# Patient Record
Sex: FEMALE | Race: WHITE | HISPANIC OR LATINO | Employment: FULL TIME | URBAN - METROPOLITAN AREA
[De-identification: names, ages, dates, MRNs, and addresses within clinical notes are randomized per-mention and may not be internally consistent; named-entity substitution may affect disease eponyms.]

---
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
## 2018-01-08 ENCOUNTER — OFFICE VISIT (OUTPATIENT)
Dept: NEUROLOGY | Facility: CLINIC | Age: 18
End: 2018-01-08
Payer: MEDICAID

## 2018-01-08 ENCOUNTER — TELEPHONE (OUTPATIENT)
Dept: NEUROLOGY | Facility: CLINIC | Age: 18
End: 2018-01-08

## 2018-01-08 VITALS
WEIGHT: 129.88 LBS | SYSTOLIC BLOOD PRESSURE: 103 MMHG | DIASTOLIC BLOOD PRESSURE: 70 MMHG | HEART RATE: 68 BPM | BODY MASS INDEX: 21.64 KG/M2 | HEIGHT: 65 IN

## 2018-01-08 DIAGNOSIS — G43.009 MIGRAINE WITHOUT AURA AND WITHOUT STATUS MIGRAINOSUS, NOT INTRACTABLE: ICD-10-CM

## 2018-01-08 DIAGNOSIS — R56.9 SEIZURE-LIKE ACTIVITY: ICD-10-CM

## 2018-01-08 DIAGNOSIS — R56.9 SEIZURES: Primary | ICD-10-CM

## 2018-01-08 PROCEDURE — 99214 OFFICE O/P EST MOD 30 MIN: CPT | Mod: PBBFAC,PO | Performed by: PSYCHIATRY & NEUROLOGY

## 2018-01-08 PROCEDURE — 99204 OFFICE O/P NEW MOD 45 MIN: CPT | Mod: S$PBB,,, | Performed by: PSYCHIATRY & NEUROLOGY

## 2018-01-08 PROCEDURE — 99999 PR PBB SHADOW E&M-EST. PATIENT-LVL IV: CPT | Mod: PBBFAC,,, | Performed by: PSYCHIATRY & NEUROLOGY

## 2018-01-08 NOTE — PROGRESS NOTES
[facility] NEUROLOGY  [facility] [place]    Date: 18  Patient Name: Vanessa Sinclair   MRN: [id number]   PCP: Tamra Arora  Referring Provider: No ref. provider found    Assessment:      This is Vanessa Sinclair, 17 y.o. female who presents ***       Plan:      Problem List Items Addressed This Visit     None             I completed education on seizure first aid and safety. I recommended seizure precautions with regards to avoiding unsupervised water recreational activity or bathing in tubs, climbing or working at heights, operation of heavy or dangerous machinery, caution around fire and sources of high heat, as well as any other activity which could put a patient at danger in case of a seizure.  I also reviewed the [facility] law and recommended ***.    As the patient is a female of childbearing age, I have counseled the patient on issues pertaining to pregnancy and epilepsy and recommended ***.    Odilon Brown MD  [facility]   Department of Neurology    Patient note was created using Dragon Dictation.  Any errors in syntax or even information may not have been identified and edited on initial review prior to signing this note.  Subjective:   Patient seen in consultation at the request of Dr. Shankar** for the evaluation of ***.  A copy of this note will be sent to the referring physician.        HPI:   Ms. Vanessa Sinclair is a 17 y.o. female who presents with a chief complaint of ***    Seizure Type: ***  Seizure Etiology:   Current AEDs:     The patient {is/is not:} accompanied by family who contribute to the history. This patient has *** types of seizure as described below. The patient reports having seizures for {gen duration 3:624997}. The patient reports to have {new/improving/stable/worsenin} seizure control. The seizure frequency is {frequency:3847914382}. The last seizure was *** . The patient reports {No/  **:07586} side effects from seizure  "medication.     Seizure Type 1: ***  Seizure Description:   Aura:   Associated Symptoms:  Seizure Frequency:  Last seizure:     Seizure Type 2: ***  Seizure Description:   Aura:   Associated Symptoms:  Seizure Frequency:  Last seizure:     Seizure Type 3: ***  Seizure Description:   Aura:   Associated Symptoms:  Seizure Frequency:  Last seizure:     Handedness: {handedness:581933}  Seizure Triggers/ Provoking Features: {Blank multiple:79441::"sleep deprivation","stress","photic stimulation","hyperventilation","missed medications","illness/medical problems","menses"}   Seizure Onset Age: ***  Seizure/ Epilepsy Risk Factors: {HW Epilepsy Risk Factors:64499}  Birth/Developmental History: {OMFS Birth History:7523218648} and {gen normal/delayed:196666} developmental history  Previous Seizure Medications: {prior AEDs:61190}  Other Treatments:  Episodes of Status:   Recent Med Changes:  Psychiatric/Behavioral Comorbitidies:  Surgical Candidacy:     Prior Studies:  EEG : ***  vEEG/ EMU evaluation:   MRI of brain:  Other studies:  AED levels:   CT/CTA Scan:   PET Scan:  Neuropsychological Evaluation:  DEXA Scan:    sz per month 2012 2013 2014 2015 2016 2017   Alvarez         Feb         Mar         Apr         May         Meet              Jul         Aug         Sep         Oct         Nov         Dec         Tot           PAST MEDICAL HISTORY:  Past Medical History:   Diagnosis Date    Asthma     Headache(784.0)     Seizures        PAST SURGICAL HISTORY:  Past Surgical History:   Procedure Laterality Date    WISDOM TOOTH EXTRACTION         CURRENT MEDS:  Current Outpatient Prescriptions   Medication Sig Dispense Refill    BLISOVI 24 FE 1 mg-20 mcg (24)/75 mg (4) per tablet Take 1 tablet by mouth once daily.      ergocalciferol (ERGOCALCIFEROL) 50,000 unit Cap Take 1 capsule (50,000 Units total) by mouth every 7 days. 12 capsule 3    ethosuximide (ZARONTIN) 250 mg Cap 2 capsules once daily 60 capsule 5    naproxen " (NAPROSYN) 500 MG tablet Take 1 tablet (500 mg total) by mouth 2 (two) times daily as needed (TAKE 1 PO BID PRN PAIN). 20 tablet 0    tizanidine (ZANAFLEX) 4 MG tablet Take 1 tablet (4 mg total) by mouth every 8 (eight) hours. Take 1 po q 8 hrs prn muscle spasm 15 tablet 0    venlafaxine (EFFEXOR) 25 MG Tab Take 1 tab PO q day x 3 ds, then take 1 tab bid. 60 tablet 3    zonisamide (ZONEGRAN) 100 MG Cap Take 4 capsules (400 mg total) by mouth once daily. 120 capsule 5     No current facility-administered medications for this visit.        ALLERGIES:  Review of patient's allergies indicates:   Allergen Reactions    Lamictal  [lamotrigine]      Other reaction(s): HIVES    Cefzil [cefprozil] Rash    Demerol [meperidine] Rash     Other reaction(s): Rash    Pcn [penicillins] Rash     Other reaction(s): Rash       FAMILY HISTORY:  Family History   Problem Relation Age of Onset    Stroke Maternal Grandfather     Heart disease Maternal Grandfather     Irregular heart beat Sister     Arthritis Paternal Grandmother        SOCIAL HISTORY:  Social History   Substance Use Topics    Smoking status: Never Smoker    Smokeless tobacco: Not on file    Alcohol use Not on file       Review of Systems:  12 review of systems is negative except for the symptoms mentioned in HPI.        Objective:   There were no vitals filed for this visit.    General: NAD, well nourished   Eyes: no tearing, discharge, no erythema   ENT: moist mucous membranes of the oral cavity, nares patent    Neck: Supple, Full range of motion  Cardiovascular: Warm and well perfused, pulses equal and symmetrical  Lungs: Normal work of breathing, normal chest wall excursions  Skin: No rash, lesions, or breakdown on exposed skin  Psychiatry: Mood and affect are appropriate   Abdomen: soft, non tender, non distended  Extremeties: No cyanosis, clubbing or edema.    Neurological   MENTAL STATUS: Alert and oriented to person, place, and time. Attention and  concentration within normal limits. Speech without dysarthria, able to name and repeat without difficulty. Recent and remote memory within normal limits   CRANIAL NERVES: Visual fields intact. PERRL. EOMI. Facial sensation intact. Face symmetrical. Hearing grossly intact. Full shoulder shrug bilaterally. Tongue protrudes midline   SENSORY: Sensation is intact to {sensation:14929} throughout.  Joint position perception intact. Negative Romberg.   MOTOR: Normal bulk and tone. No pronator drift.  5/5 deltoid, biceps, triceps, interosseous, hand  bilaterally. 5/5 iliopsoas, knee extension/flexion, foot dorsi/plantarflexion bilaterally.    REFLEXES: Symmetric and 2+ throughout. Toes down going bilaterally.   CEREBELLAR/COORDINATION/GAIT: Gait steady with normal arm swing and stride length.  Heel to shin intact. Finger to nose intact. Normal rapid alternating movements. ***

## 2018-01-08 NOTE — PATIENT INSTRUCTIONS
Electroencephalography (EEG)    Electroencephalography (EEG) is a test that measures your brain wave activity. It is used to assess your brain function. Brain cells (or neurons) communicate by producing electrical signals. These signals are measured by the EEG and any abnormalities are detected.  The EEG is safe and painless.  What is EEG used for?  Your doctor may order this test to check for seizures or other brain problems. For this test, several small metal disks (electrodes) are attached to the scalp with adhesives, or with water-based gel or paste. During the test, wavy lines (waveforms) appear on a screen or on paper. They will be studied to assess your brain function. In some people who are prone to seizures, parts of this test may slightly increase their chance of having a seizure. Sometimes it is necessary to repeat an EEG with sleep deprivation. EEG may be performed in a doctor's office or a hospital lab. The test typically takes less than an h our, although much of the time is spent attaching the electrodes. Sometimes, the electrodes are left on for several hours or days so that the EEG test can record brain waves for a longer periods of time. In these cases, you may need to stay in the hospital or can go home with a portable EEG recorder.  Before your test  Prepare for your test as instructed. Wash and dry your hair. But, don't use any hairstyling products. Your scalp and hair should be clean and free of excess oil. Take your routine medications, unless told not to. You may be asked to sleep during the EEG. To help you do this, you may be told to stay up all or part of the night before the test. Or, you may be given medication to help you sleep during the test. If so, someone will need to drive you home after the test. Your test will take about 60 minutes. Arrive with enough time to check in.  My next appointment is:  ______________________________   For your safety and for the success of your test,  tell the technologist about:  · Any medications or herbs you take  · Any seizures you may have had in the past   Date Last Reviewed: 10/19/2015  © 4573-8734 [employer]. [address] [place] [address], [place] [address]. All rights reserved. This information is not intended as a substitute for professional medical care. Always follow your healthcare professional's instructions.

## 2018-01-08 NOTE — TELEPHONE ENCOUNTER
Spoke to Johanna, pts mother, and scheduled EMU 2/9 per Dr Brown. Will contact pt and mother prior to with update concerning admission. Mother verbalized understanding.

## 2018-01-08 NOTE — PROGRESS NOTES
[facility] NEUROLOGY  [facility] [place]    Date: 1/8/18  Patient Name: Vanessa Sinclair   MRN: [id number]   PCP: Tamra Arora  Referring Provider: No ref. provider found    Assessment:      This is Vanessa Sinclair, 17 y.o. female who presents with seizure-like episodes.  Per history, the patient's events do not appear expressly consistent with seizure.  While treated as epilepsy.  He has continued to have events despite increasing doses of 2 antiepileptic drugs.  Per history, the episodes sound more consistent with vasovagal syncope, cataplexy, or potentially psychogenic events , particularly given the patient's numerous normal EEGs including a normal 72 hour study (that did not capture a typical event).  Favor admission epilepsy monitoring unit to capture typical events and for possible taper off of antiepileptic therapy. Will have patient hold AEDs on day prior to admission and perform lab draws and expose to other event triggers while in house. May consider tilt table study.      Plan:       Problem List Items Addressed This Visit        Neuro    Seizure-like activity    Current Assessment & Plan     -- arranging EMU admission         Migraine headache    Current Assessment & Plan     -- has recently started Effexor for prophylaxsis, will continue             I completed education on seizure first aid and safety. I recommended seizure precautions with regards to avoiding unsupervised water recreational activity or bathing in tubs, climbing or working at heights, operation of heavy or dangerous machinery, caution around fire and sources of high heat, as well as any other activity which could put a patient at danger in case of a seizure.  I also reviewed the [facility] law and recommended that the patient not drive for 6 months following a breakthrough event.    As the patient is a female of childbearing age, I have counseled the patient on issues pertaining to pregnancy and epilepsy and  "recommended that she take 4 mg folic acid daily while on AED therapy.    I spent a total of 50 minutes in face to face time with the patient, over half of which was spent on counseling and education about the patient's diagnosis and medications.     Odilon Brown MD  [facility]   Department of Neurology    Patient note was created using Dragon Dictation.  Any errors in syntax or even information may not have been identified and edited on initial review prior to signing this note.  Subjective:   Patient seen in consultation at the request of Dr. Bret Brown for the evaluation of reported seizures.  A copy of this note will be sent to the referring physician.      HPI:   Ms. Vanessa Sinclair is a 17 y.o. female who presents with a chief complaint of a long-standing reported seizure disorder.  The patient presents today with her mother who contributes to the history.  The patient reports that she began experiencing seizures at approximately age 8.  She states that she was at a birthday party when a little boy fell and "cracked his head open" on a video game machine.  The patient and her mother state that the scene was very grew some and there was "blood everywhere".  The patient abruptly lost consciousness and frontal to the ground before jerking slightly.  Her mother states that the patient's mouth appeared to go to "one-sided".  Together they deny any associated tongue biting or incontinence and states the patient rapidly regained consciousness within the course of a few minutes, but states that she was fatigued after the episode.      Since that time, she has experienced episodes approximately monthly almost triggered by seeing blood or injuries, having her blood drawn or getting injections, violent movies, or periods of high stress or excitement.  The patient states that she gets a prodrome of feeling lightheaded and "like she is going to pass out" before losing consciousness.  Her mother states " "that occasionally, the jerking that accompanies the episode is quite pronounced.  She is never unconscious for more than a few minutes at a time before fully returning to baseline, aside from feeling tired. She states her last episode occurred in May 2017 during her graduation when she became extremely nervous.    She has been followed and extensively worked up by Dr. Pino of [facility], who has performed multiple routine EEGs as well as a 72 hour ambulatory EEG.  All of the studies were normal.  However, she has never had an event captured while on EEG.  She has been maintained on increasing doses of ethosuximide and Zonegran, which her mother states have been escalated over the years.  The patient and her mother note that the meds make her feel "drugged" and have caused her performance in school to deteriorate.  The patient states that she always feels as though she is working "in a daze."  She is currently attending [employer].  She does state that she has seen a cardiologist in the past and was told that her evaluation with the cardiologist was normal.  She was told to increase her salt and fluid intake.    Seizure Type: Unknown  Seizure Etiology: Unknown  Current AEDs: Zonegran 400 mg daily, Ethosuxamide 500 mg daily    The patient is accompanied by family who contribute to the history. This patient has 1 types of seizure as described below. The patient reports having seizures for years. The patient reports to have stable seizure control. The seizure frequency is rarely currently. The last seizure was May 2017 . The patient reports overse side effects from seizure medication.    Seizure Type 1:   Seizure Description: Sees blood or a cut or an action movie, gets anxious, stressed, or excited, feels lightheaded and "like she's going to pass out" before losing consciousness, jerking all over, her mouth "goes to one side"   Aura: Feels "like she's going to pass out"  Associated Symptoms: " No tongue biting or incontinence  Seizure Frequency: None since May 2017, prior to that 1 per month  Last seizure: May 2017    Seizure Triggers/ Provoking Features: seeing blood/violence/injuries and stress   Seizure Onset Age: 8  Seizure/ Epilepsy Risk Factors: Childhood seizure  Birth/Developmental History: Normal birth and delayed developmental history  Previous Seizure Medications: Ethosuxamide, Zonegran  Other Treatments: None  Episodes of Status: None  Recent Med Changes: None  Psychiatric/Behavioral Comorbitidies: Depression  Surgical Candidacy: n/a    Prior Studies:  EEG : , 4/15, 10/15, , 3/17- WNL,   Ambulatory EE/17- 72 hours, WNL, no events captured  vEEG/ EMU evaluation: Not done  MRI of brain:Normal  (personally reviewed)  Other studies: Not done  AED levels: Zonegran ,   CT/CTA Scan:  Not done  PET Scan: Not done  Neuropsychological Evaluation: Not done  DEXA Scan: Not done     PAST MEDICAL HISTORY:  Past Medical History:   Diagnosis Date    Asthma     Headache(784.0)     Seizures      PAST SURGICAL HISTORY:  Past Surgical History:   Procedure Laterality Date    WISDOM TOOTH EXTRACTION       CURRENT MEDS:  Current Outpatient Prescriptions   Medication Sig Dispense Refill    BLISOVI 24 FE 1 mg-20 mcg (24)/75 mg (4) per tablet Take 1 tablet by mouth once daily.      ergocalciferol (ERGOCALCIFEROL) 50,000 unit Cap Take 1 capsule (50,000 Units total) by mouth every 7 days. 12 capsule 3    ethosuximide (ZARONTIN) 250 mg Cap 2 capsules once daily 60 capsule 5    naproxen (NAPROSYN) 500 MG tablet Take 1 tablet (500 mg total) by mouth 2 (two) times daily as needed (TAKE 1 PO BID PRN PAIN). 20 tablet 0    tizanidine (ZANAFLEX) 4 MG tablet Take 1 tablet (4 mg total) by mouth every 8 (eight) hours. Take 1 po q 8 hrs prn muscle spasm 15 tablet 0    venlafaxine (EFFEXOR) 25 MG Tab Take 1 tab PO q day x 3 ds, then take 1 tab bid. 60 tablet 3    zonisamide (ZONEGRAN) 100 MG Cap Take  4 capsules (400 mg total) by mouth once daily. 120 capsule 5     No current facility-administered medications for this visit.      ALLERGIES:  Review of patient's allergies indicates:   Allergen Reactions    Lamictal  [lamotrigine]      Other reaction(s): HIVES    Cefzil [cefprozil] Rash    Demerol [meperidine] Rash     Other reaction(s): Rash    Pcn [penicillins] Rash     Other reaction(s): Rash     FAMILY HISTORY:  Family History   Problem Relation Age of Onset    Stroke Maternal Grandfather     Heart disease Maternal Grandfather     Irregular heart beat Sister     Arthritis Paternal Grandmother        SOCIAL HISTORY:  Social History   Substance Use Topics    Smoking status: Never Smoker    Smokeless tobacco: Not on file    Alcohol use Not on file       Review of Systems:  12 review of systems is negative except for the symptoms mentioned in HPI.        Objective:     Vitals:    01/08/18 1026   BP: 103/70   Pulse: 68     General: NAD, well nourished   Eyes: no tearing, discharge, no erythema   ENT: moist mucous membranes of the oral cavity, nares patent    Neck: Supple, Full range of motion  Cardiovascular: Warm and well perfused, pulses equal and symmetrical  Lungs: Normal work of breathing, normal chest wall excursions  Skin: No rash, lesions, or breakdown on exposed skin  Psychiatry: Mood and affect are appropriate   Abdomen: soft, non tender, non distended  Extremeties: No cyanosis, clubbing or edema.    Neurological   MENTAL STATUS: Alert and oriented to person, place, and time. Attention and concentration within normal limits. Speech without dysarthria, able to name and repeat without difficulty. Recent and remote memory within normal limits   CRANIAL NERVES: Visual fields intact. PERRL. EOMI. Facial sensation intact. Face symmetrical. Hearing grossly intact. Full shoulder shrug bilaterally. Tongue protrudes midline   SENSORY: Sensation is intact to light touch throughout.    MOTOR: Normal bulk  and tone. 5/5 deltoid, biceps, triceps, interosseous, hand  bilaterally. 5/5 iliopsoas, knee extension/flexion, foot dorsi/plantarflexion bilaterally.    REFLEXES: Symmetric and 2+ throughout.   CEREBELLAR/COORDINATION/GAIT: Gait steady with normal arm swing and stride length.  Finger to nose intact. Normal rapid alternating movements.

## 2018-01-08 NOTE — TELEPHONE ENCOUNTER
----- Message from Odilon Brown MD sent at 1/8/2018 10:21 AM CST -----  Needs EMU Admit. Syncope vs. PNEE vs. Seizure. Contact is mom [phone]

## 2018-02-08 ENCOUNTER — TELEPHONE (OUTPATIENT)
Dept: NEUROLOGY | Facility: CLINIC | Age: 18
End: 2018-02-08

## 2018-02-08 NOTE — TELEPHONE ENCOUNTER
I spoke w/pt. She will be here for emu tomorrow. I emailed her the instructions w/my phone number any further questions or concerns

## 2018-02-09 ENCOUNTER — HOSPITAL ENCOUNTER (INPATIENT)
Facility: HOSPITAL | Age: 18
LOS: 3 days | Discharge: HOME | DRG: 884 | End: 2018-02-12
Attending: PSYCHIATRY & NEUROLOGY | Admitting: PSYCHIATRY & NEUROLOGY
Payer: MEDICAID

## 2018-02-09 DIAGNOSIS — R56.9 SEIZURES: ICD-10-CM

## 2018-02-09 DIAGNOSIS — R56.9 SEIZURE-LIKE ACTIVITY: Primary | ICD-10-CM

## 2018-02-09 DIAGNOSIS — G40.219 COMPLEX PARTIAL EPILEPSY WITH GENERALIZATION AND WITH INTRACTABLE EPILEPSY: ICD-10-CM

## 2018-02-09 LAB
ALBUMIN SERPL BCP-MCNC: 3.9 G/DL
ALP SERPL-CCNC: 57 U/L
ALT SERPL W/O P-5'-P-CCNC: 9 U/L
AMPHET+METHAMPHET UR QL: NEGATIVE
ANION GAP SERPL CALC-SCNC: 9 MMOL/L
AST SERPL-CCNC: 12 U/L
BARBITURATES UR QL SCN>200 NG/ML: NEGATIVE
BASOPHILS # BLD AUTO: 0.04 K/UL
BASOPHILS NFR BLD: 0.6 %
BENZODIAZ UR QL SCN>200 NG/ML: NEGATIVE
BILIRUB SERPL-MCNC: 0.1 MG/DL
BUN SERPL-MCNC: 12 MG/DL
BZE UR QL SCN: NEGATIVE
CALCIUM SERPL-MCNC: 9.3 MG/DL
CANNABINOIDS UR QL SCN: NEGATIVE
CHLORIDE SERPL-SCNC: 109 MMOL/L
CO2 SERPL-SCNC: 22 MMOL/L
CREAT SERPL-MCNC: 0.9 MG/DL
CREAT UR-MCNC: 64 MG/DL
DIFFERENTIAL METHOD: ABNORMAL
EOSINOPHIL # BLD AUTO: 0.1 K/UL
EOSINOPHIL NFR BLD: 1.4 %
ERYTHROCYTE [DISTWIDTH] IN BLOOD BY AUTOMATED COUNT: 12.7 %
EST. GFR  (AFRICAN AMERICAN): >60 ML/MIN/1.73 M^2
EST. GFR  (NON AFRICAN AMERICAN): >60 ML/MIN/1.73 M^2
GLUCOSE SERPL-MCNC: 84 MG/DL
HCT VFR BLD AUTO: 43.7 %
HGB BLD-MCNC: 14.5 G/DL
IMM GRANULOCYTES # BLD AUTO: 0.01 K/UL
IMM GRANULOCYTES NFR BLD AUTO: 0.2 %
LYMPHOCYTES # BLD AUTO: 2.6 K/UL
LYMPHOCYTES NFR BLD: 40.1 %
MCH RBC QN AUTO: 31.2 PG
MCHC RBC AUTO-ENTMCNC: 33.2 G/DL
MCV RBC AUTO: 94 FL
METHADONE UR QL SCN>300 NG/ML: NEGATIVE
MONOCYTES # BLD AUTO: 0.5 K/UL
MONOCYTES NFR BLD: 8.1 %
NEUTROPHILS # BLD AUTO: 3.2 K/UL
NEUTROPHILS NFR BLD: 49.6 %
NRBC BLD-RTO: 0 /100 WBC
OPIATES UR QL SCN: NEGATIVE
PCP UR QL SCN>25 NG/ML: NEGATIVE
PLATELET # BLD AUTO: 213 K/UL
PMV BLD AUTO: 12.5 FL
POTASSIUM SERPL-SCNC: 3.7 MMOL/L
PROT SERPL-MCNC: 7.1 G/DL
RBC # BLD AUTO: 4.65 M/UL
SODIUM SERPL-SCNC: 140 MMOL/L
TOXICOLOGY INFORMATION: NORMAL
WBC # BLD AUTO: 6.39 K/UL

## 2018-02-09 PROCEDURE — 80168 ASSAY OF ETHOSUXIMIDE: CPT

## 2018-02-09 PROCEDURE — 95951 HC EEG MONITORING/VIDEO RECORD: CPT

## 2018-02-09 PROCEDURE — 36415 COLL VENOUS BLD VENIPUNCTURE: CPT

## 2018-02-09 PROCEDURE — 25000003 PHARM REV CODE 250: Performed by: PSYCHIATRY & NEUROLOGY

## 2018-02-09 PROCEDURE — 99223 1ST HOSP IP/OBS HIGH 75: CPT | Mod: ,,, | Performed by: PSYCHIATRY & NEUROLOGY

## 2018-02-09 PROCEDURE — 85025 COMPLETE CBC W/AUTO DIFF WBC: CPT

## 2018-02-09 PROCEDURE — 80053 COMPREHEN METABOLIC PANEL: CPT

## 2018-02-09 PROCEDURE — 80203 DRUG SCREEN QUANT ZONISAMIDE: CPT

## 2018-02-09 PROCEDURE — 80307 DRUG TEST PRSMV CHEM ANLYZR: CPT

## 2018-02-09 PROCEDURE — 20600001 HC STEP DOWN PRIVATE ROOM

## 2018-02-09 RX ORDER — DOCUSATE SODIUM 100 MG/1
100 CAPSULE, LIQUID FILLED ORAL 2 TIMES DAILY
Status: DISCONTINUED | OUTPATIENT
Start: 2018-02-09 | End: 2018-02-09

## 2018-02-09 RX ORDER — SODIUM CHLORIDE 0.9 % (FLUSH) 0.9 %
3 SYRINGE (ML) INJECTION
Status: DISCONTINUED | OUTPATIENT
Start: 2018-02-09 | End: 2018-02-12 | Stop reason: HOSPADM

## 2018-02-09 RX ORDER — VENLAFAXINE 25 MG/1
25 TABLET ORAL 2 TIMES DAILY
Status: DISCONTINUED | OUTPATIENT
Start: 2018-02-09 | End: 2018-02-12 | Stop reason: HOSPADM

## 2018-02-09 RX ORDER — ONDANSETRON 8 MG/1
8 TABLET, ORALLY DISINTEGRATING ORAL EVERY 8 HOURS PRN
Status: DISCONTINUED | OUTPATIENT
Start: 2018-02-09 | End: 2018-02-12 | Stop reason: HOSPADM

## 2018-02-09 RX ORDER — DIAZEPAM 5 MG/ML
5 INJECTION, SOLUTION INTRAMUSCULAR; INTRAVENOUS
Status: DISCONTINUED | OUTPATIENT
Start: 2018-02-09 | End: 2018-02-12 | Stop reason: HOSPADM

## 2018-02-09 RX ORDER — ACETAMINOPHEN 325 MG/1
650 TABLET ORAL EVERY 4 HOURS PRN
Status: DISCONTINUED | OUTPATIENT
Start: 2018-02-09 | End: 2018-02-12 | Stop reason: HOSPADM

## 2018-02-09 RX ORDER — TIZANIDINE 2 MG/1
4 TABLET ORAL EVERY 8 HOURS PRN
Status: DISCONTINUED | OUTPATIENT
Start: 2018-02-09 | End: 2018-02-12 | Stop reason: HOSPADM

## 2018-02-09 RX ADMIN — VENLAFAXINE 25 MG: 25 TABLET ORAL at 09:02

## 2018-02-09 NOTE — SUBJECTIVE & OBJECTIVE
Past Medical History:   Diagnosis Date    Asthma     Headache(784.0)     Seizures        Past Surgical History:   Procedure Laterality Date    WISDOM TOOTH EXTRACTION         Review of patient's allergies indicates:   Allergen Reactions    Lamictal  [lamotrigine]      Other reaction(s): HIVES    Cefzil [cefprozil] Rash    Demerol [meperidine] Rash     Other reaction(s): Rash    Pcn [penicillins] Rash     Other reaction(s): Rash       No current facility-administered medications on file prior to encounter.      Current Outpatient Prescriptions on File Prior to Encounter   Medication Sig    BLISOVI 24 FE 1 mg-20 mcg (24)/75 mg (4) per tablet Take 1 tablet by mouth once daily.    ergocalciferol (ERGOCALCIFEROL) 50,000 unit Cap Take 1 capsule (50,000 Units total) by mouth every 7 days.    ethosuximide (ZARONTIN) 250 mg Cap 2 capsules once daily    naproxen (NAPROSYN) 500 MG tablet Take 1 tablet (500 mg total) by mouth 2 (two) times daily as needed (TAKE 1 PO BID PRN PAIN).    tizanidine (ZANAFLEX) 4 MG tablet Take 1 tablet (4 mg total) by mouth every 8 (eight) hours. Take 1 po q 8 hrs prn muscle spasm    venlafaxine (EFFEXOR) 25 MG Tab Take 1 tab PO q day x 3 ds, then take 1 tab bid.    zonisamide (ZONEGRAN) 100 MG Cap Take 4 capsules (400 mg total) by mouth once daily.     Continuous Infusions:    Family History     Problem Relation (Age of Onset)    Arthritis Paternal Grandmother    Heart disease Maternal Grandfather    Irregular heart beat Sister    Stroke Maternal Grandfather        Social History Main Topics    Smoking status: Never Smoker    Smokeless tobacco: Not on file    Alcohol use Not on file    Drug use: Unknown    Sexual activity: Not on file     Review of Systems   Constitutional: Negative for activity change, appetite change, fatigue and fever.   HENT: Negative for ear discharge, ear pain, postnasal drip, sinus pain and sinus pressure.    Eyes: Negative for pain, discharge and  itching.   Respiratory: Negative for choking, chest tightness and shortness of breath.    Cardiovascular: Negative for chest pain and palpitations.   Gastrointestinal: Negative for abdominal distention, abdominal pain, constipation, diarrhea and nausea.   Endocrine: Negative for cold intolerance and heat intolerance.   Genitourinary: Negative for frequency and urgency.   Musculoskeletal: Negative for back pain, gait problem and joint swelling.   Skin: Negative for color change.   Neurological: Positive for seizures. Negative for dizziness, tremors, facial asymmetry, speech difficulty, weakness and numbness.   Psychiatric/Behavioral: Negative for agitation, behavioral problems and confusion.     Objective:     Vital Signs (Most Recent):  Temp: 97.9 °F (36.6 °C) (02/09/18 1515)  Pulse: 62 (02/09/18 1515)  Resp: 20 (02/09/18 1515)  BP: 115/79 (02/09/18 1515)  SpO2: 100 % (02/09/18 1515) Vital Signs (24h Range):  Temp:  [97.9 °F (36.6 °C)] 97.9 °F (36.6 °C)  Pulse:  [62] 62  Resp:  [20] 20  SpO2:  [100 %] 100 %  BP: (115)/(79) 115/79        There is no height or weight on file to calculate BMI.    Physical Exam   Constitutional: She is oriented to person, place, and time. She appears well-developed and well-nourished.   HENT:   Head: Normocephalic and atraumatic.   Eyes: EOM are normal. Pupils are equal, round, and reactive to light.   Neck: Normal range of motion. Neck supple.   Cardiovascular: Normal rate, regular rhythm and normal heart sounds.    Pulmonary/Chest: Effort normal and breath sounds normal.   Abdominal: Soft. Bowel sounds are normal.   Musculoskeletal: Normal range of motion.   Neurological: She is oriented to person, place, and time. She has a normal Finger-Nose-Finger Test and a normal Heel to Shin Test. Gait normal.   Reflex Scores:       Tricep reflexes are 2+ on the right side and 2+ on the left side.       Bicep reflexes are 2+ on the right side and 2+ on the left side.       Brachioradialis  reflexes are 2+ on the right side and 2+ on the left side.       Patellar reflexes are 2+ on the right side and 2+ on the left side.       Achilles reflexes are 2+ on the right side and 2+ on the left side.  Skin: Skin is warm.   Psychiatric: She has a normal mood and affect. Her speech is normal and behavior is normal.       NEUROLOGICAL EXAMINATION:     MENTAL STATUS   Oriented to person, place, and time.   Attention: normal. Concentration: normal.   Speech: speech is normal   Level of consciousness: alert  Knowledge: good.     CRANIAL NERVES     CN II   Visual fields full to confrontation.     CN III, IV, VI   Pupils are equal, round, and reactive to light.  Extraocular motions are normal.   CN III: no CN III palsy  CN VI: no CN VI palsy  Nystagmus: none   Diplopia: none  Ophthalmoparesis: none    CN V   Facial sensation intact.     CN VII   Facial expression full, symmetric.     CN VIII   CN VIII normal.     CN IX, X   CN IX normal.   CN X normal.     CN XI   CN XI normal.     CN XII   CN XII normal.     MOTOR EXAM   Muscle bulk: normal  Overall muscle tone: normal    Strength   Right neck flexion: 5/5  Left neck flexion: 5/5  Right neck extension: 5/5  Left neck extension: 5/5  Right deltoid: 5/5  Left deltoid: 5/5  Right biceps: 5/5  Left biceps: 5/5  Right triceps: 5/5  Left triceps: 5/5  Right wrist flexion: 5/5  Left wrist flexion: 5/5  Right wrist extension: 5/5  Left wrist extension: 5/5  Right interossei: 5/5  Left interossei: 5/5  Right abdominals: 5/5  Left abdominals: 5/5  Right iliopsoas: 5/5  Left iliopsoas: 5/5  Right quadriceps: 5/5  Left quadriceps: 5/5  Right hamstrin/5  Left hamstrin/5  Right glutei: 5/5  Left glutei: 5/5  Right anterior tibial: 5/5  Left anterior tibial: 5/5  Right posterior tibial: 5/5  Left posterior tibial: 5/5  Right peroneal: 5/5  Left peroneal: 5/5  Right gastroc: 5/5  Left gastroc: 5/5    REFLEXES     Reflexes   Right brachioradialis: 2+  Left brachioradialis:  2+  Right biceps: 2+  Left biceps: 2+  Right triceps: 2+  Left triceps: 2+  Right patellar: 2+  Left patellar: 2+  Right achilles: 2+  Left achilles: 2+  Right plantar: normal  Left plantar: normal    SENSORY EXAM   Light touch normal.   Proprioception normal.   Pinprick normal.     GAIT AND COORDINATION     Gait  Gait: normal     Coordination   Finger to nose coordination: normal  Heel to shin coordination: normal      Significant Labs: CBC: No results for input(s): WBC, HGB, HCT, PLT in the last 48 hours.  CMP: No results for input(s): GLU, NA, K, CL, CO2, BUN, CREATININE, CALCIUM, MG, PROT, ALBUMIN, BILITOT, ALKPHOS, AST, ALT, ANIONGAP, EGFRNONAA in the last 48 hours.    Significant Studies: I have reviewed and interpreted all pertinent imaging results/findings within the past 24 hours.

## 2018-02-09 NOTE — HOSPITAL COURSE
2/9-10/2018:  Normal EEG, transient unresponsiveness  2/10-11/2018:  Normal EEG, no events  2/11/2018: No events in last 24 hours: EEG WNL.

## 2018-02-09 NOTE — H&P
"[facility]  Neurology-Epilepsy  History & Physical    Patient Name: Vanessa Sinclair  MRN: [id number]   Admission Date: 2/9/2018  Code Status: Full Code   Attending Provider: Matthew Carrasco MD   Primary Care Physician: Tamra Arora MD  Principal Problem:<principal problem not specified>    Subjective:     Chief Complaint:  Seizure like activity      HPI:   Interval History:  She reported starring spells every day. Last generalized convulsions was in May 2017.     HPI:   Patient seen in consultation at the request of Dr. Bret Brown for the evaluation of reported seizures.  A copy of this note will be sent to the referring physician.   HPI:   Ms. Vanessa Sinclair is a 17 y.o. female who presents with a chief complaint of a long-standing reported seizure disorder.  The patient presents today with her mother who contributes to the history.  The patient reports that she began experiencing seizures at approximately age 8.  She states that she was at a birthday party when a little boy fell and "cracked his head open" on a video game machine.  The patient and her mother state that the scene was very grew some and there was "blood everywhere".  The patient abruptly lost consciousness and frontal to the ground before jerking slightly.  Her mother states that the patient's mouth appeared to go to "one-sided".  Together they deny any associated tongue biting or incontinence and states the patient rapidly regained consciousness within the course of a few minutes, but states that she was fatigued after the episode.       Since that time, she has experienced episodes approximately monthly almost triggered by seeing blood or injuries, having her blood drawn or getting injections, violent movies, or periods of high stress or excitement.  The patient states that she gets a prodrome of feeling lightheaded and "like she is going to pass out" before losing consciousness.  Her mother states that " "occasionally, the jerking that accompanies the episode is quite pronounced.  She is never unconscious for more than a few minutes at a time before fully returning to baseline, aside from feeling tired. She states her last episode occurred in May 2017 during her graduation when she became extremely nervous.     She has been followed and extensively worked up by Dr. Pino of [facility], who has performed multiple routine EEGs as well as a 72 hour ambulatory EEG.  All of the studies were normal.  However, she has never had an event captured while on EEG.  She has been maintained on increasing doses of ethosuximide and Zonegran, which her mother states have been escalated over the years.  The patient and her mother note that the meds make her feel "drugged" and have caused her performance in school to deteriorate.  The patient states that she always feels as though she is working "in a daze."  She is currently attending [employer].  She does state that she has seen a cardiologist in the past and was told that her evaluation with the cardiologist was normal.  She was told to increase her salt and fluid intake.     Seizure Type: Unknown  Seizure Etiology: Unknown  Current AEDs: Zonegran 400 mg daily, Ethosuxamide 500 mg daily     The patient is accompanied by family who contribute to the history. This patient has 1 types of seizure as described below. The patient reports having seizures for years. The patient reports to have stable seizure control. The seizure frequency is rarely currently. The last seizure was May 2017 . The patient reports overse side effects from seizure medication.     Seizure Type 1:   Seizure Description: Sees blood or a cut or an action movie, gets anxious, stressed, or excited, feels lightheaded and "like she's going to pass out" before losing consciousness, jerking all over, her mouth "goes to one side"   Aura: Feels "like she's going to pass out"  Associated Symptoms: " No tongue biting or incontinence  Seizure Frequency: None since May 2017, prior to that 1 per month  Last seizure: May 2017     Seizure Triggers/ Provoking Features: seeing blood/violence/injuries and stress   Seizure Onset Age: 8  Seizure/ Epilepsy Risk Factors: Childhood seizure  Birth/Developmental History: Normal birth and delayed developmental history  Previous Seizure Medications: Ethosuxamide, Zonegran  Other Treatments: None  Episodes of Status: None  Recent Med Changes: None  Psychiatric/Behavioral Comorbitidies: Depression  Surgical Candidacy: n/a     Prior Studies:  EEG : , 4/15, 10/15, , 3/17- WNL,   Ambulatory EE/17- 72 hours, WNL, no events captured  vEEG/ EMU evaluation: Not done  MRI of brain:Normal  (personally reviewed)  Other studies: Not done  AED levels: Zonegran ,   CT/CTA Scan:  Not done  PET Scan: Not done  Neuropsychological Evaluation: Not done  DEXA Scan: Not done    Past Medical History:   Diagnosis Date    Asthma     Headache(784.0)     Seizures        Past Surgical History:   Procedure Laterality Date    WISDOM TOOTH EXTRACTION         Review of patient's allergies indicates:   Allergen Reactions    Lamictal  [lamotrigine]      Other reaction(s): HIVES    Cefzil [cefprozil] Rash    Demerol [meperidine] Rash     Other reaction(s): Rash    Pcn [penicillins] Rash     Other reaction(s): Rash       No current facility-administered medications on file prior to encounter.      Current Outpatient Prescriptions on File Prior to Encounter   Medication Sig    BLISOVI 24 FE 1 mg-20 mcg (24)/75 mg (4) per tablet Take 1 tablet by mouth once daily.    ergocalciferol (ERGOCALCIFEROL) 50,000 unit Cap Take 1 capsule (50,000 Units total) by mouth every 7 days.    ethosuximide (ZARONTIN) 250 mg Cap 2 capsules once daily    naproxen (NAPROSYN) 500 MG tablet Take 1 tablet (500 mg total) by mouth 2 (two) times daily as needed (TAKE 1 PO BID PRN PAIN).    tizanidine  (ZANAFLEX) 4 MG tablet Take 1 tablet (4 mg total) by mouth every 8 (eight) hours. Take 1 po q 8 hrs prn muscle spasm    venlafaxine (EFFEXOR) 25 MG Tab Take 1 tab PO q day x 3 ds, then take 1 tab bid.    zonisamide (ZONEGRAN) 100 MG Cap Take 4 capsules (400 mg total) by mouth once daily.     Continuous Infusions:    Family History     Problem Relation (Age of Onset)    Arthritis Paternal Grandmother    Heart disease Maternal Grandfather    Irregular heart beat Sister    Stroke Maternal Grandfather        Social History Main Topics    Smoking status: Never Smoker    Smokeless tobacco: Not on file    Alcohol use Not on file    Drug use: Unknown    Sexual activity: Not on file     Review of Systems   Constitutional: Negative for activity change, appetite change, fatigue and fever.   HENT: Negative for ear discharge, ear pain, postnasal drip, sinus pain and sinus pressure.    Eyes: Negative for pain, discharge and itching.   Respiratory: Negative for choking, chest tightness and shortness of breath.    Cardiovascular: Negative for chest pain and palpitations.   Gastrointestinal: Negative for abdominal distention, abdominal pain, constipation, diarrhea and nausea.   Endocrine: Negative for cold intolerance and heat intolerance.   Genitourinary: Negative for frequency and urgency.   Musculoskeletal: Negative for back pain, gait problem and joint swelling.   Skin: Negative for color change.   Neurological: Positive for seizures. Negative for dizziness, tremors, facial asymmetry, speech difficulty, weakness and numbness.   Psychiatric/Behavioral: Negative for agitation, behavioral problems and confusion.     Objective:     Vital Signs (Most Recent):  Temp: 97.9 °F (36.6 °C) (02/09/18 1515)  Pulse: 62 (02/09/18 1515)  Resp: 20 (02/09/18 1515)  BP: 115/79 (02/09/18 1515)  SpO2: 100 % (02/09/18 1515) Vital Signs (24h Range):  Temp:  [97.9 °F (36.6 °C)] 97.9 °F (36.6 °C)  Pulse:  [62] 62  Resp:  [20] 20  SpO2:  [100 %]  100 %  BP: (115)/(79) 115/79        There is no height or weight on file to calculate BMI.    Physical Exam   Constitutional: She is oriented to person, place, and time. She appears well-developed and well-nourished.   HENT:   Head: Normocephalic and atraumatic.   Eyes: EOM are normal. Pupils are equal, round, and reactive to light.   Neck: Normal range of motion. Neck supple.   Cardiovascular: Normal rate, regular rhythm and normal heart sounds.    Pulmonary/Chest: Effort normal and breath sounds normal.   Abdominal: Soft. Bowel sounds are normal.   Musculoskeletal: Normal range of motion.   Neurological: She is oriented to person, place, and time. She has a normal Finger-Nose-Finger Test and a normal Heel to Shin Test. Gait normal.   Reflex Scores:       Tricep reflexes are 2+ on the right side and 2+ on the left side.       Bicep reflexes are 2+ on the right side and 2+ on the left side.       Brachioradialis reflexes are 2+ on the right side and 2+ on the left side.       Patellar reflexes are 2+ on the right side and 2+ on the left side.       Achilles reflexes are 2+ on the right side and 2+ on the left side.  Skin: Skin is warm.   Psychiatric: She has a normal mood and affect. Her speech is normal and behavior is normal.       NEUROLOGICAL EXAMINATION:     MENTAL STATUS   Oriented to person, place, and time.   Attention: normal. Concentration: normal.   Speech: speech is normal   Level of consciousness: alert  Knowledge: good.     CRANIAL NERVES     CN II   Visual fields full to confrontation.     CN III, IV, VI   Pupils are equal, round, and reactive to light.  Extraocular motions are normal.   CN III: no CN III palsy  CN VI: no CN VI palsy  Nystagmus: none   Diplopia: none  Ophthalmoparesis: none    CN V   Facial sensation intact.     CN VII   Facial expression full, symmetric.     CN VIII   CN VIII normal.     CN IX, X   CN IX normal.   CN X normal.     CN XI   CN XI normal.     CN XII   CN XII normal.      MOTOR EXAM   Muscle bulk: normal  Overall muscle tone: normal    Strength   Right neck flexion: 5/5  Left neck flexion: 5/5  Right neck extension: 5/5  Left neck extension: 5/5  Right deltoid: 5/5  Left deltoid: 5/5  Right biceps: 5/5  Left biceps: 5/5  Right triceps: 5/5  Left triceps: 5/5  Right wrist flexion: 5/5  Left wrist flexion: 5/5  Right wrist extension: 5/5  Left wrist extension: 5/5  Right interossei: 5/5  Left interossei: 5/5  Right abdominals: 5/5  Left abdominals: 5/5  Right iliopsoas: 5/5  Left iliopsoas: 5/5  Right quadriceps: 5/5  Left quadriceps: 5/5  Right hamstrin/5  Left hamstrin/5  Right glutei: 5/5  Left glutei: 5/5  Right anterior tibial: 5/5  Left anterior tibial: 5/5  Right posterior tibial: 5/5  Left posterior tibial: 5/5  Right peroneal: 5/5  Left peroneal: 5/5  Right gastroc: 5/5  Left gastroc: 5/5    REFLEXES     Reflexes   Right brachioradialis: 2+  Left brachioradialis: 2+  Right biceps: 2+  Left biceps: 2+  Right triceps: 2+  Left triceps: 2+  Right patellar: 2+  Left patellar: 2+  Right achilles: 2+  Left achilles: 2+  Right plantar: normal  Left plantar: normal    SENSORY EXAM   Light touch normal.   Proprioception normal.   Pinprick normal.     GAIT AND COORDINATION     Gait  Gait: normal     Coordination   Finger to nose coordination: normal  Heel to shin coordination: normal      Significant Labs: CBC: No results for input(s): WBC, HGB, HCT, PLT in the last 48 hours.  CMP: No results for input(s): GLU, NA, K, CL, CO2, BUN, CREATININE, CALCIUM, MG, PROT, ALBUMIN, BILITOT, ALKPHOS, AST, ALT, ANIONGAP, EGFRNONAA in the last 48 hours.    Significant Studies: I have reviewed and interpreted all pertinent imaging results/findings within the past 24 hours.    Assessment and Plan:     Seizure-like activity    This is April Lorrie Sinclair, 17 y.o. female who presents with seizure-like episodes.  Per history, the patient's events do not appear expressly consistent with  seizure. Per history, the episodes sound more consistent with vasovagal syncope, cataplexy, or potentially psychogenic events , particularly given the patient's numerous normal EEGs including a normal 72 hour study (that did not capture a typical event). EMU admission to capture typical events and for possible taper off of antiepileptic therapy.     Plan:  - vEEG monitoring   - Hold all AEDs   - AEDs levels ordered   - May consider tilt table study.   - Consider valium 5 mg PRN only if its GTC > 5mins - hospital medicine to call epilepsy on call before administering   - Continue home medications   - Correct lytes as needed / control infection / avoid hypoxia or hypercapnia  - Case discussed with Dr Carrasco               VTE Risk Mitigation         Ordered     Low Risk of VTE  Once      02/09/18 0105          Gildardo Lambert II, MD  Neurology-Epilepsy  [facility]

## 2018-02-09 NOTE — ASSESSMENT & PLAN NOTE
This is Vanessa Sinclair, 17 y.o. female who presents with seizure-like episodes.  Per history, the patient's events do not appear expressly consistent with seizure. Per history, the episodes sound more consistent with vasovagal syncope, cataplexy, or potentially psychogenic events , particularly given the patient's numerous normal EEGs including a normal 72 hour study (that did not capture a typical event). EMU admission to capture typical events and for possible taper off of antiepileptic therapy.     Plan:  - vEEG monitoring   - Hold all AEDs   - AEDs levels ordered   - May consider tilt table study.   - Consider valium 5 mg PRN only if its GTC > 5mins - hospital medicine to call epilepsy on call before administering   - Continue home medications   - Correct lytes as needed / control infection / avoid hypoxia or hypercapnia  - Case discussed with Dr Carrasco

## 2018-02-09 NOTE — HPI
"Interval History:  She reported starring spells every day. Last generalized convulsions was in May 2017.     HPI:   Patient seen in consultation at the request of Dr. Bret Brown for the evaluation of reported seizures.  A copy of this note will be sent to the referring physician.   HPI:   Ms. Vanessa Sinclair is a 17 y.o. female who presents with a chief complaint of a long-standing reported seizure disorder.  The patient presents today with her mother who contributes to the history.  The patient reports that she began experiencing seizures at approximately age 8.  She states that she was at a birthday party when a little boy fell and "cracked his head open" on a video game machine.  The patient and her mother state that the scene was very grew some and there was "blood everywhere".  The patient abruptly lost consciousness and frontal to the ground before jerking slightly.  Her mother states that the patient's mouth appeared to go to "one-sided".  Together they deny any associated tongue biting or incontinence and states the patient rapidly regained consciousness within the course of a few minutes, but states that she was fatigued after the episode.       Since that time, she has experienced episodes approximately monthly almost triggered by seeing blood or injuries, having her blood drawn or getting injections, violent movies, or periods of high stress or excitement.  The patient states that she gets a prodrome of feeling lightheaded and "like she is going to pass out" before losing consciousness.  Her mother states that occasionally, the jerking that accompanies the episode is quite pronounced.  She is never unconscious for more than a few minutes at a time before fully returning to baseline, aside from feeling tired. She states her last episode occurred in May 2017 during her graduation when she became extremely nervous.     She has been followed and extensively worked up by Dr. Spann' of [facility] " "[facility], who has performed multiple routine EEGs as well as a 72 hour ambulatory EEG.  All of the studies were normal.  However, she has never had an event captured while on EEG.  She has been maintained on increasing doses of ethosuximide and Zonegran, which her mother states have been escalated over the years.  The patient and her mother note that the meds make her feel "drugged" and have caused her performance in school to deteriorate.  The patient states that she always feels as though she is working "in a daze."  She is currently attending [employer].  She does state that she has seen a cardiologist in the past and was told that her evaluation with the cardiologist was normal.  She was told to increase her salt and fluid intake.     Seizure Type: Unknown  Seizure Etiology: Unknown  Current AEDs: Zonegran 400 mg daily, Ethosuxamide 500 mg daily     The patient is accompanied by family who contribute to the history. This patient has 1 types of seizure as described below. The patient reports having seizures for years. The patient reports to have stable seizure control. The seizure frequency is rarely currently. The last seizure was May 2017 . The patient reports overse side effects from seizure medication.     Seizure Type 1:   Seizure Description: Sees blood or a cut or an action movie, gets anxious, stressed, or excited, feels lightheaded and "like she's going to pass out" before losing consciousness, jerking all over, her mouth "goes to one side"   Aura: Feels "like she's going to pass out"  Associated Symptoms: No tongue biting or incontinence  Seizure Frequency: None since May 2017, prior to that 1 per month  Last seizure: May 2017     Seizure Triggers/ Provoking Features: seeing blood/violence/injuries and stress   Seizure Onset Age: 8  Seizure/ Epilepsy Risk Factors: Childhood seizure  Birth/Developmental History: Normal birth and delayed developmental history  Previous Seizure Medications: " Ethosuxamide, Zonegran  Other Treatments: None  Episodes of Status: None  Recent Med Changes: None  Psychiatric/Behavioral Comorbitidies: Depression  Surgical Candidacy: n/a     Prior Studies:  EEG : , 4/15, 10/15, , 3/17- WNL,   Ambulatory EE/17- 72 hours, WNL, no events captured  vEEG/ EMU evaluation: Not done  MRI of brain:Normal  (personally reviewed)  Other studies: Not done  AED levels: Zonegran ,   CT/CTA Scan:  Not done  PET Scan: Not done  Neuropsychological Evaluation: Not done  DEXA Scan: Not done

## 2018-02-10 PROCEDURE — 99233 SBSQ HOSP IP/OBS HIGH 50: CPT | Mod: ,,, | Performed by: PSYCHIATRY & NEUROLOGY

## 2018-02-10 PROCEDURE — 20600001 HC STEP DOWN PRIVATE ROOM

## 2018-02-10 PROCEDURE — 95951 HC EEG MONITORING/VIDEO RECORD: CPT

## 2018-02-10 PROCEDURE — 25000003 PHARM REV CODE 250: Performed by: PSYCHIATRY & NEUROLOGY

## 2018-02-10 RX ADMIN — VENLAFAXINE 25 MG: 25 TABLET ORAL at 09:02

## 2018-02-10 RX ADMIN — VENLAFAXINE 25 MG: 25 TABLET ORAL at 08:02

## 2018-02-10 NOTE — PROGRESS NOTES
20g IV inserted, labs work collected and sent to core lab. Patient tolerated procedure well. Family at bedside.

## 2018-02-10 NOTE — PLAN OF CARE
Problem: Patient Care Overview  Goal: Plan of Care Review  Outcome: Ongoing (interventions implemented as appropriate)  No acute events throughout the day, VS and assessment per flow sheet, patient progressing towards goal as tolerated. New IV started per pt request. Plan of care reviewed with Vanessa Sinclair and family, all concerns addressed. Will continue to monitor.

## 2018-02-10 NOTE — PROGRESS NOTES
[facility]  Neurology-Epilepsy  Progress Note    Patient Name: Vanessa Sinclair  MRN: [id number]  Admission Date: 2/9/2018  Hospital Length of Stay: 1 days  Code Status: Full Code   Attending Provider: Matthew Carrasco MD  Primary Care Physician: Tamra Arora MD   Principal Problem:<principal problem not specified>    Subjective:     Hospital Course:   2/9-10/2018:  Normal EEG, transient unresponsiveness  2/10-11/2018:      Past Medical History:   Diagnosis Date    Asthma     Headache(784.0)     Seizures        Past Surgical History:   Procedure Laterality Date    WISDOM TOOTH EXTRACTION         Review of patient's allergies indicates:   Allergen Reactions    Lamictal  [lamotrigine]      Other reaction(s): HIVES    Cefzil [cefprozil] Rash    Demerol [meperidine] Rash     Other reaction(s): Rash    Pcn [penicillins] Rash     Other reaction(s): Rash       No current facility-administered medications on file prior to encounter.      Current Outpatient Prescriptions on File Prior to Encounter   Medication Sig    BLISOVI 24 FE 1 mg-20 mcg (24)/75 mg (4) per tablet Take 1 tablet by mouth once daily.    ergocalciferol (ERGOCALCIFEROL) 50,000 unit Cap Take 1 capsule (50,000 Units total) by mouth every 7 days.    ethosuximide (ZARONTIN) 250 mg Cap 2 capsules once daily    naproxen (NAPROSYN) 500 MG tablet Take 1 tablet (500 mg total) by mouth 2 (two) times daily as needed (TAKE 1 PO BID PRN PAIN).    tizanidine (ZANAFLEX) 4 MG tablet Take 1 tablet (4 mg total) by mouth every 8 (eight) hours. Take 1 po q 8 hrs prn muscle spasm    venlafaxine (EFFEXOR) 25 MG Tab Take 1 tab PO q day x 3 ds, then take 1 tab bid.    zonisamide (ZONEGRAN) 100 MG Cap Take 4 capsules (400 mg total) by mouth once daily.     Continuous Infusions:    Family History     Problem Relation (Age of Onset)    Arthritis Paternal Grandmother    Heart disease Maternal Grandfather    Irregular heart beat Sister    Stroke  Maternal Grandfather        Social History Main Topics    Smoking status: Never Smoker    Smokeless tobacco: Not on file    Alcohol use No    Drug use: Unknown    Sexual activity: No     Review of Systems   Constitutional: Negative for activity change, appetite change, fatigue and fever.   HENT: Negative for ear discharge, ear pain, postnasal drip, sinus pain and sinus pressure.    Eyes: Negative for pain, discharge and itching.   Respiratory: Negative for choking, chest tightness and shortness of breath.    Cardiovascular: Negative for chest pain and palpitations.   Gastrointestinal: Negative for abdominal distention, abdominal pain, constipation, diarrhea and nausea.   Endocrine: Negative for cold intolerance and heat intolerance.   Genitourinary: Negative for frequency and urgency.   Musculoskeletal: Negative for back pain, gait problem and joint swelling.   Skin: Negative for color change.   Neurological: Positive for seizures. Negative for dizziness, tremors, facial asymmetry, speech difficulty, weakness and numbness.   Psychiatric/Behavioral: Negative for agitation, behavioral problems and confusion.     Objective:     Vital Signs (Most Recent):  Temp: 97.1 °F (36.2 °C) (02/10/18 0701)  Pulse: 81 (02/10/18 1100)  Resp: 14 (02/10/18 0701)  BP: 108/76 (02/10/18 0701)  SpO2: 97 % (02/10/18 0701) Vital Signs (24h Range):  Temp:  [97.1 °F (36.2 °C)-98.3 °F (36.8 °C)] 97.1 °F (36.2 °C)  Pulse:  [60-81] 81  Resp:  [14-20] 14  SpO2:  [97 %-100 %] 97 %  BP: (106-115)/(68-79) 108/76     Weight: 59 kg (130 lb)  Body mass index is 21.63 kg/m².    Physical Exam   Constitutional: She is oriented to person, place, and time. She appears well-developed and well-nourished.   HENT:   Head: Normocephalic and atraumatic.   Eyes: EOM are normal. Pupils are equal, round, and reactive to light.   Neck: Normal range of motion. Neck supple.   Cardiovascular: Normal rate, regular rhythm and normal heart sounds.    Pulmonary/Chest:  Effort normal and breath sounds normal.   Abdominal: Soft. Bowel sounds are normal.   Musculoskeletal: Normal range of motion.   Neurological: She is oriented to person, place, and time. She has a normal Finger-Nose-Finger Test and a normal Heel to Shin Test. Gait normal.   Reflex Scores:       Tricep reflexes are 2+ on the right side and 2+ on the left side.       Bicep reflexes are 2+ on the right side and 2+ on the left side.       Brachioradialis reflexes are 2+ on the right side and 2+ on the left side.       Patellar reflexes are 2+ on the right side and 2+ on the left side.       Achilles reflexes are 2+ on the right side and 2+ on the left side.  Skin: Skin is warm.   Psychiatric: She has a normal mood and affect. Her speech is normal and behavior is normal.       NEUROLOGICAL EXAMINATION:     MENTAL STATUS   Oriented to person, place, and time.   Attention: normal. Concentration: normal.   Speech: speech is normal   Level of consciousness: alert  Knowledge: good.     CRANIAL NERVES     CN II   Visual fields full to confrontation.     CN III, IV, VI   Pupils are equal, round, and reactive to light.  Extraocular motions are normal.   CN III: no CN III palsy  CN VI: no CN VI palsy  Nystagmus: none   Diplopia: none  Ophthalmoparesis: none    CN V   Facial sensation intact.     CN VII   Facial expression full, symmetric.     CN VIII   CN VIII normal.     CN IX, X   CN IX normal.   CN X normal.     CN XI   CN XI normal.     CN XII   CN XII normal.     MOTOR EXAM   Muscle bulk: normal  Overall muscle tone: normal    Strength   Right neck flexion: 5/5  Left neck flexion: 5/5  Right neck extension: 5/5  Left neck extension: 5/5  Right deltoid: 5/5  Left deltoid: 5/5  Right biceps: 5/5  Left biceps: 5/5  Right triceps: 5/5  Left triceps: 5/5  Right wrist flexion: 5/5  Left wrist flexion: 5/5  Right wrist extension: 5/5  Left wrist extension: 5/5  Right interossei: 5/5  Left interossei: 5/5  Right abdominals:  5/5  Left abdominals: 5/5  Right iliopsoas: 5/5  Left iliopsoas: 5/5  Right quadriceps: 5/5  Left quadriceps: 5/5  Right hamstrin/5  Left hamstrin/5  Right glutei: 5/5  Left glutei: 5/5  Right anterior tibial: 5/5  Left anterior tibial: 5/5  Right posterior tibial: 5/5  Left posterior tibial: 5/5  Right peroneal: 5/5  Left peroneal: 5/5  Right gastroc: 5/5  Left gastroc: 5/5    REFLEXES     Reflexes   Right brachioradialis: 2+  Left brachioradialis: 2+  Right biceps: 2+  Left biceps: 2+  Right triceps: 2+  Left triceps: 2+  Right patellar: 2+  Left patellar: 2+  Right achilles: 2+  Left achilles: 2+  Right plantar: normal  Left plantar: normal    SENSORY EXAM   Light touch normal.   Proprioception normal.   Pinprick normal.     GAIT AND COORDINATION     Gait  Gait: normal     Coordination   Finger to nose coordination: normal  Heel to shin coordination: normal      Significant Labs: CBC:     Recent Labs  Lab 18   WBC 6.39   HGB 14.5   HCT 43.7        CMP:     Recent Labs  Lab 18   GLU 84      K 3.7      CO2 22*   BUN 12   CREATININE 0.9   CALCIUM 9.3   PROT 7.1   ALBUMIN 3.9   BILITOT 0.1   ALKPHOS 57   AST 12   ALT 9*   ANIONGAP 9   EGFRNONAA >60.0       Significant Studies: I have reviewed and interpreted all pertinent imaging results/findings within the past 24 hours.       INTERVAL HISTORY:  Transient unresponsiveness last night    Assessment and Plan:     Seizure-like activity    This is  Sinclair, 17 y.o. female who presents with seizure-like episodes.  Per history, the patient's events do not appear expressly consistent with seizure. Per history, the episodes sound more consistent with vasovagal syncope, cataplexy, or potentially psychogenic events , particularly given the patient's numerous normal EEGs including a normal 72 hour study (that did not capture a typical event). EMU admission to capture typical events and for possible taper off of  antiepileptic therapy.     Plan:  - vEEG monitoring   - Hold all AEDs   - AEDs levels ordered   - May consider tilt table study.   - Consider valium 5 mg PRN only if its GTC > 5mins - hospital medicine to call epilepsy on call before administering   - Continue home medications   - Correct lytes as needed / control infection / avoid hypoxia or hypercapnia              VTE Risk Mitigation         Ordered     Low Risk of VTE  Once      02/09/18 4014          Matthew Carrasco MD  Neurology-Epilepsy  [facility]

## 2018-02-10 NOTE — ASSESSMENT & PLAN NOTE
This is April Lorrie Sinclair, 17 y.o. female who presents with seizure-like episodes.  Per history, the patient's events do not appear expressly consistent with seizure. Per history, the episodes sound more consistent with vasovagal syncope, cataplexy, or potentially psychogenic events , particularly given the patient's numerous normal EEGs including a normal 72 hour study (that did not capture a typical event). EMU admission to capture typical events and for possible taper off of antiepileptic therapy.     Plan:  - vEEG monitoring   - Hold all AEDs   - AEDs levels ordered   - May consider tilt table study.   - Consider valium 5 mg PRN only if its GTC > 5mins - hospital medicine to call epilepsy on call before administering   - Continue home medications   - Correct lytes as needed / control infection / avoid hypoxia or hypercapnia

## 2018-02-10 NOTE — SUBJECTIVE & OBJECTIVE
Past Medical History:   Diagnosis Date    Asthma     Headache(784.0)     Seizures        Past Surgical History:   Procedure Laterality Date    WISDOM TOOTH EXTRACTION         Review of patient's allergies indicates:   Allergen Reactions    Lamictal  [lamotrigine]      Other reaction(s): HIVES    Cefzil [cefprozil] Rash    Demerol [meperidine] Rash     Other reaction(s): Rash    Pcn [penicillins] Rash     Other reaction(s): Rash       No current facility-administered medications on file prior to encounter.      Current Outpatient Prescriptions on File Prior to Encounter   Medication Sig    BLISOVI 24 FE 1 mg-20 mcg (24)/75 mg (4) per tablet Take 1 tablet by mouth once daily.    ergocalciferol (ERGOCALCIFEROL) 50,000 unit Cap Take 1 capsule (50,000 Units total) by mouth every 7 days.    ethosuximide (ZARONTIN) 250 mg Cap 2 capsules once daily    naproxen (NAPROSYN) 500 MG tablet Take 1 tablet (500 mg total) by mouth 2 (two) times daily as needed (TAKE 1 PO BID PRN PAIN).    tizanidine (ZANAFLEX) 4 MG tablet Take 1 tablet (4 mg total) by mouth every 8 (eight) hours. Take 1 po q 8 hrs prn muscle spasm    venlafaxine (EFFEXOR) 25 MG Tab Take 1 tab PO q day x 3 ds, then take 1 tab bid.    zonisamide (ZONEGRAN) 100 MG Cap Take 4 capsules (400 mg total) by mouth once daily.     Continuous Infusions:    Family History     Problem Relation (Age of Onset)    Arthritis Paternal Grandmother    Heart disease Maternal Grandfather    Irregular heart beat Sister    Stroke Maternal Grandfather        Social History Main Topics    Smoking status: Never Smoker    Smokeless tobacco: Not on file    Alcohol use No    Drug use: Unknown    Sexual activity: No     Review of Systems   Constitutional: Negative for activity change, appetite change, fatigue and fever.   HENT: Negative for ear discharge, ear pain, postnasal drip, sinus pain and sinus pressure.    Eyes: Negative for pain, discharge and itching.   Respiratory:  Negative for choking, chest tightness and shortness of breath.    Cardiovascular: Negative for chest pain and palpitations.   Gastrointestinal: Negative for abdominal distention, abdominal pain, constipation, diarrhea and nausea.   Endocrine: Negative for cold intolerance and heat intolerance.   Genitourinary: Negative for frequency and urgency.   Musculoskeletal: Negative for back pain, gait problem and joint swelling.   Skin: Negative for color change.   Neurological: Positive for seizures. Negative for dizziness, tremors, facial asymmetry, speech difficulty, weakness and numbness.   Psychiatric/Behavioral: Negative for agitation, behavioral problems and confusion.     Objective:     Vital Signs (Most Recent):  Temp: 97.1 °F (36.2 °C) (02/10/18 0701)  Pulse: 81 (02/10/18 1100)  Resp: 14 (02/10/18 0701)  BP: 108/76 (02/10/18 0701)  SpO2: 97 % (02/10/18 0701) Vital Signs (24h Range):  Temp:  [97.1 °F (36.2 °C)-98.3 °F (36.8 °C)] 97.1 °F (36.2 °C)  Pulse:  [60-81] 81  Resp:  [14-20] 14  SpO2:  [97 %-100 %] 97 %  BP: (106-115)/(68-79) 108/76     Weight: 59 kg (130 lb)  Body mass index is 21.63 kg/m².    Physical Exam   Constitutional: She is oriented to person, place, and time. She appears well-developed and well-nourished.   HENT:   Head: Normocephalic and atraumatic.   Eyes: EOM are normal. Pupils are equal, round, and reactive to light.   Neck: Normal range of motion. Neck supple.   Cardiovascular: Normal rate, regular rhythm and normal heart sounds.    Pulmonary/Chest: Effort normal and breath sounds normal.   Abdominal: Soft. Bowel sounds are normal.   Musculoskeletal: Normal range of motion.   Neurological: She is oriented to person, place, and time. She has a normal Finger-Nose-Finger Test and a normal Heel to Shin Test. Gait normal.   Reflex Scores:       Tricep reflexes are 2+ on the right side and 2+ on the left side.       Bicep reflexes are 2+ on the right side and 2+ on the left side.        Brachioradialis reflexes are 2+ on the right side and 2+ on the left side.       Patellar reflexes are 2+ on the right side and 2+ on the left side.       Achilles reflexes are 2+ on the right side and 2+ on the left side.  Skin: Skin is warm.   Psychiatric: She has a normal mood and affect. Her speech is normal and behavior is normal.       NEUROLOGICAL EXAMINATION:     MENTAL STATUS   Oriented to person, place, and time.   Attention: normal. Concentration: normal.   Speech: speech is normal   Level of consciousness: alert  Knowledge: good.     CRANIAL NERVES     CN II   Visual fields full to confrontation.     CN III, IV, VI   Pupils are equal, round, and reactive to light.  Extraocular motions are normal.   CN III: no CN III palsy  CN VI: no CN VI palsy  Nystagmus: none   Diplopia: none  Ophthalmoparesis: none    CN V   Facial sensation intact.     CN VII   Facial expression full, symmetric.     CN VIII   CN VIII normal.     CN IX, X   CN IX normal.   CN X normal.     CN XI   CN XI normal.     CN XII   CN XII normal.     MOTOR EXAM   Muscle bulk: normal  Overall muscle tone: normal    Strength   Right neck flexion: 5/5  Left neck flexion: 5/5  Right neck extension: 5/5  Left neck extension: 5/5  Right deltoid: 5/5  Left deltoid: 5/5  Right biceps: 5/5  Left biceps: 5/5  Right triceps: 5/5  Left triceps: 5/5  Right wrist flexion: 5/5  Left wrist flexion: 5/5  Right wrist extension: 5/5  Left wrist extension: 5/5  Right interossei: 5/5  Left interossei: 5/5  Right abdominals: 5/5  Left abdominals: 5/5  Right iliopsoas: 5/5  Left iliopsoas: 5/5  Right quadriceps: 5/5  Left quadriceps: 5/5  Right hamstrin/5  Left hamstrin/5  Right glutei: 5/5  Left glutei: 5/5  Right anterior tibial: 5/5  Left anterior tibial: 5/5  Right posterior tibial: 5/5  Left posterior tibial: 5/5  Right peroneal: 5/5  Left peroneal: 5/5  Right gastroc: 5/5  Left gastroc: 5/5    REFLEXES     Reflexes   Right brachioradialis: 2+  Left  brachioradialis: 2+  Right biceps: 2+  Left biceps: 2+  Right triceps: 2+  Left triceps: 2+  Right patellar: 2+  Left patellar: 2+  Right achilles: 2+  Left achilles: 2+  Right plantar: normal  Left plantar: normal    SENSORY EXAM   Light touch normal.   Proprioception normal.   Pinprick normal.     GAIT AND COORDINATION     Gait  Gait: normal     Coordination   Finger to nose coordination: normal  Heel to shin coordination: normal      Significant Labs: CBC:     Recent Labs  Lab 02/09/18 2010   WBC 6.39   HGB 14.5   HCT 43.7        CMP:     Recent Labs  Lab 02/09/18 2010   GLU 84      K 3.7      CO2 22*   BUN 12   CREATININE 0.9   CALCIUM 9.3   PROT 7.1   ALBUMIN 3.9   BILITOT 0.1   ALKPHOS 57   AST 12   ALT 9*   ANIONGAP 9   EGFRNONAA >60.0       Significant Studies: I have reviewed and interpreted all pertinent imaging results/findings within the past 24 hours.       INTERVAL HISTORY:  Transient unresponsiveness last night

## 2018-02-11 LAB
25(OH)D3+25(OH)D2 SERPL-MCNC: 21 NG/ML
VIT B12 SERPL-MCNC: 322 PG/ML

## 2018-02-11 PROCEDURE — 25000003 PHARM REV CODE 250: Performed by: PSYCHIATRY & NEUROLOGY

## 2018-02-11 PROCEDURE — 36415 COLL VENOUS BLD VENIPUNCTURE: CPT

## 2018-02-11 PROCEDURE — 82306 VITAMIN D 25 HYDROXY: CPT

## 2018-02-11 PROCEDURE — 95951 HC EEG MONITORING/VIDEO RECORD: CPT

## 2018-02-11 PROCEDURE — 95951 PR EEG MONITORING/VIDEORECORD: CPT | Mod: 26,,, | Performed by: PSYCHIATRY & NEUROLOGY

## 2018-02-11 PROCEDURE — 20600001 HC STEP DOWN PRIVATE ROOM

## 2018-02-11 PROCEDURE — 82607 VITAMIN B-12: CPT

## 2018-02-11 PROCEDURE — 99233 SBSQ HOSP IP/OBS HIGH 50: CPT | Mod: ,,, | Performed by: PSYCHIATRY & NEUROLOGY

## 2018-02-11 RX ADMIN — ACETAMINOPHEN 650 MG: 325 TABLET, FILM COATED ORAL at 11:02

## 2018-02-11 RX ADMIN — TIZANIDINE 4 MG: 2 TABLET ORAL at 11:02

## 2018-02-11 RX ADMIN — VENLAFAXINE 25 MG: 25 TABLET ORAL at 09:02

## 2018-02-11 NOTE — PLAN OF CARE
Problem: Patient Care Overview  Goal: Plan of Care Review  Outcome: Ongoing (interventions implemented as appropriate)  Pt vitals stable during shift. No acute events. POC reviewed with Pt and family. RN at  to monitor and answer all questions. See flowsheets for full assessment details.

## 2018-02-11 NOTE — PROCEDURES
EPILEPSY MONITORING UNIT  EEG/VIDEO TELEMETRY REPORT  DATE OF SERVICE: 2/9-11/2018  EEG NUMBER: EMU -1,2  REQUESTED BY:   LOCATION OF SERVICE:     METHODOLOGY      Electroencephalographic (EEG) is recorded with electrodes placed according to the International 10-20 placement system.  Thirty Two (32) channels of digital signal including the T1 and T2 electrodes are simultaneously recorded from the scalp and also including EKG, EMG  and/or eye movement monitors.  Recording band pass was 0.1 to 512 hz.  Digital video recording of the patient is simultaneously recorded with the EEG.  The patient is instructed report clinical symptoms which may occur during the recording session.  EEG and video recording is stored and archived in digital format. Activation procedures which include photic stimulation, hyperventilation and instructing patients to perform simple task are done in selected patients.       The EEG is displayed on a monitor screen and can be reformatted into different montages for evaluation.  The entire recoding is submitted for computer assisted analysis to detect spike and electrographic seizure activity.  The entire recording is visually reviewed and the times identified by computer analysis as being spikes or seizures are reviewed again.  Compresses spectral analysis (CSA) is also performed on the activity recorded from each individual channel.  This is displayed as a power display of frequencies from 0 to 30 Hz over time.   The CSA analysis is done and displayed continuously.  This is reviewed for asymmetries in power between homologous areas of the scalp and for presence of changes in power which can be seen when seizures occur.  Sections of suspected abnormalities on the CSA is then compared with the original EEG recording.      [employer] software was also utilized in the review of this study.  This software suite analyzes the EEG recording in multiple domains.  Coherence and rhythmicity is computed  to identify EEG sections which may contain organized seizures.  Each channel undergoes analysis to detect presence of spike and sharp waves which have special and morphological characteristic of epileptic activity.  The routine EEG recording is converted from spacial into frequency domain.  This is then displayed comparing homologous areas to identify areas of significant asymmetry.  Algorithm to identify non-cortically generated artifact is used to separate eye movement, EMG and other artifact from the EEG.      Recording Times  Start on 2/9/2018 at 16:40:57 stop on 2/10/2018 at 07:00:00  Start on 2/10/2018 at 07:00:28 stop on 2/11/2018 at 06:59:58    A total of 37:40:24 hours of EEG/Video telemetry was recorded.    Events/Seizures recorded  Event 1 - on 2/9/2018 start at 20:26:56 stopped 20:27:10    ELECTROENCEPHALOGRAM  INTERICTAL:  Background activity:   The background rhythm was characterized by alpha and anterior dominant beta activity with a 9-10 Hz posterior dominant alpha rhythm at 30-70 microvolts.     Symmetry and continuity: the background was continuous and symmetric     Sleep:   Normal sleep transients including sleep spindles, K complexes, vertex waves and POSTS were seen.    Activation procedures:   Hyperventilation was performed with no abnormalities seen  Photic stimulation was performed with no abnormalities seen    Abnormal activity:   No epileptiform discharges, periodic discharges, lateralized rhythmic delta activity or electrographic seizures were seen.    ICTAL:  Event 1:  no electrographic changes  CLINICAL DESCRIPTION OF EVENTS/SEIZURES:  Event 1:  Patient was noted have brief staring following blood draw, she was immediately responsive to voice and able to answer questions.    FINAL SUMMARY  This is a normal two day video EEG which recorded one of the patients typical episodes of brief staring with immediate responsiveness on stimulation which was most likely related to inattention.   There were no epileptiform discharges, a normal EEG does not exclude a diagnosis of epilepsy.  Please see the report by Dr. Garza for the continuation of this study.    Matthew Carrasco M.D.

## 2018-02-11 NOTE — PROGRESS NOTES
[facility]  Neurology-Epilepsy  Progress Note    Patient Name: Vanessa Sinclair  MRN: [id number]  Admission Date: 2/9/2018  Hospital Length of Stay: 2 days  Code Status: Full Code   Attending Provider: Matthew Carrasco MD  Primary Care Physician: Tamra Arora MD   Principal Problem:<principal problem not specified>    Subjective:     Hospital Course:   2/9-10/2018:  Normal EEG, transient unresponsiveness  2/10-11/2018:  Normal EEG, no events    Past Medical History:   Diagnosis Date    Asthma     Headache(784.0)     Seizures        Past Surgical History:   Procedure Laterality Date    WISDOM TOOTH EXTRACTION         Review of patient's allergies indicates:   Allergen Reactions    Lamictal  [lamotrigine]      Other reaction(s): HIVES    Cefzil [cefprozil] Rash    Demerol [meperidine] Rash     Other reaction(s): Rash    Pcn [penicillins] Rash     Other reaction(s): Rash       No current facility-administered medications on file prior to encounter.      Current Outpatient Prescriptions on File Prior to Encounter   Medication Sig    BLISOVI 24 FE 1 mg-20 mcg (24)/75 mg (4) per tablet Take 1 tablet by mouth once daily.    ergocalciferol (ERGOCALCIFEROL) 50,000 unit Cap Take 1 capsule (50,000 Units total) by mouth every 7 days.    ethosuximide (ZARONTIN) 250 mg Cap 2 capsules once daily    naproxen (NAPROSYN) 500 MG tablet Take 1 tablet (500 mg total) by mouth 2 (two) times daily as needed (TAKE 1 PO BID PRN PAIN).    tizanidine (ZANAFLEX) 4 MG tablet Take 1 tablet (4 mg total) by mouth every 8 (eight) hours. Take 1 po q 8 hrs prn muscle spasm    venlafaxine (EFFEXOR) 25 MG Tab Take 1 tab PO q day x 3 ds, then take 1 tab bid.    zonisamide (ZONEGRAN) 100 MG Cap Take 4 capsules (400 mg total) by mouth once daily.     Continuous Infusions:    Family History     Problem Relation (Age of Onset)    Arthritis Paternal Grandmother    Heart disease Maternal Grandfather    Irregular heart  beat Sister    Stroke Maternal Grandfather        Social History Main Topics    Smoking status: Never Smoker    Smokeless tobacco: Not on file    Alcohol use No    Drug use: Unknown    Sexual activity: No     Review of Systems   Constitutional: Negative for activity change, appetite change, fatigue and fever.   HENT: Negative for ear discharge, ear pain, postnasal drip, sinus pain and sinus pressure.    Eyes: Negative for pain, discharge and itching.   Respiratory: Negative for choking, chest tightness and shortness of breath.    Cardiovascular: Negative for chest pain and palpitations.   Gastrointestinal: Negative for abdominal distention, abdominal pain, constipation, diarrhea and nausea.   Endocrine: Negative for cold intolerance and heat intolerance.   Genitourinary: Negative for frequency and urgency.   Musculoskeletal: Negative for back pain, gait problem and joint swelling.   Skin: Negative for color change.   Neurological: Positive for seizures. Negative for dizziness, tremors, facial asymmetry, speech difficulty, weakness and numbness.   Psychiatric/Behavioral: Negative for agitation, behavioral problems and confusion.     Objective:     Vital Signs (Most Recent):  Temp: 98.5 °F (36.9 °C) (02/11/18 1114)  Pulse: 73 (02/11/18 1114)  Resp: 18 (02/11/18 1114)  BP: 100/60 (02/11/18 1114)  SpO2: 98 % (02/11/18 1114) Vital Signs (24h Range):  Temp:  [97.9 °F (36.6 °C)-98.5 °F (36.9 °C)] 98.5 °F (36.9 °C)  Pulse:  [] 73  Resp:  [15-18] 18  SpO2:  [96 %-98 %] 98 %  BP: (100-120)/(60-76) 100/60     Weight: 59 kg (130 lb)  Body mass index is 21.63 kg/m².    Physical Exam   Constitutional: She is oriented to person, place, and time. She appears well-developed and well-nourished.   HENT:   Head: Normocephalic and atraumatic.   Eyes: EOM are normal. Pupils are equal, round, and reactive to light.   Neck: Normal range of motion. Neck supple.   Cardiovascular: Normal rate, regular rhythm and normal heart  sounds.    Pulmonary/Chest: Effort normal and breath sounds normal.   Abdominal: Soft. Bowel sounds are normal.   Musculoskeletal: Normal range of motion.   Neurological: She is oriented to person, place, and time. She has a normal Finger-Nose-Finger Test and a normal Heel to Shin Test. Gait normal.   Reflex Scores:       Tricep reflexes are 2+ on the right side and 2+ on the left side.       Bicep reflexes are 2+ on the right side and 2+ on the left side.       Brachioradialis reflexes are 2+ on the right side and 2+ on the left side.       Patellar reflexes are 2+ on the right side and 2+ on the left side.       Achilles reflexes are 2+ on the right side and 2+ on the left side.  Skin: Skin is warm.   Psychiatric: She has a normal mood and affect. Her speech is normal and behavior is normal.       NEUROLOGICAL EXAMINATION:     MENTAL STATUS   Oriented to person, place, and time.   Attention: normal. Concentration: normal.   Speech: speech is normal   Level of consciousness: alert  Knowledge: good.     CRANIAL NERVES     CN II   Visual fields full to confrontation.     CN III, IV, VI   Pupils are equal, round, and reactive to light.  Extraocular motions are normal.   CN III: no CN III palsy  CN VI: no CN VI palsy  Nystagmus: none   Diplopia: none  Ophthalmoparesis: none    CN V   Facial sensation intact.     CN VII   Facial expression full, symmetric.     CN VIII   CN VIII normal.     CN IX, X   CN IX normal.   CN X normal.     CN XI   CN XI normal.     CN XII   CN XII normal.     MOTOR EXAM   Muscle bulk: normal  Overall muscle tone: normal    Strength   Right neck flexion: 5/5  Left neck flexion: 5/5  Right neck extension: 5/5  Left neck extension: 5/5  Right deltoid: 5/5  Left deltoid: 5/5  Right biceps: 5/5  Left biceps: 5/5  Right triceps: 5/5  Left triceps: 5/5  Right wrist flexion: 5/5  Left wrist flexion: 5/5  Right wrist extension: 5/5  Left wrist extension: 5/5  Right interossei: 5/5  Left interossei:  5/5  Right abdominals: 5/5  Left abdominals: 5/5  Right iliopsoas: 5/5  Left iliopsoas: 5/5  Right quadriceps: 5/5  Left quadriceps: 5/5  Right hamstrin/5  Left hamstrin/5  Right glutei: 5/5  Left glutei: 5/5  Right anterior tibial: 5/5  Left anterior tibial: 5/5  Right posterior tibial: 5/5  Left posterior tibial: 5/5  Right peroneal: 5/5  Left peroneal: 5/5  Right gastroc: 5/5  Left gastroc: 5/5    REFLEXES     Reflexes   Right brachioradialis: 2+  Left brachioradialis: 2+  Right biceps: 2+  Left biceps: 2+  Right triceps: 2+  Left triceps: 2+  Right patellar: 2+  Left patellar: 2+  Right achilles: 2+  Left achilles: 2+  Right plantar: normal  Left plantar: normal    SENSORY EXAM   Light touch normal.   Proprioception normal.   Pinprick normal.     GAIT AND COORDINATION     Gait  Gait: normal     Coordination   Finger to nose coordination: normal  Heel to shin coordination: normal      Significant Labs: CBC:     Recent Labs  Lab 18   WBC 6.39   HGB 14.5   HCT 43.7        CMP:     Recent Labs  Lab 18   GLU 84      K 3.7      CO2 22*   BUN 12   CREATININE 0.9   CALCIUM 9.3   PROT 7.1   ALBUMIN 3.9   BILITOT 0.1   ALKPHOS 57   AST 12   ALT 9*   ANIONGAP 9   EGFRNONAA >60.0       Significant Studies: I have reviewed and interpreted all pertinent imaging results/findings within the past 24 hours.       INTERVAL HISTORY:  No events, normal EEG    Assessment and Plan:     Seizure-like activity    This is April Lorrie Dottie, 17 y.o. female who presents with seizure-like episodes.  Per history, the patient's events do not appear expressly consistent with seizure. Per history, the episodes sound more consistent with vasovagal syncope, cataplexy, or potentially psychogenic events , particularly given the patient's numerous normal EEGs including a normal 72 hour study (that did not capture a typical event). EMU admission to capture typical events and for possible taper off  of antiepileptic therapy.     Plan:  - vEEG monitoring   - Hold all AEDs   - AEDs levels ordered   - May consider tilt table study.   - Consider valium 5 mg PRN only if its GTC > 5mins - hospital medicine to call epilepsy on call before administering   - Continue home medications   - Correct lytes as needed / control infection / avoid hypoxia or hypercapnia              VTE Risk Mitigation         Ordered     Low Risk of VTE  Once      02/09/18 1786          Matthew Carrasco MD  Neurology-Epilepsy  [facility]

## 2018-02-11 NOTE — SUBJECTIVE & OBJECTIVE
Past Medical History:   Diagnosis Date    Asthma     Headache(784.0)     Seizures        Past Surgical History:   Procedure Laterality Date    WISDOM TOOTH EXTRACTION         Review of patient's allergies indicates:   Allergen Reactions    Lamictal  [lamotrigine]      Other reaction(s): HIVES    Cefzil [cefprozil] Rash    Demerol [meperidine] Rash     Other reaction(s): Rash    Pcn [penicillins] Rash     Other reaction(s): Rash       No current facility-administered medications on file prior to encounter.      Current Outpatient Prescriptions on File Prior to Encounter   Medication Sig    BLISOVI 24 FE 1 mg-20 mcg (24)/75 mg (4) per tablet Take 1 tablet by mouth once daily.    ergocalciferol (ERGOCALCIFEROL) 50,000 unit Cap Take 1 capsule (50,000 Units total) by mouth every 7 days.    ethosuximide (ZARONTIN) 250 mg Cap 2 capsules once daily    naproxen (NAPROSYN) 500 MG tablet Take 1 tablet (500 mg total) by mouth 2 (two) times daily as needed (TAKE 1 PO BID PRN PAIN).    tizanidine (ZANAFLEX) 4 MG tablet Take 1 tablet (4 mg total) by mouth every 8 (eight) hours. Take 1 po q 8 hrs prn muscle spasm    venlafaxine (EFFEXOR) 25 MG Tab Take 1 tab PO q day x 3 ds, then take 1 tab bid.    zonisamide (ZONEGRAN) 100 MG Cap Take 4 capsules (400 mg total) by mouth once daily.     Continuous Infusions:    Family History     Problem Relation (Age of Onset)    Arthritis Paternal Grandmother    Heart disease Maternal Grandfather    Irregular heart beat Sister    Stroke Maternal Grandfather        Social History Main Topics    Smoking status: Never Smoker    Smokeless tobacco: Not on file    Alcohol use No    Drug use: Unknown    Sexual activity: No     Review of Systems   Constitutional: Negative for activity change, appetite change, fatigue and fever.   HENT: Negative for ear discharge, ear pain, postnasal drip, sinus pain and sinus pressure.    Eyes: Negative for pain, discharge and itching.   Respiratory:  Negative for choking, chest tightness and shortness of breath.    Cardiovascular: Negative for chest pain and palpitations.   Gastrointestinal: Negative for abdominal distention, abdominal pain, constipation, diarrhea and nausea.   Endocrine: Negative for cold intolerance and heat intolerance.   Genitourinary: Negative for frequency and urgency.   Musculoskeletal: Negative for back pain, gait problem and joint swelling.   Skin: Negative for color change.   Neurological: Positive for seizures. Negative for dizziness, tremors, facial asymmetry, speech difficulty, weakness and numbness.   Psychiatric/Behavioral: Negative for agitation, behavioral problems and confusion.     Objective:     Vital Signs (Most Recent):  Temp: 98.5 °F (36.9 °C) (02/11/18 1114)  Pulse: 73 (02/11/18 1114)  Resp: 18 (02/11/18 1114)  BP: 100/60 (02/11/18 1114)  SpO2: 98 % (02/11/18 1114) Vital Signs (24h Range):  Temp:  [97.9 °F (36.6 °C)-98.5 °F (36.9 °C)] 98.5 °F (36.9 °C)  Pulse:  [] 73  Resp:  [15-18] 18  SpO2:  [96 %-98 %] 98 %  BP: (100-120)/(60-76) 100/60     Weight: 59 kg (130 lb)  Body mass index is 21.63 kg/m².    Physical Exam   Constitutional: She is oriented to person, place, and time. She appears well-developed and well-nourished.   HENT:   Head: Normocephalic and atraumatic.   Eyes: EOM are normal. Pupils are equal, round, and reactive to light.   Neck: Normal range of motion. Neck supple.   Cardiovascular: Normal rate, regular rhythm and normal heart sounds.    Pulmonary/Chest: Effort normal and breath sounds normal.   Abdominal: Soft. Bowel sounds are normal.   Musculoskeletal: Normal range of motion.   Neurological: She is oriented to person, place, and time. She has a normal Finger-Nose-Finger Test and a normal Heel to Shin Test. Gait normal.   Reflex Scores:       Tricep reflexes are 2+ on the right side and 2+ on the left side.       Bicep reflexes are 2+ on the right side and 2+ on the left side.        Brachioradialis reflexes are 2+ on the right side and 2+ on the left side.       Patellar reflexes are 2+ on the right side and 2+ on the left side.       Achilles reflexes are 2+ on the right side and 2+ on the left side.  Skin: Skin is warm.   Psychiatric: She has a normal mood and affect. Her speech is normal and behavior is normal.       NEUROLOGICAL EXAMINATION:     MENTAL STATUS   Oriented to person, place, and time.   Attention: normal. Concentration: normal.   Speech: speech is normal   Level of consciousness: alert  Knowledge: good.     CRANIAL NERVES     CN II   Visual fields full to confrontation.     CN III, IV, VI   Pupils are equal, round, and reactive to light.  Extraocular motions are normal.   CN III: no CN III palsy  CN VI: no CN VI palsy  Nystagmus: none   Diplopia: none  Ophthalmoparesis: none    CN V   Facial sensation intact.     CN VII   Facial expression full, symmetric.     CN VIII   CN VIII normal.     CN IX, X   CN IX normal.   CN X normal.     CN XI   CN XI normal.     CN XII   CN XII normal.     MOTOR EXAM   Muscle bulk: normal  Overall muscle tone: normal    Strength   Right neck flexion: 5/5  Left neck flexion: 5/5  Right neck extension: 5/5  Left neck extension: 5/5  Right deltoid: 5/5  Left deltoid: 5/5  Right biceps: 5/5  Left biceps: 5/5  Right triceps: 5/5  Left triceps: 5/5  Right wrist flexion: 5/5  Left wrist flexion: 5/5  Right wrist extension: 5/5  Left wrist extension: 5/5  Right interossei: 5/5  Left interossei: 5/5  Right abdominals: 5/5  Left abdominals: 5/5  Right iliopsoas: 5/5  Left iliopsoas: 5/5  Right quadriceps: 5/5  Left quadriceps: 5/5  Right hamstrin/5  Left hamstrin/5  Right glutei: 5/5  Left glutei: 5/5  Right anterior tibial: 5/5  Left anterior tibial: 5/5  Right posterior tibial: 5/5  Left posterior tibial: 5/5  Right peroneal: 5/5  Left peroneal: 5/5  Right gastroc: 5/5  Left gastroc: 5/5    REFLEXES     Reflexes   Right brachioradialis: 2+  Left  brachioradialis: 2+  Right biceps: 2+  Left biceps: 2+  Right triceps: 2+  Left triceps: 2+  Right patellar: 2+  Left patellar: 2+  Right achilles: 2+  Left achilles: 2+  Right plantar: normal  Left plantar: normal    SENSORY EXAM   Light touch normal.   Proprioception normal.   Pinprick normal.     GAIT AND COORDINATION     Gait  Gait: normal     Coordination   Finger to nose coordination: normal  Heel to shin coordination: normal      Significant Labs: CBC:     Recent Labs  Lab 02/09/18 2010   WBC 6.39   HGB 14.5   HCT 43.7        CMP:     Recent Labs  Lab 02/09/18 2010   GLU 84      K 3.7      CO2 22*   BUN 12   CREATININE 0.9   CALCIUM 9.3   PROT 7.1   ALBUMIN 3.9   BILITOT 0.1   ALKPHOS 57   AST 12   ALT 9*   ANIONGAP 9   EGFRNONAA >60.0       Significant Studies: I have reviewed and interpreted all pertinent imaging results/findings within the past 24 hours.       INTERVAL HISTORY:  No events, normal EEG

## 2018-02-12 VITALS
SYSTOLIC BLOOD PRESSURE: 103 MMHG | BODY MASS INDEX: 21.66 KG/M2 | HEIGHT: 65 IN | OXYGEN SATURATION: 98 % | WEIGHT: 130 LBS | HEART RATE: 68 BPM | TEMPERATURE: 98 F | RESPIRATION RATE: 18 BRPM | DIASTOLIC BLOOD PRESSURE: 64 MMHG

## 2018-02-12 PROCEDURE — 95951 PR EEG MONITORING/VIDEORECORD: CPT | Mod: 26,52,, | Performed by: PSYCHIATRY & NEUROLOGY

## 2018-02-12 PROCEDURE — 99239 HOSP IP/OBS DSCHRG MGMT >30: CPT | Mod: ,,, | Performed by: PSYCHIATRY & NEUROLOGY

## 2018-02-12 PROCEDURE — 99233 SBSQ HOSP IP/OBS HIGH 50: CPT | Mod: ,,, | Performed by: PSYCHIATRY & NEUROLOGY

## 2018-02-12 NOTE — DISCHARGE SUMMARY
"[facility]  Neurology-Epilepsy  Discharge Summary      Patient Name: Vanessa Sinclair  MRN: [id number]  Admission Date: 2/9/2018  Hospital Length of Stay: 3 days  Discharge Date and Time:  02/12/2018 3:24 PM  Attending Physician: Matthew Carrasco MD   Discharging Provider: Gildardo Lambert II, MD  Primary Care Physician: Tamra Arora MD       Interval History:  She reported starring spells every day. Last generalized convulsions was in May 2017.     HPI:   Patient seen in consultation at the request of Dr. Bret Brown for the evaluation of reported seizures.  A copy of this note will be sent to the referring physician.   HPI:   Ms. Vanessa Sinclair is a 17 y.o. female who presents with a chief complaint of a long-standing reported seizure disorder.  The patient presents today with her mother who contributes to the history.  The patient reports that she began experiencing seizures at approximately age 8.  She states that she was at a birthday party when a little boy fell and "cracked his head open" on a video game machine.  The patient and her mother state that the scene was very grew some and there was "blood everywhere".  The patient abruptly lost consciousness and frontal to the ground before jerking slightly.  Her mother states that the patient's mouth appeared to go to "one-sided".  Together they deny any associated tongue biting or incontinence and states the patient rapidly regained consciousness within the course of a few minutes, but states that she was fatigued after the episode.       Since that time, she has experienced episodes approximately monthly almost triggered by seeing blood or injuries, having her blood drawn or getting injections, violent movies, or periods of high stress or excitement.  The patient states that she gets a prodrome of feeling lightheaded and "like she is going to pass out" before losing consciousness.  Her mother states that occasionally, the jerking " "that accompanies the episode is quite pronounced.  She is never unconscious for more than a few minutes at a time before fully returning to baseline, aside from feeling tired. She states her last episode occurred in May 2017 during her graduation when she became extremely nervous.     She has been followed and extensively worked up by Dr. Pino of [facility], who has performed multiple routine EEGs as well as a 72 hour ambulatory EEG.  All of the studies were normal.  However, she has never had an event captured while on EEG.  She has been maintained on increasing doses of ethosuximide and Zonegran, which her mother states have been escalated over the years.  The patient and her mother note that the meds make her feel "drugged" and have caused her performance in school to deteriorate.  The patient states that she always feels as though she is working "in a daze."  She is currently attending [employer]  She does state that she has seen a cardiologist in the past and was told that her evaluation with the cardiologist was normal.  She was told to increase her salt and fluid intake.     Seizure Type: Unknown  Seizure Etiology: Unknown  Current AEDs: Zonegran 400 mg daily, Ethosuxamide 500 mg daily     The patient is accompanied by family who contribute to the history. This patient has 1 types of seizure as described below. The patient reports having seizures for years. The patient reports to have stable seizure control. The seizure frequency is rarely currently. The last seizure was May 2017 . The patient reports overse side effects from seizure medication.     Seizure Type 1:   Seizure Description: Sees blood or a cut or an action movie, gets anxious, stressed, or excited, feels lightheaded and "like she's going to pass out" before losing consciousness, jerking all over, her mouth "goes to one side"   Aura: Feels "like she's going to pass out"  Associated Symptoms: No tongue biting or " incontinence  Seizure Frequency: None since May 2017, prior to that 1 per month  Last seizure: May 2017     Seizure Triggers/ Provoking Features: seeing blood/violence/injuries and stress   Seizure Onset Age: 8  Seizure/ Epilepsy Risk Factors: Childhood seizure  Birth/Developmental History: Normal birth and delayed developmental history  Previous Seizure Medications: Ethosuxamide, Zonegran  Other Treatments: None  Episodes of Status: None  Recent Med Changes: None  Psychiatric/Behavioral Comorbitidies: Depression  Surgical Candidacy: n/a     Prior Studies:  EEG : , 4/15, 10/15, , 3/17- WNL,   Ambulatory EE/17- 72 hours, WNL, no events captured  vEEG/ EMU evaluation: Not done  MRI of brain:Normal  (personally reviewed)  Other studies: Not done  AED levels: Zonegran ,   CT/CTA Scan:  Not done  PET Scan: Not done  Neuropsychological Evaluation: Not done  DEXA Scan: Not done    * No surgery found *     Indwelling Lines/Drains at time of discharge:   Lines/Drains/Airways          No matching active lines, drains, or airways        Hospital Course:   -10/2018:  Normal EEG, transient unresponsiveness  2/:  Normal EEG, no events  2018: No events in last 24 hours: EEG WNL.     Event 1 - on 2018 start at 20:26:56 stopped 20:27:10. Patient was noted have brief staring following blood draw, she was immediately responsive to voice and able to answer questions. EEG: no epileptiform activity     Consults:     Significant Labs: CBC: No results for input(s): WBC, HGB, HCT, PLT in the last 48 hours.  CMP: No results for input(s): GLU, NA, K, CL, CO2, BUN, CREATININE, CALCIUM, MG, PROT, ALBUMIN, BILITOT, ALKPHOS, AST, ALT, ANIONGAP, EGFRNONAA in the last 48 hours.    Significant Studies: I have reviewed and interpreted all pertinent imaging results/findings within the past 24 hours.    Pending Diagnostic Studies:     Procedure Component Value Units Date/Time    Ethosuximide level  [[id number]] Collected:  02/09/18 2010    Order Status:  Sent Lab Status:  In process Updated:  02/09/18 2122    Specimen:  Blood from Blood     Vitamin A [[id number]] Collected:  02/11/18 1430    Order Status:  Sent Lab Status:  No result     Specimen:  Blood from Blood     Vitamin E [[id number]] Collected:  02/11/18 1430    Order Status:  Sent Lab Status:  No result     Specimen:  Blood from Blood     Zonisamide level [[id number]] Collected:  02/09/18 2010    Order Status:  Sent Lab Status:  In process Updated:  02/09/18 2122    Specimen:  Blood from Blood         Final Active Diagnoses:    Diagnosis Date Noted POA    PRINCIPAL PROBLEM:  Seizure-like activity [R56.9] 08/13/2012 Yes    Complex partial epilepsy with generalization and with intractable epilepsy [G40.219] 02/09/2018 Yes      Problems Resolved During this Admission:    Diagnosis Date Noted Date Resolved POA       Seizure-like activity     This is April Lorrie Sinclair, 17 y.o. female who presents with seizure-like episodes. During this EMU hospitalization we have captured one starring episode and no generalized convulsion. Based on the EEG data from this hospitalization we have a no evidence of epilepsy. EEG WNL without epileptiform activity. We offered further monitoring but patient decided to go home today.        Plan:  - DC vEEG monitoring   - DC all AEDs   - Patient counseled in detail by Dr Garza regarding the findings during this hospitalization.  - Follow up with Dr Brown in 4-6 weeks      Discharged Condition: good    Disposition: Home or Self Care    Follow Up:  Follow-up Information     Odilon Brown MD In 4 weeks.    Specialty:  Neurology  Contact information:  [address]  [address]  [facility] [address] [phone]                 Patient Instructions:     Diet Adult Regular     Activity as tolerated         Medications:  Reconciled Home Medications:   Current Discharge Medication List      CONTINUE these medications which have NOT  CHANGED    Details   BLISOVI 24 FE 1 mg-20 mcg (24)/75 mg (4) per tablet Take 1 tablet by mouth once daily.      ergocalciferol (ERGOCALCIFEROL) 50,000 unit Cap Take 1 capsule (50,000 Units total) by mouth every 7 days.  Qty: 12 capsule, Refills: 3      naproxen (NAPROSYN) 500 MG tablet Take 1 tablet (500 mg total) by mouth 2 (two) times daily as needed (TAKE 1 PO BID PRN PAIN).  Qty: 20 tablet, Refills: 0      tizanidine (ZANAFLEX) 4 MG tablet Take 1 tablet (4 mg total) by mouth every 8 (eight) hours. Take 1 po q 8 hrs prn muscle spasm  Qty: 15 tablet, Refills: 0      venlafaxine (EFFEXOR) 25 MG Tab Take 1 tab PO q day x 3 ds, then take 1 tab bid.  Qty: 60 tablet, Refills: 3    Associated Diagnoses: Spell of loss of consciousness; Migraine without aura and without status migrainosus, not intractable         STOP taking these medications       ethosuximide (ZARONTIN) 250 mg Cap Comments:   Reason for Stopping:         zonisamide (ZONEGRAN) 100 MG Cap Comments:   Reason for Stopping:             Time spent on the discharge of patient: 45  minutes    Gildardo Lambert II, MD  Neurology-Epilepsy  [facility]

## 2018-02-12 NOTE — PLAN OF CARE
02/12/18 1536   Final Note   Assessment Type Final Discharge Note   Discharge Disposition Home     Patient is discharged to home with no needs. Patient's family will provide transportation home.

## 2018-02-12 NOTE — PLAN OF CARE
PCP:Tamra Arora MD    Extended Emergency Contact Information  Primary Emergency Contact: Johanna Sinclair  Address: [address] [place] [address] [facility]  Home Phone: [phone]  Mobile Phone: [phone]  Relation: Mother  Secondary Emergency Contact: Vivek Sinclair  Address: [place] [address] [place]  Mobile Phone: [phone]  Relation: Father      [id number] - [place], [place] - [address] AT [address]  [address]  [place] [address]  Phone: [phone] Fax: [phone]    [facility], [place], [place] - [address]  [address]  [facility] [address]  Phone: [phone] Fax: [phone]    Payor: MEDICAID / Plan: HEALTHY BLUE ([employer]) / Product Type: Managed Medicaid /     Patient was independent living with her mother, father, 25 yr old sister, 15 yr old sister and 12 yr old sister. Patient has no discharge needs at this time. Cm will continue to follow.     02/12/18 0810   Discharge Assessment   Assessment Type Discharge Planning Assessment   Confirmed/corrected address and phone number on facesheet? Yes   Assessment information obtained from? Patient;Caregiver   Prior to hospitilization cognitive status: Alert/Oriented   Prior to hospitalization functional status: Independent   Current cognitive status: Alert/Oriented   Current Functional Status: Independent   Facility Arrived From: Home   Lives With sibling(s);parent(s)   Able to Return to Prior Arrangements yes   Is patient able to care for self after discharge? Yes   Who are your caregiver(s) and their phone number(s)? Johanna Sinclair (mother) [phone], Vivek Sinclair (father) [phone]   Patient's perception of discharge disposition home or selfcare   Readmission Within The Last 30 Days no previous admission in last 30 days   Patient currently being followed by outpatient case  management? No   Patient currently receives any other outside agency services? No   Equipment Currently Used at Home none   Do you have any problems affording any of your prescribed medications? No   Is the patient taking medications as prescribed? yes   Does the patient have transportation home? Yes   Transportation Available family or friend will provide;car   Does the patient receive services at the [facility]? No   Discharge Plan A Home with family   Discharge Plan B Home with family   Patient/Family In Agreement With Plan yes

## 2018-02-12 NOTE — HOSPITAL COURSE
2/9-10/2018:  Normal EEG, transient unresponsiveness  2/10-11/2018:  Normal EEG, no events  2/12/2018: No events in last 24 hours: EEG WNL.

## 2018-02-12 NOTE — PROGRESS NOTES
[facility]  Neurology-Epilepsy  Progress Note    Patient Name: Vanessa Sinclair  MRN: [id number]  Admission Date: 2/9/2018  Hospital Length of Stay: 3 days  Code Status: Full Code   Attending Provider: Matthew Carrasco MD  Primary Care Physician: Tamra Arora MD   Principal Problem:<principal problem not specified>    Subjective:     Hospital Course:   2/9-10/2018:  Normal EEG, transient unresponsiveness  2/10-11/2018:  Normal EEG, no events  2/11/2018: No events in last 24 hours: EEG WNL.     Past Medical History:   Diagnosis Date    Asthma     Headache(784.0)     Seizures        Past Surgical History:   Procedure Laterality Date    WISDOM TOOTH EXTRACTION         Review of patient's allergies indicates:   Allergen Reactions    Lamictal  [lamotrigine]      Other reaction(s): HIVES    Cefzil [cefprozil] Rash    Demerol [meperidine] Rash     Other reaction(s): Rash    Pcn [penicillins] Rash     Other reaction(s): Rash       No current facility-administered medications on file prior to encounter.      Current Outpatient Prescriptions on File Prior to Encounter   Medication Sig    BLISOVI 24 FE 1 mg-20 mcg (24)/75 mg (4) per tablet Take 1 tablet by mouth once daily.    ergocalciferol (ERGOCALCIFEROL) 50,000 unit Cap Take 1 capsule (50,000 Units total) by mouth every 7 days.    ethosuximide (ZARONTIN) 250 mg Cap 2 capsules once daily    naproxen (NAPROSYN) 500 MG tablet Take 1 tablet (500 mg total) by mouth 2 (two) times daily as needed (TAKE 1 PO BID PRN PAIN).    tizanidine (ZANAFLEX) 4 MG tablet Take 1 tablet (4 mg total) by mouth every 8 (eight) hours. Take 1 po q 8 hrs prn muscle spasm    venlafaxine (EFFEXOR) 25 MG Tab Take 1 tab PO q day x 3 ds, then take 1 tab bid.    zonisamide (ZONEGRAN) 100 MG Cap Take 4 capsules (400 mg total) by mouth once daily.     Continuous Infusions:    Family History     Problem Relation (Age of Onset)    Arthritis Paternal Grandmother    Heart  disease Maternal Grandfather    Irregular heart beat Sister    Stroke Maternal Grandfather        Social History Main Topics    Smoking status: Never Smoker    Smokeless tobacco: Not on file    Alcohol use No    Drug use: Unknown    Sexual activity: No     Review of Systems   Constitutional: Negative for activity change, appetite change, fatigue and fever.   HENT: Negative for ear discharge, ear pain, postnasal drip, sinus pain and sinus pressure.    Eyes: Negative for pain, discharge and itching.   Respiratory: Negative for choking, chest tightness and shortness of breath.    Cardiovascular: Negative for chest pain and palpitations.   Gastrointestinal: Negative for abdominal distention, abdominal pain, constipation, diarrhea and nausea.   Endocrine: Negative for cold intolerance and heat intolerance.   Genitourinary: Negative for frequency and urgency.   Musculoskeletal: Negative for back pain, gait problem and joint swelling.   Skin: Negative for color change.   Neurological: Positive for seizures. Negative for dizziness, tremors, facial asymmetry, speech difficulty, weakness and numbness.   Psychiatric/Behavioral: Negative for agitation, behavioral problems and confusion.     Objective:     Vital Signs (Most Recent):  Temp: 98.1 °F (36.7 °C) (02/12/18 1153)  Pulse: 68 (02/12/18 1153)  Resp: 18 (02/12/18 1153)  BP: 103/64 (02/12/18 1153)  SpO2: 98 % (02/12/18 1153) Vital Signs (24h Range):  Temp:  [97.7 °F (36.5 °C)-98.6 °F (37 °C)] 98.1 °F (36.7 °C)  Pulse:  [66-93] 68  Resp:  [18] 18  SpO2:  [96 %-99 %] 98 %  BP: (101-117)/(57-74) 103/64     Weight: 59 kg (130 lb)  Body mass index is 21.63 kg/m².    Physical Exam   Constitutional: She is oriented to person, place, and time. She appears well-developed and well-nourished.   HENT:   Head: Normocephalic and atraumatic.   Eyes: EOM are normal. Pupils are equal, round, and reactive to light.   Neck: Normal range of motion. Neck supple.   Cardiovascular: Normal  rate, regular rhythm and normal heart sounds.    Pulmonary/Chest: Effort normal and breath sounds normal.   Abdominal: Soft. Bowel sounds are normal.   Musculoskeletal: Normal range of motion.   Neurological: She is oriented to person, place, and time. She has a normal Finger-Nose-Finger Test and a normal Heel to Shin Test. Gait normal.   Reflex Scores:       Tricep reflexes are 2+ on the right side and 2+ on the left side.       Bicep reflexes are 2+ on the right side and 2+ on the left side.       Brachioradialis reflexes are 2+ on the right side and 2+ on the left side.       Patellar reflexes are 2+ on the right side and 2+ on the left side.       Achilles reflexes are 2+ on the right side and 2+ on the left side.  Skin: Skin is warm.   Psychiatric: She has a normal mood and affect. Her speech is normal and behavior is normal.       NEUROLOGICAL EXAMINATION:     MENTAL STATUS   Oriented to person, place, and time.   Attention: normal. Concentration: normal.   Speech: speech is normal   Level of consciousness: alert  Knowledge: good.     CRANIAL NERVES     CN II   Visual fields full to confrontation.     CN III, IV, VI   Pupils are equal, round, and reactive to light.  Extraocular motions are normal.   CN III: no CN III palsy  CN VI: no CN VI palsy  Nystagmus: none   Diplopia: none  Ophthalmoparesis: none    CN V   Facial sensation intact.     CN VII   Facial expression full, symmetric.     CN VIII   CN VIII normal.     CN IX, X   CN IX normal.   CN X normal.     CN XI   CN XI normal.     CN XII   CN XII normal.     MOTOR EXAM   Muscle bulk: normal  Overall muscle tone: normal    Strength   Right neck flexion: 5/5  Left neck flexion: 5/5  Right neck extension: 5/5  Left neck extension: 5/5  Right deltoid: 5/5  Left deltoid: 5/5  Right biceps: 5/5  Left biceps: 5/5  Right triceps: 5/5  Left triceps: 5/5  Right wrist flexion: 5/5  Left wrist flexion: 5/5  Right wrist extension: 5/5  Left wrist extension: 5/5  Right  interossei: 5/5  Left interossei: 5/5  Right abdominals: 5/5  Left abdominals: 5/5  Right iliopsoas: 5/5  Left iliopsoas: 5/5  Right quadriceps: 5/5  Left quadriceps: 5/5  Right hamstrin/5  Left hamstrin/5  Right glutei: 5/5  Left glutei: 5/5  Right anterior tibial: 5/5  Left anterior tibial: 5/5  Right posterior tibial: 5/5  Left posterior tibial: 5/5  Right peroneal: 5/5  Left peroneal: 5/5  Right gastroc: 5/5  Left gastroc: 5/5    REFLEXES     Reflexes   Right brachioradialis: 2+  Left brachioradialis: 2+  Right biceps: 2+  Left biceps: 2+  Right triceps: 2+  Left triceps: 2+  Right patellar: 2+  Left patellar: 2+  Right achilles: 2+  Left achilles: 2+  Right plantar: normal  Left plantar: normal    SENSORY EXAM   Light touch normal.   Proprioception normal.   Pinprick normal.     GAIT AND COORDINATION     Gait  Gait: normal     Coordination   Finger to nose coordination: normal  Heel to shin coordination: normal      Significant Labs: CBC:   No results for input(s): WBC, HGB, HCT, PLT in the last 48 hours.  CMP:   No results for input(s): GLU, NA, K, CL, CO2, BUN, CREATININE, CALCIUM, MG, PROT, ALBUMIN, BILITOT, ALKPHOS, AST, ALT, ANIONGAP, EGFRNONAA in the last 48 hours.    Significant Studies: I have reviewed and interpreted all pertinent imaging results/findings within the past 24 hours.           Assessment and Plan:     Seizure-like activity    This is  Sinclair, 17 y.o. female who presents with seizure-like episodes. During this EMU hospitalization we have captured one starring episode and no generalized convulsion. Based on the EEG data from this hospitalization we have a no evidence of epilepsy. EEG WNL without epileptiform activity.      Plan:  - DC vEEG monitoring   - DC all AEDs   - Discharge today   - Patient counseled in detail by Dr Garza regarding the findings during this hospitalization.  - Follow up with Dr Brown in 4-6 weeks   - Case discussed with Dr Garza                 VTE  Risk Mitigation         Ordered     Low Risk of VTE  Once      02/09/18 8601          Gildardo Lambert II, MD  Neurology-Epilepsy  [facility]

## 2018-02-12 NOTE — ASSESSMENT & PLAN NOTE
This is April Lorrie Sinclair, 17 y.o. female who presents with seizure-like episodes. During this EMU hospitalization we have captured one starring episode and no generalized convulsion. Based on the EEG data from this hospitalization we have a no evidence of epilepsy. EEG WNL without epileptiform activity.      Plan:  - DC vEEG monitoring   - DC all AEDs   - Discharge today   - Patient counseled in detail by Dr Garza regarding the findings during this hospitalization.  - Follow up with Dr Brown in 4-6 weeks   - Case discussed with Dr Garza

## 2018-02-12 NOTE — SUBJECTIVE & OBJECTIVE
Past Medical History:   Diagnosis Date    Asthma     Headache(784.0)     Seizures        Past Surgical History:   Procedure Laterality Date    WISDOM TOOTH EXTRACTION         Review of patient's allergies indicates:   Allergen Reactions    Lamictal  [lamotrigine]      Other reaction(s): HIVES    Cefzil [cefprozil] Rash    Demerol [meperidine] Rash     Other reaction(s): Rash    Pcn [penicillins] Rash     Other reaction(s): Rash       No current facility-administered medications on file prior to encounter.      Current Outpatient Prescriptions on File Prior to Encounter   Medication Sig    BLISOVI 24 FE 1 mg-20 mcg (24)/75 mg (4) per tablet Take 1 tablet by mouth once daily.    ergocalciferol (ERGOCALCIFEROL) 50,000 unit Cap Take 1 capsule (50,000 Units total) by mouth every 7 days.    ethosuximide (ZARONTIN) 250 mg Cap 2 capsules once daily    naproxen (NAPROSYN) 500 MG tablet Take 1 tablet (500 mg total) by mouth 2 (two) times daily as needed (TAKE 1 PO BID PRN PAIN).    tizanidine (ZANAFLEX) 4 MG tablet Take 1 tablet (4 mg total) by mouth every 8 (eight) hours. Take 1 po q 8 hrs prn muscle spasm    venlafaxine (EFFEXOR) 25 MG Tab Take 1 tab PO q day x 3 ds, then take 1 tab bid.    zonisamide (ZONEGRAN) 100 MG Cap Take 4 capsules (400 mg total) by mouth once daily.     Continuous Infusions:    Family History     Problem Relation (Age of Onset)    Arthritis Paternal Grandmother    Heart disease Maternal Grandfather    Irregular heart beat Sister    Stroke Maternal Grandfather        Social History Main Topics    Smoking status: Never Smoker    Smokeless tobacco: Not on file    Alcohol use No    Drug use: Unknown    Sexual activity: No     Review of Systems   Constitutional: Negative for activity change, appetite change, fatigue and fever.   HENT: Negative for ear discharge, ear pain, postnasal drip, sinus pain and sinus pressure.    Eyes: Negative for pain, discharge and itching.   Respiratory:  Negative for choking, chest tightness and shortness of breath.    Cardiovascular: Negative for chest pain and palpitations.   Gastrointestinal: Negative for abdominal distention, abdominal pain, constipation, diarrhea and nausea.   Endocrine: Negative for cold intolerance and heat intolerance.   Genitourinary: Negative for frequency and urgency.   Musculoskeletal: Negative for back pain, gait problem and joint swelling.   Skin: Negative for color change.   Neurological: Positive for seizures. Negative for dizziness, tremors, facial asymmetry, speech difficulty, weakness and numbness.   Psychiatric/Behavioral: Negative for agitation, behavioral problems and confusion.     Objective:     Vital Signs (Most Recent):  Temp: 98.1 °F (36.7 °C) (02/12/18 1153)  Pulse: 68 (02/12/18 1153)  Resp: 18 (02/12/18 1153)  BP: 103/64 (02/12/18 1153)  SpO2: 98 % (02/12/18 1153) Vital Signs (24h Range):  Temp:  [97.7 °F (36.5 °C)-98.6 °F (37 °C)] 98.1 °F (36.7 °C)  Pulse:  [66-93] 68  Resp:  [18] 18  SpO2:  [96 %-99 %] 98 %  BP: (101-117)/(57-74) 103/64     Weight: 59 kg (130 lb)  Body mass index is 21.63 kg/m².    Physical Exam   Constitutional: She is oriented to person, place, and time. She appears well-developed and well-nourished.   HENT:   Head: Normocephalic and atraumatic.   Eyes: EOM are normal. Pupils are equal, round, and reactive to light.   Neck: Normal range of motion. Neck supple.   Cardiovascular: Normal rate, regular rhythm and normal heart sounds.    Pulmonary/Chest: Effort normal and breath sounds normal.   Abdominal: Soft. Bowel sounds are normal.   Musculoskeletal: Normal range of motion.   Neurological: She is oriented to person, place, and time. She has a normal Finger-Nose-Finger Test and a normal Heel to Shin Test. Gait normal.   Reflex Scores:       Tricep reflexes are 2+ on the right side and 2+ on the left side.       Bicep reflexes are 2+ on the right side and 2+ on the left side.       Brachioradialis  reflexes are 2+ on the right side and 2+ on the left side.       Patellar reflexes are 2+ on the right side and 2+ on the left side.       Achilles reflexes are 2+ on the right side and 2+ on the left side.  Skin: Skin is warm.   Psychiatric: She has a normal mood and affect. Her speech is normal and behavior is normal.       NEUROLOGICAL EXAMINATION:     MENTAL STATUS   Oriented to person, place, and time.   Attention: normal. Concentration: normal.   Speech: speech is normal   Level of consciousness: alert  Knowledge: good.     CRANIAL NERVES     CN II   Visual fields full to confrontation.     CN III, IV, VI   Pupils are equal, round, and reactive to light.  Extraocular motions are normal.   CN III: no CN III palsy  CN VI: no CN VI palsy  Nystagmus: none   Diplopia: none  Ophthalmoparesis: none    CN V   Facial sensation intact.     CN VII   Facial expression full, symmetric.     CN VIII   CN VIII normal.     CN IX, X   CN IX normal.   CN X normal.     CN XI   CN XI normal.     CN XII   CN XII normal.     MOTOR EXAM   Muscle bulk: normal  Overall muscle tone: normal    Strength   Right neck flexion: 5/5  Left neck flexion: 5/5  Right neck extension: 5/5  Left neck extension: 5/5  Right deltoid: 5/5  Left deltoid: 5/5  Right biceps: 5/5  Left biceps: 5/5  Right triceps: 5/5  Left triceps: 5/5  Right wrist flexion: 5/5  Left wrist flexion: 5/5  Right wrist extension: 5/5  Left wrist extension: 5/5  Right interossei: 5/5  Left interossei: 5/5  Right abdominals: 5/5  Left abdominals: 5/5  Right iliopsoas: 5/5  Left iliopsoas: 5/5  Right quadriceps: 5/5  Left quadriceps: 5/5  Right hamstrin/5  Left hamstrin/5  Right glutei: 5/5  Left glutei: 5/5  Right anterior tibial: 5/5  Left anterior tibial: 5/5  Right posterior tibial: 5/5  Left posterior tibial: 5/5  Right peroneal: 5/5  Left peroneal: 5/5  Right gastroc: 5/5  Left gastroc: 5/5    REFLEXES     Reflexes   Right brachioradialis: 2+  Left brachioradialis:  2+  Right biceps: 2+  Left biceps: 2+  Right triceps: 2+  Left triceps: 2+  Right patellar: 2+  Left patellar: 2+  Right achilles: 2+  Left achilles: 2+  Right plantar: normal  Left plantar: normal    SENSORY EXAM   Light touch normal.   Proprioception normal.   Pinprick normal.     GAIT AND COORDINATION     Gait  Gait: normal     Coordination   Finger to nose coordination: normal  Heel to shin coordination: normal      Significant Labs: CBC:   No results for input(s): WBC, HGB, HCT, PLT in the last 48 hours.  CMP:   No results for input(s): GLU, NA, K, CL, CO2, BUN, CREATININE, CALCIUM, MG, PROT, ALBUMIN, BILITOT, ALKPHOS, AST, ALT, ANIONGAP, EGFRNONAA in the last 48 hours.    Significant Studies: I have reviewed and interpreted all pertinent imaging results/findings within the past 24 hours.

## 2018-02-13 LAB
ETHOSUXIMIDE SERPL-MCNC: 28 MCG/ML
ZONISAMIDE SERPL-MCNC: 18 MCG/ML (ref 10–40)

## 2018-03-05 PROBLEM — G40.219 COMPLEX PARTIAL EPILEPSY WITH GENERALIZATION AND WITH INTRACTABLE EPILEPSY: Status: RESOLVED | Noted: 2018-02-09 | Resolved: 2018-03-05

## 2018-03-06 PROBLEM — R55 SPELL OF LOSS OF CONSCIOUSNESS: Status: ACTIVE | Noted: 2018-03-06

## 2018-03-09 ENCOUNTER — TELEPHONE (OUTPATIENT)
Dept: NEUROLOGY | Facility: CLINIC | Age: 18
End: 2018-03-09

## 2018-03-09 NOTE — TELEPHONE ENCOUNTER
----- Message from Darlyn Amin sent at 3/9/2018 12:54 PM CST -----  Contact: [phone] self  Patient would like to be seen sooner than the next available appointment. Please advise.

## 2018-03-09 NOTE — TELEPHONE ENCOUNTER
----- Message from Katarina Yu sent at 3/9/2018  2:32 PM CST -----  Contact: self @ [phone]  Pt says she is returning your call concerning an appt.

## 2018-03-15 NOTE — PROCEDURES
DATE OF PROCEDURE:  02/11/2018    EEG NUMBER:  EMU--2, EMU--4    REFERRING PHYSICIAN:  Dr. Brown    This EEG was performed to characterize the patient's events.    EPILEPSY MONITORING UNIT  EEG/VIDEO TELEMETRY REPORT    METHODOLOGY:  Electroencephalographic (EEG) is recorded with electrodes placed   according to the International 10-20 placement system.  Thirty Two (32) channels   of digital signal, including T1 and T2 electrodes, are simultaneously recorded   from the scalp and may also include EKG, EMG and/or eye movement monitors.    Recording band pass was 0.1 to 512 Hz.  Digital video recording of the patient   is simultaneously recorded with the EEG.  The patient is instructed to report   clinical symptoms which may occur during the recording session.  EEG and video   recording are stored and archived in digital format. Activation procedures,   which include photic stimulation, hyperventilation and instructing patients to   perform simple tasks, are done in selected patients.    The EEG is displayed on a monitor screen and can be reformatted into different   montages for evaluation.  The entire recoding is submitted for computer-assisted   analysis to detect spike and electrographic seizure activity.  The entire   recording is visually reviewed, and the times identified by computer analysis as   being spikes or seizures are reviewed again.  Compressesed spectral analysis   (CSA) is also performed on the activity recorded from each individual channel.    This is displayed as a power display of frequencies from 0 to 30 Hz over time.     The CSA analysis is done and displayed continuously.  This is reviewed for   asymmetries in power between homologous areas of the scalp and for presence of   changes in power which can be seen when seizures occur.  Sections of suspected   abnormalities on the CSA are then compared with the original EEG recording.    [employer] software was also utilized in the review of  this study.  This software   suite analyzes the EEG recording in multiple domains.  Coherence and rhythmicity   are computed to identify EEG sections which may contain organized seizures.    Each channel undergoes analysis to detect presence of spike and sharp waves   which have special and morphological characteristics of epileptic activity.  The   routine EEG recording is converted from special into frequency domain.  This is   then displayed comparing homologous areas to identify areas of significant   asymmetry.  Algorithm to identify non-cortically generated artifact is used to   separate artifact from the EEG.    RECORDING TIMES:  Start on 02/11/2018, hour 7, minute 0, second 32.  End on 02/12/2018, hour 7, minute 0, second 1.   Restart on 02/12/2018, hour 7, minute 0, second 40.  End on 02/12/2018, hour 15, minute 13, second 2.    Total time of video EEG recording for this study was 31 hours and 18 minutes.    EVENTS RECORDING:  During this study, none of the patient's typical events were   recorded.    ELECTROENCEPHALOGRAM:  INTERICTAL:  The recording was obtained with a number of standard bipolar and   referential montages during wakefulness, drowsiness and sleep.  In the alert   state, the posterior background rhythm was a symmetric, well-modulated 9 to 10   Hz alpha rhythm, which reacted symmetrically to eye opening.  Intermittent   photic stimulation evoked symmetric posterior driving responses.    Hyperventilation produced physiological slowing.  No abnormalities were   activated photic stimulation or hyperventilation.  During drowsiness, the   background rhythm waxed and waned and there were periods of slowing.  During   stage II sleep, symmetric V waves, K complexes and sleep spindles were noted.    There were no focal abnormalities.  There were no interictal epileptiform   abnormalities and no clinical or electrographic seizures were recorded.    The EKG channel revealed sinus rhythm.    ICTAL:   During this study and the patient's typical events were recorded.    FINAL SUMMARY:  ELECTROENCEPHALOGRAM:  INTERICTAL:  This is a normal EEG during wakefulness, drowsiness and sleep.  CLINICAL SEIZURE:  Not applicable.    CLINICAL CORRELATION:  The patient is a 17-year-old female who is being   evaluated for episodes of whole body jerking.  The patient is currently not   maintained on any anti-seizure medications.  This is a normal EEG during   wakefulness, drowsiness and sleep.  There is no evidence for either cortical   dysfunction nor an epileptic process on this recording.  No seizures were   recorded during this study.      FAK/KATIUSKA  dd: 03/15/2018 09:18:44 (CDT)  td: 03/15/2018 10:23:53 (CDT)  Doc ID   #[id number]  Job ID #[id number]    CC:

## 2018-03-29 ENCOUNTER — OFFICE VISIT (OUTPATIENT)
Dept: NEUROLOGY | Facility: CLINIC | Age: 18
End: 2018-03-29
Payer: MEDICAID

## 2018-03-29 VITALS
WEIGHT: 135.13 LBS | HEIGHT: 65 IN | SYSTOLIC BLOOD PRESSURE: 108 MMHG | BODY MASS INDEX: 22.51 KG/M2 | DIASTOLIC BLOOD PRESSURE: 67 MMHG

## 2018-03-29 DIAGNOSIS — R55 SYNCOPE AND COLLAPSE: ICD-10-CM

## 2018-03-29 DIAGNOSIS — G43.109 MIGRAINE WITH AURA AND WITHOUT STATUS MIGRAINOSUS, NOT INTRACTABLE: ICD-10-CM

## 2018-03-29 PROCEDURE — 99999 PR PBB SHADOW E&M-EST. PATIENT-LVL III: CPT | Mod: PBBFAC,,, | Performed by: PSYCHIATRY & NEUROLOGY

## 2018-03-29 PROCEDURE — 99214 OFFICE O/P EST MOD 30 MIN: CPT | Mod: S$PBB,,, | Performed by: PSYCHIATRY & NEUROLOGY

## 2018-03-29 PROCEDURE — 99213 OFFICE O/P EST LOW 20 MIN: CPT | Mod: PBBFAC,PO | Performed by: PSYCHIATRY & NEUROLOGY

## 2018-03-29 RX ORDER — LANOLIN ALCOHOL/MO/W.PET/CERES
400 CREAM (GRAM) TOPICAL DAILY
Qty: 90 TABLET | Refills: 3 | Status: SHIPPED | OUTPATIENT
Start: 2018-03-29 | End: 2019-07-18

## 2018-03-29 RX ORDER — RIBOFLAVIN (VITAMIN B2) 100 MG
200 TABLET ORAL DAILY
Qty: 180 TABLET | Refills: 3 | Status: SHIPPED | OUTPATIENT
Start: 2018-03-29 | End: 2019-07-18

## 2018-03-29 NOTE — PROGRESS NOTES
[facility] NEUROLOGY  [facility] [place]    Date: 3/29/18  Patient Name: Vansesa Sinclair   MRN: [id number]   PCP: Tamra Arora  Referring Provider: No ref. provider found    Assessment:      This is Vanessa Sinclair, 17 y.o. female who in follow-up for apparent syncopal episodes.  The patient has had no recurrence of her symptoms.  She has been tapered off of antiepileptic therapy after confirmation that the events are not epileptic.  We will add magnesium and riboflavin for further headache prophylaxis.  In addition to Effexor.  She may continue to use ibuprofen as needed for breakthrough.     Plan:       Problem List Items Addressed This Visit        Neuro    Migraine headache    Current Assessment & Plan     -- start magnesium and riboflavin  -- continue effexor  -- patient keeping a headache diary              Other    Syncope and collapse    Current Assessment & Plan     -- patient tapered off of AED therapy  -- EMU report reviewed, typical event captured- not epileptic  -- conservative treatment strategies reviewed             Odilon Brown MD  [facility]   Department of Neurology    Patient note was created using Dragon Dictation.  Any errors in syntax or even information may not have been identified and edited on initial review prior to signing this note.  Subjective:        HPI:   Ms. Vanessa Sinclair is a 17 y.o. female who presents with a chief complaint of a long-standing reported seizure disorder.  Since the patient's last visit, she reports that she has been doing well.  She presents today with her parents who contribute to the history.  She completed an EMU stay during which time she Typical staring event following the blood draw was found to have a normal EEG during the episode.  She has since been tapered off of antiepileptic medication and is doing well and has had no further episodes since her EMU stay (EEG personally reviewed).  She reports markedly  improved cognition since discontinuing AED therapy.  She does continue to experience frequent migraines approximately 1-22 times per week that she states are largely responsive to ibuprofen.  She does feel that Effexor has reduced her migraine frequency but does state    PAST MEDICAL HISTORY:  Past Medical History:   Diagnosis Date    Asthma     Headache(784.0)     Seizures      PAST SURGICAL HISTORY:  Past Surgical History:   Procedure Laterality Date    WISDOM TOOTH EXTRACTION       CURRENT MEDS:  Current Outpatient Prescriptions   Medication Sig Dispense Refill    BLISOVI 24 FE 1 mg-20 mcg (24)/75 mg (4) per tablet Take 1 tablet by mouth once daily.      ergocalciferol (ERGOCALCIFEROL) 50,000 unit Cap Take 1 capsule (50,000 Units total) by mouth every 7 days. 12 capsule 3    meloxicam (MOBIC) 15 MG tablet Take 1 tablet by mouth once daily.      naproxen (NAPROSYN) 500 MG tablet Take 1 tablet (500 mg total) by mouth 2 (two) times daily as needed (TAKE 1 PO BID PRN PAIN). 20 tablet 0    tizanidine (ZANAFLEX) 4 MG tablet Take 1 tablet (4 mg total) by mouth every 8 (eight) hours. Take 1 po q 8 hrs prn muscle spasm 15 tablet 0    venlafaxine (EFFEXOR) 25 MG Tab Take 2 tab PO qod, and 1 tab qod (alternating). 60 tablet 3    magnesium oxide (MAG-OX) 400 mg tablet Take 1 tablet (400 mg total) by mouth once daily. 90 tablet 3    riboflavin, vitamin B2, 100 mg Tab tablet Take 2 tablets (200 mg total) by mouth once daily. 180 tablet 3     No current facility-administered medications for this visit.      ALLERGIES:  Review of patient's allergies indicates:   Allergen Reactions    Lamictal  [lamotrigine]      Other reaction(s): HIVES    Cefzil [cefprozil] Rash    Demerol [meperidine] Rash     Other reaction(s): Rash    Pcn [penicillins] Rash     Other reaction(s): Rash     FAMILY HISTORY:  Family History   Problem Relation Age of Onset    Stroke Maternal Grandfather     Heart disease Maternal Grandfather      Irregular heart beat Sister     Arthritis Paternal Grandmother        SOCIAL HISTORY:  Social History   Substance Use Topics    Smoking status: Never Smoker    Smokeless tobacco: Not on file    Alcohol use No       Review of Systems:  12 review of systems is negative except for the symptoms mentioned in HPI.        Objective:     Vitals:    03/29/18 1341   BP: 108/67     General: NAD, well nourished   Eyes: no tearing, discharge, no erythema   ENT: moist mucous membranes of the oral cavity, nares patent    Neck: Supple, Full range of motion  Cardiovascular: Warm and well perfused, pulses equal and symmetrical  Lungs: Normal work of breathing, normal chest wall excursions  Skin: No rash, lesions, or breakdown on exposed skin  Psychiatry: Mood and affect are appropriate   Abdomen: soft, non tender, non distended  Extremeties: No cyanosis, clubbing or edema.    Neurological   MENTAL STATUS: Alert and oriented to person, place, and time. Attention and concentration within normal limits. Speech without dysarthria Recent and remote memory within normal limits   CRANIAL NERVES: Visual fields intact. PERRL. EOMI. Facial sensation intact. Face symmetrical. Hearing grossly intact. Full shoulder shrug bilaterally. Tongue protrudes midline   SENSORY: Sensation is intact to light touch throughout.    MOTOR: Normal bulk and tone. 5/5 deltoid, biceps, triceps, interosseous, hand  bilaterally. 5/5 iliopsoas, knee extension/flexion, foot dorsi/plantarflexion bilaterally.    REFLEXES: Symmetric and 2+ throughout.   CEREBELLAR/COORDINATION/GAIT: Gait steady with normal arm swing and stride length.  Finger to nose intact. Normal rapid alternating movements.

## 2018-03-29 NOTE — PATIENT INSTRUCTIONS
"  Self-Care for Headaches  Most headaches aren't serious and can be relieved with self-care. But some headaches may be a sign of another health problem like eye trouble or high blood pressure. To find the best treatment, learn what kind of headaches you get. For tension headaches, self-care will usually help. To treat migraines, ask your healthcare provider for advice. It is also possible to get both tension and migraine headaches. Self-care involves relieving the pain and avoiding headache triggers if you can.    Ways to reduce pain and tension  Try these steps:  · Apply a cold compress or ice pack to the pain site.  · Drink fluids. If nausea makes it hard to drink, try sucking on ice.  · Rest. Protect yourself from bright light and loud noises.  · Calm your emotions by imagining a peaceful scene.  · Massage tight neck, shoulder, and head muscles.  · To relax muscles, soak in a hot bath or use a hot shower.  Use medicines  Aspirin or aspirin substitutes, such as ibuprofen and acetaminophen, can relieve headache. Remember: Never give aspirin to anyone 18 years old or younger because of the risk of developing Reye syndrome. Use pain medicines only when necessary.  Track your headaches  Keeping a headache diary can help you and your healthcare provider identify what's causing your headaches:  · Note when each headache happens.  · Identify your activities and the foods you've eaten 6 to 8 hours before the headache began.  · Look for any trends or "triggers."  Signs of tension headache  Any of the following can be signs:  · Dull pain or feeling of pressure in a tight band around your head  · Pain in your neck or shoulders  · Headache without a definite beginning or end  · Headache after an activity such as driving or working on a computer  Signs of migraine  Any of the following can be signs:  · Throbbing pain on one or both sides of your head  · Nausea or vomiting  · Extreme sensitivity to light, sound, and " "smells  · Bright spots, flashes, or other visual changes  · Pain or nausea so severe that you can't continue your daily activities  Call your healthcare provider   If you have any of the following symptoms, contact your healthcare provider:  · A headache that lingers after a recent injury or bump to the head.  · A fever with a stiff neck or pain when you bend your head toward your chest.  · A headache along with slurred speech, changes in your vision, or numbness or weakness in your arms or legs.  · A headache for longer than 3 days.  · Frequent headaches, especially in the morning.  · Headaches with seizures   · Seek immediate medical attention if you have a headache that you would call "the worst headache you have ever had."   Date Last Reviewed: 10/4/2015  © 5311-8069 [employer]. [address] [place] [address], [place] [address]. All rights reserved. This information is not intended as a substitute for professional medical care. Always follow your healthcare professional's instructions.        "

## 2018-06-11 PROBLEM — G44.209 TENSION HEADACHE: Status: ACTIVE | Noted: 2018-06-11

## 2018-10-04 ENCOUNTER — TELEPHONE (OUTPATIENT)
Dept: NEUROLOGY | Facility: CLINIC | Age: 18
End: 2018-10-04

## 2018-10-04 NOTE — TELEPHONE ENCOUNTER
----- Message from Odilon Brown MD sent at 10/4/2018  4:11 PM CDT -----  Contact: mother Spencer, [phone]  Needs next available.    ----- Message -----  From: Megan Covarrubias MA  Sent: 10/4/2018   3:59 PM  To: Odilon Brown MD    Your real first available is in November, is there something she should do until?    ----- Message -----  From: Kailyn Josephine  Sent: 10/4/2018   3:41 PM  To: Kevin TOBIAS Staff    States patient's seizures are back and needs to be seen as soon as possible because she was taken off all of her medications and can't wait until next available appointment on 1/2. Please advise.

## 2018-10-04 NOTE — TELEPHONE ENCOUNTER
I called the patient and left a message for the patient to be scheduled for the first available with Dr. Brown.

## 2018-10-05 ENCOUNTER — TELEPHONE (OUTPATIENT)
Dept: NEUROLOGY | Facility: CLINIC | Age: 18
End: 2018-10-05

## 2018-10-05 NOTE — TELEPHONE ENCOUNTER
I called patient and she wanted to reschedule her appointment. New appointment is for 10/22/2018 @ 11:00 am

## 2018-10-22 ENCOUNTER — OFFICE VISIT (OUTPATIENT)
Dept: NEUROLOGY | Facility: CLINIC | Age: 18
End: 2018-10-22
Payer: OTHER GOVERNMENT

## 2018-10-22 VITALS
HEIGHT: 65 IN | HEART RATE: 75 BPM | DIASTOLIC BLOOD PRESSURE: 87 MMHG | BODY MASS INDEX: 23.14 KG/M2 | SYSTOLIC BLOOD PRESSURE: 122 MMHG | WEIGHT: 138.88 LBS

## 2018-10-22 DIAGNOSIS — F41.9 ANXIETY: ICD-10-CM

## 2018-10-22 DIAGNOSIS — G43.109 MIGRAINE WITH AURA AND WITHOUT STATUS MIGRAINOSUS, NOT INTRACTABLE: ICD-10-CM

## 2018-10-22 DIAGNOSIS — R55 SYNCOPE AND COLLAPSE: ICD-10-CM

## 2018-10-22 DIAGNOSIS — R55 SYNCOPE, CONVULSIVE: Primary | ICD-10-CM

## 2018-10-22 PROCEDURE — 99214 OFFICE O/P EST MOD 30 MIN: CPT | Mod: S$PBB,,, | Performed by: PSYCHIATRY & NEUROLOGY

## 2018-10-22 PROCEDURE — 99214 OFFICE O/P EST MOD 30 MIN: CPT | Mod: PBBFAC,PO | Performed by: PSYCHIATRY & NEUROLOGY

## 2018-10-22 PROCEDURE — 99999 PR PBB SHADOW E&M-EST. PATIENT-LVL IV: CPT | Mod: PBBFAC,,, | Performed by: PSYCHIATRY & NEUROLOGY

## 2018-10-22 NOTE — PATIENT INSTRUCTIONS
Causes of Syncope  Syncope (fainting) has many causes. Sometimes it is not serious. In other cases, syncope is a sign of a heart problem. But treatment can help    When syncope is not serious  Your healthcare provider may call your problem vasovagal syncope, reflex syncope, or orthostatic hypotension. These types of syncope are generally not serious. They can be caused by:  · Strong feelings, such as anxiety or fear. A nerve signal may briefly change your heart rate and lower your blood pressure too much.  · Standing for too long. Standing may cause blood to pool in your legs. When this happens, your brain may not receive all the blood it needs.  · Standing up too quickly. Your blood pressure may not adjust fast enough to changes in posture and may drop too low. Certain medicines can also cause this problem. Examples of medicines that can cause a drop in blood pressure include diuretics, blood pressure medicines, and medicines for chest pain. Your pharmacist or healthcare provider can discuss these with you.  · Reaction to normal body functions. When you go to the bathroom, have gastrointestinal discomfort, nausea, or pain, your heart may have a natural reflex to slow down and lower blood pressure. This can result in syncope. This may also follow exercise, eating, laughter, weight lifting, or playing musical instruments like the trumpet or trombone.  When heart trouble causes syncope  A heart problem can decrease the amount of oxygen-rich blood that reaches the brain. Heart trouble can be serious and even life threatening if not treated:  · A slow heart rate. Electrical signals tell the chambers of the heart when to pump. But the signals may be slowed or blocked (heart block) as they travel on the hearts electrical pathways. This can be caused by aging, scarred heart tissue, or damage from heart disease. When the heart rate slows, not enough blood is pumped.  · A fast heart rate. Certain problems can make the  heart race. For instance, after a heart attack, also known as acute myocardial infarction, or AMI, abnormal electrical signals may be created. These signals can make the heart suddenly beat very fast. The heart pumps before the chambers can fill with blood. So less blood reaches the brain and other parts of the body. Illegal drugs, certain medicines, heart disease, or an inherited condition can also cause this.  · A heart valve problem. Blood travels through the chambers of the heart as it is pumped. Heart valves open and close to help move blood in the right direction. But a valve may not open or close fully, if its hardened or scarred. As a result, less blood is pumped through the heart to the brain and body. Most often, syncope occurs when a person's aortic valve is critically narrowed and he or she participates in  a strenuous activity.  · A heart muscle problem. Some people develop a thickened heart muscle that blocks blood flow out of the heart to the body. This is called hypertrophic cardiomyopathy. Being dehydrated and having hypertrophic cardiomyopathy can increase the risk for syncope.  Whatever the cause of syncope, it is important to be evaluated by your healthcare provider. You may need to be seen by a cardiologist, neurologist, or an ear, nose, and throat specialist. Do not drive, operate heavy machinery, or participate in activities in which you would be at risk for falls and injury if you have syncope and have not been evaluated.  Date Last Reviewed: 5/1/2016  © 0535-5266 [employer]. [address] [place] [address], [place] [address]. All rights reserved. This information is not intended as a substitute for professional medical care. Always follow your healthcare professional's instructions.

## 2018-10-22 NOTE — PROGRESS NOTES
[facility] NEUROLOGY  [facility] [place]    Date: 10/22/18  Patient Name: Vanessa Sinclair   MRN: [id number]   PCP: Tamra Arora  Referring Provider: No ref. provider found    Assessment:      This is Vanessa Sinclair, 18 y.o. female who in follow-up for apparent syncopal episodes.  Patient has had recurrence of syncope in the setting of a stressful argument and while watching a bloody medical procedure.  Will obtain Holter monitor.  Patient reports increased stress and has requested referral to a psychologist today.     Plan:       Problem List Items Addressed This Visit        Neuro    Migraine headache    Current Assessment & Plan     -- patient to contact with name of rescue med for ongoing prescription            Psychiatric    Anxiety    Current Assessment & Plan     -- patient to establish care with a counselor         Relevant Orders    Ambulatory consult to Psychology       Other    Syncope and collapse    Current Assessment & Plan     -- obtaining Holter monitor           Other Visit Diagnoses     Syncope, convulsive    -  Primary    Relevant Orders    Cardiac event monitor        Odilon Brown MD  [facility]   Department of Neurology    Patient note was created using Dragon Dictation.  Any errors in syntax or even information may not have been identified and edited on initial review prior to signing this note.  Subjective:        HPI:   Ms. Vanessa Sinclair is a 18 y.o. female who presents in follow-up for recurrent syncopal episodes.  Of note, the patient has had and DM you stay with confirmation nonepileptic events.  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  She states the patient had begun bleeding profusely provoking the episode.  She also  states she had a 2nd episode when arguing with her  who accompanies her to the visit today.  He states that she complained that she felt like she was going to pass out before she briefly jerked her right arm and lost consciousness for approximately 30 sec before medially returning to baseline.  She reports that her migraine control is stable but states that she has been using a rescue medication prescribed by an outside physician and is uncertain of the name of the medicine. She does endorse increased anxiety due to her schooling.     PAST MEDICAL HISTORY:  Past Medical History:   Diagnosis Date    Asthma     Headache(784.0)     Seizures      PAST SURGICAL HISTORY:  Past Surgical History:   Procedure Laterality Date    WISDOM TOOTH EXTRACTION       CURRENT MEDS:  Current Outpatient Medications   Medication Sig Dispense Refill    BLISOVI 24 FE 1 mg-20 mcg (24)/75 mg (4) per tablet Take 1 tablet by mouth once daily.      magnesium oxide (MAG-OX) 400 mg tablet Take 1 tablet (400 mg total) by mouth once daily. 90 tablet 3    ergocalciferol (ERGOCALCIFEROL) 50,000 unit Cap Take 1 capsule (50,000 Units total) by mouth every 7 days. 12 capsule 1    ketoconazole (NIZORAL) 2 % shampoo USE TO WASH SCALP two TO three TIMES A WEEK  2    meloxicam (MOBIC) 15 MG tablet Take 1 tablet by mouth once daily.      naproxen (NAPROSYN) 500 MG tablet Take 1 tablet (500 mg total) by mouth 2 (two) times daily as needed (TAKE 1 PO BID PRN PAIN). 20 tablet 0    riboflavin, vitamin B2, 100 mg Tab tablet Take 2 tablets (200 mg total) by mouth once daily. 180 tablet 3    sumatriptan (IMITREX) 50 MG tablet Take 1 tab PO X 1 PRN for migraines. Do not repeat for 24 hours. 6 tablet 2    venlafaxine (EFFEXOR XR) 37.5 MG 24 hr capsule Take 1 capsule (37.5 mg total) by mouth once daily. 30 capsule 11    zonisamide (ZONEGRAN) 50 MG Cap Take 1 tab PO QHS 30 capsule 4     No current facility-administered medications for this visit.       ALLERGIES:  Review of patient's allergies indicates:   Allergen Reactions    Lamictal  [lamotrigine]      Other reaction(s): HIVES    Cefzil [cefprozil] Rash    Demerol [meperidine] Rash     Other reaction(s): Rash    Pcn [penicillins] Rash     Other reaction(s): Rash     FAMILY HISTORY:  Family History   Problem Relation Age of Onset    Stroke Maternal Grandfather     Heart disease Maternal Grandfather     Irregular heart beat Sister     Arthritis Paternal Grandmother        SOCIAL HISTORY:  Social History     Tobacco Use    Smoking status: Never Smoker   Substance Use Topics    Alcohol use: No    Drug use: Not on file       Review of Systems:  12 review of systems is negative except for the symptoms mentioned in HPI.        Objective:     Vitals:    10/22/18 1122   BP: 122/87   Pulse: 75     General: NAD, well nourished   Eyes: no tearing, discharge, no erythema   ENT: moist mucous membranes of the oral cavity, nares patent    Neck: Supple, Full range of motion  Cardiovascular: Warm and well perfused, pulses equal and symmetrical  Lungs: Normal work of breathing, normal chest wall excursions  Skin: No rash, lesions, or breakdown on exposed skin  Psychiatry: Mood and affect are appropriate   Abdomen: soft, non tender, non distended  Extremeties: No cyanosis, clubbing or edema.    Neurological   MENTAL STATUS: Alert and oriented to person, place, and time. Attention and concentration within normal limits. Speech without dysarthria.  CRANIAL NERVES: Visual fields intact. PERRL. EOMI. Facial sensation intact. Face symmetrical. Hearing grossly intact. Full shoulder shrug bilaterally. Tongue protrudes midline   SENSORY: Sensation is intact to light touch throughout.    MOTOR: Normal bulk and tone. 5/5 deltoid, biceps, triceps, interosseous, hand  bilaterally. 5/5 iliopsoas, knee extension/flexion, foot dorsi/plantarflexion bilaterally.    REFLEXES: Symmetric and 2+ throughout.    CEREBELLAR/COORDINATION/GAIT: Gait steady with normal arm swing and stride length.  Finger to nose intact.

## 2018-12-19 ENCOUNTER — TELEPHONE (OUTPATIENT)
Dept: PEDIATRIC NEUROLOGY | Facility: CLINIC | Age: 18
End: 2018-12-19

## 2018-12-19 NOTE — TELEPHONE ENCOUNTER
Telephoned Thalia/Guy Mak [phone] to inform her she will have to fax over a release of information to [phone]

## 2018-12-19 NOTE — TELEPHONE ENCOUNTER
----- Message from Zoe Mo sent at 12/19/2018 11:06 AM CST -----  Contact: Thalia/Guy Mak [phone]  Reason for call: Medical records          Communication Preference: Ana Maria Mak [phone]    Additional Information: Thalia stated that they received patient's medical records but they were not sent certified and they were incomplete (there are pages missing). She stated that they didn't come from the main campus records department and is requesting a call back from the doctor's office.

## 2019-05-17 ENCOUNTER — HOSPITAL ENCOUNTER (EMERGENCY)
Facility: HOSPITAL | Age: 19
Discharge: HOME | End: 2019-05-17
Attending: EMERGENCY MEDICINE
Payer: OTHER GOVERNMENT

## 2019-05-17 VITALS
WEIGHT: 148 LBS | SYSTOLIC BLOOD PRESSURE: 101 MMHG | BODY MASS INDEX: 23.78 KG/M2 | HEART RATE: 89 BPM | HEIGHT: 66 IN | OXYGEN SATURATION: 99 % | DIASTOLIC BLOOD PRESSURE: 59 MMHG | TEMPERATURE: 98 F | RESPIRATION RATE: 20 BRPM

## 2019-05-17 DIAGNOSIS — R19.7 NAUSEA VOMITING AND DIARRHEA: ICD-10-CM

## 2019-05-17 DIAGNOSIS — R11.2 NAUSEA VOMITING AND DIARRHEA: ICD-10-CM

## 2019-05-17 DIAGNOSIS — R10.31 RLQ ABDOMINAL PAIN: Primary | ICD-10-CM

## 2019-05-17 DIAGNOSIS — G40.909 SEIZURE DISORDER: ICD-10-CM

## 2019-05-17 LAB
ALBUMIN SERPL BCP-MCNC: 3.8 G/DL (ref 3.5–5.2)
ALP SERPL-CCNC: 36 U/L (ref 55–135)
ALT SERPL W/O P-5'-P-CCNC: 11 U/L (ref 10–44)
ANION GAP SERPL CALC-SCNC: 8 MMOL/L (ref 8–16)
AST SERPL-CCNC: 14 U/L (ref 10–40)
B-HCG UR QL: NEGATIVE
BASOPHILS # BLD AUTO: 0 K/UL (ref 0–0.2)
BASOPHILS NFR BLD: 0 % (ref 0–1.9)
BILIRUB SERPL-MCNC: 0.5 MG/DL (ref 0.1–1)
BILIRUB UR QL STRIP: NEGATIVE
BUN SERPL-MCNC: 14 MG/DL (ref 6–20)
CALCIUM SERPL-MCNC: 9 MG/DL (ref 8.7–10.5)
CHLORIDE SERPL-SCNC: 106 MMOL/L (ref 95–110)
CLARITY UR: ABNORMAL
CO2 SERPL-SCNC: 22 MMOL/L (ref 23–29)
COLOR UR: YELLOW
CREAT SERPL-MCNC: 0.8 MG/DL (ref 0.5–1.4)
CTP QC/QA: YES
DIFFERENTIAL METHOD: ABNORMAL
EOSINOPHIL # BLD AUTO: 0 K/UL (ref 0–0.5)
EOSINOPHIL NFR BLD: 0.2 % (ref 0–8)
ERYTHROCYTE [DISTWIDTH] IN BLOOD BY AUTOMATED COUNT: 12.9 % (ref 11.5–14.5)
EST. GFR  (AFRICAN AMERICAN): >60 ML/MIN/1.73 M^2
EST. GFR  (NON AFRICAN AMERICAN): >60 ML/MIN/1.73 M^2
GLUCOSE SERPL-MCNC: 98 MG/DL (ref 70–110)
GLUCOSE UR QL STRIP: NEGATIVE
HCT VFR BLD AUTO: 43.9 % (ref 37–48.5)
HGB BLD-MCNC: 14.5 G/DL (ref 12–16)
HGB UR QL STRIP: NEGATIVE
KETONES UR QL STRIP: ABNORMAL
LEUKOCYTE ESTERASE UR QL STRIP: ABNORMAL
LIPASE SERPL-CCNC: 25 U/L (ref 4–60)
LYMPHOCYTES # BLD AUTO: 0.5 K/UL (ref 1–4.8)
LYMPHOCYTES NFR BLD: 5.4 % (ref 18–48)
MCH RBC QN AUTO: 29.8 PG (ref 27–31)
MCHC RBC AUTO-ENTMCNC: 33 G/DL (ref 32–36)
MCV RBC AUTO: 90 FL (ref 82–98)
MICROSCOPIC COMMENT: ABNORMAL
MONOCYTES # BLD AUTO: 0.3 K/UL (ref 0.3–1)
MONOCYTES NFR BLD: 3.7 % (ref 4–15)
NEUTROPHILS # BLD AUTO: 7.6 K/UL (ref 1.8–7.7)
NEUTROPHILS NFR BLD: 90.7 % (ref 38–73)
NITRITE UR QL STRIP: NEGATIVE
PH UR STRIP: 5 [PH] (ref 5–8)
PLATELET # BLD AUTO: 191 K/UL (ref 150–350)
PMV BLD AUTO: 11.9 FL (ref 9.2–12.9)
POTASSIUM SERPL-SCNC: 3.7 MMOL/L (ref 3.5–5.1)
PROT SERPL-MCNC: 6.6 G/DL (ref 6–8.4)
PROT UR QL STRIP: NEGATIVE
RBC # BLD AUTO: 4.86 M/UL (ref 4–5.4)
SODIUM SERPL-SCNC: 136 MMOL/L (ref 136–145)
SP GR UR STRIP: 1.02 (ref 1–1.03)
SQUAMOUS #/AREA URNS HPF: 20 /HPF
URN SPEC COLLECT METH UR: ABNORMAL
UROBILINOGEN UR STRIP-ACNC: NEGATIVE EU/DL
WBC # BLD AUTO: 8.33 K/UL (ref 3.9–12.7)
WBC #/AREA URNS HPF: 10 /HPF (ref 0–5)

## 2019-05-17 PROCEDURE — 96365 THER/PROPH/DIAG IV INF INIT: CPT | Mod: 59

## 2019-05-17 PROCEDURE — 81000 URINALYSIS NONAUTO W/SCOPE: CPT

## 2019-05-17 PROCEDURE — 25000003 PHARM REV CODE 250: Performed by: EMERGENCY MEDICINE

## 2019-05-17 PROCEDURE — 25000003 PHARM REV CODE 250: Performed by: PHYSICIAN ASSISTANT

## 2019-05-17 PROCEDURE — 25500020 PHARM REV CODE 255: Performed by: EMERGENCY MEDICINE

## 2019-05-17 PROCEDURE — 63600175 PHARM REV CODE 636 W HCPCS: Performed by: EMERGENCY MEDICINE

## 2019-05-17 PROCEDURE — 81025 URINE PREGNANCY TEST: CPT | Performed by: PHYSICIAN ASSISTANT

## 2019-05-17 PROCEDURE — 96375 TX/PRO/DX INJ NEW DRUG ADDON: CPT

## 2019-05-17 PROCEDURE — 80053 COMPREHEN METABOLIC PANEL: CPT

## 2019-05-17 PROCEDURE — 85025 COMPLETE CBC W/AUTO DIFF WBC: CPT

## 2019-05-17 PROCEDURE — 63600175 PHARM REV CODE 636 W HCPCS: Performed by: PHYSICIAN ASSISTANT

## 2019-05-17 PROCEDURE — 83690 ASSAY OF LIPASE: CPT

## 2019-05-17 PROCEDURE — 99285 EMERGENCY DEPT VISIT HI MDM: CPT | Mod: 25

## 2019-05-17 RX ORDER — LEVETIRACETAM 10 MG/ML
1000 INJECTION INTRAVASCULAR
Status: COMPLETED | OUTPATIENT
Start: 2019-05-17 | End: 2019-05-17

## 2019-05-17 RX ORDER — NITROFURANTOIN 25; 75 MG/1; MG/1
100 CAPSULE ORAL 2 TIMES DAILY
Qty: 10 CAPSULE | Refills: 0 | Status: SHIPPED | OUTPATIENT
Start: 2019-05-17 | End: 2019-05-22

## 2019-05-17 RX ORDER — KETOROLAC TROMETHAMINE 30 MG/ML
15 INJECTION, SOLUTION INTRAMUSCULAR; INTRAVENOUS
Status: COMPLETED | OUTPATIENT
Start: 2019-05-17 | End: 2019-05-17

## 2019-05-17 RX ORDER — ONDANSETRON 2 MG/ML
4 INJECTION INTRAMUSCULAR; INTRAVENOUS
Status: COMPLETED | OUTPATIENT
Start: 2019-05-17 | End: 2019-05-17

## 2019-05-17 RX ORDER — ONDANSETRON 4 MG/1
4 TABLET, ORALLY DISINTEGRATING ORAL EVERY 8 HOURS PRN
Qty: 15 TABLET | Refills: 0 | Status: SHIPPED | OUTPATIENT
Start: 2019-05-17 | End: 2019-05-17 | Stop reason: SDUPTHER

## 2019-05-17 RX ORDER — ONDANSETRON 4 MG/1
4 TABLET, ORALLY DISINTEGRATING ORAL EVERY 8 HOURS PRN
Qty: 15 TABLET | Refills: 0 | Status: SHIPPED | OUTPATIENT
Start: 2019-05-17 | End: 2019-05-22

## 2019-05-17 RX ORDER — NITROFURANTOIN 25; 75 MG/1; MG/1
100 CAPSULE ORAL
Status: COMPLETED | OUTPATIENT
Start: 2019-05-17 | End: 2019-05-17

## 2019-05-17 RX ORDER — LEVETIRACETAM 500 MG/1
500 TABLET, EXTENDED RELEASE ORAL DAILY
Qty: 30 TABLET | Refills: 11 | Status: SHIPPED | OUTPATIENT
Start: 2019-05-17 | End: 2019-07-18

## 2019-05-17 RX ADMIN — IOHEXOL 75 ML: 350 INJECTION, SOLUTION INTRAVENOUS at 10:05

## 2019-05-17 RX ADMIN — ONDANSETRON 4 MG: 2 INJECTION INTRAMUSCULAR; INTRAVENOUS at 09:05

## 2019-05-17 RX ADMIN — LEVETIRACETAM 1000 MG: 10 INJECTION INTRAVENOUS at 10:05

## 2019-05-17 RX ADMIN — KETOROLAC TROMETHAMINE 15 MG: 30 INJECTION, SOLUTION INTRAMUSCULAR; INTRAVENOUS at 09:05

## 2019-05-17 RX ADMIN — SODIUM CHLORIDE 1000 ML: 0.9 INJECTION, SOLUTION INTRAVENOUS at 09:05

## 2019-05-17 RX ADMIN — NITROFURANTOIN (MONOHYDRATE/MACROCRYSTALS) 100 MG: 75; 25 CAPSULE ORAL at 11:05

## 2019-05-17 NOTE — LETTER
[address] [id number]  Phone: [phone] May 17, 2019     Tamra Arora MD  [address] [place] [address] [place] [address]    Patient: Vanessa Arreaga   Patient ID: [id number]   YOB: 2000   Date of Visit: 5/17/2019        Dear Tamra Arora:    Your patient, Vanessa Arreaga, was recently seen and treated in our emergency department with a complaint of abdominal pain. During her workup she had a short seizure episode.  She had not had seizures in several years and was no longer taking antiepileptics.  A provided her with the loading dose of Keppra and have her call her neurologist for directions about continuing Keppra.  Her abdominal pain workup revealed a UTI which I have started treatment with nitrofurantoin.  CT of the abdomen was negative. Have asked her to call for follow up with you for recheck in the next few days.  Attached to this letter is a summary of that visit.    Sincerely,        Blayne Kibmall MD     [facility]

## 2019-05-17 NOTE — ED TRIAGE NOTES
"Arrived via personal transportation. Pt complains of RLQ abd pain x 1 week. Pt also reports nausea x 1 week. And vomiting that started last night. Pt aaox4. Reports PMH of seizures. States "I haven't had one in a year. My doctor took me off the me of the keppra"   "

## 2019-05-17 NOTE — ED PROVIDER NOTES
"Encounter Date: 5/17/2019    SCRIBE #1 NOTE: I, Penny Escoto, am scribing for, and in the presence of,  Karin Sandoval PA-C. I have scribed the following portions of the note - Other sections scribed: HPI, ROS, and PE.   SCRIBE #2 NOTE: I, Tanya Arias, am scribing for, and in the presence of,  Dr. Kimball. I have scribed the remaining portions of the note not scribed by Scribe #1.     History     Chief Complaint   Patient presents with    Vomiting     +nausea, vomiting, diarrhea, RLQ ABD pain x "last nigth," "I think it's my gall bladder."  No acute distress noted.     Abdominal Pain     CC: Abdominal Pain    HPI: This 18 y.o female who has seizures and asthma presents to the ED for an evaluation of acute onset, constant, 7/10 right lower quadrant abdominal pain described as a stabbing sensation that began last night.  Patient also reports of nausea, 2 episodes of emesis, and 2 episodes of diarrhea.  Patient reports attempting treatment with Tums without relief. She reports coming to the ED out of concern her symptoms are a result of her gallbladder secondary her family hx.  She reports both gallbladder and appendix are intact.  Patient denies fever, chills, back pain, dysuria, vaginal bleeding, vaginal discharge, rash, blood in emesis or diarrhea, or any other associated symptoms.  No alleviating factors. LMP 2 weeks ago. She notes allergies to Demerol and Penicillin.    The history is provided by the patient. No  was used.     Review of patient's allergies indicates:   Allergen Reactions    Lamictal  [lamotrigine]      Other reaction(s): HIVES    Cefzil [cefprozil] Rash    Demerol [meperidine] Rash     Other reaction(s): Rash    Pcn [penicillins] Rash     Other reaction(s): Rash     Past Medical History:   Diagnosis Date    Asthma     Headache(784.0)     Seizures      Past Surgical History:   Procedure Laterality Date    WISDOM TOOTH EXTRACTION       Family History   Problem " Relation Age of Onset    Stroke Maternal Grandfather     Heart disease Maternal Grandfather     Irregular heart beat Sister     Arthritis Paternal Grandmother      Social History     Tobacco Use    Smoking status: Never Smoker   Substance Use Topics    Alcohol use: No    Drug use: Not on file     Review of Systems   Constitutional: Negative for appetite change, chills and fever.   HENT: Negative for congestion, ear pain and sore throat.    Eyes: Negative for pain.   Respiratory: Negative for cough and shortness of breath.    Cardiovascular: Negative for chest pain.   Gastrointestinal: Positive for abdominal pain, diarrhea, nausea and vomiting. Negative for blood in stool and constipation.   Genitourinary: Negative for decreased urine volume, difficulty urinating, dysuria, frequency, hematuria, vaginal bleeding and vaginal discharge.   Musculoskeletal: Negative for back pain and neck pain.   Skin: Negative for rash.   Neurological: Negative for seizures, weakness and headaches.        + history of seizures; last one was 4 months ago     Psychiatric/Behavioral: Negative for confusion.       Physical Exam     Initial Vitals [05/17/19 0908]   BP Pulse Resp Temp SpO2   117/72 108 18 99.1 °F (37.3 °C) 99 %      MAP       --         Physical Exam    Nursing note and vitals reviewed.  Constitutional: She appears well-developed and well-nourished. She is not diaphoretic. She is cooperative.  Non-toxic appearance. She does not have a sickly appearance. She does not appear ill. No distress.   HENT:   Head: Normocephalic and atraumatic.   Right Ear: Tympanic membrane, external ear and ear canal normal.   Left Ear: Tympanic membrane, external ear and ear canal normal.   Nose: Nose normal.   Mouth/Throat: Uvula is midline, oropharynx is clear and moist and mucous membranes are normal. No oral lesions. No trismus in the jaw. No uvula swelling. No oropharyngeal exudate, posterior oropharyngeal edema or posterior  oropharyngeal erythema.   Eyes: EOM are normal. Pupils are equal, round, and reactive to light. No scleral icterus.   Neck: Trachea normal, normal range of motion, full passive range of motion without pain and phonation normal. Neck supple. No JVD present.   Cardiovascular: Normal rate, regular rhythm and intact distal pulses.   Pulmonary/Chest: Effort normal and breath sounds normal. No stridor. No respiratory distress. She has no decreased breath sounds. She has no wheezes. She has no rhonchi. She has no rales.   Abdominal: Soft. Bowel sounds are normal. She exhibits no distension. There is tenderness in the right lower quadrant. There is no rigidity, no rebound, no guarding and no CVA tenderness.   Musculoskeletal: Normal range of motion. She exhibits no edema or tenderness.   Neurological: She is alert and oriented to person, place, and time. She has normal strength. No cranial nerve deficit or sensory deficit. She exhibits normal muscle tone. Coordination and gait normal. GCS eye subscore is 4. GCS verbal subscore is 5. GCS motor subscore is 6.   Skin: Skin is warm and dry. Capillary refill takes less than 2 seconds. No rash noted. There is pallor.   Psychiatric: She has a normal mood and affect.         ED Course   Procedures  Labs Reviewed   URINALYSIS, REFLEX TO URINE CULTURE - Abnormal; Notable for the following components:       Result Value    Appearance, UA Cloudy (*)     Ketones, UA Trace (*)     Leukocytes, UA 2+ (*)     All other components within normal limits    Narrative:     Preferred Collection Type->Urine, Clean Catch   CBC W/ AUTO DIFFERENTIAL - Abnormal; Notable for the following components:    Lymph # 0.5 (*)     Gran% 90.7 (*)     Lymph% 5.4 (*)     Mono% 3.7 (*)     All other components within normal limits   COMPREHENSIVE METABOLIC PANEL - Abnormal; Notable for the following components:    CO2 22 (*)     Alkaline Phosphatase 36 (*)     All other components within normal limits   URINALYSIS  MICROSCOPIC - Abnormal; Notable for the following components:    WBC, UA 10 (*)     All other components within normal limits    Narrative:     Preferred Collection Type->Urine, Clean Catch   LIPASE   POCT URINE PREGNANCY          Imaging Results          CT Abdomen Pelvis With Contrast (Final result)  Result time 05/17/19 11:11:34    Final result by Gary Singh MD (05/17/19 11:11:34)                 Impression:      No CT findings are seen to explain the source of the patient's abdominal pain.      Electronically signed by: Gary Singh  Date:    05/17/2019  Time:    11:11             Narrative:    EXAMINATION:  CT ABDOMEN PELVIS WITH CONTRAST    CLINICAL HISTORY:  RLQ pain, appendicitis suspected;    TECHNIQUE:  Low dose axial images, sagittal and coronal reformations were obtained from the lung bases to the pubic symphysis following the IV administration of 75 mL of Omnipaque 350 .  Oral contrast was not given.    COMPARISON:  CT 01/03/2017.    FINDINGS:  The lung bases are clear.  No pleural effusion is seen.  The visualized portions of the heart appear unremarkable.    The liver is normal in size and attenuation.  No focal hepatic abnormality is seen.  The portal vein is patent.  The gallbladder is unremarkable.  No intrahepatic or extrahepatic biliary ductal dilatation is seen.    The stomach, spleen, pancreas, and bilateral adrenal glands appear unremarkable.    The kidneys are normal in size and location.  They enhance with contrast appropriately.  No hydronephrosis is seen.  The urinary bladder is unremarkable.  The uterus is unremarkable.    The visualized loops of small and large bowel demonstrate no evidence of obstruction or inflammation.  No ascites, free fluid, or intraperitoneal free air seen.  The appendix is partially filled with air and appears unremarkable.    The abdominal aorta is normal in caliber and course without significant atherosclerotic calcification throughout its course.  No  "mesenteric or retroperitoneal lymph node enlargement is present.    The osseous structures demonstrate degenerative change without evidence of acute fracture or osseous destructive process.                                 Medical Decision Making:   Clinical Tests:   Lab Tests: Ordered and Reviewed  Radiological Study: Ordered and Reviewed  ED Management:  ASSUMPTION OF CARE:  Case accepted from MICHAEL Chairez, at 09:30AM, after the pt had an episode of LOC with tonoclonic movement consistent with seizure. I agree with previous provider's history/physical and overall assessment. After the episode, the pt feels lightheaded, dizzy. She states she has been on Keppra before. I will treat her symptoms, review orders, and reassess.         APC / Resident Notes:   This is an evaluation of a 18 y.o. female with PMHx epilepsy asthma that presents to the Emergency Department for nausea, vomiting, diarrhea and RLQ abdominal pain. Patient reports symptoms began yesterday. She attempted Tums this morning. She denies recent foreign travel. She denies further symptoms. She states that she was taken off of her seizure medication,     Exam findings: Patient is non-toxic, afebrile and well appearing. Patient appears pale. PERRL. No facial droop. There is mild tachycardia, likely 2/2 dehydration. There is RLQ abdominal tenderness upon palpation. No guarding, rigidity or rebound tenderness. Bowel sounds are hyperactive. No CVA tenderness. No neuro deficits. Patient ambulatory; no coordination or gait abnormality.  Remainder of exam unremarkable.     If available, past records have been reviewed.  Vitals reviewed.    ED course: I placed initial orders for work-up. While Mele Rocha RN was initiated patient's IV access, she witnessed patient "become limp and convulse, with her eyes rolled back for about 5 seconds." Mele immediately called for help and I met patient and Mele at the bedside. Patient was AAOx3 and talking to me. " She states that she has not taken seizure medication in 4 months because she was taken off of it. She denies seizure in the past 4 months. I have discussed this case with Dr. Kimball and patient was moved to the main ED for closer monitoring due to seizure. Patient remains stable while under my care. My care ends here and Dr. Boone will assume care from this time.  10:43AM, 5/17/2019      Karin Higgins PA-C         Scribe Attestation:   Scribe #1: I performed the above scribed service and the documentation accurately describes the services I performed. I attest to the accuracy of the note.            ED Course as of May 17 1242   Fri May 17, 2019   1101 CMP is within normal limits   Comprehensive metabolic panel(!) [MH]   1101 CBC is within normal limits   CBC auto differential(!) [MH]   1102 UA is concerning for UTI   Urinalysis, Reflex to Urine Culture Urine, Clean Catch(!) [MH]   1102 UPT negative   Preg Test, Ur: Negative []   1133 CT reviewed.  No evidence of appendicitis.    []   1241 Tolerating fluids.  Will discharge home a bland diet and follow up with primary care    []      ED Course User Index  [MH] Micelle CHRISTIE Kimball MD     Clinical Impression:       ICD-10-CM ICD-9-CM   1. RLQ abdominal pain R10.31 789.03   2. Nausea vomiting and diarrhea R11.2 787.91    R19.7 787.01         Disposition:     Scribe attestation: I, Karin Higgins, personally performed the services described in this documentation. All medical record entries made by the scribe were at my direction and in my presence.  I have reviewed the chart and agree that the record reflects my personal performance and is accurate and complete                         Micelle CHRISTIE Kimball MD  05/17/19 1249

## 2019-05-17 NOTE — DISCHARGE INSTRUCTIONS
We did not find a medical condition that required inpatient admission at this time. It is important to remember that any patient's condition may change, and we cannot predict how you will be feeling tomorrow or the next day, so if you have any worsening or new symptoms, you should not hesitate to return to the emergency department for reevaluation.  It is also important to follow up with your primary care physician to determine that the condition that you are being treated for today has completely resolved and has been optimally managed.      NO DRIVING UNTIL CLEARED BY YOUR NEUROLOGIST.  SEIZURE PRECAUTIONS INCLUDING NO SWIMMING/BATHING ALONE!

## 2019-05-17 NOTE — ED NOTES
RN in pt's room starting IV. Upon starting IV pt drops arm and and falls back in bed. Generalized jerking movement noted. Pt lied flat in bed by RN. Seizure lasted about 10 seconds. LOC noted. Pt did not hit head. Provider, Karin, called to bedside. Will move pt to monitored bed

## 2019-05-20 ENCOUNTER — TELEPHONE (OUTPATIENT)
Dept: NEUROLOGY | Facility: CLINIC | Age: 19
End: 2019-05-20

## 2019-05-20 NOTE — TELEPHONE ENCOUNTER
----- Message from Letha Pruitt sent at 5/20/2019  3:14 PM CDT -----  Contact: [phone] pt's mother   Patient's mother is requesting a callback about pt's work release forms. Please advise.

## 2019-05-20 NOTE — TELEPHONE ENCOUNTER
I called the patient to schedule a follow up appointment with Dr. Brown for medical clearance for her job

## 2019-05-28 ENCOUNTER — OFFICE VISIT (OUTPATIENT)
Dept: NEUROLOGY | Facility: CLINIC | Age: 19
End: 2019-05-28
Payer: OTHER GOVERNMENT

## 2019-05-28 VITALS
WEIGHT: 147.94 LBS | SYSTOLIC BLOOD PRESSURE: 118 MMHG | BODY MASS INDEX: 23.77 KG/M2 | HEART RATE: 67 BPM | DIASTOLIC BLOOD PRESSURE: 80 MMHG | HEIGHT: 66 IN

## 2019-05-28 DIAGNOSIS — R55 SYNCOPE AND COLLAPSE: ICD-10-CM

## 2019-05-28 DIAGNOSIS — R55 VASOVAGAL SYNCOPE: Primary | ICD-10-CM

## 2019-05-28 PROCEDURE — 99214 OFFICE O/P EST MOD 30 MIN: CPT | Mod: S$PBB,,, | Performed by: PSYCHIATRY & NEUROLOGY

## 2019-05-28 PROCEDURE — 99999 PR PBB SHADOW E&M-EST. PATIENT-LVL III: CPT | Mod: PBBFAC,,, | Performed by: PSYCHIATRY & NEUROLOGY

## 2019-05-28 PROCEDURE — 99214 PR OFFICE/OUTPT VISIT, EST, LEVL IV, 30-39 MIN: ICD-10-PCS | Mod: S$PBB,,, | Performed by: PSYCHIATRY & NEUROLOGY

## 2019-05-28 PROCEDURE — 99999 PR PBB SHADOW E&M-EST. PATIENT-LVL III: ICD-10-PCS | Mod: PBBFAC,,, | Performed by: PSYCHIATRY & NEUROLOGY

## 2019-05-28 PROCEDURE — 99213 OFFICE O/P EST LOW 20 MIN: CPT | Mod: PBBFAC,PO | Performed by: PSYCHIATRY & NEUROLOGY

## 2019-05-28 NOTE — PROGRESS NOTES
[facility] NEUROLOGY  [facility] [place]    Date: 5/28/19  Patient Name: Vanessa Arreaga   MRN: [id number]   PCP: To Obtain Unable  Referring Provider: No ref. provider found    Assessment:   Vanessa Arreaga is a 18 y.o. female presenting in follow-up for recurrent convulsive syncope.  Suspect likely vasovagal in etiology.  Patient has not undergone cardiology eval and have referred for this at this time.  Discussed conservative management strategies such as use of compression socks, compression sorts, and increasing salt intake.  Patient will also avoid triggers which include seeing blood in the coming overheated.  Discussed patient's job with her at length.  Unfortunately, patient currently works as a  in a .  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  We discussed today that a reasonable combination for her work with PT to be moved to a room with older toddlers who were able to walk and do not need to be held.  Patient states that she will discuss this with her floor.  I am happy to support her in this.    Plan:     Problem List Items Addressed This Visit        Other    Convulsive syncope    Current Assessment & Plan     -- referred to cards for consideration of tilt and holter, likely vasovagal           Other Visit Diagnoses     Vasovagal syncope    -  Primary    Relevant Orders    Ambulatory consult to Cardiology          Odilon Brown MD  [facility]   Department of Neurology    Patient note was created using MModal Dictation.  Any errors in syntax or even information may not have been identified and edited on initial review prior to signing this note.  Subjective:        HPI:   Ms. Vanessa Arreaga is a 18 y.o. female presenting in follow-up for recurrent syncope.  The patient presents today with her  who contributes to the history.  Together they report  that several weeks ago, the patient was sick in the setting of a urinary tract infection and recent stomach leg.  She presented to the emergency room.  While having her blood drawn, the patient began to feel presyncopal before losing consciousness and briefly exhibiting whole body jerking.  The patient states that this was consistent with her prior syncopal episodes.  Per review of ED notes, there was some discussion of resuming the patient's Keppra, however the patient and her  elected not to do this stating that this was again consistent with all prior episodes of the patient losing consciousness when seeing blood.  She states she does continue to occasionally feel presyncopal when she is standing for a long period of time or is overheated.  They continued to deny any vicenta seizure-like activity.    PAST MEDICAL HISTORY:  Past Medical History:   Diagnosis Date    Asthma     Headache(784.0)     Seizures        PAST SURGICAL HISTORY:  Past Surgical History:   Procedure Laterality Date    WISDOM TOOTH EXTRACTION         CURRENT MEDS:  Current Outpatient Medications   Medication Sig Dispense Refill    BLISOVI 24 FE 1 mg-20 mcg (24)/75 mg (4) per tablet Take 1 tablet by mouth once daily.      ergocalciferol (ERGOCALCIFEROL) 50,000 unit Cap Take 1 capsule (50,000 Units total) by mouth every 7 days. 12 capsule 1    ketoconazole (NIZORAL) 2 % shampoo USE TO WASH SCALP two TO three TIMES A WEEK  2    levetiracetam XR (KEPPRA XR) 500 mg Tb24 24 hr tablet Take 1 tablet (500 mg total) by mouth once daily. 30 tablet 11    magnesium oxide (MAG-OX) 400 mg tablet Take 1 tablet (400 mg total) by mouth once daily. 90 tablet 3    meloxicam (MOBIC) 15 MG tablet Take 1 tablet by mouth once daily.      naproxen (NAPROSYN) 500 MG tablet Take 1 tablet (500 mg total) by mouth 2 (two) times daily as needed (TAKE 1 PO BID PRN PAIN). 20 tablet 0    riboflavin, vitamin B2, 100 mg Tab tablet Take 2 tablets (200 mg total) by  "mouth once daily. 180 tablet 3    sumatriptan (IMITREX) 50 MG tablet Take 1 tab PO X 1 PRN for migraines. Do not repeat for 24 hours. 6 tablet 2    venlafaxine (EFFEXOR XR) 37.5 MG 24 hr capsule Take 1 capsule (37.5 mg total) by mouth once daily. 30 capsule 11    zonisamide (ZONEGRAN) 50 MG Cap Take 1 tab PO QHS 30 capsule 4     No current facility-administered medications for this visit.        ALLERGIES:  Review of patient's allergies indicates:   Allergen Reactions    Lamictal  [lamotrigine]      Other reaction(s): HIVES    Cefzil [cefprozil] Rash    Demerol [meperidine] Rash     Other reaction(s): Rash    Pcn [penicillins] Rash     Other reaction(s): Rash       FAMILY HISTORY:  Family History   Problem Relation Age of Onset    Stroke Maternal Grandfather     Heart disease Maternal Grandfather     Irregular heart beat Sister     Arthritis Paternal Grandmother        SOCIAL HISTORY:  Social History     Tobacco Use    Smoking status: Never Smoker   Substance Use Topics    Alcohol use: No    Drug use: Not on file       Review of Systems:  12 system review of systems is negative except for the symptoms mentioned in HPI.      Objective:     Vitals:    05/28/19 1553   BP: 118/80   Pulse: 67   Weight: 67.1 kg (147 lb 14.9 oz)   Height: 5' 5.5" (1.664 m)     General: NAD, well nourished   Eyes: no tearing, discharge, no erythema   ENT: moist mucous membranes of the oral cavity, nares patent    Neck: Supple, full range of motion  Cardiovascular: Warm and well perfused, pulses equal and symmetrical  Lungs: Normal work of breathing, normal chest wall excursions  Skin: No rash, lesions, or breakdown on exposed skin  Psychiatry: Mood and affect are appropriate   Abdomen: soft, non tender, non distended  Extremeties: No cyanosis, clubbing or edema.    Neurological   MENTAL STATUS: Alert and oriented to person, place, and time. Attention and concentration within normal limits. Speech without dysarthria, able to " name and repeat without difficulty.   CRANIAL NERVES: Visual fields intact. PERRL. EOMI. Facial sensation intact. Face symmetrical. Hearing grossly intact. Full shoulder shrug bilaterally. Tongue protrudes midline   SENSORY: Sensation is intact to light touch throughout.  Joint position perception intact.   MOTOR: Normal bulk and tone. No pronator drift.  5/5 deltoid, biceps, triceps, interosseous, hand  bilaterally. 5/5 iliopsoas, knee extension/flexion, foot dorsi/plantarflexion bilaterally.    REFLEXES: Symmetric and 2+ throughout. Toes down going bilaterally.   CEREBELLAR/COORDINATION/GAIT: Gait steady with normal arm swing and stride length.  Heel to shin intact. Finger to nose intact. Normal rapid alternating movements.

## 2019-06-17 ENCOUNTER — PATIENT MESSAGE (OUTPATIENT)
Dept: CARDIOLOGY | Facility: CLINIC | Age: 19
End: 2019-06-17

## 2019-06-17 ENCOUNTER — TELEPHONE (OUTPATIENT)
Dept: CARDIOLOGY | Facility: CLINIC | Age: 19
End: 2019-06-17

## 2019-06-17 NOTE — TELEPHONE ENCOUNTER
Returned pt phone call     Pt mom states that pt needs a note stating she has an upcoming erich scheduled w/ Dr. Bermudez due to her employer needing verification to approve the erich     Message will be sent through pt portal confirming scheduled appointment

## 2019-06-17 NOTE — TELEPHONE ENCOUNTER
----- Message from Lisa Alonzo sent at 6/17/2019 12:16 PM CDT -----  Contact: [phone]/self  Patient requesting to speak with you regarding giving her employer verification of her upcoming appt. Please call and advise.

## 2019-06-20 ENCOUNTER — OFFICE VISIT (OUTPATIENT)
Dept: CARDIOLOGY | Facility: CLINIC | Age: 19
End: 2019-06-20
Payer: OTHER GOVERNMENT

## 2019-06-20 VITALS
SYSTOLIC BLOOD PRESSURE: 111 MMHG | HEIGHT: 66 IN | DIASTOLIC BLOOD PRESSURE: 81 MMHG | BODY MASS INDEX: 24.24 KG/M2 | HEART RATE: 72 BPM | WEIGHT: 150.81 LBS

## 2019-06-20 DIAGNOSIS — R55 CONVULSIVE SYNCOPE: ICD-10-CM

## 2019-06-20 DIAGNOSIS — R55 SYNCOPE AND COLLAPSE: Primary | ICD-10-CM

## 2019-06-20 PROCEDURE — 99204 PR OFFICE/OUTPT VISIT, NEW, LEVL IV, 45-59 MIN: ICD-10-PCS | Mod: S$PBB,,, | Performed by: INTERNAL MEDICINE

## 2019-06-20 PROCEDURE — 99999 PR PBB SHADOW E&M-EST. PATIENT-LVL III: ICD-10-PCS | Mod: PBBFAC,,, | Performed by: INTERNAL MEDICINE

## 2019-06-20 PROCEDURE — 99213 OFFICE O/P EST LOW 20 MIN: CPT | Mod: PBBFAC,PO | Performed by: INTERNAL MEDICINE

## 2019-06-20 PROCEDURE — 99999 PR PBB SHADOW E&M-EST. PATIENT-LVL III: CPT | Mod: PBBFAC,,, | Performed by: INTERNAL MEDICINE

## 2019-06-20 PROCEDURE — 99204 OFFICE O/P NEW MOD 45 MIN: CPT | Mod: S$PBB,,, | Performed by: INTERNAL MEDICINE

## 2019-06-20 NOTE — LETTER
June 20, 2019      Odilon Brown MD  [address]  [address]  [place] [address]           [facility]  [address], [address]  [facility] [address]  Phone: [phone]          Patient: Vanessa Arreaga   MR Number: [id number]   YOB: 2000   Date of Visit: 6/20/2019       Dear Dr. Odilon Brown:    Thank you for referring Vanessa Arreaga to me for evaluation. Attached you will find relevant portions of my assessment and plan of care.    If you have questions, please do not hesitate to call me. I look forward to following Vanessa Arreaga along with you.    Sincerely,    Kulwinder Bermudez MD    Enclosure  CC:  No Recipients    If you would like to receive this communication electronically, please contact [email or web address] or [phone] to request more information on [employer] Link access.    For providers and/or their staff who would like to refer a patient to [facility], please contact us through our one-stop-shop provider referral line, [facility] , at [phone].    If you feel you have received this communication in error or would no longer like to receive these types of communications, please e-mail [email or web address]

## 2019-06-20 NOTE — PROGRESS NOTES
Subjective:   Patient ID:  April A Dottie Arreaga is a 19 y.o. female who presents for evaluation of No chief complaint on file.      HPI: 20 y/o female with PMH of seizures present to get evaluate for syncope. As per patient she has been having these episodes as recently as last month where she would Lose consciousness. She blamed it on her seizures but Neurologist thinks differently. No chest pain but she is having dyspnea with exertion with mild activities. When she regained consciousness she is alert and oriented after a minute. LOC usually is about a minute. She sometimes have convulsions with LOC.  No loss of bowel or bladder control.     Past Medical History:   Diagnosis Date    Asthma     Headache(784.0)     Seizures        Past Surgical History:   Procedure Laterality Date    WISDOM TOOTH EXTRACTION         Social History     Tobacco Use    Smoking status: Never Smoker   Substance Use Topics    Alcohol use: No    Drug use: Not on file       Family History   Problem Relation Age of Onset    Stroke Maternal Grandfather     Heart disease Maternal Grandfather     Irregular heart beat Sister     Arthritis Paternal Grandmother        Patient's Medications   New Prescriptions    No medications on file   Previous Medications    BLISOVI 24 FE 1 MG-20 MCG (24)/75 MG (4) PER TABLET    Take 1 tablet by mouth once daily.    ERGOCALCIFEROL (ERGOCALCIFEROL) 50,000 UNIT CAP    Take 1 capsule (50,000 Units total) by mouth every 7 days.    KETOCONAZOLE (NIZORAL) 2 % SHAMPOO    USE TO WASH SCALP two TO three TIMES A WEEK    LEVETIRACETAM XR (KEPPRA XR) 500 MG TB24 24 HR TABLET    Take 1 tablet (500 mg total) by mouth once daily.    MAGNESIUM OXIDE (MAG-OX) 400 MG TABLET    Take 1 tablet (400 mg total) by mouth once daily.    MELOXICAM (MOBIC) 15 MG TABLET    Take 1 tablet by mouth once daily.    NAPROXEN (NAPROSYN) 500 MG TABLET    Take 1 tablet (500 mg total) by mouth 2 (two) times daily as needed (TAKE 1 PO BID  PRN PAIN).    RIBOFLAVIN, VITAMIN B2, 100 MG TAB TABLET    Take 2 tablets (200 mg total) by mouth once daily.    SUMATRIPTAN (IMITREX) 50 MG TABLET    Take 1 tab PO X 1 PRN for migraines. Do not repeat for 24 hours.    VENLAFAXINE (EFFEXOR XR) 37.5 MG 24 HR CAPSULE    Take 1 capsule (37.5 mg total) by mouth once daily.    ZONISAMIDE (ZONEGRAN) 50 MG CAP    Take 1 tab PO QHS   Modified Medications    No medications on file   Discontinued Medications    No medications on file        Review of patient's allergies indicates:   Allergen Reactions    Lamictal  [lamotrigine]      Other reaction(s): HIVES    Cefzil [cefprozil] Rash    Demerol [meperidine] Rash     Other reaction(s): Rash    Pcn [penicillins] Rash     Other reaction(s): Rash       Review of Systems   Constitution: Negative.   HENT: Negative.    Eyes: Negative.    Cardiovascular: Positive for near-syncope and syncope.   Respiratory: Negative.    Endocrine: Negative.    Hematologic/Lymphatic: Negative.    Skin: Negative.    Musculoskeletal: Negative.    Gastrointestinal: Negative.    Psychiatric/Behavioral: Negative.    Allergic/Immunologic: Negative.      Objective:   Physical Exam   Constitutional: She is oriented to person, place, and time. She appears well-developed and well-nourished. No distress.   Examination of the digits showed no clubbing or cyanosis   HENT:   Head: Normocephalic and atraumatic.   Eyes: Pupils are equal, round, and reactive to light. Conjunctivae are normal. Right eye exhibits no discharge.   Neck: Normal range of motion. Neck supple. No JVD present. No thyromegaly present.   No carotid bruits   Cardiovascular: Normal rate, regular rhythm, S1 normal, S2 normal, normal heart sounds, intact distal pulses and normal pulses. PMI is not displaced. Exam reveals no gallop, no friction rub and no opening snap.   No murmur heard.  Pulmonary/Chest: Effort normal and breath sounds normal. No respiratory distress. She has no wheezes. She has  no rales. She exhibits no tenderness.   Abdominal: Soft. Bowel sounds are normal. She exhibits no distension and no mass. There is no tenderness. There is no guarding.   No hepatosplenomegaly   Musculoskeletal: Normal range of motion. She exhibits no edema or tenderness.   Lymphadenopathy:     She has no cervical adenopathy.   Neurological: She is alert and oriented to person, place, and time.   Skin: Skin is warm. No rash noted. She is not diaphoretic. No erythema.   Psychiatric: She has a normal mood and affect.   Nursing note and vitals reviewed.      Lab Results   Component Value Date    WBC 8.33 05/17/2019    HGB 14.5 05/17/2019    HCT 43.9 05/17/2019    MCV 90 05/17/2019     05/17/2019         Chemistry        Component Value Date/Time     05/17/2019 0932    K 3.7 05/17/2019 0932     05/17/2019 0932    CO2 22 (L) 05/17/2019 0932    BUN 14 05/17/2019 0932    CREATININE 0.8 05/17/2019 0932    GLU 98 05/17/2019 0932    GLU 87 01/03/2017 0705        Component Value Date/Time    CALCIUM 9.0 05/17/2019 0932    ALKPHOS 36 (L) 05/17/2019 0932    AST 14 05/17/2019 0932    ALT 11 05/17/2019 0932    BILITOT 0.5 05/17/2019 0932    ESTGFRAFRICA >60 05/17/2019 0932    EGFRNONAA >60 05/17/2019 0932            Lab Results   Component Value Date    CHOL 178 02/27/2017     Lab Results   Component Value Date    HDL 55 02/27/2017     Lab Results   Component Value Date    LDLCALC 112.2 02/27/2017     Lab Results   Component Value Date    TRIG 54 02/27/2017     Lab Results   Component Value Date    CHOLHDL 30.9 02/27/2017       Lab Results   Component Value Date    TSH 0.690 02/27/2017       No results found for: HGBA1C    ECGs reviewed-NSR, normal QRS and QT and St segments  LABS reviewed      Assessment:     1. Syncope and collapse    2. Convulsive syncope        Plan:     Patient with history of Seizures. Likely having syncope  Will get 2d echo complete  Tilt Table study, if negative will refer for loop  recorder  Keep hydrated      Orders Placed This Encounter   Procedures    Tilt table     Standing Status:   Future     Standing Expiration Date:   6/20/2020    Transthoracic Echo (TTE) Complete 2D     Standing Status:   Future     Standing Expiration Date:   6/20/2020

## 2019-07-18 ENCOUNTER — HOSPITAL ENCOUNTER (OUTPATIENT)
Facility: HOSPITAL | Age: 19
Discharge: HOME | End: 2019-07-19
Attending: INTERNAL MEDICINE | Admitting: INTERNAL MEDICINE
Payer: OTHER GOVERNMENT

## 2019-07-18 ENCOUNTER — ANESTHESIA (OUTPATIENT)
Dept: CARDIOLOGY | Facility: HOSPITAL | Age: 19
End: 2019-07-18
Payer: OTHER GOVERNMENT

## 2019-07-18 ENCOUNTER — HOSPITAL ENCOUNTER (OUTPATIENT)
Dept: CARDIOLOGY | Facility: HOSPITAL | Age: 19
Discharge: HOME | End: 2019-07-18
Attending: INTERNAL MEDICINE
Payer: OTHER GOVERNMENT

## 2019-07-18 ENCOUNTER — ANESTHESIA EVENT (OUTPATIENT)
Dept: CARDIOLOGY | Facility: HOSPITAL | Age: 19
End: 2019-07-18
Payer: OTHER GOVERNMENT

## 2019-07-18 DIAGNOSIS — I49.9 ARRHYTHMIA: ICD-10-CM

## 2019-07-18 DIAGNOSIS — R55 SYNCOPE AND COLLAPSE: ICD-10-CM

## 2019-07-18 DIAGNOSIS — R55 SYNCOPE: ICD-10-CM

## 2019-07-18 DIAGNOSIS — R55 CONVULSIVE SYNCOPE: ICD-10-CM

## 2019-07-18 LAB
ALBUMIN SERPL BCP-MCNC: 3.8 G/DL (ref 3.5–5.2)
ALP SERPL-CCNC: 42 U/L (ref 55–135)
ALT SERPL W/O P-5'-P-CCNC: 14 U/L (ref 10–44)
ANION GAP SERPL CALC-SCNC: 8 MMOL/L (ref 8–16)
AORTIC ROOT ANNULUS: 2.7 CM
APTT BLDCRRT: 34.5 SEC (ref 21–32)
AST SERPL-CCNC: 14 U/L (ref 10–40)
AV INDEX (PROSTH): 0.77
AV MEAN GRADIENT: 4 MMHG
AV PEAK GRADIENT: 6 MMHG
AV VALVE AREA: 1.95 CM2
AV VELOCITY RATIO: 0.69
B-HCG UR QL: NEGATIVE
BASOPHILS # BLD AUTO: 0.01 K/UL (ref 0–0.2)
BASOPHILS NFR BLD: 0.1 % (ref 0–1.9)
BILIRUB SERPL-MCNC: 0.3 MG/DL (ref 0.1–1)
BNP SERPL-MCNC: 27 PG/ML (ref 0–99)
BUN SERPL-MCNC: 12 MG/DL (ref 6–20)
CALCIUM SERPL-MCNC: 9.3 MG/DL (ref 8.7–10.5)
CHLORIDE SERPL-SCNC: 109 MMOL/L (ref 95–110)
CO2 SERPL-SCNC: 22 MMOL/L (ref 23–29)
CREAT SERPL-MCNC: 0.8 MG/DL (ref 0.5–1.4)
CV ECHO LV RWT: 0.29 CM
DIFFERENTIAL METHOD: ABNORMAL
DOP CALC AO PEAK VEL: 1.21 M/S
DOP CALC AO VTI: 27 CM
DOP CALC LVOT AREA: 2.5 CM2
DOP CALC LVOT DIAMETER: 1.8 CM
DOP CALC LVOT PEAK VEL: 0.84 M/S
DOP CALC LVOT STROKE VOLUME: 52.75 CM3
DOP CALCLVOT PEAK VEL VTI: 20.74 CM
E WAVE DECELERATION TIME: 197.44 MSEC
E/A RATIO: 2.55
ECHO LV POSTERIOR WALL: 0.62 CM (ref 0.6–1.1)
EOSINOPHIL # BLD AUTO: 0.1 K/UL (ref 0–0.5)
EOSINOPHIL NFR BLD: 1.1 % (ref 0–8)
ERYTHROCYTE [DISTWIDTH] IN BLOOD BY AUTOMATED COUNT: 13 % (ref 11.5–14.5)
EST. GFR  (AFRICAN AMERICAN): >60 ML/MIN/1.73 M^2
EST. GFR  (NON AFRICAN AMERICAN): >60 ML/MIN/1.73 M^2
FRACTIONAL SHORTENING: 27 % (ref 28–44)
GLUCOSE SERPL-MCNC: 87 MG/DL (ref 70–110)
HCT VFR BLD AUTO: 43.9 % (ref 37–48.5)
HGB BLD-MCNC: 14.3 G/DL (ref 12–16)
INR PPP: 1.1 (ref 0.8–1.2)
INTERVENTRICULAR SEPTUM: 0.62 CM (ref 0.6–1.1)
IVRT: 0.09 MSEC
LA MAJOR: 4.51 CM
LA MINOR: 4.49 CM
LA WIDTH: 3.35 CM
LEFT ATRIUM SIZE: 3.17 CM
LEFT ATRIUM VOLUME: 40.62 CM3
LEFT INTERNAL DIMENSION IN SYSTOLE: 3.1 CM (ref 2.1–4)
LEFT VENTRICLE DIASTOLIC VOLUME: 81.34 ML
LEFT VENTRICLE SYSTOLIC VOLUME: 37.77 ML
LEFT VENTRICULAR INTERNAL DIMENSION IN DIASTOLE: 4.26 CM (ref 3.5–6)
LEFT VENTRICULAR MASS: 74.7 G
LYMPHOCYTES # BLD AUTO: 1.2 K/UL (ref 1–4.8)
LYMPHOCYTES NFR BLD: 16 % (ref 18–48)
MCH RBC QN AUTO: 29.3 PG (ref 27–31)
MCHC RBC AUTO-ENTMCNC: 32.6 G/DL (ref 32–36)
MCV RBC AUTO: 90 FL (ref 82–98)
MONOCYTES # BLD AUTO: 0.4 K/UL (ref 0.3–1)
MONOCYTES NFR BLD: 5.9 % (ref 4–15)
MV PEAK A VEL: 0.38 M/S
MV PEAK E VEL: 0.97 M/S
NEUTROPHILS # BLD AUTO: 5.6 K/UL (ref 1.8–7.7)
NEUTROPHILS NFR BLD: 76.9 % (ref 38–73)
PISA TR MAX VEL: 2.09 M/S
PLATELET # BLD AUTO: 212 K/UL (ref 150–350)
PMV BLD AUTO: 11.6 FL (ref 9.2–12.9)
POTASSIUM SERPL-SCNC: 4.1 MMOL/L (ref 3.5–5.1)
PROT SERPL-MCNC: 6.5 G/DL (ref 6–8.4)
PROTHROMBIN TIME: 11.5 SEC (ref 9–12.5)
PULM VEIN S/D RATIO: 1.33
PV PEAK D VEL: 0.42 M/S
PV PEAK S VEL: 0.56 M/S
RA MAJOR: 4.44 CM
RA PRESSURE: 3 MMHG
RBC # BLD AUTO: 4.88 M/UL (ref 4–5.4)
RIGHT VENTRICULAR END-DIASTOLIC DIMENSION: 2.22 CM
SODIUM SERPL-SCNC: 139 MMOL/L (ref 136–145)
TR MAX PG: 17 MMHG
TROPONIN I SERPL DL<=0.01 NG/ML-MCNC: <0.006 NG/ML (ref 0–0.03)
TSH SERPL DL<=0.005 MIU/L-ACNC: 0.61 UIU/ML (ref 0.4–4)
TV REST PULMONARY ARTERY PRESSURE: 20 MMHG
WBC # BLD AUTO: 7.32 K/UL (ref 3.9–12.7)

## 2019-07-18 PROCEDURE — 81025 URINE PREGNANCY TEST: CPT

## 2019-07-18 PROCEDURE — 99220 PR INITIAL OBSERVATION CARE,LEVL III: CPT | Mod: ,,, | Performed by: INTERNAL MEDICINE

## 2019-07-18 PROCEDURE — 25000003 PHARM REV CODE 250: Performed by: NURSE ANESTHETIST, CERTIFIED REGISTERED

## 2019-07-18 PROCEDURE — G0378 HOSPITAL OBSERVATION PER HR: HCPCS

## 2019-07-18 PROCEDURE — 25000003 PHARM REV CODE 250: Performed by: INTERNAL MEDICINE

## 2019-07-18 PROCEDURE — 33207 INSERT HEART PM VENTRICULAR: CPT | Mod: 59 | Performed by: INTERNAL MEDICINE

## 2019-07-18 PROCEDURE — 93660 TILT TABLE EVALUATION: CPT

## 2019-07-18 PROCEDURE — 63600175 PHARM REV CODE 636 W HCPCS: Performed by: NURSE ANESTHETIST, CERTIFIED REGISTERED

## 2019-07-18 PROCEDURE — 93660 TILT TABLE EVALUATION: CPT | Mod: 26,,, | Performed by: STUDENT IN AN ORGANIZED HEALTH CARE EDUCATION/TRAINING PROGRAM

## 2019-07-18 PROCEDURE — C1785 PMKR, DUAL, RATE-RESP: HCPCS | Performed by: INTERNAL MEDICINE

## 2019-07-18 PROCEDURE — 93010 ELECTROCARDIOGRAM REPORT: CPT | Mod: ,,, | Performed by: STUDENT IN AN ORGANIZED HEALTH CARE EDUCATION/TRAINING PROGRAM

## 2019-07-18 PROCEDURE — 93306 TTE W/DOPPLER COMPLETE: CPT | Mod: 59

## 2019-07-18 PROCEDURE — 94761 N-INVAS EAR/PLS OXIMETRY MLT: CPT

## 2019-07-18 PROCEDURE — 85730 THROMBOPLASTIN TIME PARTIAL: CPT

## 2019-07-18 PROCEDURE — 93660 TILT TABLE EVALUATION: CPT | Performed by: INTERNAL MEDICINE

## 2019-07-18 PROCEDURE — 93660 TILT TABLE (CUPID ONLY): ICD-10-PCS | Mod: 26,,, | Performed by: STUDENT IN AN ORGANIZED HEALTH CARE EDUCATION/TRAINING PROGRAM

## 2019-07-18 PROCEDURE — 85025 COMPLETE CBC W/AUTO DIFF WBC: CPT

## 2019-07-18 PROCEDURE — 99203 OFFICE O/P NEW LOW 30 MIN: CPT | Mod: 57,,, | Performed by: INTERNAL MEDICINE

## 2019-07-18 PROCEDURE — 93010 EKG 12-LEAD: ICD-10-PCS | Mod: ,,, | Performed by: STUDENT IN AN ORGANIZED HEALTH CARE EDUCATION/TRAINING PROGRAM

## 2019-07-18 PROCEDURE — 33207 PR INSER HART PACER XVENOUS VENTR: ICD-10-PCS | Mod: ,,, | Performed by: INTERNAL MEDICINE

## 2019-07-18 PROCEDURE — 33207 INSERT HEART PM VENTRICULAR: CPT | Mod: ,,, | Performed by: INTERNAL MEDICINE

## 2019-07-18 PROCEDURE — 93005 ELECTROCARDIOGRAM TRACING: CPT

## 2019-07-18 PROCEDURE — 36415 COLL VENOUS BLD VENIPUNCTURE: CPT

## 2019-07-18 PROCEDURE — G0379 DIRECT REFER HOSPITAL OBSERV: HCPCS

## 2019-07-18 PROCEDURE — 84443 ASSAY THYROID STIM HORMONE: CPT

## 2019-07-18 PROCEDURE — 37000008 HC ANESTHESIA 1ST 15 MINUTES: Performed by: INTERNAL MEDICINE

## 2019-07-18 PROCEDURE — 93306 TTE W/DOPPLER COMPLETE: CPT | Mod: 26,,, | Performed by: STUDENT IN AN ORGANIZED HEALTH CARE EDUCATION/TRAINING PROGRAM

## 2019-07-18 PROCEDURE — C1898 LEAD, PMKR, OTHER THAN TRANS: HCPCS | Performed by: INTERNAL MEDICINE

## 2019-07-18 PROCEDURE — 80053 COMPREHEN METABOLIC PANEL: CPT

## 2019-07-18 PROCEDURE — 99220 PR INITIAL OBSERVATION CARE,LEVL III: ICD-10-PCS | Mod: ,,, | Performed by: INTERNAL MEDICINE

## 2019-07-18 PROCEDURE — 63600175 PHARM REV CODE 636 W HCPCS: Performed by: INTERNAL MEDICINE

## 2019-07-18 PROCEDURE — 00530 ANES PERM TRANSVNS PM INSJ: CPT | Performed by: INTERNAL MEDICINE

## 2019-07-18 PROCEDURE — 37000009 HC ANESTHESIA EA ADD 15 MINS: Performed by: INTERNAL MEDICINE

## 2019-07-18 PROCEDURE — 83880 ASSAY OF NATRIURETIC PEPTIDE: CPT

## 2019-07-18 PROCEDURE — 93306 TRANSTHORACIC ECHO (TTE) COMPLETE (CUPID ONLY): ICD-10-PCS | Mod: 26,,, | Performed by: STUDENT IN AN ORGANIZED HEALTH CARE EDUCATION/TRAINING PROGRAM

## 2019-07-18 PROCEDURE — 84484 ASSAY OF TROPONIN QUANT: CPT

## 2019-07-18 PROCEDURE — 85610 PROTHROMBIN TIME: CPT

## 2019-07-18 PROCEDURE — 99203 PR OFFICE/OUTPT VISIT, NEW, LEVL III, 30-44 MIN: ICD-10-PCS | Mod: 57,,, | Performed by: INTERNAL MEDICINE

## 2019-07-18 DEVICE — IMPLANTABLE DEVICE
Type: IMPLANTABLE DEVICE | Site: CHEST  WALL | Status: FUNCTIONAL
Brand: SOLIA

## 2019-07-18 DEVICE — IMPLANTABLE DEVICE
Type: IMPLANTABLE DEVICE | Site: CHEST  WALL | Status: NON-FUNCTIONAL
Brand: EDORA 8 SR-T
Removed: 2021-05-25

## 2019-07-18 RX ORDER — FENTANYL CITRATE 50 UG/ML
INJECTION, SOLUTION INTRAMUSCULAR; INTRAVENOUS
Status: DISCONTINUED | OUTPATIENT
Start: 2019-07-18 | End: 2019-07-18

## 2019-07-18 RX ORDER — PROPOFOL 10 MG/ML
VIAL (ML) INTRAVENOUS CONTINUOUS PRN
Status: DISCONTINUED | OUTPATIENT
Start: 2019-07-18 | End: 2019-07-18

## 2019-07-18 RX ORDER — MIDAZOLAM HYDROCHLORIDE 1 MG/ML
INJECTION, SOLUTION INTRAMUSCULAR; INTRAVENOUS
Status: DISCONTINUED | OUTPATIENT
Start: 2019-07-18 | End: 2019-07-18

## 2019-07-18 RX ORDER — VANCOMYCIN HYDROCHLORIDE 1 G/20ML
INJECTION, POWDER, LYOPHILIZED, FOR SOLUTION INTRAVENOUS
Status: DISCONTINUED | OUTPATIENT
Start: 2019-07-18 | End: 2019-07-18 | Stop reason: HOSPADM

## 2019-07-18 RX ORDER — ACETAMINOPHEN 325 MG/1
650 TABLET ORAL EVERY 4 HOURS PRN
Status: DISCONTINUED | OUTPATIENT
Start: 2019-07-18 | End: 2019-07-19 | Stop reason: HOSPADM

## 2019-07-18 RX ORDER — BACITRACIN 50000 [IU]/1
INJECTION, POWDER, FOR SOLUTION INTRAMUSCULAR
Status: DISCONTINUED | OUTPATIENT
Start: 2019-07-18 | End: 2019-07-18 | Stop reason: HOSPADM

## 2019-07-18 RX ORDER — VANCOMYCIN HCL IN 5 % DEXTROSE 1G/250ML
1000 PLASTIC BAG, INJECTION (ML) INTRAVENOUS EVERY 8 HOURS
Status: DISCONTINUED | OUTPATIENT
Start: 2019-07-18 | End: 2019-07-18 | Stop reason: DRUGHIGH

## 2019-07-18 RX ORDER — SODIUM CHLORIDE 0.9 % (FLUSH) 0.9 %
10 SYRINGE (ML) INJECTION
Status: CANCELLED | OUTPATIENT
Start: 2019-07-18

## 2019-07-18 RX ORDER — SODIUM CHLORIDE 0.9 % (FLUSH) 0.9 %
10 SYRINGE (ML) INJECTION
Status: DISCONTINUED | OUTPATIENT
Start: 2019-07-18 | End: 2019-07-19 | Stop reason: HOSPADM

## 2019-07-18 RX ORDER — LIDOCAINE HCL/PF 100 MG/5ML
SYRINGE (ML) INTRAVENOUS
Status: DISCONTINUED | OUTPATIENT
Start: 2019-07-18 | End: 2019-07-18

## 2019-07-18 RX ORDER — SODIUM CHLORIDE 9 MG/ML
INJECTION, SOLUTION INTRAVENOUS CONTINUOUS PRN
Status: DISCONTINUED | OUTPATIENT
Start: 2019-07-18 | End: 2019-07-18

## 2019-07-18 RX ORDER — HYDROMORPHONE HYDROCHLORIDE 2 MG/ML
0.5 INJECTION, SOLUTION INTRAMUSCULAR; INTRAVENOUS; SUBCUTANEOUS EVERY 5 MIN PRN
Status: CANCELLED | OUTPATIENT
Start: 2019-07-18

## 2019-07-18 RX ORDER — LIDOCAINE HYDROCHLORIDE 10 MG/ML
INJECTION, SOLUTION EPIDURAL; INFILTRATION; INTRACAUDAL; PERINEURAL
Status: DISCONTINUED | OUTPATIENT
Start: 2019-07-18 | End: 2019-07-18 | Stop reason: HOSPADM

## 2019-07-18 RX ORDER — ONDANSETRON 2 MG/ML
4 INJECTION INTRAMUSCULAR; INTRAVENOUS ONCE AS NEEDED
Status: CANCELLED | OUTPATIENT
Start: 2019-07-18 | End: 2030-12-14

## 2019-07-18 RX ORDER — PROPOFOL 10 MG/ML
VIAL (ML) INTRAVENOUS
Status: DISCONTINUED | OUTPATIENT
Start: 2019-07-18 | End: 2019-07-18

## 2019-07-18 RX ORDER — VANCOMYCIN HCL IN 5 % DEXTROSE 1G/250ML
1000 PLASTIC BAG, INJECTION (ML) INTRAVENOUS
Status: DISCONTINUED | OUTPATIENT
Start: 2019-07-19 | End: 2019-07-19 | Stop reason: HOSPADM

## 2019-07-18 RX ADMIN — FENTANYL CITRATE 50 MCG: 50 INJECTION, SOLUTION INTRAMUSCULAR; INTRAVENOUS at 03:07

## 2019-07-18 RX ADMIN — ACETAMINOPHEN 650 MG: 325 TABLET ORAL at 08:07

## 2019-07-18 RX ADMIN — MIDAZOLAM 2 MG: 1 INJECTION INTRAMUSCULAR; INTRAVENOUS at 03:07

## 2019-07-18 RX ADMIN — VANCOMYCIN HYDROCHLORIDE 2000 MG: 1 INJECTION, POWDER, LYOPHILIZED, FOR SOLUTION INTRAVENOUS at 03:07

## 2019-07-18 RX ADMIN — FENTANYL CITRATE 100 MCG: 50 INJECTION, SOLUTION INTRAMUSCULAR; INTRAVENOUS at 03:07

## 2019-07-18 RX ADMIN — PROPOFOL 30 MG: 10 INJECTION, EMULSION INTRAVENOUS at 03:07

## 2019-07-18 RX ADMIN — SODIUM CHLORIDE: 9 INJECTION, SOLUTION INTRAVENOUS at 03:07

## 2019-07-18 RX ADMIN — LIDOCAINE HYDROCHLORIDE 100 MG: 20 INJECTION, SOLUTION INTRAVENOUS at 03:07

## 2019-07-18 RX ADMIN — PROPOFOL 150 MCG/KG/MIN: 10 INJECTION, EMULSION INTRAVENOUS at 03:07

## 2019-07-18 RX ADMIN — FENTANYL CITRATE 100 MCG: 50 INJECTION, SOLUTION INTRAMUSCULAR; INTRAVENOUS at 04:07

## 2019-07-18 NOTE — TRANSFER OF CARE
Anesthesia Transfer of Care Note    Patient: Vanessa Arreaga    Procedure(s) Performed: Procedure(s) (LRB):  INSERTION, CARDIAC PACEMAKER, DUAL CHAMBER (Left)    Patient location: Cath Lab    Anesthesia Type: MAC    Transport from OR: Transported from OR on room air with adequate spontaneous ventilation    Post pain: adequate analgesia    Post assessment: no apparent anesthetic complications and tolerated procedure well    Post vital signs: stable    Level of consciousness: alert and awake    Nausea/Vomiting: no nausea/vomiting    Complications: none          Last vitals:   Visit Vitals  /74 (BP Location: Right arm, Patient Position: Lying)   Pulse 74   Temp 36.9 °C (98.4 °F) (Oral)   Resp 16   Breastfeeding? No

## 2019-07-18 NOTE — SUBJECTIVE & OBJECTIVE
Past Medical History:   Diagnosis Date    Asthma     Headache(784.0)     Seizures        Past Surgical History:   Procedure Laterality Date    WISDOM TOOTH EXTRACTION         Review of patient's allergies indicates:   Allergen Reactions    Lamictal  [lamotrigine]      Other reaction(s): HIVES    Cefzil [cefprozil] Rash    Demerol [meperidine] Rash     Other reaction(s): Rash    Pcn [penicillins] Rash     Other reaction(s): Rash       No current facility-administered medications on file prior to encounter.      Current Outpatient Medications on File Prior to Encounter   Medication Sig    BLISOVI 24 FE 1 mg-20 mcg (24)/75 mg (4) per tablet Take 1 tablet by mouth once daily.    sumatriptan (IMITREX) 50 MG tablet Take 1 tab PO X 1 PRN for migraines. Do not repeat for 24 hours.    ergocalciferol (ERGOCALCIFEROL) 50,000 unit Cap Take 1 capsule (50,000 Units total) by mouth every 7 days.    ketoconazole (NIZORAL) 2 % shampoo USE TO WASH SCALP two TO three TIMES A WEEK    venlafaxine (EFFEXOR XR) 37.5 MG 24 hr capsule Take 1 capsule (37.5 mg total) by mouth once daily.    [DISCONTINUED] levetiracetam XR (KEPPRA XR) 500 mg Tb24 24 hr tablet Take 1 tablet (500 mg total) by mouth once daily.    [DISCONTINUED] magnesium oxide (MAG-OX) 400 mg tablet Take 1 tablet (400 mg total) by mouth once daily.    [DISCONTINUED] meloxicam (MOBIC) 15 MG tablet Take 1 tablet by mouth once daily.    [DISCONTINUED] naproxen (NAPROSYN) 500 MG tablet Take 1 tablet (500 mg total) by mouth 2 (two) times daily as needed (TAKE 1 PO BID PRN PAIN).    [DISCONTINUED] riboflavin, vitamin B2, 100 mg Tab tablet Take 2 tablets (200 mg total) by mouth once daily.    [DISCONTINUED] zonisamide (ZONEGRAN) 50 MG Cap Take 1 tab PO QHS     Family History     Problem Relation (Age of Onset)    Arthritis Paternal Grandmother    Heart disease Maternal Grandfather    Irregular heart beat Sister    Stroke Maternal Grandfather        Tobacco Use     Smoking status: Never Smoker   Substance and Sexual Activity    Alcohol use: No    Drug use: Not on file    Sexual activity: Never     Review of Systems   Constitution: Negative for diaphoresis.   HENT: Negative.    Eyes: Negative.    Cardiovascular: Positive for syncope. Negative for chest pain, dyspnea on exertion, irregular heartbeat, leg swelling, near-syncope, orthopnea, palpitations and paroxysmal nocturnal dyspnea.   Respiratory: Negative for cough, shortness of breath and wheezing.    Endocrine: Negative.    Hematologic/Lymphatic: Negative.    Skin: Negative.    Musculoskeletal: Negative.    Gastrointestinal: Positive for nausea.   Genitourinary: Negative.    Neurological: Positive for headaches. Negative for dizziness and focal weakness.   Psychiatric/Behavioral: Negative.    Allergic/Immunologic: Negative.      Objective:     Vital Signs (Most Recent):  Temp: 98.4 °F (36.9 °C) (07/18/19 1049)  Pulse: 74 (07/18/19 1207)  Resp: 16 (07/18/19 1049)  BP: 118/74 (07/18/19 1049) Vital Signs (24h Range):  Temp:  [98.4 °F (36.9 °C)] 98.4 °F (36.9 °C)  Pulse:  [61-74] 74  Resp:  [16] 16  BP: (118)/(74) 118/74        There is no height or weight on file to calculate BMI.            No intake or output data in the 24 hours ending 07/18/19 1213    Lines/Drains/Airways          None          Physical Exam   Constitutional: She is oriented to person, place, and time. She appears well-developed and well-nourished. No distress.   HENT:   Head: Normocephalic and atraumatic.   Eyes: Right eye exhibits no discharge. Left eye exhibits no discharge.   Neck: No JVD present.   Cardiovascular: Normal rate, regular rhythm and normal heart sounds. Exam reveals no gallop and no friction rub.   No murmur heard.  Pulmonary/Chest: Effort normal and breath sounds normal. No respiratory distress. She has no rales.   Abdominal: Soft. Bowel sounds are normal.   Musculoskeletal: She exhibits no edema or tenderness.   Neurological: She is  alert and oriented to person, place, and time.   Skin: Skin is warm and dry. She is not diaphoretic.   Psychiatric: She has a normal mood and affect. Her behavior is normal. Judgment and thought content normal.       Significant Labs:   CMP   Recent Labs   Lab 07/18/19  1115      K 4.1      CO2 22*   GLU 87   BUN 12   CREATININE 0.8   CALCIUM 9.3   PROT 6.5   ALBUMIN 3.8   BILITOT 0.3   ALKPHOS 42*   AST 14   ALT 14   ANIONGAP 8   ESTGFRAFRICA >60   EGFRNONAA >60   , CBC   Recent Labs   Lab 07/18/19  1115   WBC 7.32   HGB 14.3   HCT 43.9      , INR No results for input(s): INR, PROTIME in the last 48 hours., Troponin   Recent Labs   Lab 07/18/19  1115   TROPONINI <0.006    and All pertinent lab results from the last 24 hours have been reviewed.    Significant Imaging: Echocardiogram:   Transthoracic echo (TTE) complete (Cupid Only):   Results for orders placed or performed during the hospital encounter of 07/18/19   Transthoracic echo (TTE) 2D with Color Flow   Result Value Ref Range    AV mean gradient 4 mmHg    Ao peak woody 1.21 m/s    Ao VTI 27.00 cm    IVRT 0.09 msec    IVS 0.62 0.6 - 1.1 cm    LA size 3.17 cm    Left Atrium Major Axis 4.51 cm    Left Atrium Minor Axis 4.49 cm    LVIDD 4.26 3.5 - 6.0 cm    LVIDS 3.10 2.1 - 4.0 cm    LVOT diameter 1.80 cm    LVOT peak VTI 20.74 cm    PW 0.62 0.6 - 1.1 cm    MV Peak A Woody 0.38 m/s    E wave decelartion time 197.44 msec    MV Peak E Woody 0.97 m/s    PV Peak D Woody 0.42 m/s    PV Peak S Woody 0.56 m/s    RA Major Axis 4.44 cm    RVDD 2.22 cm    TR Max Woody 2.09 m/s    LA WIDTH 3.35 cm    Ao root annulus 2.70 cm    LV Diastolic Volume 81.34 mL    LV Systolic Volume 37.77 mL    LVOT peak woody 0.84 m/s    FS 27 %    LA volume 40.62 cm3    LV mass 74.70 g    Left Ventricle Relative Wall Thickness 0.29 cm    AV valve area 1.95 cm2    AV Velocity Ratio 0.69     AV index (prosthetic) 0.77     E/A ratio 2.55     Pulm vein S/D ratio 1.33     LVOT area 2.5 cm2     LVOT stroke volume 52.75 cm3    AV peak gradient 6 mmHg    Triscuspid Valve Regurgitation Peak Gradient 17 mmHg    Right Atrial Pressure (from IVC) 3 mmHg    TV rest pulmonary artery pressure 20 mmHg    Narrative    · Normal left ventricular systolic function. The estimated ejection   fraction is 55%  · Normal LV diastolic function.  · No wall motion abnormalities.  · Normal right ventricular systolic function.  · The estimated PA systolic pressure is 20 mm Hg  · Normal central venous pressure (3 mm Hg).

## 2019-07-18 NOTE — HPI
Vanessa Arreaga is a 18 y/o female with PMH of seizures and migraines. She is no longer on anticonvulsants or migraine prevention medications. Patient presented to the Cardiopulmonary lab today for an outpatient tilt table test which was ordered for recurrent syncope. Patient reports having ~ 1 syncopal episode per month and it usually occurs with emotional upset or seeing blood. She recalls nausea prior to loss of consciousness episodes. Patient denies loss of bowel or bladder. When she regaines consciousness she is alert and oriented after a minute. LOC usually is about a minute. She sometimes have convulsions with LOC. Patient initially blamed it on her seizures but Neurologist suspects vasovagal etiology. No chest pain but she is having dyspnea with exertion. Patient had a syncopal episode this AM while staff was attempting to start an IV for her tilt table test. This episode was the same as previous, she regained consciousness after <1 min, and was oriented. No post ictal state suspected. 3.10 minutes into the tilt table test, the table was adjusted per protocol, patient complained of nausea followed by syncope. Her BP remained stable with notation of asystolic pause from 3.10 min to ~4.02 minutes (termination tracings were not captured). SR resumed without recurrent syncope. SBP 100s-110s. Patient was given a 500 cc bolus of NS and was direct admitted to telemetry. Echo was performed, results are pending. Dr Cooley consulted for further evaluation.   TSH, CBC, CMP, Troponin, and BNP pending.

## 2019-07-18 NOTE — PLAN OF CARE
1945   Patient transferred to floor 464 on cardiac monitor.  VSS. No c/o pain.   Patient attached to tele monitor upon arrival in room. Left chest site CDI. No bleeding or hematoma noted, +2 stanton radial pulses and pedal pulses.  Report given to REVA Nelson at bedside..  All questions answered. Updated pt's  in pt's room.

## 2019-07-18 NOTE — PLAN OF CARE
Problem: Adult Inpatient Plan of Care  Goal: Plan of Care Review  Outcome: Ongoing (interventions implemented as appropriate)  Patient on room air. No apparent respiratory distress noted.  Will continue to monitor.

## 2019-07-18 NOTE — ANESTHESIA PREPROCEDURE EVALUATION
07/18/2019  Vanessa Arreaga is a 19 y.o., female scheduled for pacemaker insertion for vasovagal syncope.       Has hx of seizure but not seizures in over a year.         Past Medical History:   Diagnosis Date    Asthma     Headache(784.0)     Seizures      Past Surgical History:   Procedure Laterality Date    WISDOM TOOTH EXTRACTION         No current facility-administered medications on file prior to encounter.      Current Outpatient Medications on File Prior to Encounter   Medication Sig Dispense Refill    BLISOVI 24 FE 1 mg-20 mcg (24)/75 mg (4) per tablet Take 1 tablet by mouth once daily.      sumatriptan (IMITREX) 50 MG tablet Take 1 tab PO X 1 PRN for migraines. Do not repeat for 24 hours. 6 tablet 2    ergocalciferol (ERGOCALCIFEROL) 50,000 unit Cap Take 1 capsule (50,000 Units total) by mouth every 7 days. 12 capsule 1    ketoconazole (NIZORAL) 2 % shampoo USE TO WASH SCALP two TO three TIMES A WEEK  2    venlafaxine (EFFEXOR XR) 37.5 MG 24 hr capsule Take 1 capsule (37.5 mg total) by mouth once daily. 30 capsule 11     Recent Labs   Lab 07/18/19  1115   WBC 7.32   RBC 4.88   HGB 14.3   HCT 43.9      MCV 90   MCH 29.3   MCHC 32.6       Chemistry        Component Value Date/Time     07/18/2019 1115    K 4.1 07/18/2019 1115     07/18/2019 1115    CO2 22 (L) 07/18/2019 1115    BUN 12 07/18/2019 1115    CREATININE 0.8 07/18/2019 1115    GLU 87 07/18/2019 1115    GLU 87 01/03/2017 0705        Component Value Date/Time    CALCIUM 9.3 07/18/2019 1115    ALKPHOS 42 (L) 07/18/2019 1115    AST 14 07/18/2019 1115    ALT 14 07/18/2019 1115    BILITOT 0.3 07/18/2019 1115    ESTGFRAFRICA >60 07/18/2019 1115    EGFRNONAA >60 07/18/2019 1115        TTE 7/18/19  · Normal left ventricular systolic function. The estimated ejection fraction is 55%  · Normal LV diastolic  function.  · No wall motion abnormalities.  · Normal right ventricular systolic function.  · The estimated PA systolic pressure is 20 mm Hg  · Normal central venous pressure (3 mm Hg).    EKG  6/25/19    Vent. Rate : 069 BPM     Atrial Rate : 069 BPM     P-R Int : 144 ms          QRS Dur : 082 ms      QT Int : 378 ms       P-R-T Axes : 037 044 022 degrees     QTc Int : 405 ms    Normal sinus rhythm with sinus arrhythmia  Normal ECG          Anesthesia Evaluation    I have reviewed the Patient Summary Reports.    I have reviewed the Nursing Notes.   I have reviewed the Medications.     Review of Systems  Anesthesia Hx:  No problems with previous Anesthesia  History of prior surgery of interest to airway management or planning:   Social:  Non-Smoker, No Alcohol Use    Hematology/Oncology:  Hematology Normal        Cardiovascular:   Exercise tolerance: good Vasovagal syncope   Pulmonary:   Asthma    Musculoskeletal:  Musculoskeletal Normal    Neurological:   Headaches Seizures    Psych:  Psychiatric Normal           Physical Exam  General:  Well nourished, Obesity    Airway/Jaw/Neck:  Airway Findings: Mouth Opening: Normal Tongue: Normal  General Airway Assessment: Adult, Average  Mallampati: II  Improves to II with phonation.  TM Distance: 4 - 6 cm  Jaw/Neck Findings:     Neck ROM: Normal ROM      Dental:  Dental Findings: In tact, Upper braces, Lower braces   Chest/Lungs:  Chest/Lungs Findings: Clear to auscultation     Heart/Vascular:  Heart Findings: Rate: Bradycardia  Rhythm: Regular Rhythm        Mental Status:  Mental Status Findings:  Cooperative, Alert and Oriented         Anesthesia Plan  Type of Anesthesia, risks & benefits discussed:  Anesthesia Type:  MAC, general  Patient's Preference:   Intra-op Monitoring Plan: standard ASA monitors  Intra-op Monitoring Plan Comments:   Post Op Pain Control Plan:   Post Op Pain Control Plan Comments:   Induction:   IV  Beta Blocker:  Patient is not currently on a  Beta-Blocker (No further documentation required).       Informed Consent: Patient understands risks and agrees with Anesthesia plan.  Questions answered. Anesthesia consent signed with patient.  ASA Score: 3     Day of Surgery Review of History & Physical: I have interviewed and examined the patient. I have reviewed the patient's H&P dated:            Ready For Surgery From Anesthesia Perspective.

## 2019-07-18 NOTE — ASSESSMENT & PLAN NOTE
Patient had a syncopal episode this AM while staff was attempting to start an IV for her tilt table test.   She regained consciousness after <1 min, and was oriented. No post ictal state suspected.   10+ episodes in the past year of similar syncope   3.10 minutes into the tilt table test, the table was adjusted per protocol, patient complained of nausea followed by syncope.   Her BP remained stable with notation of asystolic pause from 3.10 min to ~4.02 minutes (termination tracings were not captured).   SR resumed without recurrent syncope.   SBP 100s-110s.  Suspect vasovagal   Dr Cooley consulted for further evaluation.   NPO for potential PPM placement  CMP, CBC, BNP, Troponin unremarkable  TSH, PT,PTT, UPT pending    Monitor on tele

## 2019-07-18 NOTE — H&P
[facility]  Cardiology  History and Physical     Patient Name: Vanessa Arreaga  MRN: [id number]  Admission Date: 7/18/2019  Code Status: Full Code   Attending Provider: Sekou Silver MD   Primary Care Physician: To Obtain Unable  Principal Problem:Syncope and collapse    Patient information was obtained from patient, past medical records and ER records.     Subjective:     Chief Complaint:  Syncope     HPI:  Vanessa Arreaga is a 18 y/o female with PMH of seizures and migraines. She is no longer on anticonvulsants or migraine prevention medications. Patient presented to the Cardiopulmonary lab today for an outpatient tilt table test which was ordered for recurrent syncope. Patient reports having ~ 1 syncopal episode per month and it usually occurs with emotional upset or seeing blood. She recalls nausea prior to loss of consciousness episodes. Patient denies loss of bowel or bladder. When she regaines consciousness she is alert and oriented after a minute. LOC usually is about a minute. She sometimes have convulsions with LOC. Patient initially blamed it on her seizures but Neurologist suspects vasovagal etiology. No chest pain but she is having dyspnea with exertion. Patient had a syncopal episode this AM while staff was attempting to start an IV for her tilt table test. This episode was the same as previous, she regained consciousness after <1 min, and was oriented. No post ictal state suspected. 3.10 minutes into the tilt table test, the table was adjusted per protocol, patient complained of nausea followed by syncope. Her BP remained stable with notation of asystolic pause from 3.10 min to ~4.02 minutes (termination tracings were not captured). SR resumed without recurrent syncope. SBP 100s-110s. Patient was given a 500 cc bolus of NS and was direct admitted to telemetry. Echo was performed, results are pending. Dr Cooley consulted for further evaluation.   TSH, CBC, CMP, Troponin, and BNP  pending.     Past Medical History:   Diagnosis Date    Asthma     Headache(784.0)     Seizures        Past Surgical History:   Procedure Laterality Date    WISDOM TOOTH EXTRACTION         Review of patient's allergies indicates:   Allergen Reactions    Lamictal  [lamotrigine]      Other reaction(s): HIVES    Cefzil [cefprozil] Rash    Demerol [meperidine] Rash     Other reaction(s): Rash    Pcn [penicillins] Rash     Other reaction(s): Rash       No current facility-administered medications on file prior to encounter.      Current Outpatient Medications on File Prior to Encounter   Medication Sig    BLISOVI 24 FE 1 mg-20 mcg (24)/75 mg (4) per tablet Take 1 tablet by mouth once daily.    sumatriptan (IMITREX) 50 MG tablet Take 1 tab PO X 1 PRN for migraines. Do not repeat for 24 hours.    ergocalciferol (ERGOCALCIFEROL) 50,000 unit Cap Take 1 capsule (50,000 Units total) by mouth every 7 days.    ketoconazole (NIZORAL) 2 % shampoo USE TO WASH SCALP two TO three TIMES A WEEK    venlafaxine (EFFEXOR XR) 37.5 MG 24 hr capsule Take 1 capsule (37.5 mg total) by mouth once daily.    [DISCONTINUED] levetiracetam XR (KEPPRA XR) 500 mg Tb24 24 hr tablet Take 1 tablet (500 mg total) by mouth once daily.    [DISCONTINUED] magnesium oxide (MAG-OX) 400 mg tablet Take 1 tablet (400 mg total) by mouth once daily.    [DISCONTINUED] meloxicam (MOBIC) 15 MG tablet Take 1 tablet by mouth once daily.    [DISCONTINUED] naproxen (NAPROSYN) 500 MG tablet Take 1 tablet (500 mg total) by mouth 2 (two) times daily as needed (TAKE 1 PO BID PRN PAIN).    [DISCONTINUED] riboflavin, vitamin B2, 100 mg Tab tablet Take 2 tablets (200 mg total) by mouth once daily.    [DISCONTINUED] zonisamide (ZONEGRAN) 50 MG Cap Take 1 tab PO QHS     Family History     Problem Relation (Age of Onset)    Arthritis Paternal Grandmother    Heart disease Maternal Grandfather    Irregular heart beat Sister    Stroke Maternal Grandfather         Tobacco Use    Smoking status: Never Smoker   Substance and Sexual Activity    Alcohol use: No    Drug use: Not on file    Sexual activity: Never     Review of Systems   Constitution: Negative for diaphoresis.   HENT: Negative.    Eyes: Negative.    Cardiovascular: Positive for syncope. Negative for chest pain, dyspnea on exertion, irregular heartbeat, leg swelling, near-syncope, orthopnea, palpitations and paroxysmal nocturnal dyspnea.   Respiratory: Negative for cough, shortness of breath and wheezing.    Endocrine: Negative.    Hematologic/Lymphatic: Negative.    Skin: Negative.    Musculoskeletal: Negative.    Gastrointestinal: Positive for nausea.   Genitourinary: Negative.    Neurological: Positive for headaches. Negative for dizziness and focal weakness.   Psychiatric/Behavioral: Negative.    Allergic/Immunologic: Negative.      Objective:     Vital Signs (Most Recent):  Temp: 98.4 °F (36.9 °C) (07/18/19 1049)  Pulse: 74 (07/18/19 1207)  Resp: 16 (07/18/19 1049)  BP: 118/74 (07/18/19 1049) Vital Signs (24h Range):  Temp:  [98.4 °F (36.9 °C)] 98.4 °F (36.9 °C)  Pulse:  [61-74] 74  Resp:  [16] 16  BP: (118)/(74) 118/74        There is no height or weight on file to calculate BMI.            No intake or output data in the 24 hours ending 07/18/19 1213    Lines/Drains/Airways          None          Physical Exam   Constitutional: She is oriented to person, place, and time. She appears well-developed and well-nourished. No distress.   HENT:   Head: Normocephalic and atraumatic.   Eyes: Right eye exhibits no discharge. Left eye exhibits no discharge.   Neck: No JVD present.   Cardiovascular: Normal rate, regular rhythm and normal heart sounds. Exam reveals no gallop and no friction rub.   No murmur heard.  Pulmonary/Chest: Effort normal and breath sounds normal. No respiratory distress. She has no rales.   Abdominal: Soft. Bowel sounds are normal.   Musculoskeletal: She exhibits no edema or tenderness.    Neurological: She is alert and oriented to person, place, and time.   Skin: Skin is warm and dry. She is not diaphoretic.   Psychiatric: She has a normal mood and affect. Her behavior is normal. Judgment and thought content normal.       Significant Labs:   CMP   Recent Labs   Lab 07/18/19  1115      K 4.1      CO2 22*   GLU 87   BUN 12   CREATININE 0.8   CALCIUM 9.3   PROT 6.5   ALBUMIN 3.8   BILITOT 0.3   ALKPHOS 42*   AST 14   ALT 14   ANIONGAP 8   ESTGFRAFRICA >60   EGFRNONAA >60   , CBC   Recent Labs   Lab 07/18/19  1115   WBC 7.32   HGB 14.3   HCT 43.9      , INR No results for input(s): INR, PROTIME in the last 48 hours., Troponin   Recent Labs   Lab 07/18/19  1115   TROPONINI <0.006    and All pertinent lab results from the last 24 hours have been reviewed.    Significant Imaging: Echocardiogram:   Transthoracic echo (TTE) complete (Cupid Only):   Results for orders placed or performed during the hospital encounter of 07/18/19   Transthoracic echo (TTE) 2D with Color Flow   Result Value Ref Range    AV mean gradient 4 mmHg    Ao peak woody 1.21 m/s    Ao VTI 27.00 cm    IVRT 0.09 msec    IVS 0.62 0.6 - 1.1 cm    LA size 3.17 cm    Left Atrium Major Axis 4.51 cm    Left Atrium Minor Axis 4.49 cm    LVIDD 4.26 3.5 - 6.0 cm    LVIDS 3.10 2.1 - 4.0 cm    LVOT diameter 1.80 cm    LVOT peak VTI 20.74 cm    PW 0.62 0.6 - 1.1 cm    MV Peak A Woody 0.38 m/s    E wave decelartion time 197.44 msec    MV Peak E Woody 0.97 m/s    PV Peak D Woody 0.42 m/s    PV Peak S Woody 0.56 m/s    RA Major Axis 4.44 cm    RVDD 2.22 cm    TR Max Woody 2.09 m/s    LA WIDTH 3.35 cm    Ao root annulus 2.70 cm    LV Diastolic Volume 81.34 mL    LV Systolic Volume 37.77 mL    LVOT peak woody 0.84 m/s    FS 27 %    LA volume 40.62 cm3    LV mass 74.70 g    Left Ventricle Relative Wall Thickness 0.29 cm    AV valve area 1.95 cm2    AV Velocity Ratio 0.69     AV index (prosthetic) 0.77     E/A ratio 2.55     Pulm vein S/D ratio 1.33      LVOT area 2.5 cm2    LVOT stroke volume 52.75 cm3    AV peak gradient 6 mmHg    Triscuspid Valve Regurgitation Peak Gradient 17 mmHg    Right Atrial Pressure (from IVC) 3 mmHg    TV rest pulmonary artery pressure 20 mmHg    Narrative    · Normal left ventricular systolic function. The estimated ejection   fraction is 55%  · Normal LV diastolic function.  · No wall motion abnormalities.  · Normal right ventricular systolic function.  · The estimated PA systolic pressure is 20 mm Hg  · Normal central venous pressure (3 mm Hg).        Assessment and Plan:     * Syncope and collapse  Patient had a syncopal episode this AM while staff was attempting to start an IV for her tilt table test.   She regained consciousness after <1 min, and was oriented. No post ictal state suspected.   10+ episodes in the past year of similar syncope   3.10 minutes into the tilt table test, the table was adjusted per protocol, patient complained of nausea followed by syncope.   Her BP remained stable with notation of asystolic pause from 3.10 min to ~4.02 minutes (termination tracings were not captured).   SR resumed without recurrent syncope.   SBP 100s-110s.  Suspect vasovagal   Dr Cooley consulted for further evaluation.   NPO for potential PPM placement  CMP, CBC, BNP, Troponin unremarkable  TSH, PT,PTT, UPT pending    Monitor on tele         VTE Risk Mitigation (From admission, onward)        Ordered     IP VTE LOW RISK PATIENT  Once      07/18/19 1048     Place BRIGETTE hose  Until discontinued      07/18/19 1048          Marv Amato, TEE  Cardiology   [facility]

## 2019-07-18 NOTE — PLAN OF CARE
Court at bedside to make PPM programming changes per Dr Cooley.  Patient was having runs of paced beats that were not appropriate for programming.  Changes made per Court.  No more paced beats observed. Patient has met transfer criteria.

## 2019-07-18 NOTE — PLAN OF CARE
VN note: VN cued into patient's room for introduction. VN informed patient and  at bedside that VN would be working closely along side bedside nurse, PCT, and the rest of care team and making rounds throughout the shift. Both verbalized understanding. Allowed time for questions. VN will continue to be available to patient and intervene prn.

## 2019-07-18 NOTE — PLAN OF CARE
1640  Patient transferred to recovery cath lab slot 4  via stretcher with side rails up x2 .  Pt drowsy but able to follow commands. Pt is stable, connected to   cardiac monitors. No bleeding or hematoma noted. +2 stanton radial and pedal pulses palpated.   Skin normal in color and warm to touch, < 3 sec cap refill.  Fall risk precautions given and patient acknowledges.  AIDET completed to pt.  Will continue to monitor patient.  Updated pt's  in sx waiting room.

## 2019-07-18 NOTE — CONSULTS
"[facility]  Electrophysiology  Consult Note    Patient Name: Vanessa Arreaga  MRN: [id number]  Admission Date: 7/18/2019  Hospital Length of Stay: 0 days  Code Status: Full Code   Attending Provider: Sekou Silver MD   Consulting Provider: Piter Cooley MD  Primary Care Physician: To Obtain Unable  Principal Problem:Syncope and collapse    Patient information was obtained from patient and spouse/SO.     Inpatient consult to [facility]  Consult performed by: Piter Cooley MD  Consult ordered by: Sekou Silver MD  Reason for consult: syncope  Assessment/Recommendations: Profound bradycardia and syncope during TTT today. Sinus slowing and CHB without escape, resulting in ventricular asystole for 30-50 seconds.  There's likely also a component of vasodepressor syncope here, but definitely there's a profound cardioinhibitory reaction occurring.  Reviewed this with patient, including in general the findings of ISSUE-3 trial showing that even in patients with significantly shorter pauses due to neurocardioinhibition, they do better (i.e., less frequent syncope) than without.    I spent about a half hour with patient and  discussing the risks and benefits of PPM placement. Our discussion of risks included (but was not limited to) the possibility of infection, death, stroke, MI, pneumothorax, embolism, cardiac perforation, cardiac tamponade, renal injury, and bleeding.  I discussed with patient risks, indications, benefits, and alternatives of the planned procedure. All questions were answered. Patient understands and wishes to proceed.        Subjective:     Chief Complaint:  syncope     HPI:   19 y.o. F  "seizure disorder" by history (though neurologist, by report, has recently disagreed with this dx)    Recurrent syncope. Often exacerbated by IV placement, blood draws, etc.  Underwent TTT today, during which there was syncope, sinus slowing, and ventricular asystole for " somewhere be 30 s and 50 s (and CHB). Very similar to past episodes.  Grandfather had sudden death at age 50. Parents, sibs: no syncope; no other SCD.    I'm consulted for PPM placement.      Past Medical History:   Diagnosis Date    Asthma     Headache(784.0)     Seizures        Past Surgical History:   Procedure Laterality Date    WISDOM TOOTH EXTRACTION         Review of patient's allergies indicates:   Allergen Reactions    Lamictal  [lamotrigine]      Other reaction(s): HIVES    Cefzil [cefprozil] Rash    Demerol [meperidine] Rash     Other reaction(s): Rash    Pcn [penicillins] Rash     Other reaction(s): Rash       No current facility-administered medications on file prior to encounter.      Current Outpatient Medications on File Prior to Encounter   Medication Sig    BLISOVI 24 FE 1 mg-20 mcg (24)/75 mg (4) per tablet Take 1 tablet by mouth once daily.    sumatriptan (IMITREX) 50 MG tablet Take 1 tab PO X 1 PRN for migraines. Do not repeat for 24 hours.    ergocalciferol (ERGOCALCIFEROL) 50,000 unit Cap Take 1 capsule (50,000 Units total) by mouth every 7 days.    ketoconazole (NIZORAL) 2 % shampoo USE TO WASH SCALP two TO three TIMES A WEEK    venlafaxine (EFFEXOR XR) 37.5 MG 24 hr capsule Take 1 capsule (37.5 mg total) by mouth once daily.    [DISCONTINUED] levetiracetam XR (KEPPRA XR) 500 mg Tb24 24 hr tablet Take 1 tablet (500 mg total) by mouth once daily.    [DISCONTINUED] magnesium oxide (MAG-OX) 400 mg tablet Take 1 tablet (400 mg total) by mouth once daily.    [DISCONTINUED] meloxicam (MOBIC) 15 MG tablet Take 1 tablet by mouth once daily.    [DISCONTINUED] naproxen (NAPROSYN) 500 MG tablet Take 1 tablet (500 mg total) by mouth 2 (two) times daily as needed (TAKE 1 PO BID PRN PAIN).    [DISCONTINUED] riboflavin, vitamin B2, 100 mg Tab tablet Take 2 tablets (200 mg total) by mouth once daily.    [DISCONTINUED] zonisamide (ZONEGRAN) 50 MG Cap Take 1 tab PO QHS     Family History      Problem Relation (Age of Onset)    Arthritis Paternal Grandmother    Heart disease Maternal Grandfather    Irregular heart beat Sister    Stroke Maternal Grandfather        Tobacco Use    Smoking status: Never Smoker   Substance and Sexual Activity    Alcohol use: No    Drug use: Not on file    Sexual activity: Never     ROS  Objective:     Vital Signs (Most Recent):  Temp: 98.4 °F (36.9 °C) (07/18/19 1049)  Pulse: 74 (07/18/19 1207)  Resp: 16 (07/18/19 1049)  BP: 118/74 (07/18/19 1049) Vital Signs (24h Range):  Temp:  [98.4 °F (36.9 °C)] 98.4 °F (36.9 °C)  Pulse:  [61-74] 74  Resp:  [16] 16  BP: (118)/(74) 118/74        There is no height or weight on file to calculate BMI.       O2 Device (Oxygen Therapy): room air    No intake or output data in the 24 hours ending 07/18/19 1440    Lines/Drains/Airways          None          Physical Exam    Significant Labs:   CMP   Recent Labs   Lab 07/18/19  1115      K 4.1      CO2 22*   GLU 87   BUN 12   CREATININE 0.8   CALCIUM 9.3   PROT 6.5   ALBUMIN 3.8   BILITOT 0.3   ALKPHOS 42*   AST 14   ALT 14   ANIONGAP 8   ESTGFRAFRICA >60   EGFRNONAA >60   , CBC   Recent Labs   Lab 07/18/19  1115   WBC 7.32   HGB 14.3   HCT 43.9      , INR   Recent Labs   Lab 07/18/19  1348   INR 1.1    and Troponin   Recent Labs   Lab 07/18/19  1115   TROPONINI <0.006     TSH normal as well.    Significant Imaging: Echocardiogram: LVEF 55%  Assessment and Plan:     Active Diagnoses:    Diagnosis Date Noted POA    PRINCIPAL PROBLEM:  Syncope and collapse [R55] 07/18/2019 Unknown      Problems Resolved During this Admission:       VTE Risk Mitigation (From admission, onward)        Ordered     IP VTE LOW RISK PATIENT  Once      07/18/19 1048     Place BRIGETTE hose  Until discontinued      07/18/19 1048          Thank you for your consult. I will perform pacemaker implantation today and will continue to follow the patient on an outpatient basis.    Pitre Cooley MD MPH  Cardiac  Electrophysiology  [facility]

## 2019-07-19 ENCOUNTER — TELEPHONE (OUTPATIENT)
Dept: CARDIOLOGY | Facility: CLINIC | Age: 19
End: 2019-07-19

## 2019-07-19 ENCOUNTER — TELEPHONE (OUTPATIENT)
Dept: ELECTROPHYSIOLOGY | Facility: CLINIC | Age: 19
End: 2019-07-19

## 2019-07-19 VITALS
HEART RATE: 65 BPM | DIASTOLIC BLOOD PRESSURE: 63 MMHG | BODY MASS INDEX: 24.32 KG/M2 | OXYGEN SATURATION: 99 % | HEIGHT: 65 IN | RESPIRATION RATE: 18 BRPM | WEIGHT: 145.94 LBS | TEMPERATURE: 98 F | SYSTOLIC BLOOD PRESSURE: 107 MMHG

## 2019-07-19 DIAGNOSIS — Z95.0 CARDIAC PACEMAKER IN SITU: Primary | ICD-10-CM

## 2019-07-19 DIAGNOSIS — R55 SYNCOPE, UNSPECIFIED SYNCOPE TYPE: ICD-10-CM

## 2019-07-19 LAB — B-HCG UR QL: NEGATIVE

## 2019-07-19 PROCEDURE — 63600175 PHARM REV CODE 636 W HCPCS: Performed by: INTERNAL MEDICINE

## 2019-07-19 PROCEDURE — 81025 URINE PREGNANCY TEST: CPT

## 2019-07-19 PROCEDURE — 94761 N-INVAS EAR/PLS OXIMETRY MLT: CPT

## 2019-07-19 PROCEDURE — 99217 PR OBSERVATION CARE DISCHARGE: ICD-10-PCS | Mod: ,,, | Performed by: NURSE PRACTITIONER

## 2019-07-19 PROCEDURE — 25000003 PHARM REV CODE 250: Performed by: INTERNAL MEDICINE

## 2019-07-19 PROCEDURE — 99217 PR OBSERVATION CARE DISCHARGE: CPT | Mod: ,,, | Performed by: NURSE PRACTITIONER

## 2019-07-19 PROCEDURE — G0378 HOSPITAL OBSERVATION PER HR: HCPCS

## 2019-07-19 RX ORDER — CLARITHROMYCIN 500 MG/1
500 TABLET, FILM COATED ORAL EVERY 12 HOURS
Qty: 10 TABLET | Refills: 0 | Status: SHIPPED | OUTPATIENT
Start: 2019-07-19 | End: 2019-07-24

## 2019-07-19 RX ORDER — HYDROCODONE BITARTRATE AND ACETAMINOPHEN 5; 325 MG/1; MG/1
1 TABLET ORAL EVERY 6 HOURS PRN
Status: DISCONTINUED | OUTPATIENT
Start: 2019-07-19 | End: 2019-07-19 | Stop reason: HOSPADM

## 2019-07-19 RX ADMIN — ACETAMINOPHEN 650 MG: 325 TABLET ORAL at 10:07

## 2019-07-19 RX ADMIN — HYDROCODONE BITARTRATE AND ACETAMINOPHEN 1 TABLET: 5; 325 TABLET ORAL at 04:07

## 2019-07-19 RX ADMIN — ACETAMINOPHEN 650 MG: 325 TABLET ORAL at 01:07

## 2019-07-19 RX ADMIN — VANCOMYCIN HYDROCHLORIDE 1000 MG: 1 INJECTION, POWDER, LYOPHILIZED, FOR SOLUTION INTRAVENOUS at 02:07

## 2019-07-19 NOTE — ANESTHESIA POSTPROCEDURE EVALUATION
Anesthesia Post Evaluation    Patient: Vanessa Arreaga    Procedure(s) Performed: Procedure(s) (LRB):  INSERTION, CARDIAC PACEMAKER, DUAL CHAMBER (Left)    Final Anesthesia Type: general  Patient location during evaluation: PACU  Patient participation: Yes- Able to Participate  Level of consciousness: awake and alert, oriented and awake  Post-procedure vital signs: reviewed and stable  Pain management: adequate  Airway patency: patent  PONV status at discharge: No PONV  Anesthetic complications: no      Cardiovascular status: blood pressure returned to baseline  Respiratory status: unassisted and room air  Hydration status: euvolemic  Follow-up not needed.          Vitals Value Taken Time   /77 7/19/2019  5:33 AM   Temp 36.1 °C (97 °F) 7/19/2019  5:33 AM   Pulse 73 7/19/2019  5:33 AM   Resp 18 7/19/2019  5:33 AM   SpO2 98 % 7/19/2019  5:33 AM         Event Time     Out of Recovery 17:45:00          Pain/Guicho Score: Pain Rating Prior to Med Admin: 10 (7/19/2019  5:05 AM)  Pain Rating Post Med Admin: 0 (7/19/2019  5:05 AM)  Guicho Score: 9 (7/18/2019  5:45 PM)

## 2019-07-19 NOTE — TELEPHONE ENCOUNTER
----- Message from JASSON Dhillon ANP sent at 7/19/2019  9:05 AM CDT -----  Contact: Shu  Needs follow up for pacemaker wound check in one week. Dr. Cooley placed PPM yesterday. Aware she will have to be seen at [facility]. She is being discharged today so please call her with appt date and time    Thanks  BH

## 2019-07-19 NOTE — PLAN OF CARE
Discharge order noted, no HH or DME noted. Pt's Mom will assist patient at home as needed. Per TEE Harry-'s nurse Kathy will call patient with a follow up appointment today or Monday. TN informed pt and pt's mother of this. Pt voices no discharge needs. TN explained the importance of med compliance and going to follow up appointments.     Follow-up With  Details  Why  Contact Info   MD Dr. Yasir Mast's nurse will call you with a follow up appointment.  [address] [facility] [address]  [phone]            07/19/19 1028   Final Note   Assessment Type Final Discharge Note   Anticipated Discharge Disposition Home   What phone number can be called within the next 1-3 days to see how you are doing after discharge? [phone]   Hospital Follow Up  Appt(s) scheduled? No   Discharge plans and expectations educations in teach back method with documentation complete? Yes   Right Care Referral Info   Post Acute Recommendation No Care

## 2019-07-19 NOTE — TELEPHONE ENCOUNTER
Spoke w/ pts mother informing her of appt on 7/26. Gave her instructions & told her to call if she got lost getting here.  ----- Message from Yanely Drew sent at 7/19/2019 11:39 AM CDT -----  Chino,  Patient was schedule on 7/26 at 2:20pm. Please inform regards of time and date provided.   ----- Message -----  From: Chino Osei MA  Sent: 7/19/2019  10:48 AM  To: [facility] Arrhythmia Device Staff    We dont schedule those  ----- Message -----  From: Kathy Oakes MA  Sent: 7/19/2019  10:40 AM  To: [facility] ADEN Staff    Good afternoon,     This patient had a device implanted by Dr. Cooley yesterday and she is being discharged today. Shu Tai NP request that the patient be seen in 1 week for wound check. Please over book patient for 7/24/2019 at [facility]. I will call the patient with the date and time.     Thank you,     AALIYAH Chavez

## 2019-07-19 NOTE — PROGRESS NOTES
Pharmacokinetic Initial Assessment: IV Vancomycin    Assessment/Plan:    Initiate intravenous vancomycin with loading dose of 2000 mg once followed by a maintenance dose of vancomycin 1000mg IV every 12 hours x 2 doses only.        Please contact pharmacy at extension [phone] with any questions regarding this assessment.     Thank you for the consult,   Raysa Bishop     Patient brief summary:  Vanessa Arreaga is a 19 y.o. female initiated on antimicrobial therapy with IV Vancomycin for treatment for surgical prophylaxis     Drug Allergies:   Review of patient's allergies indicates:   Allergen Reactions    Lamictal  [lamotrigine]      Other reaction(s): HIVES    Cefzil [cefprozil] Rash    Demerol [meperidine] Rash     Other reaction(s): Rash    Pcn [penicillins] Rash     Other reaction(s): Rash       Actual Body Weight:   68 kg    Renal Function:   Estimated Creatinine Clearance: 100.1 mL/min (based on SCr of 0.8 mg/dL).,     Dialysis Method (if applicable):  ha     CBC (last 72 hours):  Recent Labs   Lab Result Units 07/18/19  1115   WBC K/uL 7.32   Hemoglobin g/dL 14.3   Hematocrit % 43.9   Platelets K/uL 212   Gran% % 76.9*   Lymph% % 16.0*   Mono% % 5.9   Eosinophil% % 1.1   Basophil% % 0.1   Differential Method  Automated       Metabolic Panel (last 72 hours):  Recent Labs   Lab Result Units 07/18/19  1115   Sodium mmol/L 139   Potassium mmol/L 4.1   Chloride mmol/L 109   CO2 mmol/L 22*   Glucose mg/dL 87   BUN, Bld mg/dL 12   Creatinine mg/dL 0.8   Albumin g/dL 3.8   Total Bilirubin mg/dL 0.3   Alkaline Phosphatase U/L 42*   AST U/L 14   ALT U/L 14       Drug levels (last 3 results):  No results for input(s): VANCOMYCINRA, VANCOMYCINPE, VANCOMYCINTR in the last 72 hours.    Microbiologic Results:  Microbiology Results (last 7 days)       ** No results found for the last 168 hours. **

## 2019-07-19 NOTE — DISCHARGE INSTRUCTIONS
Dr. Cooley's Pacemaker Wound Care Instructions    Dressing:   - Leave dressing intact until follow up with Dr. Cooley    Activity:   - No heavy lifting or strenuous activity until cleared by Dr. Cooley    - No lifting of arm above the level of the shoulder until cleared by Dr. Cooley    - Wear the sling for 24 hours then reapply nightly until cleared by Dr. Cooley                   Wound Care:   - Leave dressing intact   - Ok to shower but avoid direct stream of water to site/dressing     Notify MD:   - temperature greater than 100.5   - increased redness to site, swelling, drainage or increased pain    Follow up:    - See Dr. Cooley in the clinic for a wound check in one week          Syncope, Causes of (English) View Edit Remove   Syncope, Treating: Helping Your Heart (English) View Edit Remove   Pacemaker Implantation, Discharge Instructions for (English) View Edit Remove   Pacemaker, Living with (English) View Edit Remove   Wound Infection, Recognizing and Treating (English) View Edit Remove   Clarithromycin tablets (English) View Edit Remove

## 2019-07-19 NOTE — TELEPHONE ENCOUNTER
Patient is scheduled for 7/26 with Device Check Clinic. I put in a recall for 4 month f/u with Dr Cooley.

## 2019-07-19 NOTE — NURSING
Patient safety maintained. Monitoring pain and treating as needed. Printed documentation provided on discharge. IV removed, patient tolerated well. Telemetry monitor removed and returned to the monitor room. Family at bed side.

## 2019-07-19 NOTE — PLAN OF CARE
1905H Patient is awake and orientedx4. Care plan explained to patient, and family; they verbalized understanding. On room air, O2 saturation at 96%. Hooked to heart monitor running normal sinus rhythm at 64-80bpm. Patient has a 24G right hand flushed and saline locked. Maintained on clear liquid diet; advancing as tolerated diet. Incision dressing intact, bleeding controlled. Ambulated to the bathroom with assistance. Complained of pain at the incision site gave tylenol. Patient denies shortness/cough. Due medications given. Ice pack/sling in place. Maintained on fall risk precaution. Bed in lowest position, bed alarm on, call light/personal items within reach and instructed to call for help when needed. Will continue to monitor.

## 2019-07-19 NOTE — PLAN OF CARE
VN note: VN cued into patient's room. Patient's family at bedside. VN reviewed discharge information with them. VN provided education on new medication and side effects. Medication list reviewed and follow-up information given. VN also educated patient on specific post-op pacemaker instructions. Patient understands signs and symptoms of infection and when to seek medical attention.Patient verbalized understanding and all questions answered. Refer to clinical references for further education.  Patient reports prescription delivered at bedside.    Family at bedside. Transport requested.

## 2019-07-22 NOTE — DISCHARGE SUMMARY
[facility]  Cardiology  Discharge Summary      Patient Name: April A Dottie Arreaga  MRN: [id number]  Admission Date: 7/18/2019  Hospital Length of Stay: 0 days  Discharge Date and Time: 7/19/2019 11:50 AM  Attending Physician: No att. providers found  Discharging Provider: JASSON Grant, ANP  Primary Care Physician: To Obtain Unable    HPI: 18 y/o female with PMH of seizures and migraines. She is no longer on anticonvulsants or migraine prevention medications. Patient presented to the Cardiopulmonary lab today for an outpatient tilt table test which was ordered for recurrent syncope. Patient reports having ~ 1 syncopal episode per month and it usually occurs with emotional upset or seeing blood. She recalls nausea prior to loss of consciousness episodes. Patient denies loss of bowel or bladder. When she regaines consciousness she is alert and oriented after a minute. LOC usually is about a minute. She sometimes have convulsions with LOC. Patient initially blamed it on her seizures but Neurologist suspects vasovagal etiology. No chest pain but she is having dyspnea with exertion. Patient had a syncopal episode this AM while staff was attempting to start an IV for her tilt table test. This episode was the same as previous, she regained consciousness after <1 min, and was oriented. No post ictal state suspected. 3.10 minutes into the tilt table test, the table was adjusted per protocol, patient complained of nausea followed by syncope. Her BP remained stable with notation of asystolic pause from 3.10 min to ~4.02 minutes (termination tracings were not captured). SR resumed without recurrent syncope. SBP 100s-110s. Patient was given a 500 cc bolus of NS and was direct admitted to telemetry. Echo was performed, results are pending. Dr Cooley consulted for further evaluation.   TSH, CBC, CMP, Troponin, and BNP pending.     Procedure(s) (LRB):  INSERTION, CARDIAC PACEMAKER, DUAL CHAMBER (Left)      Indwelling Lines/Drains at time of discharge: none     Hospital Course     7/18/2019 Outpatient tilt table test for evaluation of syncope. During tilt table test, she began to feel nauseated and dizzy with witnessed syncopal episode. Her episode was the same as previous and regained consciousness after <1 min, and was oriented. No post ictal state suspected. 3.10 minutes into the tilt table test, the table was adjusted per protocol, patient complained of nausea followed by syncope. Her BP remained stable with notation of asystolic pause from 3.10 min to ~4.02 minutes (termination tracings were not captured). SR resumed without recurrent syncope. Admitted to telemetry unit for further observation. Echocardiogram with normal LVEF. EP-Dr. Cooley consulted with plans for permanent pacemaker placement this afternoon   7/19/2019 Underwent successful placement of single lead pacemaker yesterday. Tolerated procedure well and recovered on telemetry unit overnight. LSC site stable with Aquacel dressing intact. No active bleeding, hematoma or swelling noted. Able to ambulate without any difficulty. Deemed ready for discharge and sent home on Biaxin 500mg po BID x 5 days. Reviewed instructions in regards to  site/dressing care as well as activity restrictions at length at the bedside with patient, mother, sister and  and provided written instructions on AVS. Will need wound check follow up next week at [facility] and then will need to see Dr. Cooley in 4 months    Consults:   Consults (From admission, onward)        Status Ordering Provider     Inpatient consult to Cardiology-[facility]  Once     Provider:  Piter Cooley MD    Completed SOLO FAITH          Significant Diagnostic Studies:   TTE 7/18/2019    · Normal left ventricular systolic function. The estimated ejection fraction is 55%  · Normal LV diastolic function.  · No wall motion abnormalities.  · Normal right ventricular systolic function.  · The estimated  PA systolic pressure is 20 mm Hg  · Normal central venous pressure (3 mm Hg).    Pending Diagnostic Studies:     None          Final Active Diagnoses:    Diagnosis Date Noted POA    PRINCIPAL PROBLEM:  Syncope and collapse [R55] 07/18/2019 Unknown    Syncope [R55] 07/18/2019 Yes      Problems Resolved During this Admission:       Discharged Condition: good    Follow Up:  Follow-up Information     Piter Cooley MD.    Specialties:  Electrophysiology, Cardiology  Why:  Dr. Cooley's nurse will call you with a follow up appointment.  Contact information:  [address]  [facility] [address] [phone]                 Patient Instructions:      Diet Adult Regular     Lifting restrictions   Order Comments: No lifting of left arm above the level of the shoulder until cleared by Dr. Cooley     Leave dressing on - Keep it clean, dry, and intact until clinic visit     Medications:  Reconciled Home Medications:      Medication List      START taking these medications    clarithromycin 500 MG tablet  Commonly known as:  BIAXIN  Take 1 tablet (500 mg total) by mouth every 12 (twelve) hours. for 5 days        CONTINUE taking these medications    BLISOVI 24 FE 1 mg-20 mcg (24)/75 mg (4) per tablet  Generic drug:  norethindrone-e.estradiol-iron  Take 1 tablet by mouth once daily.     sumatriptan 50 MG tablet  Commonly known as:  IMITREX  Take 1 tab PO X 1 PRN for migraines. Do not repeat for 24 hours.        STOP taking these medications    ergocalciferol 50,000 unit Cap  Commonly known as:  ERGOCALCIFEROL     ketoconazole 2 % shampoo  Commonly known as:  NIZORAL     levetiracetam  mg Tb24 24 hr tablet  Commonly known as:  KEPPRA XR     magnesium oxide 400 mg (241.3 mg magnesium) tablet  Commonly known as:  MAG-OX     meloxicam 15 MG tablet  Commonly known as:  MOBIC     naproxen 500 MG tablet  Commonly known as:  NAPROSYN     riboflavin (vitamin B2) 100 mg Tab tablet  Commonly known as:  VITAMIN B-2     venlafaxine  37.5 MG 24 hr capsule  Commonly known as:  EFFEXOR XR     zonisamide 50 MG Cap  Commonly known as:  ZONEGRAN            Time spent on the discharge of patient: 45 minutes    JASSON Grant, ANP  Cardiology  [facility]

## 2019-07-26 ENCOUNTER — CLINICAL SUPPORT (OUTPATIENT)
Dept: CARDIOLOGY | Facility: HOSPITAL | Age: 19
End: 2019-07-26
Attending: INTERNAL MEDICINE
Payer: OTHER GOVERNMENT

## 2019-07-26 DIAGNOSIS — Z95.0 CARDIAC PACEMAKER IN SITU: ICD-10-CM

## 2019-07-26 DIAGNOSIS — R55 SYNCOPE, UNSPECIFIED SYNCOPE TYPE: ICD-10-CM

## 2019-07-26 PROCEDURE — 93279 PRGRMG DEV EVAL PM/LDLS PM: CPT

## 2019-08-22 ENCOUNTER — OFFICE VISIT (OUTPATIENT)
Dept: CARDIOLOGY | Facility: CLINIC | Age: 19
End: 2019-08-22
Payer: OTHER GOVERNMENT

## 2019-08-22 VITALS
BODY MASS INDEX: 24.72 KG/M2 | WEIGHT: 148.38 LBS | DIASTOLIC BLOOD PRESSURE: 76 MMHG | SYSTOLIC BLOOD PRESSURE: 119 MMHG | HEIGHT: 65 IN

## 2019-08-22 DIAGNOSIS — Z95.0 CARDIAC PACEMAKER IN SITU: ICD-10-CM

## 2019-08-22 DIAGNOSIS — G90.01 CAROTID SINUS SYNCOPE: Primary | ICD-10-CM

## 2019-08-22 PROCEDURE — 99214 PR OFFICE/OUTPT VISIT, EST, LEVL IV, 30-39 MIN: ICD-10-PCS | Mod: S$PBB,,, | Performed by: INTERNAL MEDICINE

## 2019-08-22 PROCEDURE — 99999 PR PBB SHADOW E&M-EST. PATIENT-LVL II: ICD-10-PCS | Mod: PBBFAC,,, | Performed by: INTERNAL MEDICINE

## 2019-08-22 PROCEDURE — 99999 PR PBB SHADOW E&M-EST. PATIENT-LVL II: CPT | Mod: PBBFAC,,, | Performed by: INTERNAL MEDICINE

## 2019-08-22 PROCEDURE — 99212 OFFICE O/P EST SF 10 MIN: CPT | Mod: PBBFAC,PO | Performed by: INTERNAL MEDICINE

## 2019-08-22 PROCEDURE — 99214 OFFICE O/P EST MOD 30 MIN: CPT | Mod: S$PBB,,, | Performed by: INTERNAL MEDICINE

## 2019-08-22 NOTE — LETTER
August 22, 2019      [facility] - Cardiology  [address] [place] [address] [address]  [place] [address]  Phone: [phone]       Patient: Vanessa Arreaga   YOB: 2000  Date of Visit: 08/22/2019    To Whom It May Concern:    Jaime Arreaga  was at [facility] on 08/22/2019. She may return to work 8/29/2019 with no restrictions. If you have any questions or concerns, or if I can be of further assistance, please do not hesitate to contact me.    Sincerely,    Kulwinder Bermudez MD

## 2019-08-22 NOTE — PROGRESS NOTES
Subjective:   Patient ID:  April A Dottie Arreaga is a 19 y.o. female who presents for evaluation of No chief complaint on file.      HPI: 20 y/o female with syncope present for f/u. She was found to have Neurocardiogenic Syncope on tilt table and Pacemaker was placed. She is doing well with no complaints except for muscle skeletal pain in chest. BP and HR is controlled.       Review of Systems   Constitution: Negative.   HENT: Negative.    Eyes: Negative.    Cardiovascular: Positive for syncope. Negative for near-syncope.   Respiratory: Negative.    Endocrine: Negative.    Hematologic/Lymphatic: Negative.    Skin: Negative.    Musculoskeletal: Negative.    Gastrointestinal: Negative.    Psychiatric/Behavioral: Negative.    Allergic/Immunologic: Negative.      Objective:   Physical Exam   Constitutional: She is oriented to person, place, and time. She appears well-developed and well-nourished. No distress.   Examination of the digits showed no clubbing or cyanosis   HENT:   Head: Normocephalic and atraumatic.   Eyes: Pupils are equal, round, and reactive to light. Conjunctivae are normal. Right eye exhibits no discharge.   Neck: Normal range of motion. Neck supple. No JVD present. No thyromegaly present.   No carotid bruits   Cardiovascular: Normal rate, regular rhythm, S1 normal, S2 normal, normal heart sounds, intact distal pulses and normal pulses. PMI is not displaced. Exam reveals no gallop, no friction rub and no opening snap.   No murmur heard.  Pulmonary/Chest: Effort normal and breath sounds normal. No respiratory distress. She has no wheezes. She has no rales. She exhibits no tenderness.   Abdominal: Soft. Bowel sounds are normal. She exhibits no distension and no mass. There is no tenderness. There is no guarding.   No hepatosplenomegaly   Musculoskeletal: Normal range of motion. She exhibits no edema or tenderness.   Lymphadenopathy:     She has no cervical adenopathy.   Neurological: She is alert and  oriented to person, place, and time.   Skin: Skin is warm. No rash noted. She is not diaphoretic. No erythema.   Psychiatric: She has a normal mood and affect.   Nursing note and vitals reviewed.      Lab Results   Component Value Date    WBC 7.32 07/18/2019    HGB 14.3 07/18/2019    HCT 43.9 07/18/2019    MCV 90 07/18/2019     07/18/2019         Chemistry        Component Value Date/Time     07/18/2019 1115    K 4.1 07/18/2019 1115     07/18/2019 1115    CO2 22 (L) 07/18/2019 1115    BUN 12 07/18/2019 1115    CREATININE 0.8 07/18/2019 1115    GLU 87 07/18/2019 1115    GLU 87 01/03/2017 0705        Component Value Date/Time    CALCIUM 9.3 07/18/2019 1115    ALKPHOS 42 (L) 07/18/2019 1115    AST 14 07/18/2019 1115    ALT 14 07/18/2019 1115    BILITOT 0.3 07/18/2019 1115    ESTGFRAFRICA >60 07/18/2019 1115    EGFRNONAA >60 07/18/2019 1115            Lab Results   Component Value Date    CHOL 178 02/27/2017     Lab Results   Component Value Date    HDL 55 02/27/2017     Lab Results   Component Value Date    LDLCALC 112.2 02/27/2017     Lab Results   Component Value Date    TRIG 54 02/27/2017     Lab Results   Component Value Date    CHOLHDL 30.9 02/27/2017       Lab Results   Component Value Date    TSH 0.606 07/18/2019       No results found for: HGBA1C    ECGs reviewed-NSR, normal QRS and QT and St segments  LABS reviewed      Assessment:     1. Carotid sinus syncope    2. Cardiac pacemaker in situ        Plan:     Patient is doing well  F/u with Dr. Cooley  Activity as tolerate  Only pacing 7% of the time    No orders of the defined types were placed in this encounter.

## 2019-09-10 ENCOUNTER — TELEPHONE (OUTPATIENT)
Dept: ELECTROPHYSIOLOGY | Facility: CLINIC | Age: 19
End: 2019-09-10

## 2019-09-10 NOTE — TELEPHONE ENCOUNTER
Spoke w/ pt to see why she has an appt scheduled before her 3 mo post op & she stated that Dr. Bermudez instructed her to f/u w/ DM.

## 2019-09-19 ENCOUNTER — HOSPITAL ENCOUNTER (OUTPATIENT)
Dept: CARDIOLOGY | Facility: HOSPITAL | Age: 19
Discharge: HOME | End: 2019-09-19
Attending: INTERNAL MEDICINE
Payer: OTHER GOVERNMENT

## 2019-09-19 ENCOUNTER — OFFICE VISIT (OUTPATIENT)
Dept: CARDIOLOGY | Facility: CLINIC | Age: 19
End: 2019-09-19
Payer: OTHER GOVERNMENT

## 2019-09-19 VITALS
DIASTOLIC BLOOD PRESSURE: 75 MMHG | WEIGHT: 147.69 LBS | HEIGHT: 65 IN | SYSTOLIC BLOOD PRESSURE: 112 MMHG | HEART RATE: 96 BPM | BODY MASS INDEX: 24.61 KG/M2

## 2019-09-19 DIAGNOSIS — G90.01 CAROTID SINUS SYNCOPE: ICD-10-CM

## 2019-09-19 DIAGNOSIS — Z95.0 CARDIAC PACEMAKER IN SITU: Primary | ICD-10-CM

## 2019-09-19 DIAGNOSIS — R55 SYNCOPE AND COLLAPSE: ICD-10-CM

## 2019-09-19 DIAGNOSIS — Z95.0 CARDIAC PACEMAKER IN SITU: ICD-10-CM

## 2019-09-19 LAB
AORTIC ROOT ANNULUS: 2.96 CM
AORTIC VALVE CUSP SEPERATION: 1.98 CM
AV INDEX (PROSTH): 0.74
AV MEAN GRADIENT: 4 MMHG
AV PEAK GRADIENT: 7 MMHG
AV VALVE AREA: 1.94 CM2
AV VELOCITY RATIO: 0.86
BSA FOR ECHO PROCEDURE: 1.75 M2
CV ECHO LV RWT: 0.35 CM
DOP CALC AO PEAK VEL: 1.34 M/S
DOP CALC AO VTI: 31.06 CM
DOP CALC LVOT AREA: 2.6 CM2
DOP CALC LVOT DIAMETER: 1.83 CM
DOP CALC LVOT PEAK VEL: 1.15 M/S
DOP CALC LVOT STROKE VOLUME: 60.12 CM3
DOP CALCLVOT PEAK VEL VTI: 22.87 CM
E WAVE DECELERATION TIME: 150.46 MSEC
E/A RATIO: 1.58
ECHO LV POSTERIOR WALL: 0.76 CM (ref 0.6–1.1)
FRACTIONAL SHORTENING: 37 % (ref 28–44)
INTERVENTRICULAR SEPTUM: 0.55 CM (ref 0.6–1.1)
IVRT: 0.09 MSEC
LA MAJOR: 4.15 CM
LA MINOR: 4.24 CM
LA WIDTH: 3.06 CM
LEFT ATRIUM SIZE: 3.13 CM
LEFT ATRIUM VOLUME INDEX: 19.6 ML/M2
LEFT ATRIUM VOLUME: 34.15 CM3
LEFT INTERNAL DIMENSION IN SYSTOLE: 2.76 CM (ref 2.1–4)
LEFT VENTRICLE DIASTOLIC VOLUME INDEX: 49.35 ML/M2
LEFT VENTRICLE DIASTOLIC VOLUME: 85.82 ML
LEFT VENTRICLE MASS INDEX: 48 G/M2
LEFT VENTRICLE SYSTOLIC VOLUME INDEX: 16.4 ML/M2
LEFT VENTRICLE SYSTOLIC VOLUME: 28.45 ML
LEFT VENTRICULAR INTERNAL DIMENSION IN DIASTOLE: 4.36 CM (ref 3.5–6)
LEFT VENTRICULAR MASS: 83.3 G
MV PEAK A VEL: 0.53 M/S
MV PEAK E VEL: 0.84 M/S
PULM VEIN S/D RATIO: 0.59
PV PEAK D VEL: 0.49 M/S
PV PEAK S VEL: 0.29 M/S
PV PEAK VELOCITY: 1.2 CM/S
RA MAJOR: 3.61 CM
RA PRESSURE: 3 MMHG
RIGHT VENTRICULAR END-DIASTOLIC DIMENSION: 2.43 CM

## 2019-09-19 PROCEDURE — 99024 POSTOP FOLLOW-UP VISIT: CPT | Mod: S$PBB,,, | Performed by: INTERNAL MEDICINE

## 2019-09-19 PROCEDURE — 93306 TTE W/DOPPLER COMPLETE: CPT

## 2019-09-19 PROCEDURE — 99999 PR PBB SHADOW E&M-EST. PATIENT-LVL III: CPT | Mod: PBBFAC,,, | Performed by: INTERNAL MEDICINE

## 2019-09-19 PROCEDURE — 99024 PR POST-OP FOLLOW-UP VISIT: ICD-10-PCS | Mod: S$PBB,,, | Performed by: INTERNAL MEDICINE

## 2019-09-19 PROCEDURE — 93306 ECHO (CUPID ONLY): ICD-10-PCS | Mod: 26,,, | Performed by: INTERNAL MEDICINE

## 2019-09-19 PROCEDURE — 93306 TTE W/DOPPLER COMPLETE: CPT | Mod: 26,,, | Performed by: INTERNAL MEDICINE

## 2019-09-19 PROCEDURE — 99213 OFFICE O/P EST LOW 20 MIN: CPT | Mod: PBBFAC,25,PO | Performed by: INTERNAL MEDICINE

## 2019-09-19 PROCEDURE — 99999 PR PBB SHADOW E&M-EST. PATIENT-LVL III: ICD-10-PCS | Mod: PBBFAC,,, | Performed by: INTERNAL MEDICINE

## 2019-09-19 NOTE — PROGRESS NOTES
"Subjective:    Patient ID:  April A Dottie Arreaga is a 19 y.o. female who presents for evaluation of Irregular Heart Beat      HPI   19 y.o. F  "seizure d/o" by history, but likely actually related to seizure-type activity following cardioinhibitory syncope  migraine HA on meds  asthma    Recurrent syncope. Often exacerbated by IV placement, blood draws, etc.  Underwent TTT, during which there was syncope, sinus slowing, and ventricular asystole for somewhere be 30 s and 50 s (and CHB). Very similar sx to past episodes.  Grandfather had sudden death at age 50. Parents, sibs: no syncope; no other SCD.    I was consulted for PPM placement, which I did (V lead only, given pt youth). Since, no syncope at all!  She does get some high sternal chest discomfort at times. Not pulsatile. Both at rest and with exertion. Some SOB, but no diaphoresis or N/V. Mildly reproducible with palpation. This discomfort was not reproducible with V pacing at highest output at high HR today in clinic.    My interpretation of today's ECG is NSR    Review of Systems   Constitution: Negative. Negative for malaise/fatigue.   HENT: Negative.  Negative for ear pain and tinnitus.    Eyes: Negative for blurred vision.   Cardiovascular: Negative.  Negative for chest pain, dyspnea on exertion, near-syncope, palpitations and syncope.   Respiratory: Negative.  Negative for shortness of breath.    Endocrine: Negative.  Negative for polyuria.   Hematologic/Lymphatic: Does not bruise/bleed easily.   Skin: Negative.  Negative for rash.   Musculoskeletal: Negative.  Negative for joint pain and muscle weakness.   Gastrointestinal: Negative.  Negative for abdominal pain and change in bowel habit.   Genitourinary: Negative for frequency.   Neurological: Negative.  Negative for dizziness and weakness.   Psychiatric/Behavioral: Negative.  Negative for depression. The patient is not nervous/anxious.    Allergic/Immunologic: Negative for environmental allergies. "        Objective:    Physical Exam   Constitutional: She is oriented to person, place, and time. Vital signs are normal. She appears well-developed and well-nourished. She is active and cooperative.   HENT:   Head: Normocephalic and atraumatic.   Eyes: Conjunctivae and EOM are normal.   Neck: Normal range of motion. Carotid bruit is not present. No tracheal deviation and no edema present. No thyroid mass and no thyromegaly present.   Cardiovascular: Normal rate, regular rhythm, normal heart sounds, intact distal pulses and normal pulses.  No extrasystoles are present. PMI is not displaced. Exam reveals no gallop and no friction rub.   No murmur heard.  Pulmonary/Chest: Effort normal and breath sounds normal. No respiratory distress. She has no wheezes. She has no rales.       Abdominal: Soft. Normal appearance. She exhibits no distension. There is no hepatosplenomegaly.   Musculoskeletal: Normal range of motion.   Neurological: She is alert and oriented to person, place, and time. Coordination normal.   Skin: Skin is warm and dry. No rash noted.   Psychiatric: She has a normal mood and affect. Her speech is normal and behavior is normal. Thought content normal. Cognition and memory are normal.   Nursing note and vitals reviewed.        Assessment:       1. Cardiac pacemaker in situ    2. Carotid sinus syncope    3. Syncope and collapse         Plan:       No syncope since PPM.  V pacing ~20%; will decrease CLS settings to minimize this.  Musculoskeletal pain, most likely. Will echo to definitely r/o structural issues related to PPM. Recommended trial of ibuprofen.    Return in 1 year with echo, or earlier prn.

## 2019-09-19 NOTE — LETTER
September 19, 2019      Kulwinder Bermudez MD  [address]  [address]  [facility] [place] [facility] [address]           [facility] - Arrhythmia  [address]  [facility] [address]  Phone: [phone]          Patient: Vanessa Arreaga   MR Number: [id number]   YOB: 2000   Date of Visit: 9/19/2019       Dear Dr. Kulwinder Bermudez:    Thank you for referring Vanessa Arreaga to me for evaluation. Attached you will find relevant portions of my assessment and plan of care.    If you have questions, please do not hesitate to call me. I look forward to following Vanessa Arreaga along with you.    Sincerely,    Piter Cooley MD    Enclosure  CC:  No Recipients    If you would like to receive this communication electronically, please contact [email or web address] or [phone] to request more information on [employer] access.    For providers and/or their staff who would like to refer a patient to [facility], please contact us through our one-stop-shop provider referral line, [facility], at [phone].    If you feel you have received this communication in error or would no longer like to receive these types of communications, please e-mail [email or web address]

## 2019-09-20 ENCOUNTER — DOCUMENTATION ONLY (OUTPATIENT)
Dept: ELECTROPHYSIOLOGY | Facility: CLINIC | Age: 19
End: 2019-09-20

## 2019-12-08 ENCOUNTER — CLINICAL SUPPORT (OUTPATIENT)
Dept: CARDIOLOGY | Facility: HOSPITAL | Age: 19
End: 2019-12-08
Payer: OTHER GOVERNMENT

## 2019-12-08 DIAGNOSIS — Z95.0 PRESENCE OF CARDIAC PACEMAKER: ICD-10-CM

## 2019-12-08 PROCEDURE — 93296 REM INTERROG EVL PM/IDS: CPT | Performed by: INTERNAL MEDICINE

## 2019-12-08 PROCEDURE — 93294 REM INTERROG EVL PM/LDLS PM: CPT | Mod: ,,, | Performed by: INTERNAL MEDICINE

## 2019-12-08 PROCEDURE — 93294 CARDIAC DEVICE CHECK - REMOTE: ICD-10-PCS | Mod: ,,, | Performed by: INTERNAL MEDICINE

## 2020-02-05 ENCOUNTER — TELEPHONE (OUTPATIENT)
Dept: ELECTROPHYSIOLOGY | Facility: CLINIC | Age: 20
End: 2020-02-05

## 2020-02-05 NOTE — TELEPHONE ENCOUNTER
Left pt a message instructing her to call me back. She has an appt scheduled on 2/13, but she isnt due until 9/2020. Called to see why she was coming in so soon.

## 2020-02-06 ENCOUNTER — TELEPHONE (OUTPATIENT)
Dept: ELECTROPHYSIOLOGY | Facility: CLINIC | Age: 20
End: 2020-02-06

## 2020-02-06 NOTE — TELEPHONE ENCOUNTER
Called pt back, but she did not answer. Spoke w/ pts mother & she stated that pt works where there isnt any service, so she may not answer her phone. Will try calling at another time.   ----- Message from Yaz Pascual sent at 2/6/2020 12:31 PM CST -----  Contact: Pt  Pt requesting a call back    Please call and advise    Phone [phone]

## 2020-02-07 ENCOUNTER — PATIENT MESSAGE (OUTPATIENT)
Dept: ELECTROPHYSIOLOGY | Facility: CLINIC | Age: 20
End: 2020-02-07

## 2020-02-11 DIAGNOSIS — I49.8 OTHER SPECIFIED CARDIAC ARRHYTHMIAS: Primary | ICD-10-CM

## 2020-02-13 ENCOUNTER — OFFICE VISIT (OUTPATIENT)
Dept: CARDIOLOGY | Facility: CLINIC | Age: 20
End: 2020-02-13
Payer: OTHER GOVERNMENT

## 2020-02-13 VITALS
HEIGHT: 65 IN | HEART RATE: 79 BPM | BODY MASS INDEX: 24.49 KG/M2 | WEIGHT: 147 LBS | DIASTOLIC BLOOD PRESSURE: 80 MMHG | SYSTOLIC BLOOD PRESSURE: 115 MMHG

## 2020-02-13 DIAGNOSIS — I49.5 SSS (SICK SINUS SYNDROME): ICD-10-CM

## 2020-02-13 DIAGNOSIS — I49.8 OTHER SPECIFIED CARDIAC ARRHYTHMIAS: ICD-10-CM

## 2020-02-13 DIAGNOSIS — Z95.0 CARDIAC PACEMAKER IN SITU: Primary | ICD-10-CM

## 2020-02-13 PROCEDURE — 99213 OFFICE O/P EST LOW 20 MIN: CPT | Mod: PBBFAC,PO,25 | Performed by: INTERNAL MEDICINE

## 2020-02-13 PROCEDURE — 99999 PR PBB SHADOW E&M-EST. PATIENT-LVL III: CPT | Mod: PBBFAC,,, | Performed by: INTERNAL MEDICINE

## 2020-02-13 PROCEDURE — 99214 OFFICE O/P EST MOD 30 MIN: CPT | Mod: S$PBB,,, | Performed by: INTERNAL MEDICINE

## 2020-02-13 PROCEDURE — 93010 RHYTHM STRIP: ICD-10-PCS | Mod: S$PBB,,, | Performed by: INTERNAL MEDICINE

## 2020-02-13 PROCEDURE — 93010 ELECTROCARDIOGRAM REPORT: CPT | Mod: S$PBB,,, | Performed by: INTERNAL MEDICINE

## 2020-02-13 PROCEDURE — 99999 PR PBB SHADOW E&M-EST. PATIENT-LVL III: ICD-10-PCS | Mod: PBBFAC,,, | Performed by: INTERNAL MEDICINE

## 2020-02-13 PROCEDURE — 93005 ELECTROCARDIOGRAM TRACING: CPT | Mod: PBBFAC,PO | Performed by: INTERNAL MEDICINE

## 2020-02-13 PROCEDURE — 99214 PR OFFICE/OUTPT VISIT, EST, LEVL IV, 30-39 MIN: ICD-10-PCS | Mod: S$PBB,,, | Performed by: INTERNAL MEDICINE

## 2020-02-13 RX ORDER — FLUCONAZOLE 150 MG/1
150 TABLET ORAL
COMMUNITY
Start: 2019-11-07 | End: 2021-03-26 | Stop reason: ALTCHOICE

## 2020-02-13 NOTE — PROGRESS NOTES
"Subjective:    Patient ID:  April A Dottie Arreaga is a 19 y.o. female who presents for evaluation of Irregular Heart Beat      HPI   19 y.o. F  "seizure d/o" by history, but likely actually related to seizure-type activity following cardioinhibitory syncope  migraine HA on meds  asthma  recurrent syncope with a very significant cardioinhibitory component (asystole x 50 sec!) -- no syncope since PPM    Recurrent syncope. Often exacerbated by IV placement, blood draws, etc.  Underwent TTT, during which there was syncope, sinus slowing, and ventricular asystole for somewhere be 30 s and 50 s (and CHB). Very similar sx to past episodes.  Grandfather had sudden death at age 50. Parents, sibs: no syncope; no other SCD.    I was consulted for PPM placement, which I did (V lead only, given pt's youth). Since, no syncope at all!  V pacing 20-30% (in VVI-CLS).    My interpretation of today's ECG is NSR    Review of Systems   Constitution: Negative. Negative for malaise/fatigue.   HENT: Negative.  Negative for ear pain and tinnitus.    Eyes: Negative for blurred vision.   Cardiovascular: Negative.  Negative for chest pain, dyspnea on exertion, near-syncope, palpitations and syncope.   Respiratory: Negative.  Negative for shortness of breath.    Endocrine: Negative.  Negative for polyuria.   Hematologic/Lymphatic: Does not bruise/bleed easily.   Skin: Negative.  Negative for rash.   Musculoskeletal: Negative.  Negative for joint pain and muscle weakness.   Gastrointestinal: Negative.  Negative for abdominal pain and change in bowel habit.   Genitourinary: Negative for frequency.   Neurological: Negative.  Negative for dizziness and weakness.   Psychiatric/Behavioral: Negative.  Negative for depression. The patient is not nervous/anxious.    Allergic/Immunologic: Negative for environmental allergies.        Objective:    Physical Exam   Constitutional: She is oriented to person, place, and time. Vital signs are normal. She " appears well-developed and well-nourished. She is active and cooperative.   HENT:   Head: Normocephalic and atraumatic.   Eyes: Conjunctivae and EOM are normal.   Neck: Normal range of motion. Carotid bruit is not present. No tracheal deviation and no edema present. No thyroid mass and no thyromegaly present.   Cardiovascular: Normal rate, regular rhythm, normal heart sounds, intact distal pulses and normal pulses.  No extrasystoles are present. PMI is not displaced. Exam reveals no gallop and no friction rub.   No murmur heard.  Pulmonary/Chest: Effort normal and breath sounds normal. No respiratory distress. She has no wheezes. She has no rales.       Abdominal: Soft. Normal appearance. She exhibits no distension. There is no hepatosplenomegaly.   Musculoskeletal: Normal range of motion.   Neurological: She is alert and oriented to person, place, and time. Coordination normal.   Skin: Skin is warm and dry. No rash noted.   Psychiatric: She has a normal mood and affect. Her speech is normal and behavior is normal. Thought content normal. Cognition and memory are normal.   Nursing note and vitals reviewed.        Assessment:       1. Cardiac pacemaker in situ    2. Other specified cardiac arrhythmias    3. SSS (sick sinus syndrome)         Plan:       No syncope since PPM.  V pacing ~20%; will further decrease CLS settings to minimize this.    Return in 1 year with echo, or earlier prn.

## 2020-03-06 ENCOUNTER — CLINICAL SUPPORT (OUTPATIENT)
Dept: CARDIOLOGY | Facility: HOSPITAL | Age: 20
End: 2020-03-06
Payer: OTHER GOVERNMENT

## 2020-03-06 PROCEDURE — 93294 REM INTERROG EVL PM/LDLS PM: CPT | Mod: ,,, | Performed by: INTERNAL MEDICINE

## 2020-03-06 PROCEDURE — 93294 CARDIAC DEVICE CHECK - REMOTE: ICD-10-PCS | Mod: ,,, | Performed by: INTERNAL MEDICINE

## 2020-03-06 PROCEDURE — 93296 REM INTERROG EVL PM/IDS: CPT | Performed by: INTERNAL MEDICINE

## 2020-03-09 ENCOUNTER — TELEPHONE (OUTPATIENT)
Dept: ELECTROPHYSIOLOGY | Facility: CLINIC | Age: 20
End: 2020-03-09

## 2020-03-09 NOTE — TELEPHONE ENCOUNTER
Spoke w/ pt & scheduled needed appts on 3/13. Instructed pt to arrive earlier since device check needed to be scheduled after actual appt. Pt confirmed.  ----- Message from Lisa Alonzo sent at 3/9/2020 11:06 AM CDT -----  Contact: patient  Type:  Same Day Appointment Request    Caller is requesting a same day appointment.  Caller declined first available appointment listed below.    Name of Caller: patient  When is the first available appointment? 5/1  Symptoms: not feeling well  Best Call Back Number: [phone]  Additional Information: n/a

## 2020-03-11 ENCOUNTER — TELEPHONE (OUTPATIENT)
Dept: ELECTROPHYSIOLOGY | Facility: CLINIC | Age: 20
End: 2020-03-11

## 2020-03-11 NOTE — TELEPHONE ENCOUNTER
Confirmed with pt mother why she was coming in on Friday ----- Message from Gail Lewis NP sent at 3/11/2020  4:04 PM CDT -----  She is on my sched for Fri. Can we find out why she is back so soon? She just saw Dr. Cooley 3 weeks ago and he didn't need to see her for a year.  ThanksKRISTEN

## 2020-03-11 NOTE — PROGRESS NOTES
"Ms. Dottie Arreaga is a patient of Dr. Cooley and was last seen in clinic 2/13/2020.      Subjective:   Patient ID:  April A Dottie Arreaga is a 19 y.o. female who presents for follow-up of Pre Syncope and Pacemaker Check  .     HPI:    Ms. Dottie Arreaga is a 19 y.o. female with seizure, migraine, asthma, syncope, PPM here for follow up.     Background:    "Seizure d/o" by history, but likely actually related to seizure-type activity following cardioinhibitory syncope  migraine HA on meds  asthma  recurrent syncope with a very significant cardioinhibitory component (asystole x 50 sec!) -- no syncope since PPM    Recurrent syncope. Often exacerbated by IV placement, blood draws, etc.  Underwent TTT, during which there was syncope, sinus slowing, and ventricular asystole for somewhere be 30 s and 50 s (and CHB). Very similar sx to past episodes.  Grandfather had sudden death at age 50. Parents, sibs: no syncope; no other SCD.  Dr. Cooley was consulted for PPM placement, which I did (V lead only, given pt's youth). Since, no syncope at all!  V pacing 20-30% (in VVI-CLS).  No syncope since PPM.  V pacing ~20%; will further decrease CLS settings to minimize this.    Update (03/13/2020):    Today she says that ever since her last clinic visit she has been feeling light-headed. Several pre-syncopal episodes. Denies SOB/AGUIAR, CP, palpitations.    Device Interrogation (3/13/2020) reveals an intrinsic SR (70s) with stable lead and device function. No significant arrhythmias or treated episodes were noted.  She paces 21% in the RV. Estimated battery longevity 12.4 years.     I have personally reviewed the patient's EKG today, which shows normal sinus rhythm at 71bpm. IA interval is 150. QRS is 86. QTc is 408.    Recent Cardiac Tests:    2D Echo (9/19/2019):  · Normal left ventricular systolic function. The estimated ejection fraction is 55-60%  · Mild tricuspid regurgitation.  · Normal LV diastolic function.  · No wall motion " "abnormalities.    Current Outpatient Medications   Medication Sig    BLISOVI 24 FE 1 mg-20 mcg (24)/75 mg (4) per tablet Take 1 tablet by mouth once daily.    sumatriptan (IMITREX) 50 MG tablet Take 1 tab PO X 1 PRN for migraines. Do not repeat for 24 hours.    fluconazole (DIFLUCAN) 150 MG Tab      No current facility-administered medications for this visit.        Review of Systems   Constitution: Negative for malaise/fatigue.   Cardiovascular: Positive for near-syncope. Negative for chest pain, dyspnea on exertion, irregular heartbeat, leg swelling and palpitations.   Respiratory: Negative for shortness of breath.    Hematologic/Lymphatic: Negative for bleeding problem.   Skin: Negative for rash.   Musculoskeletal: Negative for myalgias.   Gastrointestinal: Negative for hematemesis, hematochezia and nausea.   Genitourinary: Negative for hematuria.   Neurological: Positive for light-headedness.   Psychiatric/Behavioral: Negative for altered mental status.   Allergic/Immunologic: Negative for persistent infections.     Objective:        /70   Pulse 71   Ht 5' 5" (1.651 m)   Wt 66.7 kg (147 lb 0.8 oz)   BMI 24.47 kg/m²     Physical Exam   Constitutional: She is oriented to person, place, and time. She appears well-developed and well-nourished.   HENT:   Head: Normocephalic.   Nose: Nose normal.   Eyes: Pupils are equal, round, and reactive to light.   Cardiovascular: Normal rate, regular rhythm, S1 normal and S2 normal.   No murmur heard.  Pulses:       Radial pulses are 2+ on the right side, and 2+ on the left side.   Pulmonary/Chest: Breath sounds normal. No respiratory distress.   Device to LUCW   Abdominal: Normal appearance.   Musculoskeletal: Normal range of motion. She exhibits no edema.   Neurological: She is alert and oriented to person, place, and time.   Skin: Skin is warm and dry. No erythema.   Psychiatric: She has a normal mood and affect. Her speech is normal and behavior is normal. "   Nursing note and vitals reviewed.    Lab Results   Component Value Date     07/18/2019    K 4.1 07/18/2019    BUN 12 07/18/2019    CREATININE 0.8 07/18/2019    ALT 14 07/18/2019    AST 14 07/18/2019    HGB 14.3 07/18/2019    HCT 43.9 07/18/2019    TSH 0.606 07/18/2019    LDLCALC 112.2 02/27/2017       Recent Labs   Lab 07/18/19  1348   INR 1.1       Assessment:     1. Cardiac pacemaker in situ    2. Convulsive syncope    3. SSS (sick sinus syndrome)      Plan:     In summary, Ms. Dottie Arreaga is a 19 y.o. female with seizure, migraine, asthma, syncope, PPM here for follow up.   She is reporting pre-syncopal episodes and LH since her base rate was decreased to 40. Will increase back to 60bpm today. Discussed with patient that if her RV pacing burden increases, will need to add RA lead. Patient understood. She was also told to be alert for symptoms such as edema, SOB, orthopnea. Will have patient RTC in 6 months with echo.    Device changes as indicated.  Continue current medications.  RTC 6 mo with echo, sooner if needed.    *A copy of this note has been sent to Dr. Cooley*    Follow up in about 6 months (around 9/13/2020).    ------------------------------------------------------------------    JASSON Franco, NP-C  Cardiac Electrophysiology

## 2020-03-13 ENCOUNTER — HOSPITAL ENCOUNTER (OUTPATIENT)
Dept: CARDIOLOGY | Facility: CLINIC | Age: 20
Discharge: HOME | End: 2020-03-13
Payer: OTHER GOVERNMENT

## 2020-03-13 ENCOUNTER — CLINICAL SUPPORT (OUTPATIENT)
Dept: CARDIOLOGY | Facility: HOSPITAL | Age: 20
End: 2020-03-13
Attending: INTERNAL MEDICINE
Payer: OTHER GOVERNMENT

## 2020-03-13 ENCOUNTER — OFFICE VISIT (OUTPATIENT)
Dept: ELECTROPHYSIOLOGY | Facility: CLINIC | Age: 20
End: 2020-03-13
Payer: OTHER GOVERNMENT

## 2020-03-13 VITALS
BODY MASS INDEX: 24.5 KG/M2 | HEIGHT: 65 IN | SYSTOLIC BLOOD PRESSURE: 110 MMHG | HEART RATE: 71 BPM | DIASTOLIC BLOOD PRESSURE: 70 MMHG | WEIGHT: 147.06 LBS

## 2020-03-13 DIAGNOSIS — R55 CONVULSIVE SYNCOPE: ICD-10-CM

## 2020-03-13 DIAGNOSIS — Z95.0 CARDIAC PACEMAKER IN SITU: ICD-10-CM

## 2020-03-13 DIAGNOSIS — R55 SYNCOPE, UNSPECIFIED SYNCOPE TYPE: ICD-10-CM

## 2020-03-13 DIAGNOSIS — I49.8 OTHER SPECIFIED CARDIAC ARRHYTHMIAS: ICD-10-CM

## 2020-03-13 DIAGNOSIS — Z95.0 CARDIAC PACEMAKER IN SITU: Primary | ICD-10-CM

## 2020-03-13 DIAGNOSIS — I49.5 SSS (SICK SINUS SYNDROME): ICD-10-CM

## 2020-03-13 PROCEDURE — 93010 ELECTROCARDIOGRAM REPORT: CPT | Mod: S$PBB,,, | Performed by: INTERNAL MEDICINE

## 2020-03-13 PROCEDURE — 93005 ELECTROCARDIOGRAM TRACING: CPT | Mod: PBBFAC | Performed by: INTERNAL MEDICINE

## 2020-03-13 PROCEDURE — 99999 PR PBB SHADOW E&M-EST. PATIENT-LVL III: ICD-10-PCS | Mod: PBBFAC,,, | Performed by: NURSE PRACTITIONER

## 2020-03-13 PROCEDURE — 99214 PR OFFICE/OUTPT VISIT, EST, LEVL IV, 30-39 MIN: ICD-10-PCS | Mod: S$PBB,,, | Performed by: NURSE PRACTITIONER

## 2020-03-13 PROCEDURE — 99213 OFFICE O/P EST LOW 20 MIN: CPT | Mod: PBBFAC,25 | Performed by: NURSE PRACTITIONER

## 2020-03-13 PROCEDURE — 99999 PR PBB SHADOW E&M-EST. PATIENT-LVL III: CPT | Mod: PBBFAC,,, | Performed by: NURSE PRACTITIONER

## 2020-03-13 PROCEDURE — 93279 PRGRMG DEV EVAL PM/LDLS PM: CPT

## 2020-03-13 PROCEDURE — 99214 OFFICE O/P EST MOD 30 MIN: CPT | Mod: S$PBB,,, | Performed by: NURSE PRACTITIONER

## 2020-03-13 PROCEDURE — 93010 RHYTHM STRIP: ICD-10-PCS | Mod: S$PBB,,, | Performed by: INTERNAL MEDICINE

## 2020-06-05 ENCOUNTER — CLINICAL SUPPORT (OUTPATIENT)
Dept: CARDIOLOGY | Facility: HOSPITAL | Age: 20
End: 2020-06-05
Payer: OTHER GOVERNMENT

## 2020-06-05 PROCEDURE — 93296 REM INTERROG EVL PM/IDS: CPT | Performed by: INTERNAL MEDICINE

## 2020-06-05 PROCEDURE — 93294 CARDIAC DEVICE CHECK - REMOTE: ICD-10-PCS | Mod: ,,, | Performed by: INTERNAL MEDICINE

## 2020-06-05 PROCEDURE — 93294 REM INTERROG EVL PM/LDLS PM: CPT | Mod: ,,, | Performed by: INTERNAL MEDICINE

## 2020-08-07 ENCOUNTER — TELEPHONE (OUTPATIENT)
Dept: UROLOGY | Facility: CLINIC | Age: 20
End: 2020-08-07

## 2020-08-07 NOTE — TELEPHONE ENCOUNTER
I called to schedule the patient for a follow up with Dr. Brown. The patient does not have voice mail set up.

## 2020-09-03 ENCOUNTER — CLINICAL SUPPORT (OUTPATIENT)
Dept: CARDIOLOGY | Facility: HOSPITAL | Age: 20
End: 2020-09-03
Payer: OTHER GOVERNMENT

## 2020-09-03 DIAGNOSIS — Z95.0 PRESENCE OF CARDIAC PACEMAKER: ICD-10-CM

## 2020-09-03 DIAGNOSIS — I49.5 SICK SINUS SYNDROME: ICD-10-CM

## 2020-09-03 PROCEDURE — 93294 REM INTERROG EVL PM/LDLS PM: CPT | Mod: ,,, | Performed by: INTERNAL MEDICINE

## 2020-09-03 PROCEDURE — 93296 REM INTERROG EVL PM/IDS: CPT | Performed by: INTERNAL MEDICINE

## 2020-09-03 PROCEDURE — 93294 CARDIAC DEVICE CHECK - REMOTE: ICD-10-PCS | Mod: ,,, | Performed by: INTERNAL MEDICINE

## 2020-12-02 ENCOUNTER — CLINICAL SUPPORT (OUTPATIENT)
Dept: CARDIOLOGY | Facility: HOSPITAL | Age: 20
End: 2020-12-02
Payer: OTHER GOVERNMENT

## 2020-12-02 DIAGNOSIS — Z95.0 PRESENCE OF CARDIAC PACEMAKER: ICD-10-CM

## 2020-12-02 PROCEDURE — 93294 CARDIAC DEVICE CHECK - REMOTE: ICD-10-PCS | Mod: ,,, | Performed by: INTERNAL MEDICINE

## 2020-12-02 PROCEDURE — 93294 REM INTERROG EVL PM/LDLS PM: CPT | Mod: ,,, | Performed by: INTERNAL MEDICINE

## 2020-12-02 PROCEDURE — 93296 REM INTERROG EVL PM/IDS: CPT | Performed by: INTERNAL MEDICINE

## 2020-12-18 PROBLEM — R20.2 PARESTHESIA AND PAIN OF RIGHT EXTREMITY: Status: ACTIVE | Noted: 2020-12-18

## 2020-12-18 PROBLEM — M54.2 NECK PAIN: Status: ACTIVE | Noted: 2020-12-18

## 2020-12-18 PROBLEM — M54.50 ACUTE MIDLINE LOW BACK PAIN WITHOUT SCIATICA: Status: ACTIVE | Noted: 2020-12-18

## 2020-12-18 PROBLEM — R55 SYNCOPE AND COLLAPSE: Status: RESOLVED | Noted: 2019-07-18 | Resolved: 2020-12-18

## 2020-12-18 PROBLEM — R55 SYNCOPE: Status: RESOLVED | Noted: 2019-07-18 | Resolved: 2020-12-18

## 2020-12-18 PROBLEM — M79.609 PARESTHESIA AND PAIN OF RIGHT EXTREMITY: Status: ACTIVE | Noted: 2020-12-18

## 2021-01-26 ENCOUNTER — TELEPHONE (OUTPATIENT)
Dept: NEUROLOGY | Facility: CLINIC | Age: 21
End: 2021-01-26

## 2021-01-26 DIAGNOSIS — G43.109 MIGRAINE WITH AURA AND WITHOUT STATUS MIGRAINOSUS, NOT INTRACTABLE: ICD-10-CM

## 2021-01-26 RX ORDER — SUMATRIPTAN 50 MG/1
TABLET, FILM COATED ORAL
Qty: 6 TABLET | Refills: 0 | Status: SHIPPED | OUTPATIENT
Start: 2021-01-26 | End: 2022-06-03 | Stop reason: SDUPTHER

## 2021-02-10 ENCOUNTER — OFFICE VISIT (OUTPATIENT)
Dept: NEUROLOGY | Facility: CLINIC | Age: 21
End: 2021-02-10
Payer: OTHER GOVERNMENT

## 2021-02-10 VITALS
HEIGHT: 65 IN | BODY MASS INDEX: 23.28 KG/M2 | SYSTOLIC BLOOD PRESSURE: 110 MMHG | HEART RATE: 77 BPM | WEIGHT: 139.75 LBS | DIASTOLIC BLOOD PRESSURE: 76 MMHG

## 2021-02-10 DIAGNOSIS — I49.5 SSS (SICK SINUS SYNDROME): ICD-10-CM

## 2021-02-10 DIAGNOSIS — G44.40 MEDICATION OVERUSE HEADACHE: Primary | ICD-10-CM

## 2021-02-10 PROCEDURE — 99999 PR PBB SHADOW E&M-EST. PATIENT-LVL II: CPT | Mod: PBBFAC,,, | Performed by: PSYCHIATRY & NEUROLOGY

## 2021-02-10 PROCEDURE — 99212 OFFICE O/P EST SF 10 MIN: CPT | Mod: PBBFAC,PO | Performed by: PSYCHIATRY & NEUROLOGY

## 2021-02-10 PROCEDURE — 99999 PR PBB SHADOW E&M-EST. PATIENT-LVL II: ICD-10-PCS | Mod: PBBFAC,,, | Performed by: PSYCHIATRY & NEUROLOGY

## 2021-02-10 PROCEDURE — 99214 OFFICE O/P EST MOD 30 MIN: CPT | Mod: S$PBB,,, | Performed by: PSYCHIATRY & NEUROLOGY

## 2021-02-10 PROCEDURE — 99214 PR OFFICE/OUTPT VISIT, EST, LEVL IV, 30-39 MIN: ICD-10-PCS | Mod: S$PBB,,, | Performed by: PSYCHIATRY & NEUROLOGY

## 2021-02-10 RX ORDER — METHYLPREDNISOLONE 4 MG/1
TABLET ORAL
Qty: 1 PACKAGE | Refills: 0 | Status: SHIPPED | OUTPATIENT
Start: 2021-02-10 | End: 2021-03-25

## 2021-03-02 ENCOUNTER — CLINICAL SUPPORT (OUTPATIENT)
Dept: CARDIOLOGY | Facility: HOSPITAL | Age: 21
End: 2021-03-02
Payer: OTHER GOVERNMENT

## 2021-03-02 DIAGNOSIS — Z95.0 PRESENCE OF CARDIAC PACEMAKER: ICD-10-CM

## 2021-03-02 PROCEDURE — 93294 CARDIAC DEVICE CHECK - REMOTE: ICD-10-PCS | Mod: ,,, | Performed by: INTERNAL MEDICINE

## 2021-03-02 PROCEDURE — 93294 REM INTERROG EVL PM/LDLS PM: CPT | Mod: ,,, | Performed by: INTERNAL MEDICINE

## 2021-03-02 PROCEDURE — 93296 REM INTERROG EVL PM/IDS: CPT | Performed by: INTERNAL MEDICINE

## 2021-03-08 DIAGNOSIS — R55 SYNCOPE, UNSPECIFIED SYNCOPE TYPE: Primary | ICD-10-CM

## 2021-03-25 ENCOUNTER — OFFICE VISIT (OUTPATIENT)
Dept: CARDIOLOGY | Facility: CLINIC | Age: 21
DRG: 310 | End: 2021-03-25
Payer: OTHER GOVERNMENT

## 2021-03-25 ENCOUNTER — HOSPITAL ENCOUNTER (OUTPATIENT)
Dept: CARDIOLOGY | Facility: HOSPITAL | Age: 21
Discharge: HOME | End: 2021-03-25
Attending: INTERNAL MEDICINE
Payer: OTHER GOVERNMENT

## 2021-03-25 VITALS
HEART RATE: 76 BPM | WEIGHT: 138 LBS | DIASTOLIC BLOOD PRESSURE: 73 MMHG | HEIGHT: 65 IN | SYSTOLIC BLOOD PRESSURE: 107 MMHG | HEIGHT: 65 IN | BODY MASS INDEX: 22.99 KG/M2 | BODY MASS INDEX: 23.16 KG/M2 | WEIGHT: 139 LBS

## 2021-03-25 DIAGNOSIS — Z95.0 CARDIAC PACEMAKER IN SITU: ICD-10-CM

## 2021-03-25 DIAGNOSIS — R55 SYNCOPE, UNSPECIFIED SYNCOPE TYPE: ICD-10-CM

## 2021-03-25 DIAGNOSIS — I49.5 SSS (SICK SINUS SYNDROME): Primary | ICD-10-CM

## 2021-03-25 LAB
AORTIC ROOT ANNULUS: 3.12 CM
ASCENDING AORTA: 2.25 CM
AV INDEX (PROSTH): 0.71
AV MEAN GRADIENT: 4 MMHG
AV PEAK GRADIENT: 6 MMHG
AV VALVE AREA: 1.98 CM2
AV VELOCITY RATIO: 0.7
BSA FOR ECHO PROCEDURE: 1.7 M2
CV ECHO LV RWT: 0.42 CM
DOP CALC AO PEAK VEL: 1.27 M/S
DOP CALC AO VTI: 29.99 CM
DOP CALC LVOT AREA: 2.8 CM2
DOP CALC LVOT DIAMETER: 1.88 CM
DOP CALC LVOT PEAK VEL: 0.89 M/S
DOP CALC LVOT STROKE VOLUME: 59.29 CM3
DOP CALCLVOT PEAK VEL VTI: 21.37 CM
E WAVE DECELERATION TIME: 210.64 MSEC
E/A RATIO: 1.41
E/E' RATIO: 5.13 M/S
ECHO LV POSTERIOR WALL: 0.94 CM (ref 0.6–1.1)
FRACTIONAL SHORTENING: 56 % (ref 28–44)
INTERVENTRICULAR SEPTUM: 0.8 CM (ref 0.6–1.1)
LA MAJOR: 5.02 CM
LA MINOR: 3.5 CM
LA WIDTH: 3.39 CM
LEFT ATRIUM SIZE: 2.48 CM
LEFT ATRIUM VOLUME INDEX MOD: 16.6 ML/M2
LEFT ATRIUM VOLUME INDEX: 17.4 ML/M2
LEFT ATRIUM VOLUME MOD: 28.03 CM3
LEFT ATRIUM VOLUME: 29.47 CM3
LEFT INTERNAL DIMENSION IN SYSTOLE: 2 CM (ref 2.1–4)
LEFT VENTRICLE DIASTOLIC VOLUME INDEX: 55.08 ML/M2
LEFT VENTRICLE DIASTOLIC VOLUME: 93.09 ML
LEFT VENTRICLE MASS INDEX: 75 G/M2
LEFT VENTRICLE SYSTOLIC VOLUME INDEX: 26.1 ML/M2
LEFT VENTRICLE SYSTOLIC VOLUME: 44.07 ML
LEFT VENTRICULAR INTERNAL DIMENSION IN DIASTOLE: 4.51 CM (ref 3.5–6)
LEFT VENTRICULAR MASS: 127.4 G
LV LATERAL E/E' RATIO: 4.32 M/S
LV SEPTAL E/E' RATIO: 6.31 M/S
MV A" WAVE DURATION": 8.56 MSEC
MV PEAK A VEL: 0.58 M/S
MV PEAK E VEL: 0.82 M/S
MV STENOSIS PRESSURE HALF TIME: 61.09 MS
MV VALVE AREA P 1/2 METHOD: 3.6 CM2
PISA TR MAX VEL: 2.23 M/S
PULM VEIN S/D RATIO: 0.62
PV PEAK D VEL: 0.66 M/S
PV PEAK S VEL: 0.41 M/S
PV PEAK VELOCITY: 1.09 CM/S
RA MAJOR: 3.7 CM
RA PRESSURE: 3 MMHG
RA WIDTH: 2.6 CM
RIGHT VENTRICULAR END-DIASTOLIC DIMENSION: 2.1 CM
RIGHT VENTRICULAR LENGTH IN DIASTOLE (APICAL 4-CHAMBER VIEW): 5 CM
STJ: 2.05 CM
TDI LATERAL: 0.19 M/S
TDI SEPTAL: 0.13 M/S
TDI: 0.16 M/S
TR MAX PG: 20 MMHG
TRICUSPID ANNULAR PLANE SYSTOLIC EXCURSION: 1.7 CM
TV REST PULMONARY ARTERY PRESSURE: 23 MMHG

## 2021-03-25 PROCEDURE — 93010 ELECTROCARDIOGRAM REPORT: CPT | Mod: S$PBB,,, | Performed by: INTERNAL MEDICINE

## 2021-03-25 PROCEDURE — 93306 TTE W/DOPPLER COMPLETE: CPT | Mod: 26,,, | Performed by: INTERNAL MEDICINE

## 2021-03-25 PROCEDURE — 99999 PR PBB SHADOW E&M-EST. PATIENT-LVL III: ICD-10-PCS | Mod: PBBFAC,,, | Performed by: INTERNAL MEDICINE

## 2021-03-25 PROCEDURE — 93010 RHYTHM STRIP: ICD-10-PCS | Mod: S$PBB,,, | Performed by: INTERNAL MEDICINE

## 2021-03-25 PROCEDURE — 99214 OFFICE O/P EST MOD 30 MIN: CPT | Mod: S$PBB,,, | Performed by: INTERNAL MEDICINE

## 2021-03-25 PROCEDURE — 99213 OFFICE O/P EST LOW 20 MIN: CPT | Mod: PBBFAC,25,PO | Performed by: INTERNAL MEDICINE

## 2021-03-25 PROCEDURE — 93306 TTE W/DOPPLER COMPLETE: CPT

## 2021-03-25 PROCEDURE — 93005 ELECTROCARDIOGRAM TRACING: CPT | Mod: PBBFAC,PO | Performed by: INTERNAL MEDICINE

## 2021-03-25 PROCEDURE — 99214 PR OFFICE/OUTPT VISIT, EST, LEVL IV, 30-39 MIN: ICD-10-PCS | Mod: S$PBB,,, | Performed by: INTERNAL MEDICINE

## 2021-03-25 PROCEDURE — 99999 PR PBB SHADOW E&M-EST. PATIENT-LVL III: CPT | Mod: PBBFAC,,, | Performed by: INTERNAL MEDICINE

## 2021-03-25 PROCEDURE — 93306 ECHO (CUPID ONLY): ICD-10-PCS | Mod: 26,,, | Performed by: INTERNAL MEDICINE

## 2021-03-25 RX ORDER — NORETHINDRONE ACETATE AND ETHINYL ESTRADIOL 1MG-20(24)
KIT ORAL
COMMUNITY
Start: 2020-10-08 | End: 2022-01-18 | Stop reason: SDUPTHER

## 2021-03-26 ENCOUNTER — HOSPITAL ENCOUNTER (EMERGENCY)
Facility: HOSPITAL | Age: 21
Discharge: ANOTHER HOSPITAL | End: 2021-03-26
Attending: SURGERY
Payer: OTHER GOVERNMENT

## 2021-03-26 ENCOUNTER — HOSPITAL ENCOUNTER (INPATIENT)
Facility: HOSPITAL | Age: 21
LOS: 1 days | Discharge: HOME | DRG: 310 | End: 2021-03-27
Attending: INTERNAL MEDICINE | Admitting: INTERNAL MEDICINE
Payer: OTHER GOVERNMENT

## 2021-03-26 ENCOUNTER — TELEPHONE (OUTPATIENT)
Dept: ELECTROPHYSIOLOGY | Facility: CLINIC | Age: 21
End: 2021-03-26

## 2021-03-26 VITALS
SYSTOLIC BLOOD PRESSURE: 108 MMHG | BODY MASS INDEX: 22.47 KG/M2 | WEIGHT: 135 LBS | OXYGEN SATURATION: 100 % | RESPIRATION RATE: 20 BRPM | HEART RATE: 65 BPM | DIASTOLIC BLOOD PRESSURE: 72 MMHG | TEMPERATURE: 98 F

## 2021-03-26 DIAGNOSIS — R06.00 DYSPNEA: ICD-10-CM

## 2021-03-26 DIAGNOSIS — R55 SYNCOPE: ICD-10-CM

## 2021-03-26 DIAGNOSIS — I97.190 PACEMAKER SYNDROME: Primary | ICD-10-CM

## 2021-03-26 DIAGNOSIS — R53.83 FATIGUE: ICD-10-CM

## 2021-03-26 DIAGNOSIS — R00.2 PALPITATIONS: ICD-10-CM

## 2021-03-26 DIAGNOSIS — I49.5 SICK SINUS SYNDROME: Primary | ICD-10-CM

## 2021-03-26 DIAGNOSIS — R06.02 SOB (SHORTNESS OF BREATH): ICD-10-CM

## 2021-03-26 DIAGNOSIS — I49.9 CARDIAC ARRHYTHMIA, UNSPECIFIED CARDIAC ARRHYTHMIA TYPE: ICD-10-CM

## 2021-03-26 LAB
ALBUMIN SERPL BCP-MCNC: 3.9 G/DL (ref 3.5–5.2)
ALP SERPL-CCNC: 33 U/L (ref 55–135)
ALT SERPL W/O P-5'-P-CCNC: 9 U/L (ref 10–44)
AMPHET+METHAMPHET UR QL: NEGATIVE
ANION GAP SERPL CALC-SCNC: 9 MMOL/L (ref 8–16)
APTT BLDCRRT: 27.8 SEC (ref 21–32)
AST SERPL-CCNC: 13 U/L (ref 10–40)
B-HCG UR QL: NEGATIVE
BACTERIA #/AREA URNS HPF: ABNORMAL /HPF
BARBITURATES UR QL SCN>200 NG/ML: NEGATIVE
BASOPHILS # BLD AUTO: 0.05 K/UL (ref 0–0.2)
BASOPHILS NFR BLD: 0.8 % (ref 0–1.9)
BENZODIAZ UR QL SCN>200 NG/ML: NEGATIVE
BILIRUB SERPL-MCNC: 0.2 MG/DL (ref 0.1–1)
BILIRUB UR QL STRIP: NEGATIVE
BNP SERPL-MCNC: 34 PG/ML (ref 0–99)
BUN SERPL-MCNC: 14 MG/DL (ref 6–20)
BZE UR QL SCN: NEGATIVE
CALCIUM SERPL-MCNC: 8.9 MG/DL (ref 8.7–10.5)
CANNABINOIDS UR QL SCN: NEGATIVE
CHLORIDE SERPL-SCNC: 109 MMOL/L (ref 95–110)
CK MB SERPL-MCNC: 0.5 NG/ML (ref 0.1–6.5)
CK MB SERPL-RTO: 0.7 % (ref 0–5)
CK SERPL-CCNC: 71 U/L (ref 20–180)
CK SERPL-CCNC: 71 U/L (ref 20–180)
CLARITY UR: CLEAR
CO2 SERPL-SCNC: 23 MMOL/L (ref 23–29)
COLOR UR: YELLOW
CREAT SERPL-MCNC: 1 MG/DL (ref 0.5–1.4)
CREAT UR-MCNC: 121.3 MG/DL (ref 15–325)
D DIMER PPP IA.FEU-MCNC: 0.48 MG/L FEU
DIFFERENTIAL METHOD: NORMAL
EOSINOPHIL # BLD AUTO: 0.1 K/UL (ref 0–0.5)
EOSINOPHIL NFR BLD: 1.8 % (ref 0–8)
ERYTHROCYTE [DISTWIDTH] IN BLOOD BY AUTOMATED COUNT: 13 % (ref 11.5–14.5)
EST. GFR  (AFRICAN AMERICAN): >60 ML/MIN/1.73 M^2
EST. GFR  (NON AFRICAN AMERICAN): >60 ML/MIN/1.73 M^2
GLUCOSE SERPL-MCNC: 79 MG/DL (ref 70–110)
GLUCOSE UR QL STRIP: NEGATIVE
HCT VFR BLD AUTO: 40.8 % (ref 37–48.5)
HGB BLD-MCNC: 13.3 G/DL (ref 12–16)
HGB UR QL STRIP: NEGATIVE
IMM GRANULOCYTES # BLD AUTO: 0.01 K/UL (ref 0–0.04)
IMM GRANULOCYTES NFR BLD AUTO: 0.2 % (ref 0–0.5)
INR PPP: 1.1 (ref 0.8–1.2)
KETONES UR QL STRIP: NEGATIVE
LEUKOCYTE ESTERASE UR QL STRIP: ABNORMAL
LYMPHOCYTES # BLD AUTO: 2.3 K/UL (ref 1–4.8)
LYMPHOCYTES NFR BLD: 38.3 % (ref 18–48)
MAGNESIUM SERPL-MCNC: 1.9 MG/DL (ref 1.6–2.6)
MCH RBC QN AUTO: 29.6 PG (ref 27–31)
MCHC RBC AUTO-ENTMCNC: 32.6 G/DL (ref 32–36)
MCV RBC AUTO: 91 FL (ref 82–98)
METHADONE UR QL SCN>300 NG/ML: NEGATIVE
MICROSCOPIC COMMENT: ABNORMAL
MONOCYTES # BLD AUTO: 0.6 K/UL (ref 0.3–1)
MONOCYTES NFR BLD: 9.2 % (ref 4–15)
NEUTROPHILS # BLD AUTO: 3 K/UL (ref 1.8–7.7)
NEUTROPHILS NFR BLD: 49.7 % (ref 38–73)
NITRITE UR QL STRIP: NEGATIVE
NRBC BLD-RTO: 0 /100 WBC
OPIATES UR QL SCN: NEGATIVE
PCP UR QL SCN>25 NG/ML: NEGATIVE
PH UR STRIP: 7 [PH] (ref 5–8)
PHOSPHATE SERPL-MCNC: 2 MG/DL (ref 2.7–4.5)
PLATELET # BLD AUTO: 202 K/UL (ref 150–350)
PMV BLD AUTO: 12.1 FL (ref 9.2–12.9)
POTASSIUM SERPL-SCNC: 3.8 MMOL/L (ref 3.5–5.1)
PROT SERPL-MCNC: 6.8 G/DL (ref 6–8.4)
PROT UR QL STRIP: NEGATIVE
PROTHROMBIN TIME: 11.9 SEC (ref 9–12.5)
RBC # BLD AUTO: 4.49 M/UL (ref 4–5.4)
RBC #/AREA URNS HPF: 1 /HPF (ref 0–4)
SARS-COV-2 RDRP RESP QL NAA+PROBE: NEGATIVE
SODIUM SERPL-SCNC: 141 MMOL/L (ref 136–145)
SP GR UR STRIP: 1.02 (ref 1–1.03)
SQUAMOUS #/AREA URNS HPF: 8 /HPF
TOXICOLOGY INFORMATION: NORMAL
TROPONIN I SERPL DL<=0.01 NG/ML-MCNC: <0.006 NG/ML (ref 0–0.03)
TSH SERPL DL<=0.005 MIU/L-ACNC: 0.88 UIU/ML (ref 0.4–4)
URN SPEC COLLECT METH UR: ABNORMAL
UROBILINOGEN UR STRIP-ACNC: NEGATIVE EU/DL
WBC # BLD AUTO: 6.08 K/UL (ref 3.9–12.7)
WBC #/AREA URNS HPF: 10 /HPF (ref 0–5)

## 2021-03-26 PROCEDURE — 82550 ASSAY OF CK (CPK): CPT | Performed by: SURGERY

## 2021-03-26 PROCEDURE — 81000 URINALYSIS NONAUTO W/SCOPE: CPT | Mod: 59 | Performed by: SURGERY

## 2021-03-26 PROCEDURE — 93010 ELECTROCARDIOGRAM REPORT: CPT | Mod: ,,, | Performed by: INTERNAL MEDICINE

## 2021-03-26 PROCEDURE — 36415 COLL VENOUS BLD VENIPUNCTURE: CPT | Performed by: SURGERY

## 2021-03-26 PROCEDURE — 85610 PROTHROMBIN TIME: CPT | Performed by: SURGERY

## 2021-03-26 PROCEDURE — U0002 COVID-19 LAB TEST NON-CDC: HCPCS | Performed by: SURGERY

## 2021-03-26 PROCEDURE — 93005 ELECTROCARDIOGRAM TRACING: CPT

## 2021-03-26 PROCEDURE — 83880 ASSAY OF NATRIURETIC PEPTIDE: CPT | Performed by: SURGERY

## 2021-03-26 PROCEDURE — 93010 EKG 12-LEAD: ICD-10-PCS | Mod: ,,, | Performed by: INTERNAL MEDICINE

## 2021-03-26 PROCEDURE — 20600001 HC STEP DOWN PRIVATE ROOM

## 2021-03-26 PROCEDURE — 99285 EMERGENCY DEPT VISIT HI MDM: CPT | Mod: 25

## 2021-03-26 PROCEDURE — 99236 PR OBSERV/HOSP SAME DATE,LEVL V: ICD-10-PCS | Mod: ,,, | Performed by: INTERNAL MEDICINE

## 2021-03-26 PROCEDURE — 85025 COMPLETE CBC W/AUTO DIFF WBC: CPT | Performed by: SURGERY

## 2021-03-26 PROCEDURE — 85730 THROMBOPLASTIN TIME PARTIAL: CPT | Performed by: SURGERY

## 2021-03-26 PROCEDURE — 84100 ASSAY OF PHOSPHORUS: CPT | Performed by: SURGERY

## 2021-03-26 PROCEDURE — 81025 URINE PREGNANCY TEST: CPT | Performed by: SURGERY

## 2021-03-26 PROCEDURE — 80307 DRUG TEST PRSMV CHEM ANLYZR: CPT | Performed by: SURGERY

## 2021-03-26 PROCEDURE — 83735 ASSAY OF MAGNESIUM: CPT | Performed by: SURGERY

## 2021-03-26 PROCEDURE — 82553 CREATINE MB FRACTION: CPT | Performed by: SURGERY

## 2021-03-26 PROCEDURE — 85379 FIBRIN DEGRADATION QUANT: CPT | Performed by: SURGERY

## 2021-03-26 PROCEDURE — 84443 ASSAY THYROID STIM HORMONE: CPT | Performed by: SURGERY

## 2021-03-26 PROCEDURE — 80053 COMPREHEN METABOLIC PANEL: CPT | Performed by: SURGERY

## 2021-03-26 PROCEDURE — 84484 ASSAY OF TROPONIN QUANT: CPT | Performed by: SURGERY

## 2021-03-26 PROCEDURE — 99236 HOSP IP/OBS SAME DATE HI 85: CPT | Mod: ,,, | Performed by: INTERNAL MEDICINE

## 2021-03-26 RX ORDER — SODIUM CHLORIDE 0.9 % (FLUSH) 0.9 %
10 SYRINGE (ML) INJECTION
Status: DISCONTINUED | OUTPATIENT
Start: 2021-03-26 | End: 2021-03-28 | Stop reason: HOSPADM

## 2021-03-27 VITALS
DIASTOLIC BLOOD PRESSURE: 72 MMHG | HEIGHT: 65 IN | SYSTOLIC BLOOD PRESSURE: 103 MMHG | WEIGHT: 136.44 LBS | HEART RATE: 86 BPM | BODY MASS INDEX: 22.73 KG/M2 | OXYGEN SATURATION: 99 % | TEMPERATURE: 99 F | RESPIRATION RATE: 20 BRPM

## 2021-03-27 PROBLEM — I97.190 PACEMAKER SYNDROME: Status: ACTIVE | Noted: 2021-03-27

## 2021-03-27 PROBLEM — R07.82 INTERCOSTAL PAIN: Status: ACTIVE | Noted: 2021-03-27

## 2021-03-27 LAB
ALBUMIN SERPL BCP-MCNC: 3.5 G/DL (ref 3.5–5.2)
ALP SERPL-CCNC: 33 U/L (ref 55–135)
ALT SERPL W/O P-5'-P-CCNC: 8 U/L (ref 10–44)
AMPHET+METHAMPHET UR QL: NEGATIVE
ANION GAP SERPL CALC-SCNC: 8 MMOL/L (ref 8–16)
AST SERPL-CCNC: 11 U/L (ref 10–40)
BARBITURATES UR QL SCN>200 NG/ML: NEGATIVE
BASOPHILS # BLD AUTO: 0.03 K/UL (ref 0–0.2)
BASOPHILS NFR BLD: 0.5 % (ref 0–1.9)
BENZODIAZ UR QL SCN>200 NG/ML: NEGATIVE
BILIRUB SERPL-MCNC: 0.5 MG/DL (ref 0.1–1)
BUN SERPL-MCNC: 13 MG/DL (ref 6–20)
BZE UR QL SCN: NEGATIVE
CALCIUM SERPL-MCNC: 8.5 MG/DL (ref 8.7–10.5)
CANNABINOIDS UR QL SCN: NEGATIVE
CHLORIDE SERPL-SCNC: 108 MMOL/L (ref 95–110)
CO2 SERPL-SCNC: 23 MMOL/L (ref 23–29)
CREAT SERPL-MCNC: 0.8 MG/DL (ref 0.5–1.4)
CREAT UR-MCNC: 88 MG/DL (ref 15–325)
CRP SERPL-MCNC: 1.7 MG/L (ref 0–8.2)
D DIMER PPP IA.FEU-MCNC: 0.3 MG/L FEU
DIFFERENTIAL METHOD: NORMAL
EOSINOPHIL # BLD AUTO: 0.1 K/UL (ref 0–0.5)
EOSINOPHIL NFR BLD: 1.8 % (ref 0–8)
ERYTHROCYTE [DISTWIDTH] IN BLOOD BY AUTOMATED COUNT: 12.7 % (ref 11.5–14.5)
ERYTHROCYTE [SEDIMENTATION RATE] IN BLOOD BY WESTERGREN METHOD: <2 MM/HR (ref 0–36)
EST. GFR  (AFRICAN AMERICAN): >60 ML/MIN/1.73 M^2
EST. GFR  (NON AFRICAN AMERICAN): >60 ML/MIN/1.73 M^2
ETHANOL UR-MCNC: <10 MG/DL
GLUCOSE SERPL-MCNC: 87 MG/DL (ref 70–110)
HCT VFR BLD AUTO: 40.2 % (ref 37–48.5)
HGB BLD-MCNC: 13.3 G/DL (ref 12–16)
IMM GRANULOCYTES # BLD AUTO: 0.01 K/UL (ref 0–0.04)
IMM GRANULOCYTES NFR BLD AUTO: 0.2 % (ref 0–0.5)
LYMPHOCYTES # BLD AUTO: 2.5 K/UL (ref 1–4.8)
LYMPHOCYTES NFR BLD: 37.6 % (ref 18–48)
MAGNESIUM SERPL-MCNC: 1.9 MG/DL (ref 1.6–2.6)
MCH RBC QN AUTO: 30.1 PG (ref 27–31)
MCHC RBC AUTO-ENTMCNC: 33.1 G/DL (ref 32–36)
MCV RBC AUTO: 91 FL (ref 82–98)
METHADONE UR QL SCN>300 NG/ML: NEGATIVE
MONOCYTES # BLD AUTO: 0.5 K/UL (ref 0.3–1)
MONOCYTES NFR BLD: 8 % (ref 4–15)
NEUTROPHILS # BLD AUTO: 3.5 K/UL (ref 1.8–7.7)
NEUTROPHILS NFR BLD: 51.9 % (ref 38–73)
NRBC BLD-RTO: 0 /100 WBC
OPIATES UR QL SCN: NEGATIVE
PCP UR QL SCN>25 NG/ML: NEGATIVE
PHOSPHATE SERPL-MCNC: 3.9 MG/DL (ref 2.7–4.5)
PLATELET # BLD AUTO: 192 K/UL (ref 150–350)
PMV BLD AUTO: 12.2 FL (ref 9.2–12.9)
POTASSIUM SERPL-SCNC: 4.3 MMOL/L (ref 3.5–5.1)
PROT SERPL-MCNC: 6.2 G/DL (ref 6–8.4)
RBC # BLD AUTO: 4.42 M/UL (ref 4–5.4)
SODIUM SERPL-SCNC: 139 MMOL/L (ref 136–145)
TOXICOLOGY INFORMATION: NORMAL
WBC # BLD AUTO: 6.63 K/UL (ref 3.9–12.7)

## 2021-03-27 PROCEDURE — 85652 RBC SED RATE AUTOMATED: CPT | Performed by: INTERNAL MEDICINE

## 2021-03-27 PROCEDURE — 86140 C-REACTIVE PROTEIN: CPT | Performed by: INTERNAL MEDICINE

## 2021-03-27 PROCEDURE — 83735 ASSAY OF MAGNESIUM: CPT | Performed by: INTERNAL MEDICINE

## 2021-03-27 PROCEDURE — 80053 COMPREHEN METABOLIC PANEL: CPT | Performed by: INTERNAL MEDICINE

## 2021-03-27 PROCEDURE — 99233 SBSQ HOSP IP/OBS HIGH 50: CPT | Mod: ,,, | Performed by: INTERNAL MEDICINE

## 2021-03-27 PROCEDURE — 36415 COLL VENOUS BLD VENIPUNCTURE: CPT | Performed by: INTERNAL MEDICINE

## 2021-03-27 PROCEDURE — 85025 COMPLETE CBC W/AUTO DIFF WBC: CPT | Performed by: INTERNAL MEDICINE

## 2021-03-27 PROCEDURE — 80307 DRUG TEST PRSMV CHEM ANLYZR: CPT | Performed by: INTERNAL MEDICINE

## 2021-03-27 PROCEDURE — 85379 FIBRIN DEGRADATION QUANT: CPT | Performed by: INTERNAL MEDICINE

## 2021-03-27 PROCEDURE — 99233 PR SUBSEQUENT HOSPITAL CARE,LEVL III: ICD-10-PCS | Mod: ,,, | Performed by: INTERNAL MEDICINE

## 2021-03-27 PROCEDURE — 84100 ASSAY OF PHOSPHORUS: CPT | Performed by: INTERNAL MEDICINE

## 2021-04-01 ENCOUNTER — PATIENT MESSAGE (OUTPATIENT)
Dept: ELECTROPHYSIOLOGY | Facility: CLINIC | Age: 21
End: 2021-04-01

## 2021-04-01 ENCOUNTER — TELEPHONE (OUTPATIENT)
Dept: ELECTROPHYSIOLOGY | Facility: CLINIC | Age: 21
End: 2021-04-01

## 2021-04-09 ENCOUNTER — PATIENT MESSAGE (OUTPATIENT)
Dept: ELECTROPHYSIOLOGY | Facility: CLINIC | Age: 21
End: 2021-04-09

## 2021-04-09 DIAGNOSIS — R55 CONVULSIVE SYNCOPE: Primary | ICD-10-CM

## 2021-04-09 DIAGNOSIS — Z01.818 PRE-OP TESTING: ICD-10-CM

## 2021-04-21 ENCOUNTER — PATIENT MESSAGE (OUTPATIENT)
Dept: ELECTROPHYSIOLOGY | Facility: CLINIC | Age: 21
End: 2021-04-21

## 2021-04-23 ENCOUNTER — TELEPHONE (OUTPATIENT)
Dept: ELECTROPHYSIOLOGY | Facility: CLINIC | Age: 21
End: 2021-04-23

## 2021-04-29 ENCOUNTER — PATIENT MESSAGE (OUTPATIENT)
Dept: ELECTROPHYSIOLOGY | Facility: CLINIC | Age: 21
End: 2021-04-29

## 2021-05-24 ENCOUNTER — TELEPHONE (OUTPATIENT)
Dept: ELECTROPHYSIOLOGY | Facility: CLINIC | Age: 21
End: 2021-05-24

## 2021-05-24 ENCOUNTER — ANESTHESIA EVENT (OUTPATIENT)
Dept: MEDSURG UNIT | Facility: HOSPITAL | Age: 21
End: 2021-05-24
Payer: COMMERCIAL

## 2021-05-25 ENCOUNTER — ANESTHESIA (OUTPATIENT)
Dept: MEDSURG UNIT | Facility: HOSPITAL | Age: 21
End: 2021-05-25
Payer: COMMERCIAL

## 2021-05-25 ENCOUNTER — HOSPITAL ENCOUNTER (OUTPATIENT)
Facility: HOSPITAL | Age: 21
Discharge: HOME | End: 2021-05-25
Attending: INTERNAL MEDICINE | Admitting: INTERNAL MEDICINE
Payer: COMMERCIAL

## 2021-05-25 VITALS
OXYGEN SATURATION: 98 % | TEMPERATURE: 99 F | HEART RATE: 72 BPM | HEIGHT: 65 IN | WEIGHT: 130 LBS | DIASTOLIC BLOOD PRESSURE: 68 MMHG | BODY MASS INDEX: 21.66 KG/M2 | RESPIRATION RATE: 18 BRPM | SYSTOLIC BLOOD PRESSURE: 102 MMHG

## 2021-05-25 DIAGNOSIS — Z95.0 CARDIAC PACEMAKER IN SITU: ICD-10-CM

## 2021-05-25 DIAGNOSIS — I49.5 SSS (SICK SINUS SYNDROME): Primary | ICD-10-CM

## 2021-05-25 DIAGNOSIS — R55 PRE-SYNCOPE: ICD-10-CM

## 2021-05-25 DIAGNOSIS — R00.2 PALPITATIONS: ICD-10-CM

## 2021-05-25 DIAGNOSIS — I97.190 PACEMAKER SYNDROME: ICD-10-CM

## 2021-05-25 DIAGNOSIS — Z95.9 CARDIAC DEVICE IN SITU: ICD-10-CM

## 2021-05-25 DIAGNOSIS — R06.02 SOB (SHORTNESS OF BREATH): ICD-10-CM

## 2021-05-25 LAB
B-HCG UR QL: NEGATIVE
CTP QC/QA: YES

## 2021-05-25 PROCEDURE — 63600175 PHARM REV CODE 636 W HCPCS: Performed by: NURSE ANESTHETIST, CERTIFIED REGISTERED

## 2021-05-25 PROCEDURE — 27201423 OPTIME MED/SURG SUP & DEVICES STERILE SUPPLY: Performed by: INTERNAL MEDICINE

## 2021-05-25 PROCEDURE — 33214 UPGRADE OF PACEMAKER SYSTEM: CPT | Performed by: INTERNAL MEDICINE

## 2021-05-25 PROCEDURE — D9220A PRA ANESTHESIA: ICD-10-PCS | Mod: ,,, | Performed by: NURSE ANESTHETIST, CERTIFIED REGISTERED

## 2021-05-25 PROCEDURE — D9220A PRA ANESTHESIA: ICD-10-PCS | Mod: ,,, | Performed by: STUDENT IN AN ORGANIZED HEALTH CARE EDUCATION/TRAINING PROGRAM

## 2021-05-25 PROCEDURE — C1894 INTRO/SHEATH, NON-LASER: HCPCS | Performed by: INTERNAL MEDICINE

## 2021-05-25 PROCEDURE — 63600175 PHARM REV CODE 636 W HCPCS: Performed by: NURSE PRACTITIONER

## 2021-05-25 PROCEDURE — C1785 PMKR, DUAL, RATE-RESP: HCPCS | Performed by: INTERNAL MEDICINE

## 2021-05-25 PROCEDURE — C1898 LEAD, PMKR, OTHER THAN TRANS: HCPCS | Performed by: INTERNAL MEDICINE

## 2021-05-25 PROCEDURE — 11000001 HC ACUTE MED/SURG PRIVATE ROOM

## 2021-05-25 PROCEDURE — 63600175 PHARM REV CODE 636 W HCPCS: Performed by: INTERNAL MEDICINE

## 2021-05-25 PROCEDURE — 25000003 PHARM REV CODE 250: Performed by: NURSE ANESTHETIST, CERTIFIED REGISTERED

## 2021-05-25 PROCEDURE — 11406 PR EXC SKIN BENIG >4 CM TRUNK,ARM,LEG: ICD-10-PCS | Mod: 51,,, | Performed by: INTERNAL MEDICINE

## 2021-05-25 PROCEDURE — 37000009 HC ANESTHESIA EA ADD 15 MINS: Performed by: INTERNAL MEDICINE

## 2021-05-25 PROCEDURE — 99220 PR INITIAL OBSERVATION CARE,LEVL III: CPT | Mod: ,,, | Performed by: INTERNAL MEDICINE

## 2021-05-25 PROCEDURE — 99220 PR INITIAL OBSERVATION CARE,LEVL III: ICD-10-PCS | Mod: ,,, | Performed by: INTERNAL MEDICINE

## 2021-05-25 PROCEDURE — 93010 ELECTROCARDIOGRAM REPORT: CPT | Mod: ,,, | Performed by: INTERNAL MEDICINE

## 2021-05-25 PROCEDURE — 93005 ELECTROCARDIOGRAM TRACING: CPT

## 2021-05-25 PROCEDURE — 25000003 PHARM REV CODE 250: Performed by: NURSE PRACTITIONER

## 2021-05-25 PROCEDURE — 81025 URINE PREGNANCY TEST: CPT | Performed by: NURSE PRACTITIONER

## 2021-05-25 PROCEDURE — 93005 ELECTROCARDIOGRAM TRACING: CPT | Mod: 59

## 2021-05-25 PROCEDURE — 33214 PR UPGRADE OF PACEMAKER SYSTEM: ICD-10-PCS | Mod: ,,, | Performed by: INTERNAL MEDICINE

## 2021-05-25 PROCEDURE — 37000008 HC ANESTHESIA 1ST 15 MINUTES: Performed by: INTERNAL MEDICINE

## 2021-05-25 PROCEDURE — D9220A PRA ANESTHESIA: Mod: ,,, | Performed by: NURSE ANESTHETIST, CERTIFIED REGISTERED

## 2021-05-25 PROCEDURE — 11406 EXC TR-EXT B9+MARG >4.0 CM: CPT | Performed by: INTERNAL MEDICINE

## 2021-05-25 PROCEDURE — 93010 EKG 12-LEAD: ICD-10-PCS | Mod: ,,, | Performed by: INTERNAL MEDICINE

## 2021-05-25 PROCEDURE — 25500020 PHARM REV CODE 255: Performed by: NURSE ANESTHETIST, CERTIFIED REGISTERED

## 2021-05-25 PROCEDURE — 11406 EXC TR-EXT B9+MARG >4.0 CM: CPT | Mod: 51,,, | Performed by: INTERNAL MEDICINE

## 2021-05-25 PROCEDURE — 25000003 PHARM REV CODE 250: Performed by: INTERNAL MEDICINE

## 2021-05-25 PROCEDURE — 33214 UPGRADE OF PACEMAKER SYSTEM: CPT | Mod: ,,, | Performed by: INTERNAL MEDICINE

## 2021-05-25 PROCEDURE — D9220A PRA ANESTHESIA: Mod: ,,, | Performed by: STUDENT IN AN ORGANIZED HEALTH CARE EDUCATION/TRAINING PROGRAM

## 2021-05-25 DEVICE — IMPLANTABLE DEVICE
Type: IMPLANTABLE DEVICE | Site: CHEST  WALL | Status: FUNCTIONAL
Brand: SOLIA

## 2021-05-25 DEVICE — IMPLANTABLE DEVICE
Type: IMPLANTABLE DEVICE | Site: CHEST  WALL | Status: FUNCTIONAL
Brand: EDORA 8 DR-T

## 2021-05-25 RX ORDER — HYDROMORPHONE HYDROCHLORIDE 1 MG/ML
0.2 INJECTION, SOLUTION INTRAMUSCULAR; INTRAVENOUS; SUBCUTANEOUS EVERY 5 MIN PRN
Status: DISCONTINUED | OUTPATIENT
Start: 2021-05-25 | End: 2021-05-28 | Stop reason: HOSPADM

## 2021-05-25 RX ORDER — BUPIVACAINE HYDROCHLORIDE 2.5 MG/ML
INJECTION, SOLUTION EPIDURAL; INFILTRATION; INTRACAUDAL
Status: DISCONTINUED | OUTPATIENT
Start: 2021-05-25 | End: 2021-05-28 | Stop reason: HOSPADM

## 2021-05-25 RX ORDER — FENTANYL CITRATE 50 UG/ML
INJECTION, SOLUTION INTRAMUSCULAR; INTRAVENOUS
Status: DISCONTINUED | OUTPATIENT
Start: 2021-05-25 | End: 2021-05-25

## 2021-05-25 RX ORDER — ONDANSETRON 2 MG/ML
INJECTION INTRAMUSCULAR; INTRAVENOUS
Status: DISCONTINUED | OUTPATIENT
Start: 2021-05-25 | End: 2021-05-25

## 2021-05-25 RX ORDER — FENTANYL CITRATE 50 UG/ML
25 INJECTION, SOLUTION INTRAMUSCULAR; INTRAVENOUS EVERY 5 MIN PRN
Status: DISCONTINUED | OUTPATIENT
Start: 2021-05-25 | End: 2021-05-28 | Stop reason: HOSPADM

## 2021-05-25 RX ORDER — ONDANSETRON 2 MG/ML
4 INJECTION INTRAMUSCULAR; INTRAVENOUS ONCE AS NEEDED
Status: DISCONTINUED | OUTPATIENT
Start: 2021-05-25 | End: 2021-05-28 | Stop reason: HOSPADM

## 2021-05-25 RX ORDER — PROPOFOL 10 MG/ML
VIAL (ML) INTRAVENOUS CONTINUOUS PRN
Status: DISCONTINUED | OUTPATIENT
Start: 2021-05-25 | End: 2021-05-25

## 2021-05-25 RX ORDER — PHENYLEPHRINE HCL IN 0.9% NACL 1 MG/10 ML
SYRINGE (ML) INTRAVENOUS
Status: DISCONTINUED | OUTPATIENT
Start: 2021-05-25 | End: 2021-05-25

## 2021-05-25 RX ORDER — DOXYCYCLINE 100 MG/1
100 CAPSULE ORAL 2 TIMES DAILY
Qty: 10 CAPSULE | Refills: 0 | Status: SHIPPED | OUTPATIENT
Start: 2021-05-25 | End: 2021-05-30

## 2021-05-25 RX ORDER — IODIXANOL 320 MG/ML
INJECTION, SOLUTION INTRAVASCULAR
Status: DISCONTINUED | OUTPATIENT
Start: 2021-05-25 | End: 2021-05-25

## 2021-05-25 RX ORDER — LIDOCAINE HYDROCHLORIDE 20 MG/ML
INJECTION, SOLUTION EPIDURAL; INFILTRATION; INTRACAUDAL; PERINEURAL
Status: DISCONTINUED | OUTPATIENT
Start: 2021-05-25 | End: 2021-05-25

## 2021-05-25 RX ORDER — DEXAMETHASONE SODIUM PHOSPHATE 4 MG/ML
INJECTION, SOLUTION INTRA-ARTICULAR; INTRALESIONAL; INTRAMUSCULAR; INTRAVENOUS; SOFT TISSUE
Status: DISCONTINUED | OUTPATIENT
Start: 2021-05-25 | End: 2021-05-25

## 2021-05-25 RX ORDER — VANCOMYCIN HYDROCHLORIDE 1 G/20ML
INJECTION, POWDER, LYOPHILIZED, FOR SOLUTION INTRAVENOUS
Status: DISCONTINUED | OUTPATIENT
Start: 2021-05-25 | End: 2021-05-28 | Stop reason: HOSPADM

## 2021-05-25 RX ORDER — LIDOCAINE HYDROCHLORIDE 20 MG/ML
INJECTION, SOLUTION INFILTRATION; PERINEURAL
Status: DISCONTINUED | OUTPATIENT
Start: 2021-05-25 | End: 2021-05-28 | Stop reason: HOSPADM

## 2021-05-25 RX ORDER — PROPOFOL 10 MG/ML
VIAL (ML) INTRAVENOUS
Status: DISCONTINUED | OUTPATIENT
Start: 2021-05-25 | End: 2021-05-25

## 2021-05-25 RX ORDER — VANCOMYCIN HCL IN 5 % DEXTROSE 1G/250ML
1000 PLASTIC BAG, INJECTION (ML) INTRAVENOUS
Status: DISCONTINUED | OUTPATIENT
Start: 2021-05-25 | End: 2021-05-25

## 2021-05-25 RX ORDER — ACETAMINOPHEN 325 MG/1
650 TABLET ORAL EVERY 4 HOURS PRN
Status: DISCONTINUED | OUTPATIENT
Start: 2021-05-25 | End: 2021-05-28 | Stop reason: HOSPADM

## 2021-05-25 RX ORDER — MIDAZOLAM HYDROCHLORIDE 1 MG/ML
INJECTION, SOLUTION INTRAMUSCULAR; INTRAVENOUS
Status: DISCONTINUED | OUTPATIENT
Start: 2021-05-25 | End: 2021-05-25

## 2021-05-25 RX ADMIN — DEXAMETHASONE SODIUM PHOSPHATE 4 MG: 4 INJECTION INTRA-ARTICULAR; INTRALESIONAL; INTRAMUSCULAR; INTRAVENOUS; SOFT TISSUE at 02:05

## 2021-05-25 RX ADMIN — MIDAZOLAM 2 MG: 1 INJECTION INTRAMUSCULAR; INTRAVENOUS at 02:05

## 2021-05-25 RX ADMIN — FENTANYL CITRATE 25 MCG: 50 INJECTION, SOLUTION INTRAMUSCULAR; INTRAVENOUS at 02:05

## 2021-05-25 RX ADMIN — Medication 50 MCG: at 02:05

## 2021-05-25 RX ADMIN — LIDOCAINE HYDROCHLORIDE 60 MG: 20 INJECTION, SOLUTION EPIDURAL; INFILTRATION; INTRACAUDAL at 02:05

## 2021-05-25 RX ADMIN — FENTANYL CITRATE 25 MCG: 50 INJECTION, SOLUTION INTRAMUSCULAR; INTRAVENOUS at 03:05

## 2021-05-25 RX ADMIN — SODIUM CHLORIDE: 0.9 INJECTION, SOLUTION INTRAVENOUS at 02:05

## 2021-05-25 RX ADMIN — ONDANSETRON 4 MG: 2 INJECTION INTRAMUSCULAR; INTRAVENOUS at 02:05

## 2021-05-25 RX ADMIN — Medication 150 MCG/KG/MIN: at 02:05

## 2021-05-25 RX ADMIN — PROPOFOL 50 MG: 10 INJECTION, EMULSION INTRAVENOUS at 02:05

## 2021-05-25 RX ADMIN — IODIXANOL 10 ML: 320 INJECTION, SOLUTION INTRAVASCULAR at 02:05

## 2021-05-25 RX ADMIN — ACETAMINOPHEN 650 MG: 325 TABLET ORAL at 05:05

## 2021-05-25 RX ADMIN — VANCOMYCIN HYDROCHLORIDE 1000 MG: 1 INJECTION, POWDER, LYOPHILIZED, FOR SOLUTION INTRAVENOUS at 02:05

## 2021-05-25 RX ADMIN — Medication 100 MCG: at 03:05

## 2021-05-26 PROCEDURE — 11000001 HC ACUTE MED/SURG PRIVATE ROOM

## 2021-05-27 PROCEDURE — 11000001 HC ACUTE MED/SURG PRIVATE ROOM

## 2021-05-31 ENCOUNTER — CLINICAL SUPPORT (OUTPATIENT)
Dept: CARDIOLOGY | Facility: HOSPITAL | Age: 21
End: 2021-05-31
Payer: COMMERCIAL

## 2021-05-31 DIAGNOSIS — Z95.0 PRESENCE OF CARDIAC PACEMAKER: ICD-10-CM

## 2021-05-31 PROCEDURE — 93296 REM INTERROG EVL PM/IDS: CPT | Performed by: INTERNAL MEDICINE

## 2021-05-31 PROCEDURE — 93294 CARDIAC DEVICE CHECK - REMOTE: ICD-10-PCS | Mod: ,,, | Performed by: INTERNAL MEDICINE

## 2021-05-31 PROCEDURE — 93294 REM INTERROG EVL PM/LDLS PM: CPT | Mod: ,,, | Performed by: INTERNAL MEDICINE

## 2021-06-01 ENCOUNTER — TELEPHONE (OUTPATIENT)
Dept: ELECTROPHYSIOLOGY | Facility: CLINIC | Age: 21
End: 2021-06-01

## 2021-06-01 ENCOUNTER — CLINICAL SUPPORT (OUTPATIENT)
Dept: CARDIOLOGY | Facility: HOSPITAL | Age: 21
End: 2021-06-01
Attending: INTERNAL MEDICINE
Payer: COMMERCIAL

## 2021-06-01 DIAGNOSIS — R55 CONVULSIVE SYNCOPE: ICD-10-CM

## 2021-06-01 PROCEDURE — 93280 PM DEVICE PROGR EVAL DUAL: CPT

## 2021-06-01 PROCEDURE — 93280 CARDIAC DEVICE CHECK - IN CLINIC & HOSPITAL: ICD-10-PCS | Mod: 26,,, | Performed by: INTERNAL MEDICINE

## 2021-06-01 PROCEDURE — 93280 PM DEVICE PROGR EVAL DUAL: CPT | Mod: 26,,, | Performed by: INTERNAL MEDICINE

## 2021-07-02 ENCOUNTER — HOSPITAL ENCOUNTER (OUTPATIENT)
Dept: CARDIOLOGY | Facility: HOSPITAL | Age: 21
Discharge: HOME | End: 2021-07-02
Attending: INTERNAL MEDICINE
Payer: COMMERCIAL

## 2021-07-02 VITALS — BODY MASS INDEX: 21.66 KG/M2 | HEIGHT: 65 IN | WEIGHT: 130 LBS

## 2021-07-02 DIAGNOSIS — I49.5 SSS (SICK SINUS SYNDROME): ICD-10-CM

## 2021-07-02 LAB
AORTIC ROOT ANNULUS: 2.23 CM
AORTIC VALVE CUSP SEPERATION: 1.69 CM
AV INDEX (PROSTH): 0.76
AV MEAN GRADIENT: 3 MMHG
AV PEAK GRADIENT: 5 MMHG
AV VALVE AREA: 2.38 CM2
AV VELOCITY RATIO: 0.84
BSA FOR ECHO PROCEDURE: 1.64 M2
CV ECHO LV RWT: 0.44 CM
DOP CALC AO PEAK VEL: 1.16 M/S
DOP CALC AO VTI: 23.04 CM
DOP CALC LVOT AREA: 3.1 CM2
DOP CALC LVOT DIAMETER: 2 CM
DOP CALC LVOT PEAK VEL: 0.98 M/S
DOP CALC LVOT STROKE VOLUME: 54.95 CM3
DOP CALCLVOT PEAK VEL VTI: 17.5 CM
E WAVE DECELERATION TIME: 223.6 MSEC
E/A RATIO: 1.31
E/E' RATIO: 3.94 M/S
ECHO LV POSTERIOR WALL: 1.01 CM (ref 0.6–1.1)
EJECTION FRACTION: 55 %
FRACTIONAL SHORTENING: 38 % (ref 28–44)
INTERVENTRICULAR SEPTUM: 0.88 CM (ref 0.6–1.1)
IVRT: 70.41 MSEC
LA MAJOR: 5.24 CM
LA MINOR: 3.97 CM
LA WIDTH: 2.97 CM
LEFT ATRIUM SIZE: 2.15 CM
LEFT ATRIUM VOLUME INDEX MOD: 13.6 ML/M2
LEFT ATRIUM VOLUME INDEX: 14.9 ML/M2
LEFT ATRIUM VOLUME MOD: 22.49 CM3
LEFT ATRIUM VOLUME: 24.52 CM3
LEFT INTERNAL DIMENSION IN SYSTOLE: 2.84 CM (ref 2.1–4)
LEFT VENTRICLE DIASTOLIC VOLUME INDEX: 57.52 ML/M2
LEFT VENTRICLE DIASTOLIC VOLUME: 94.9 ML
LEFT VENTRICLE MASS INDEX: 88 G/M2
LEFT VENTRICLE SYSTOLIC VOLUME INDEX: 18.6 ML/M2
LEFT VENTRICLE SYSTOLIC VOLUME: 30.63 ML
LEFT VENTRICULAR INTERNAL DIMENSION IN DIASTOLE: 4.55 CM (ref 3.5–6)
LEFT VENTRICULAR MASS: 144.45 G
LV LATERAL E/E' RATIO: 3.55 M/S
LV SEPTAL E/E' RATIO: 4.44 M/S
MV A" WAVE DURATION": 11.99 MSEC
MV MEAN GRADIENT: 1 MMHG
MV PEAK A VEL: 0.54 M/S
MV PEAK E VEL: 0.71 M/S
MV PEAK GRADIENT: 4 MMHG
MV STENOSIS PRESSURE HALF TIME: 64.84 MS
MV VALVE AREA P 1/2 METHOD: 3.39 CM2
PISA MRMAX VEL: 0.01 M/S
PISA TR MAX VEL: 2.13 M/S
PULM VEIN S/D RATIO: 0.52
PV PEAK D VEL: 0.64 M/S
PV PEAK S VEL: 0.33 M/S
PV PEAK VELOCITY: 1.04 CM/S
RA MAJOR: 3.98 CM
RA PRESSURE: 3 MMHG
RA WIDTH: 2.73 CM
RIGHT VENTRICULAR END-DIASTOLIC DIMENSION: 2.03 CM
TDI LATERAL: 0.2 M/S
TDI SEPTAL: 0.16 M/S
TDI: 0.18 M/S
TR MAX PG: 18 MMHG
TV REST PULMONARY ARTERY PRESSURE: 21 MMHG

## 2021-07-02 PROCEDURE — 93306 TTE W/DOPPLER COMPLETE: CPT | Mod: 26,,, | Performed by: INTERNAL MEDICINE

## 2021-07-02 PROCEDURE — 93306 TTE W/DOPPLER COMPLETE: CPT

## 2021-07-02 PROCEDURE — 93306 ECHO (CUPID ONLY): ICD-10-PCS | Mod: 26,,, | Performed by: INTERNAL MEDICINE

## 2021-07-12 ENCOUNTER — TELEPHONE (OUTPATIENT)
Dept: ELECTROPHYSIOLOGY | Facility: CLINIC | Age: 21
End: 2021-07-12

## 2021-07-16 ENCOUNTER — OFFICE VISIT (OUTPATIENT)
Dept: ELECTROPHYSIOLOGY | Facility: CLINIC | Age: 21
End: 2021-07-16
Payer: COMMERCIAL

## 2021-07-16 ENCOUNTER — CLINICAL SUPPORT (OUTPATIENT)
Dept: CARDIOLOGY | Facility: HOSPITAL | Age: 21
End: 2021-07-16
Attending: INTERNAL MEDICINE
Payer: COMMERCIAL

## 2021-07-16 VITALS
HEART RATE: 72 BPM | BODY MASS INDEX: 22.82 KG/M2 | SYSTOLIC BLOOD PRESSURE: 110 MMHG | WEIGHT: 142 LBS | HEIGHT: 66 IN | DIASTOLIC BLOOD PRESSURE: 72 MMHG

## 2021-07-16 DIAGNOSIS — Z95.0 CARDIAC PACEMAKER IN SITU: Primary | ICD-10-CM

## 2021-07-16 DIAGNOSIS — I49.5 SSS (SICK SINUS SYNDROME): ICD-10-CM

## 2021-07-16 DIAGNOSIS — R00.2 PALPITATIONS: ICD-10-CM

## 2021-07-16 DIAGNOSIS — I47.19 ATRIAL TACHYCARDIA: ICD-10-CM

## 2021-07-16 DIAGNOSIS — R55 CONVULSIVE SYNCOPE: ICD-10-CM

## 2021-07-16 DIAGNOSIS — R55 SYNCOPE, UNSPECIFIED SYNCOPE TYPE: ICD-10-CM

## 2021-07-16 PROCEDURE — 99999 PR PBB SHADOW E&M-EST. PATIENT-LVL III: ICD-10-PCS | Mod: PBBFAC,,, | Performed by: NURSE PRACTITIONER

## 2021-07-16 PROCEDURE — 93005 RHYTHM STRIP: ICD-10-PCS | Mod: S$GLB,,, | Performed by: INTERNAL MEDICINE

## 2021-07-16 PROCEDURE — 93010 ELECTROCARDIOGRAM REPORT: CPT | Mod: S$GLB,,, | Performed by: INTERNAL MEDICINE

## 2021-07-16 PROCEDURE — 99999 PR PBB SHADOW E&M-EST. PATIENT-LVL III: CPT | Mod: PBBFAC,,, | Performed by: NURSE PRACTITIONER

## 2021-07-16 PROCEDURE — 93010 RHYTHM STRIP: ICD-10-PCS | Mod: S$GLB,,, | Performed by: INTERNAL MEDICINE

## 2021-07-16 PROCEDURE — 99214 OFFICE O/P EST MOD 30 MIN: CPT | Mod: S$GLB,,, | Performed by: NURSE PRACTITIONER

## 2021-07-16 PROCEDURE — 3008F BODY MASS INDEX DOCD: CPT | Mod: CPTII,S$GLB,, | Performed by: NURSE PRACTITIONER

## 2021-07-16 PROCEDURE — 93280 CARDIAC DEVICE CHECK - IN CLINIC & HOSPITAL: ICD-10-PCS | Mod: 26,,, | Performed by: INTERNAL MEDICINE

## 2021-07-16 PROCEDURE — 99214 PR OFFICE/OUTPT VISIT, EST, LEVL IV, 30-39 MIN: ICD-10-PCS | Mod: S$GLB,,, | Performed by: NURSE PRACTITIONER

## 2021-07-16 PROCEDURE — 3008F PR BODY MASS INDEX (BMI) DOCUMENTED: ICD-10-PCS | Mod: CPTII,S$GLB,, | Performed by: NURSE PRACTITIONER

## 2021-07-16 PROCEDURE — 1126F AMNT PAIN NOTED NONE PRSNT: CPT | Mod: S$GLB,,, | Performed by: NURSE PRACTITIONER

## 2021-07-16 PROCEDURE — 1126F PR PAIN SEVERITY QUANTIFIED, NO PAIN PRESENT: ICD-10-PCS | Mod: S$GLB,,, | Performed by: NURSE PRACTITIONER

## 2021-07-16 PROCEDURE — 93280 PM DEVICE PROGR EVAL DUAL: CPT | Mod: 26,,, | Performed by: INTERNAL MEDICINE

## 2021-07-16 PROCEDURE — 93280 PM DEVICE PROGR EVAL DUAL: CPT

## 2021-07-16 PROCEDURE — 93005 ELECTROCARDIOGRAM TRACING: CPT | Mod: S$GLB,,, | Performed by: INTERNAL MEDICINE

## 2021-08-26 ENCOUNTER — HOSPITAL ENCOUNTER (OUTPATIENT)
Dept: CARDIOLOGY | Facility: CLINIC | Age: 21
Discharge: HOME | End: 2021-08-26
Payer: COMMERCIAL

## 2021-08-26 ENCOUNTER — CLINICAL SUPPORT (OUTPATIENT)
Dept: CARDIOLOGY | Facility: HOSPITAL | Age: 21
End: 2021-08-26
Attending: INTERNAL MEDICINE
Payer: COMMERCIAL

## 2021-08-26 ENCOUNTER — OFFICE VISIT (OUTPATIENT)
Dept: ELECTROPHYSIOLOGY | Facility: CLINIC | Age: 21
End: 2021-08-26
Payer: COMMERCIAL

## 2021-08-26 VITALS
HEART RATE: 84 BPM | DIASTOLIC BLOOD PRESSURE: 79 MMHG | SYSTOLIC BLOOD PRESSURE: 116 MMHG | BODY MASS INDEX: 23.2 KG/M2 | HEIGHT: 66 IN | WEIGHT: 144.38 LBS | OXYGEN SATURATION: 100 %

## 2021-08-26 DIAGNOSIS — I97.190 PACEMAKER SYNDROME: ICD-10-CM

## 2021-08-26 DIAGNOSIS — R55 SYNCOPE, UNSPECIFIED SYNCOPE TYPE: ICD-10-CM

## 2021-08-26 DIAGNOSIS — R55 CONVULSIVE SYNCOPE: ICD-10-CM

## 2021-08-26 DIAGNOSIS — I49.5 SSS (SICK SINUS SYNDROME): ICD-10-CM

## 2021-08-26 DIAGNOSIS — Z95.0 CARDIAC PACEMAKER IN SITU: Primary | ICD-10-CM

## 2021-08-26 PROCEDURE — 3008F PR BODY MASS INDEX (BMI) DOCUMENTED: ICD-10-PCS | Mod: CPTII,S$GLB,, | Performed by: NURSE PRACTITIONER

## 2021-08-26 PROCEDURE — 3078F DIAST BP <80 MM HG: CPT | Mod: CPTII,S$GLB,, | Performed by: NURSE PRACTITIONER

## 2021-08-26 PROCEDURE — 99214 OFFICE O/P EST MOD 30 MIN: CPT | Mod: S$GLB,,, | Performed by: NURSE PRACTITIONER

## 2021-08-26 PROCEDURE — 93280 CARDIAC DEVICE CHECK - IN CLINIC & HOSPITAL: ICD-10-PCS | Mod: 26,,, | Performed by: INTERNAL MEDICINE

## 2021-08-26 PROCEDURE — 93280 PM DEVICE PROGR EVAL DUAL: CPT | Mod: 26,,, | Performed by: INTERNAL MEDICINE

## 2021-08-26 PROCEDURE — 1126F AMNT PAIN NOTED NONE PRSNT: CPT | Mod: CPTII,S$GLB,, | Performed by: NURSE PRACTITIONER

## 2021-08-26 PROCEDURE — 3078F PR MOST RECENT DIASTOLIC BLOOD PRESSURE < 80 MM HG: ICD-10-PCS | Mod: CPTII,S$GLB,, | Performed by: NURSE PRACTITIONER

## 2021-08-26 PROCEDURE — 3008F BODY MASS INDEX DOCD: CPT | Mod: CPTII,S$GLB,, | Performed by: NURSE PRACTITIONER

## 2021-08-26 PROCEDURE — 3074F SYST BP LT 130 MM HG: CPT | Mod: CPTII,S$GLB,, | Performed by: NURSE PRACTITIONER

## 2021-08-26 PROCEDURE — 1159F PR MEDICATION LIST DOCUMENTED IN MEDICAL RECORD: ICD-10-PCS | Mod: CPTII,S$GLB,, | Performed by: NURSE PRACTITIONER

## 2021-08-26 PROCEDURE — 1160F PR REVIEW ALL MEDS BY PRESCRIBER/CLIN PHARMACIST DOCUMENTED: ICD-10-PCS | Mod: CPTII,S$GLB,, | Performed by: NURSE PRACTITIONER

## 2021-08-26 PROCEDURE — 93005 RHYTHM STRIP: ICD-10-PCS | Mod: S$GLB,,, | Performed by: INTERNAL MEDICINE

## 2021-08-26 PROCEDURE — 1126F PR PAIN SEVERITY QUANTIFIED, NO PAIN PRESENT: ICD-10-PCS | Mod: CPTII,S$GLB,, | Performed by: NURSE PRACTITIONER

## 2021-08-26 PROCEDURE — 93010 ELECTROCARDIOGRAM REPORT: CPT | Mod: S$GLB,,, | Performed by: INTERNAL MEDICINE

## 2021-08-26 PROCEDURE — 99999 PR PBB SHADOW E&M-EST. PATIENT-LVL III: CPT | Mod: PBBFAC,,, | Performed by: NURSE PRACTITIONER

## 2021-08-26 PROCEDURE — 1159F MED LIST DOCD IN RCRD: CPT | Mod: CPTII,S$GLB,, | Performed by: NURSE PRACTITIONER

## 2021-08-26 PROCEDURE — 93010 RHYTHM STRIP: ICD-10-PCS | Mod: S$GLB,,, | Performed by: INTERNAL MEDICINE

## 2021-08-26 PROCEDURE — 93280 PM DEVICE PROGR EVAL DUAL: CPT

## 2021-08-26 PROCEDURE — 3074F PR MOST RECENT SYSTOLIC BLOOD PRESSURE < 130 MM HG: ICD-10-PCS | Mod: CPTII,S$GLB,, | Performed by: NURSE PRACTITIONER

## 2021-08-26 PROCEDURE — 93005 ELECTROCARDIOGRAM TRACING: CPT | Mod: S$GLB,,, | Performed by: INTERNAL MEDICINE

## 2021-08-26 PROCEDURE — 99999 PR PBB SHADOW E&M-EST. PATIENT-LVL III: ICD-10-PCS | Mod: PBBFAC,,, | Performed by: NURSE PRACTITIONER

## 2021-08-26 PROCEDURE — 1160F RVW MEDS BY RX/DR IN RCRD: CPT | Mod: CPTII,S$GLB,, | Performed by: NURSE PRACTITIONER

## 2021-08-26 PROCEDURE — 99214 PR OFFICE/OUTPT VISIT, EST, LEVL IV, 30-39 MIN: ICD-10-PCS | Mod: S$GLB,,, | Performed by: NURSE PRACTITIONER

## 2021-08-29 ENCOUNTER — CLINICAL SUPPORT (OUTPATIENT)
Dept: CARDIOLOGY | Facility: HOSPITAL | Age: 21
End: 2021-08-29
Payer: COMMERCIAL

## 2021-08-29 DIAGNOSIS — Z95.0 PRESENCE OF CARDIAC PACEMAKER: ICD-10-CM

## 2021-08-29 PROCEDURE — 93294 CARDIAC DEVICE CHECK - REMOTE: ICD-10-PCS | Mod: ,,, | Performed by: INTERNAL MEDICINE

## 2021-08-29 PROCEDURE — 93296 REM INTERROG EVL PM/IDS: CPT | Performed by: INTERNAL MEDICINE

## 2021-08-29 PROCEDURE — 93294 REM INTERROG EVL PM/LDLS PM: CPT | Mod: ,,, | Performed by: INTERNAL MEDICINE

## 2021-11-17 ENCOUNTER — TELEPHONE (OUTPATIENT)
Dept: ELECTROPHYSIOLOGY | Facility: CLINIC | Age: 21
End: 2021-11-17
Payer: COMMERCIAL

## 2021-11-18 ENCOUNTER — OFFICE VISIT (OUTPATIENT)
Dept: ELECTROPHYSIOLOGY | Facility: CLINIC | Age: 21
End: 2021-11-18
Payer: COMMERCIAL

## 2021-11-18 ENCOUNTER — HOSPITAL ENCOUNTER (OUTPATIENT)
Dept: CARDIOLOGY | Facility: CLINIC | Age: 21
Discharge: HOME | End: 2021-11-18
Attending: INTERNAL MEDICINE
Payer: COMMERCIAL

## 2021-11-18 VITALS
TEMPERATURE: 76 F | HEIGHT: 65 IN | WEIGHT: 147.25 LBS | SYSTOLIC BLOOD PRESSURE: 127 MMHG | BODY MASS INDEX: 24.53 KG/M2 | DIASTOLIC BLOOD PRESSURE: 76 MMHG

## 2021-11-18 DIAGNOSIS — R07.9 CHEST PAIN, UNSPECIFIED TYPE: Primary | ICD-10-CM

## 2021-11-18 DIAGNOSIS — I49.5 SSS (SICK SINUS SYNDROME): ICD-10-CM

## 2021-11-18 DIAGNOSIS — Z95.0 CARDIAC PACEMAKER IN SITU: ICD-10-CM

## 2021-11-18 DIAGNOSIS — R55 SYNCOPE, UNSPECIFIED SYNCOPE TYPE: ICD-10-CM

## 2021-11-18 PROCEDURE — 99999 PR PBB SHADOW E&M-EST. PATIENT-LVL III: ICD-10-PCS | Mod: PBBFAC,,, | Performed by: NURSE PRACTITIONER

## 2021-11-18 PROCEDURE — 93005 ELECTROCARDIOGRAM TRACING: CPT | Mod: S$GLB,,, | Performed by: INTERNAL MEDICINE

## 2021-11-18 PROCEDURE — 99214 OFFICE O/P EST MOD 30 MIN: CPT | Mod: S$GLB,,, | Performed by: NURSE PRACTITIONER

## 2021-11-18 PROCEDURE — 3078F DIAST BP <80 MM HG: CPT | Mod: CPTII,S$GLB,, | Performed by: NURSE PRACTITIONER

## 2021-11-18 PROCEDURE — 3078F PR MOST RECENT DIASTOLIC BLOOD PRESSURE < 80 MM HG: ICD-10-PCS | Mod: CPTII,S$GLB,, | Performed by: NURSE PRACTITIONER

## 2021-11-18 PROCEDURE — 1160F RVW MEDS BY RX/DR IN RCRD: CPT | Mod: CPTII,S$GLB,, | Performed by: NURSE PRACTITIONER

## 2021-11-18 PROCEDURE — 99214 PR OFFICE/OUTPT VISIT, EST, LEVL IV, 30-39 MIN: ICD-10-PCS | Mod: S$GLB,,, | Performed by: NURSE PRACTITIONER

## 2021-11-18 PROCEDURE — 3008F PR BODY MASS INDEX (BMI) DOCUMENTED: ICD-10-PCS | Mod: CPTII,S$GLB,, | Performed by: NURSE PRACTITIONER

## 2021-11-18 PROCEDURE — 3074F PR MOST RECENT SYSTOLIC BLOOD PRESSURE < 130 MM HG: ICD-10-PCS | Mod: CPTII,S$GLB,, | Performed by: NURSE PRACTITIONER

## 2021-11-18 PROCEDURE — 3008F BODY MASS INDEX DOCD: CPT | Mod: CPTII,S$GLB,, | Performed by: NURSE PRACTITIONER

## 2021-11-18 PROCEDURE — 1160F PR REVIEW ALL MEDS BY PRESCRIBER/CLIN PHARMACIST DOCUMENTED: ICD-10-PCS | Mod: CPTII,S$GLB,, | Performed by: NURSE PRACTITIONER

## 2021-11-18 PROCEDURE — 99999 PR PBB SHADOW E&M-EST. PATIENT-LVL III: CPT | Mod: PBBFAC,,, | Performed by: NURSE PRACTITIONER

## 2021-11-18 PROCEDURE — 93010 ELECTROCARDIOGRAM REPORT: CPT | Mod: S$GLB,,, | Performed by: INTERNAL MEDICINE

## 2021-11-18 PROCEDURE — 93005 RHYTHM STRIP: ICD-10-PCS | Mod: S$GLB,,, | Performed by: INTERNAL MEDICINE

## 2021-11-18 PROCEDURE — 1159F PR MEDICATION LIST DOCUMENTED IN MEDICAL RECORD: ICD-10-PCS | Mod: CPTII,S$GLB,, | Performed by: NURSE PRACTITIONER

## 2021-11-18 PROCEDURE — 93010 RHYTHM STRIP: ICD-10-PCS | Mod: S$GLB,,, | Performed by: INTERNAL MEDICINE

## 2021-11-18 PROCEDURE — 3074F SYST BP LT 130 MM HG: CPT | Mod: CPTII,S$GLB,, | Performed by: NURSE PRACTITIONER

## 2021-11-18 PROCEDURE — 1159F MED LIST DOCD IN RCRD: CPT | Mod: CPTII,S$GLB,, | Performed by: NURSE PRACTITIONER

## 2021-11-22 ENCOUNTER — OFFICE VISIT (OUTPATIENT)
Dept: INTERNAL MEDICINE | Facility: CLINIC | Age: 21
End: 2021-11-22
Payer: COMMERCIAL

## 2021-11-22 ENCOUNTER — IMMUNIZATION (OUTPATIENT)
Dept: INTERNAL MEDICINE | Facility: CLINIC | Age: 21
End: 2021-11-22
Payer: COMMERCIAL

## 2021-11-22 ENCOUNTER — HOSPITAL ENCOUNTER (OUTPATIENT)
Dept: CARDIOLOGY | Facility: HOSPITAL | Age: 21
Discharge: HOME | End: 2021-11-22
Attending: NURSE PRACTITIONER
Payer: COMMERCIAL

## 2021-11-22 VITALS
WEIGHT: 147 LBS | BODY MASS INDEX: 23.63 KG/M2 | DIASTOLIC BLOOD PRESSURE: 82 MMHG | HEIGHT: 66 IN | SYSTOLIC BLOOD PRESSURE: 116 MMHG | OXYGEN SATURATION: 98 % | HEART RATE: 82 BPM

## 2021-11-22 VITALS — BODY MASS INDEX: 24.49 KG/M2 | HEIGHT: 65 IN | WEIGHT: 147 LBS

## 2021-11-22 DIAGNOSIS — M54.2 NECK PAIN: ICD-10-CM

## 2021-11-22 DIAGNOSIS — Z95.0 CARDIAC PACEMAKER IN SITU: ICD-10-CM

## 2021-11-22 DIAGNOSIS — Z00.00 VISIT FOR ANNUAL HEALTH EXAMINATION: Primary | ICD-10-CM

## 2021-11-22 DIAGNOSIS — R07.9 CHEST PAIN, UNSPECIFIED TYPE: ICD-10-CM

## 2021-11-22 DIAGNOSIS — I49.5 SSS (SICK SINUS SYNDROME): ICD-10-CM

## 2021-11-22 DIAGNOSIS — R55 CONVULSIVE SYNCOPE: ICD-10-CM

## 2021-11-22 PROBLEM — M54.50 ACUTE MIDLINE LOW BACK PAIN WITHOUT SCIATICA: Status: RESOLVED | Noted: 2020-12-18 | Resolved: 2021-11-22

## 2021-11-22 PROBLEM — R06.02 SOB (SHORTNESS OF BREATH): Status: RESOLVED | Noted: 2021-03-26 | Resolved: 2021-11-22

## 2021-11-22 LAB
CV STRESS BASE HR: 80 BPM
DIASTOLIC BLOOD PRESSURE: 61 MMHG
OHS CV CPX 1 MINUTE RECOVERY HEART RATE: 121 BPM
OHS CV CPX 85 PERCENT MAX PREDICTED HEART RATE MALE: 158
OHS CV CPX ESTIMATED METS: 7
OHS CV CPX MAX PREDICTED HEART RATE: 186
OHS CV CPX PATIENT IS FEMALE: 1
OHS CV CPX PATIENT IS MALE: 0
OHS CV CPX PEAK DIASTOLIC BLOOD PRESSURE: 94 MMHG
OHS CV CPX PEAK HEAR RATE: 125 BPM
OHS CV CPX PEAK RATE PRESSURE PRODUCT: NORMAL
OHS CV CPX PEAK SYSTOLIC BLOOD PRESSURE: 139 MMHG
OHS CV CPX PERCENT MAX PREDICTED HEART RATE ACHIEVED: 67
OHS CV CPX RATE PRESSURE PRODUCT PRESENTING: 8240
STRESS ECHO POST EXERCISE DUR MIN: 4 MINUTES
STRESS ECHO POST EXERCISE DUR SEC: 30 SECONDS
SYSTOLIC BLOOD PRESSURE: 103 MMHG

## 2021-11-22 PROCEDURE — 93017 CV STRESS TEST TRACING ONLY: CPT

## 2021-11-22 PROCEDURE — 99395 PREV VISIT EST AGE 18-39: CPT | Mod: 25,S$GLB,, | Performed by: INTERNAL MEDICINE

## 2021-11-22 PROCEDURE — 90471 IMMUNIZATION ADMIN: CPT | Mod: S$GLB,,, | Performed by: INTERNAL MEDICINE

## 2021-11-22 PROCEDURE — 90686 FLU VACCINE (QUAD) GREATER THAN OR EQUAL TO 3YO PRESERVATIVE FREE IM: ICD-10-PCS | Mod: S$GLB,,, | Performed by: INTERNAL MEDICINE

## 2021-11-22 PROCEDURE — 93016 CV STRESS TEST SUPVJ ONLY: CPT | Mod: ,,, | Performed by: INTERNAL MEDICINE

## 2021-11-22 PROCEDURE — 99395 PR PREVENTIVE VISIT,EST,18-39: ICD-10-PCS | Mod: 25,S$GLB,, | Performed by: INTERNAL MEDICINE

## 2021-11-22 PROCEDURE — 93018 CV STRESS TEST I&R ONLY: CPT | Mod: ,,, | Performed by: INTERNAL MEDICINE

## 2021-11-22 PROCEDURE — 99999 PR PBB SHADOW E&M-EST. PATIENT-LVL IV: ICD-10-PCS | Mod: PBBFAC,,, | Performed by: INTERNAL MEDICINE

## 2021-11-22 PROCEDURE — 90686 IIV4 VACC NO PRSV 0.5 ML IM: CPT | Mod: S$GLB,,, | Performed by: INTERNAL MEDICINE

## 2021-11-22 PROCEDURE — 93018 EXERCISE STRESS - EKG (CUPID ONLY): ICD-10-PCS | Mod: ,,, | Performed by: INTERNAL MEDICINE

## 2021-11-22 PROCEDURE — 99999 PR PBB SHADOW E&M-EST. PATIENT-LVL IV: CPT | Mod: PBBFAC,,, | Performed by: INTERNAL MEDICINE

## 2021-11-22 PROCEDURE — 90471 FLU VACCINE (QUAD) GREATER THAN OR EQUAL TO 3YO PRESERVATIVE FREE IM: ICD-10-PCS | Mod: S$GLB,,, | Performed by: INTERNAL MEDICINE

## 2021-11-22 PROCEDURE — 93016 EXERCISE STRESS - EKG (CUPID ONLY): ICD-10-PCS | Mod: ,,, | Performed by: INTERNAL MEDICINE

## 2021-11-27 ENCOUNTER — CLINICAL SUPPORT (OUTPATIENT)
Dept: CARDIOLOGY | Facility: HOSPITAL | Age: 21
End: 2021-11-27
Payer: COMMERCIAL

## 2021-11-27 DIAGNOSIS — Z95.0 PRESENCE OF CARDIAC PACEMAKER: ICD-10-CM

## 2021-11-27 PROCEDURE — 93294 CARDIAC DEVICE CHECK - REMOTE: ICD-10-PCS | Mod: ,,, | Performed by: INTERNAL MEDICINE

## 2021-11-27 PROCEDURE — 93294 REM INTERROG EVL PM/LDLS PM: CPT | Mod: ,,, | Performed by: INTERNAL MEDICINE

## 2021-11-27 PROCEDURE — 93296 REM INTERROG EVL PM/IDS: CPT | Performed by: INTERNAL MEDICINE

## 2021-12-13 ENCOUNTER — CLINICAL SUPPORT (OUTPATIENT)
Dept: REHABILITATION | Facility: HOSPITAL | Age: 21
End: 2021-12-13
Payer: COMMERCIAL

## 2021-12-13 DIAGNOSIS — M53.82 IMPAIRED RANGE OF MOTION OF CERVICAL SPINE: ICD-10-CM

## 2021-12-13 DIAGNOSIS — M54.2 BILATERAL NECK PAIN: ICD-10-CM

## 2021-12-13 DIAGNOSIS — M54.2 NECK PAIN: ICD-10-CM

## 2021-12-13 DIAGNOSIS — R29.898 IMPAIRED FLEXIBILITY OF UPPER EXTREMITY: ICD-10-CM

## 2021-12-13 PROCEDURE — 97110 THERAPEUTIC EXERCISES: CPT | Mod: PO

## 2021-12-13 PROCEDURE — 97162 PT EVAL MOD COMPLEX 30 MIN: CPT | Mod: PO

## 2022-01-10 ENCOUNTER — CLINICAL SUPPORT (OUTPATIENT)
Dept: REHABILITATION | Facility: HOSPITAL | Age: 22
End: 2022-01-10
Payer: COMMERCIAL

## 2022-01-10 DIAGNOSIS — M54.2 BILATERAL NECK PAIN: ICD-10-CM

## 2022-01-10 DIAGNOSIS — R29.898 IMPAIRED FLEXIBILITY OF UPPER EXTREMITY: Primary | ICD-10-CM

## 2022-01-10 DIAGNOSIS — M53.82 IMPAIRED RANGE OF MOTION OF CERVICAL SPINE: ICD-10-CM

## 2022-01-10 PROCEDURE — 97110 THERAPEUTIC EXERCISES: CPT | Mod: PO

## 2022-01-10 NOTE — PROGRESS NOTES
"[facility]   Physical Therapy Treatment Note     Name: Vanessa Sinclair  Clinic Number: [id number]    Therapy Diagnosis:   Encounter Diagnoses   Name Primary?    Impaired flexibility of upper extremity Yes    Bilateral neck pain     Impaired range of motion of cervical spine      Physician: Marry Roque MD    Visit Date: 1/10/2022    Physician Orders: PT Eval and Treat   Medical Diagnosis from Referral: neck pain  Evaluation Date: 12/13/2021  Authorization Period Expiration: 2/7/22  Plan of Care Expiration: 1/24/22  Progress Note Due: 1/13/22  Visit # / Visits authorized: 1/20  FOTO: 1/3     Precautions: asthma,  seizures , syncope, pacemaker       Time In: 10:00 AM  Time Out: 10:40 AM  Total Appointment Time (timed & untimed codes): 40  minutes (TE-3)     SUBJECTIVE     Pt reports: still has the tightness and pain in the neck. She does feel a little relief after doing the exercises. She is continuing to have migraines as well.  She was semi-compliant with home exercise program.- had COVID  Response to previous treatment: kusum manohar well  Functional change: none     Pain: 6/10  Location: neck (mainly on the left side)    OBJECTIVE     n/a    Treatment     April received the treatments listed below:      therex for flexibility and posture x40 mins    Supine chin tuck with towel under head 30x3"   +Supine chin tuck with rotation 2x10 to each side  UT stretch B 3x30"  +Open books 2x10 to each side   LS stretch B - not performed today       Patient Education and Home Exercises     Home Exercises Provided and Patient Education Provided     Education provided:   -exercises  - pt educated on all interventions today  - HEP    Written Home Exercises Provided: yes. Exercises were reviewed and April was able to demonstrate them prior to the end of the session.  April demonstrated good  understanding of the education provided. See EMR under Patient Instructions for exercises provided during " therapy sessions    ASSESSMENT     Pt with fair kusum to session today. We attempted to begin manual therapy but patient has increased pain even with gentle soft tissue massage so this is discontinued. Otherwise, however, she has good tolerance to therex with education to keep all exercises pain-free. Progress as kusum.      April Is progressing well towards her goals.   Pt prognosis is Good.     Pt will continue to benefit from skilled outpatient physical therapy to address the deficits listed in the problem list box on initial evaluation, provide pt/family education and to maximize pt's level of independence in the home and community environment.     Pt's spiritual, cultural and educational needs considered and pt agreeable to plan of care and goals.     Anticipated barriers to physical therapy: none    Goals: PROGRESSING          Short Term Goals: 3 weeks  1. Pt to be independent in HEP to improve independence with symptoms.   2. Pt to require min VC from PT for proper scapular retraction in order to improve postural awareness.   3. Pt to improve periscapular muscles strength by 1/2 muscle grade to improve scapular stability.   4. Pt to report pain at worst to be </=4/10 in order to improve upon symptom management and quality of life.   5. Pt will demonstrate improved sitting posture to decrease pain experienced in neck and improve cervicothoracic endurance.     Long Term Goals: 6 weeks   1. Pt to be independent in HEP progressions to improve independence with symptoms.   2. Pt to require no verbal or tactile from PT for proper scapular retraction in order to improve postural awareness.   3. Pt to improve periscapular muscles strength by 1 muscle grade to improve scapular stability.   4. Pt to report pain at worst to be </=2/10 in order to improve upon symptom management and quality of life.  5. Pt to improve cervical AROM to WFL to improve tolerance to daily activities at home and work.   6. Pt to show flexibility  restrictions to </= min restrictions for improved posture and movement.  7. Pt will improve FOTO to </=30% limitation to improve perceived limitation        PLAN     Continue per PT POC    Terri Galvez, PT

## 2022-01-18 ENCOUNTER — DOCUMENTATION ONLY (OUTPATIENT)
Dept: REHABILITATION | Facility: HOSPITAL | Age: 22
End: 2022-01-18

## 2022-01-18 ENCOUNTER — OFFICE VISIT (OUTPATIENT)
Dept: OBSTETRICS AND GYNECOLOGY | Facility: CLINIC | Age: 22
End: 2022-01-18
Payer: COMMERCIAL

## 2022-01-18 ENCOUNTER — CLINICAL SUPPORT (OUTPATIENT)
Dept: REHABILITATION | Facility: HOSPITAL | Age: 22
End: 2022-01-18
Payer: COMMERCIAL

## 2022-01-18 VITALS
HEIGHT: 65 IN | DIASTOLIC BLOOD PRESSURE: 84 MMHG | WEIGHT: 145.75 LBS | BODY MASS INDEX: 24.28 KG/M2 | SYSTOLIC BLOOD PRESSURE: 114 MMHG

## 2022-01-18 DIAGNOSIS — M54.2 BILATERAL NECK PAIN: ICD-10-CM

## 2022-01-18 DIAGNOSIS — Z11.3 SCREEN FOR STD (SEXUALLY TRANSMITTED DISEASE): ICD-10-CM

## 2022-01-18 DIAGNOSIS — M53.82 IMPAIRED RANGE OF MOTION OF CERVICAL SPINE: ICD-10-CM

## 2022-01-18 DIAGNOSIS — R29.898 IMPAIRED FLEXIBILITY OF UPPER EXTREMITY: ICD-10-CM

## 2022-01-18 DIAGNOSIS — Z30.09 ENCOUNTER FOR OTHER GENERAL COUNSELING OR ADVICE ON CONTRACEPTION: Primary | ICD-10-CM

## 2022-01-18 PROCEDURE — 1159F PR MEDICATION LIST DOCUMENTED IN MEDICAL RECORD: ICD-10-PCS | Mod: CPTII,S$GLB,, | Performed by: OBSTETRICS & GYNECOLOGY

## 2022-01-18 PROCEDURE — 3008F BODY MASS INDEX DOCD: CPT | Mod: CPTII,S$GLB,, | Performed by: OBSTETRICS & GYNECOLOGY

## 2022-01-18 PROCEDURE — 87491 CHLMYD TRACH DNA AMP PROBE: CPT | Performed by: OBSTETRICS & GYNECOLOGY

## 2022-01-18 PROCEDURE — 99203 PR OFFICE/OUTPT VISIT, NEW, LEVL III, 30-44 MIN: ICD-10-PCS | Mod: S$GLB,,, | Performed by: OBSTETRICS & GYNECOLOGY

## 2022-01-18 PROCEDURE — 3008F PR BODY MASS INDEX (BMI) DOCUMENTED: ICD-10-PCS | Mod: CPTII,S$GLB,, | Performed by: OBSTETRICS & GYNECOLOGY

## 2022-01-18 PROCEDURE — 3079F PR MOST RECENT DIASTOLIC BLOOD PRESSURE 80-89 MM HG: ICD-10-PCS | Mod: CPTII,S$GLB,, | Performed by: OBSTETRICS & GYNECOLOGY

## 2022-01-18 PROCEDURE — 97110 THERAPEUTIC EXERCISES: CPT | Mod: PO,CQ

## 2022-01-18 PROCEDURE — 3074F SYST BP LT 130 MM HG: CPT | Mod: CPTII,S$GLB,, | Performed by: OBSTETRICS & GYNECOLOGY

## 2022-01-18 PROCEDURE — 1159F MED LIST DOCD IN RCRD: CPT | Mod: CPTII,S$GLB,, | Performed by: OBSTETRICS & GYNECOLOGY

## 2022-01-18 PROCEDURE — 87591 N.GONORRHOEAE DNA AMP PROB: CPT | Performed by: OBSTETRICS & GYNECOLOGY

## 2022-01-18 PROCEDURE — 3074F PR MOST RECENT SYSTOLIC BLOOD PRESSURE < 130 MM HG: ICD-10-PCS | Mod: CPTII,S$GLB,, | Performed by: OBSTETRICS & GYNECOLOGY

## 2022-01-18 PROCEDURE — 99203 OFFICE O/P NEW LOW 30 MIN: CPT | Mod: S$GLB,,, | Performed by: OBSTETRICS & GYNECOLOGY

## 2022-01-18 PROCEDURE — 3079F DIAST BP 80-89 MM HG: CPT | Mod: CPTII,S$GLB,, | Performed by: OBSTETRICS & GYNECOLOGY

## 2022-01-18 RX ORDER — NORETHINDRONE ACETATE AND ETHINYL ESTRADIOL 1MG-20(24)
1 KIT ORAL DAILY
Qty: 84 TABLET | Refills: 3 | Status: SHIPPED | OUTPATIENT
Start: 2022-01-18 | End: 2022-06-17 | Stop reason: SDUPTHER

## 2022-01-18 NOTE — PROGRESS NOTES
[facility]   Physical Therapy Treatment Note     Name: Vanessa Sinclair  Clinic Number: [id number]    Therapy Diagnosis:   Encounter Diagnoses   Name Primary?    Impaired flexibility of upper extremity     Bilateral neck pain     Impaired range of motion of cervical spine      Physician: Marry Roque MD    Visit Date: 1/18/2022    Physician Orders: PT Eval and Treat   Medical Diagnosis from Referral: neck pain  Evaluation Date: 12/13/2021  Authorization Period Expiration: 2/7/22  Plan of Care Expiration: 1/24/22  Progress Note Due: 1/13/22  Visit # / Visits authorized: 2 / 20  FOTO: 1 / 3     Precautions: asthma,  seizures , syncope, pacemaker       Time In: 03:15 pm   Time Out: 03:55 pm   Total Appointment Time (timed & untimed codes): 40  minutes (TE-3)     SUBJECTIVE     Pt reports: She feeling about same today     She was semi-compliant with home exercise program.- had COVID  Response to previous treatment: tolerated eval well  Functional change: none     Pain: 6 / 10  Location: neck (mainly on the left side)    OBJECTIVE     n/a    Treatment     April received the treatments listed below:      Therex for flexibility and posture x 40 minutes    Supine Chin Tuck with Towel Under Head 30 x 3 seconds   Supine Chin Tuck with Rotation 2 x 10 to Each Side  Upper Trap Stretch Bilateral 3 x 30 seconds   Open Books 2 x 10 to Each Side   LS stretch Bilateral: 3 x 30 Seconds       Patient Education and Home Exercises     Home Exercises Provided and Patient Education Provided     Education provided:   -exercises  - pt educated on all interventions today  - HEP    Written Home Exercises Provided: yes. Exercises were reviewed and April was able to demonstrate them prior to the end of the session.  April demonstrated good  understanding of the education provided. See EMR under Patient Instructions for exercises provided during therapy sessions    ASSESSMENT     Patient with slight  improvement in exercise tolerance, however still unable to tolerate manual technique this session.  Progress as tolerated.    April Is progressing well towards her goals.   Pt prognosis is Good.     Pt will continue to benefit from skilled outpatient physical therapy to address the deficits listed in the problem list box on initial evaluation, provide pt/family education and to maximize pt's level of independence in the home and community environment.     Pt's spiritual, cultural and educational needs considered and pt agreeable to plan of care and goals.     Anticipated barriers to physical therapy: None    Goals: PROGRESSING          Short Term Goals: 3 weeks  1. Pt to be independent in HEP to improve independence with symptoms.   2. Pt to require min VC from PT for proper scapular retraction in order to improve postural awareness.   3. Pt to improve periscapular muscles strength by 1/2 muscle grade to improve scapular stability.   4. Pt to report pain at worst to be </=4/10 in order to improve upon symptom management and quality of life.   5. Pt will demonstrate improved sitting posture to decrease pain experienced in neck and improve cervicothoracic endurance.     Long Term Goals: 6 weeks   1. Pt to be independent in HEP progressions to improve independence with symptoms.   2. Pt to require no verbal or tactile from PT for proper scapular retraction in order to improve postural awareness.   3. Pt to improve periscapular muscles strength by 1 muscle grade to improve scapular stability.   4. Pt to report pain at worst to be </=2/10 in order to improve upon symptom management and quality of life.  5. Pt to improve cervical AROM to WFL to improve tolerance to daily activities at home and work.   6. Pt to show flexibility restrictions to </= min restrictions for improved posture and movement.  7. Pt will improve FOTO to </=30% limitation to improve perceived limitation        PLAN     Continue per PT POC    Karen  Dalton, PTA

## 2022-01-18 NOTE — PROGRESS NOTES
PT/PTA face to face conference completed regarding patient treatment plan and progress towards established goals. Treatment will be continued as described in initial report/ evaluation and treatment/progress notes. Patient will be seen by physical therapist every sixth visit or minimally once per month.

## 2022-01-18 NOTE — PROGRESS NOTES
Subjective:       Patient ID: April Lorrie Sinclair is a 21 y.o. female.    Chief Complaint: Contraception Management       History of Present Illness  20 yo G0 presents to establish care and refill ATIF.  No complaints.  Desires STD screening.  Started menses today.     Patient Active Problem List   Diagnosis    Migraine headache    Convulsive syncope    Vitamin D deficiency    Anxiety    Cardiac pacemaker in situ    SSS (sick sinus syndrome)    Neck pain    Paresthesia and pain of right extremity    Palpitations    Intercostal pain    Pacemaker syndrome    Pre-syncope    Impaired flexibility of upper extremity    Bilateral neck pain    Impaired range of motion of cervical spine       Past Medical History:   Diagnosis Date    Asthma     Headache(784.0)     Migraine     Seizures     Syncope and collapse        Past Surgical History:   Procedure Laterality Date    A-V CARDIAC PACEMAKER INSERTION Left 2019    Procedure: INSERTION, CARDIAC PACEMAKER, DUAL CHAMBER;  Surgeon: Piter Cooley MD;  Location: [facility] CATH LAB/EP;  Service: Cardiology;  Laterality: Left;    PHLEBOGRAPHY  2021    Procedure: Venogram;  Surgeon: Piter Cooley MD;  Location: [facility] EP LAB;  Service: Cardiology;;    REPLACEMENT OF PACEMAKER GENERATOR  2021    Procedure: REPLACEMENT, PACEMAKER GENERATOR;  Surgeon: Piter Cooley MD;  Location: [facility] EP LAB;  Service: Cardiology;;    WISDOM TOOTH EXTRACTION         OB History    Para Term  AB Living   0 0 0 0 0 0   SAB IAB Ectopic Multiple Live Births   0 0 0 0 0   Obstetric Comments      No abnormal pap   No STDs       Patient's last menstrual period was 2022.   Date of Last Pap: No result found    Review of Systems  Review of Systems   Constitutional: Negative for activity change, appetite change, chills, fatigue and fever.   Respiratory: Negative for shortness of breath.    Cardiovascular: Negative for chest pain.   Gastrointestinal:  "Negative for abdominal pain, constipation, diarrhea, nausea and vomiting.   Endocrine: Negative for cold intolerance and heat intolerance.   Genitourinary: Negative for dysuria, flank pain, frequency, hematuria, menstrual irregularity, pelvic pain, urgency and vaginal discharge.   Musculoskeletal: Negative for back pain.   Integumentary:  Negative for rash, breast mass, breast discharge and breast tenderness.   Neurological: Negative for headaches.   Psychiatric/Behavioral: Negative for dysphoric mood. The patient is not nervous/anxious.    Breast: Negative for mass and tenderness       Objective:   /84   Ht 5' 5" (1.651 m)   Wt 66.1 kg (145 lb 11.6 oz)   LMP 01/18/2022   BMI 24.25 kg/m²   Body mass index is 24.25 kg/m².    APPEARANCE: Well nourished, well developed, in no acute distress.  PSYCH: Appropriate mood and affect.  SKIN: No acne or hirsutism  NECK: Neck symmetric without masses or thyromegaly  NODES: No inguinal, axillary, or supraclavicular lymph node enlargement  ABDOMEN: Soft.  No tenderness or masses.    CARDIOVASCULAR: No edema of peripheral extremities      Results for orders placed or performed during the hospital encounter of 11/22/21   Exercise Stress - EKG   Result Value Ref Range    85% Max Predicted      Max Predicted      [facility] CV CPX PATIENT IS MALE 0.0     [facility] CV CPX PATIENT IS FEMALE 1.0     HR at rest 80 bpm    Systolic blood pressure 103 mmHg    Diastolic blood pressure 61 mmHg    RPP 8,240     Exercise duration (min) 4 minutes    Exercise duration (sec) 30 seconds    Peak  bpm    Peak Systolic  mmHg    Peak Diatolic BP 94 mmHg    Peak RPP 17,375     Estimated METs 7     % Max HR Achieved 67     1 Minute Recovery  bpm       Assessment/ Plan:     1. Encounter for other general counseling or advice on contraception     2. Screen for STD (sexually transmitted disease)  C. trachomatis/N. gonorrhoeae by AMP DNA [facility]; Urine       Follow up in about 2 " weeks (around 2/1/2022) for annual well woman exam or as needed.            Karin Rushing MD  [facility]  01/18/2022

## 2022-01-18 NOTE — PATIENT INSTRUCTIONS
Please check out the [employer] PATIENT WEBSITE.  The site has education materials, patient stories, expert views, and a portal for you to ask questions.       [email or web address]      As always, please let me know if you have any questions.     Dr. Karin Rushing

## 2022-01-21 LAB
C TRACH DNA SPEC QL NAA+PROBE: NOT DETECTED
N GONORRHOEA DNA SPEC QL NAA+PROBE: NOT DETECTED

## 2022-01-24 ENCOUNTER — CLINICAL SUPPORT (OUTPATIENT)
Dept: REHABILITATION | Facility: HOSPITAL | Age: 22
End: 2022-01-24
Payer: COMMERCIAL

## 2022-01-24 DIAGNOSIS — M54.2 BILATERAL NECK PAIN: ICD-10-CM

## 2022-01-24 DIAGNOSIS — R29.898 IMPAIRED FLEXIBILITY OF UPPER EXTREMITY: Primary | ICD-10-CM

## 2022-01-24 DIAGNOSIS — M53.82 IMPAIRED RANGE OF MOTION OF CERVICAL SPINE: ICD-10-CM

## 2022-01-24 PROCEDURE — 97140 MANUAL THERAPY 1/> REGIONS: CPT | Mod: PO

## 2022-01-24 PROCEDURE — 97110 THERAPEUTIC EXERCISES: CPT | Mod: PO

## 2022-01-24 NOTE — PLAN OF CARE
"[facility]   Physical Therapy Treatment Note / Progress note / Updated Plan of Care    Name: Vanessa Sinclair  Clinic Number: [id number]    Therapy Diagnosis:   Encounter Diagnoses   Name Primary?    Impaired flexibility of upper extremity Yes    Bilateral neck pain     Impaired range of motion of cervical spine      Physician: Marry Roque MD    Visit Date: 1/24/2022    Physician Orders: PT Eval and Treat   Medical Diagnosis from Referral: neck pain  Evaluation Date: 12/13/2021  Authorization Period Expiration: 2/7/22  Plan of Care Expiration: 3/21/22  Progress Note Due: 2/24/22  Visit # / Visits authorized: 3 / 20  FOTO: 1 / 3     Precautions: asthma,  seizures , syncope, pacemaker       Time In:100 PM   Time Out 153 PM  Total Appointment Time (timed & untimed codes): 43  minutes (TE-2, MT-1) +10 min MHP (untimed)     SUBJECTIVE     Pt reports: she has been in a lot of pain. She has been doing a lot though. She is in school now, working a lot, playing a lot of card games because she was taking care of her nieces.  She was compliant with home exercise program.  Response to previous treatment: "I felt really good" with the heat   Functional change: none as of yet     Pain: 7 / 10  Location: neck (L>R)    OBJECTIVE        Posture: FHP and rounded shoulders      Cervical Range of Motion:     Degrees Pain   Flexion 50 yes   Extension 50 yes   Right Rotation 70 yes   Left Rotation 65     no   Right Side Bending 50 no   Left Side Bending 50 no           Upper Extremity Strength  (R) UE   (L) UE     Shoulder flexion: 4+/5 Shoulder flexion: 4+/5   Shoulder Abduction: 4+/5 Shoulder abduction: 4+/5   Shoulder ER 5/5 Shoulder ER 5/5   Shoulder IR 5/5 Shoulder IR 5/5   Lower trap 4/5 Lower trap  4/5   Middle trap 4/5 Middle trap  4+/5   Rhomboids 4+/5 Rhomboids 4+/5             Treatment     MHP to neck at the beginning of session for 10 mins for pain relief      April received the " treatments listed below:      Therex for flexibility and posture x 28 minutes  Bold=exercises performed  Including reassessment     Diaphragmatic breathing in supine x3 mins  Supine Chin Tuck with Towel Under Head 30 x 3 seconds   Supine Chin Tuck with Rotation 2 x 10 to Each Side  Upper Trap Stretch Bilateral 3 x 30 seconds   Open Books 2 x 10 to Each Side   LS stretch Bilateral: 3 x 30 Seconds     Manual therapy technique: x15 mins  Gentle STM to B UT, cervical paraspinals and suboccipital musculature      Patient Education and Home Exercises     Home Exercises Provided and Patient Education Provided     Education provided:   -exercises  - pt educated on all interventions today  -PT POC  - diaphragmatic breathing  - HEP    Written Home Exercises Provided: yes. Exercises were reviewed and April was able to demonstrate them prior to the end of the session.  April demonstrated good  understanding of the education provided. See EMR under Patient Instructions for exercises provided during therapy sessions    ASSESSMENT       Pt with increase in pain today. Therefore, we began session with moist heat pack to neck to attempt to alleviate some pain for improved tolerance to session. Upon reassessment (after MHP to neck), she demos progressive improvements in cervical AROM and periscapular strength bilaterally. We also were able to initiate manual therapy interventions, starting today with gentle soft tissue massage. In addition, patient is educated on diaphragmatic breathing today to decrease excessive use of accessory muscles for breathing patterns. Pt is showing slow, steady progress towards goals and will continue to benefit from skilled outpatient PT to address deficits and improve symptom management.    April Is progressing well towards her goals.   Pt prognosis is Good.     Pt will continue to benefit from skilled outpatient physical therapy to address the deficits listed in the problem list box on initial  evaluation, provide pt/family education and to maximize pt's level of independence in the home and community environment.     Pt's spiritual, cultural and educational needs considered and pt agreeable to plan of care and goals.     Anticipated barriers to physical therapy: None    Goals:           Short Term Goals: 3 weeks  1. Pt to be independent in HEP to improve independence with symptoms.-met    2. Pt to require min VC from PT for proper scapular retraction in order to improve postural awareness. -almost met   3. Pt to improve periscapular muscles strength by 1/2 muscle grade to improve scapular stability.   4. Pt to report pain at worst to be </=4/10 in order to improve upon symptom management and quality of life.-progressing   5. Pt will demonstrate improved sitting posture to decrease pain experienced in neck and improve cervicothoracic endurance.     Long Term Goals: 6 weeks  PROGRESSING  1. Pt to be independent in HEP progressions to improve independence with symptoms.   2. Pt to require no verbal or tactile from PT for proper scapular retraction in order to improve postural awareness.   3. Pt to improve periscapular muscles strength by 1 muscle grade to improve scapular stability.   4. Pt to report pain at worst to be </=2/10 in order to improve upon symptom management and quality of life.  5. Pt to improve cervical AROM to WFL to improve tolerance to daily activities at home and work.   6. Pt to show flexibility restrictions to </= min restrictions for improved posture and movement.  7. Pt will improve FOTO to </=30% limitation to improve perceived limitation        PLAN   PT POC extended 1x/week for 8 weeks     Terri Galvez, PT

## 2022-01-24 NOTE — PROGRESS NOTES
"[facility]   Physical Therapy Treatment Note / Progress note / Updated Plan of Care    Name: Vanessa Sinclair  Clinic Number: [id number]    Therapy Diagnosis:   Encounter Diagnoses   Name Primary?    Impaired flexibility of upper extremity Yes    Bilateral neck pain     Impaired range of motion of cervical spine      Physician: Marry Roque MD    Visit Date: 1/24/2022    Physician Orders: PT Eval and Treat   Medical Diagnosis from Referral: neck pain  Evaluation Date: 12/13/2021  Authorization Period Expiration: 2/7/22  Plan of Care Expiration: 3/21/22  Progress Note Due: 2/24/22  Visit # / Visits authorized: 3 / 20  FOTO: 1 / 3     Precautions: asthma,  seizures , syncope, pacemaker       Time In:100 PM   Time Out 153 PM  Total Appointment Time (timed & untimed codes): 43  minutes (TE-2, MT-1) +10 min MHP (untimed)     SUBJECTIVE     Pt reports: she has been in a lot of pain. She has been doing a lot though. She is in school now, working a lot, playing a lot of card games because she was taking care of her nieces.  She was compliant with home exercise program.  Response to previous treatment: "I felt really good" with the heat   Functional change: none as of yet     Pain: 7 / 10  Location: neck (L>R)    OBJECTIVE        Posture: FHP and rounded shoulders      Cervical Range of Motion:     Degrees Pain   Flexion 50 yes   Extension 50 yes   Right Rotation 70 yes   Left Rotation 65     no   Right Side Bending 50 no   Left Side Bending 50 no           Upper Extremity Strength  (R) UE   (L) UE     Shoulder flexion: 4+/5 Shoulder flexion: 4+/5   Shoulder Abduction: 4+/5 Shoulder abduction: 4+/5   Shoulder ER 5/5 Shoulder ER 5/5   Shoulder IR 5/5 Shoulder IR 5/5   Lower trap 4/5 Lower trap  4/5   Middle trap 4/5 Middle trap  4+/5   Rhomboids 4+/5 Rhomboids 4+/5             Treatment     MHP to neck at the beginning of session for 10 mins for pain relief      April received the " treatments listed below:      Therex for flexibility and posture x 28 minutes  Bold=exercises performed  Including reassessment     Diaphragmatic breathing in supine x3 mins  Supine Chin Tuck with Towel Under Head 30 x 3 seconds   Supine Chin Tuck with Rotation 2 x 10 to Each Side  Upper Trap Stretch Bilateral 3 x 30 seconds   Open Books 2 x 10 to Each Side   LS stretch Bilateral: 3 x 30 Seconds     Manual therapy technique: x15 mins  Gentle STM to B UT, cervical paraspinals and suboccipital musculature      Patient Education and Home Exercises     Home Exercises Provided and Patient Education Provided     Education provided:   -exercises  - pt educated on all interventions today  -PT POC  - diaphragmatic breathing  - HEP    Written Home Exercises Provided: yes. Exercises were reviewed and April was able to demonstrate them prior to the end of the session.  April demonstrated good  understanding of the education provided. See EMR under Patient Instructions for exercises provided during therapy sessions    ASSESSMENT       Pt with increase in pain today. Therefore, we began session with moist heat pack to neck to attempt to alleviate some pain for improved tolerance to session. Upon reassessment (after MHP to neck), she demos progressive improvements in cervical AROM and periscapular strength bilaterally. We also were able to initiate manual therapy interventions, starting today with gentle soft tissue massage. In addition, patient is educated on diaphragmatic breathing today to decrease excessive use of accessory muscles for breathing patterns. Pt is showing slow, steady progress towards goals and will continue to benefit from skilled outpatient PT to address deficits and improve symptom management.    April Is progressing well towards her goals.   Pt prognosis is Good.     Pt will continue to benefit from skilled outpatient physical therapy to address the deficits listed in the problem list box on initial  evaluation, provide pt/family education and to maximize pt's level of independence in the home and community environment.     Pt's spiritual, cultural and educational needs considered and pt agreeable to plan of care and goals.     Anticipated barriers to physical therapy: None    Goals:           Short Term Goals: 3 weeks  1. Pt to be independent in HEP to improve independence with symptoms.-met    2. Pt to require min VC from PT for proper scapular retraction in order to improve postural awareness. -almost met   3. Pt to improve periscapular muscles strength by 1/2 muscle grade to improve scapular stability.   4. Pt to report pain at worst to be </=4/10 in order to improve upon symptom management and quality of life.-progressing   5. Pt will demonstrate improved sitting posture to decrease pain experienced in neck and improve cervicothoracic endurance.     Long Term Goals: 6 weeks  PROGRESSING  1. Pt to be independent in HEP progressions to improve independence with symptoms.   2. Pt to require no verbal or tactile from PT for proper scapular retraction in order to improve postural awareness.   3. Pt to improve periscapular muscles strength by 1 muscle grade to improve scapular stability.   4. Pt to report pain at worst to be </=2/10 in order to improve upon symptom management and quality of life.  5. Pt to improve cervical AROM to WFL to improve tolerance to daily activities at home and work.   6. Pt to show flexibility restrictions to </= min restrictions for improved posture and movement.  7. Pt will improve FOTO to </=30% limitation to improve perceived limitation        PLAN   PT POC extended 1x/week for 8 weeks     Terri Galvez, PT

## 2022-01-31 ENCOUNTER — CLINICAL SUPPORT (OUTPATIENT)
Dept: REHABILITATION | Facility: HOSPITAL | Age: 22
End: 2022-01-31
Payer: COMMERCIAL

## 2022-01-31 DIAGNOSIS — M54.2 BILATERAL NECK PAIN: ICD-10-CM

## 2022-01-31 DIAGNOSIS — R29.898 IMPAIRED FLEXIBILITY OF UPPER EXTREMITY: Primary | ICD-10-CM

## 2022-01-31 DIAGNOSIS — M53.82 IMPAIRED RANGE OF MOTION OF CERVICAL SPINE: ICD-10-CM

## 2022-01-31 PROCEDURE — 97110 THERAPEUTIC EXERCISES: CPT | Mod: PO

## 2022-01-31 NOTE — PROGRESS NOTES
"[facility] OUTPATIENT THERAPY AND WELLNESS   Physical Therapy Treatment Note     Name: Vanessa Sinclair  Clinic Number: [id number]    Therapy Diagnosis:   Encounter Diagnoses   Name Primary?    Impaired flexibility of upper extremity Yes    Bilateral neck pain     Impaired range of motion of cervical spine      Physician: Marry Roque MD    Visit Date: 1/31/2022    Physician Orders: PT Eval and Treat   Medical Diagnosis from Referral: neck pain  Evaluation Date: 12/13/2021  Authorization Period Expiration: 2/7/22  Plan of Care Expiration: 3/21/22  Progress Note Due: 2/24/22  Visit # / Visits authorized: 4 / 20  FOTO: 2 / 3     Precautions: asthma,  seizures , syncope, pacemaker       Time In:100 PM   Time Out 145 PM  Total Appointment Time (timed & untimed codes): 38  minutes (TE-3) +7 min MHP (untimed)     SUBJECTIVE     Pt reports: the stabbing pain isn't too bad like it used to be. It used to always be there but now it comes and goes.   She was compliant with home exercise program.  Response to previous treatment: "I felt really good" with the heat   Functional change: none as of yet     Pain: 6 / 10  Location: neck (more right sided today)    OBJECTIVE   n/a    Treatment     MHP to neck at the beginning of session for 7 mins for pain relief      April received the treatments listed below:      Therex for flexibility, strength, endurance and posture x 38 minutes    Bold=exercises performed    Diaphragmatic breathing in supine x3 mins   Supine Chin Tuck with Towel Under Head 30 x 3 seconds   Supine Chin Tuck with Rotation 2 x 10 to Each Side  +rows BTB 3x10  +shoulder extensions OTB 3x10  Upper Trap Stretch Bilateral 2 x 30 seconds to each side  Open Books 2 x 10 to Each Side   LS stretch Bilateral: 3 x 30 Seconds     Manual therapy technique: x00 mins- not performed today  Gentle STM to B UT, cervical paraspinals and suboccipital musculature      FOTO performed today       Patient Education and Home " Exercises     Home Exercises Provided and Patient Education Provided     Education provided:   -exercises  - pt educated on all interventions today  - continue HEP    Written Home Exercises Provided: Patient instructed to cont prior HEP. Exercises were reviewed and April was able to demonstrate them prior to the end of the session.  April demonstrated good  understanding of the education provided. See EMR under Patient Instructions for exercises provided during therapy sessions    ASSESSMENT     Pt with good kusum to today's session. She is able to tolerate addition of both theraband rows and shoulder extensions today with min cuing for proper scapular control and to reduce UT compensation but with good carryover once cued. Pt is showing slow, steady progress towards goals and will continue to benefit from skilled outpatient PT to address deficits and improve symptom management.       April Is progressing well towards her goals.   Pt prognosis is Good.     Pt will continue to benefit from skilled outpatient physical therapy to address the deficits listed in the problem list box on initial evaluation, provide pt/family education and to maximize pt's level of independence in the home and community environment.     Pt's spiritual, cultural and educational needs considered and pt agreeable to plan of care and goals.     Anticipated barriers to physical therapy: None    Goals:           Short Term Goals: 3 weeks  1. Pt to be independent in HEP to improve independence with symptoms.-met    2. Pt to require min VC from PT for proper scapular retraction in order to improve postural awareness. -almost met   3. Pt to improve periscapular muscles strength by 1/2 muscle grade to improve scapular stability.   4. Pt to report pain at worst to be </=4/10 in order to improve upon symptom management and quality of life.-progressing   5. Pt will demonstrate improved sitting posture to decrease pain experienced in neck and improve  cervicothoracic endurance.     Long Term Goals: 6 weeks  PROGRESSING  1. Pt to be independent in HEP progressions to improve independence with symptoms.   2. Pt to require no verbal or tactile from PT for proper scapular retraction in order to improve postural awareness.   3. Pt to improve periscapular muscles strength by 1 muscle grade to improve scapular stability.   4. Pt to report pain at worst to be </=2/10 in order to improve upon symptom management and quality of life.  5. Pt to improve cervical AROM to WFL to improve tolerance to daily activities at home and work.   6. Pt to show flexibility restrictions to </= min restrictions for improved posture and movement.  7. Pt will improve FOTO to </=30% limitation to improve perceived limitation        PLAN   PT POC extended 1x/week for 8 weeks     Terri Galvez, PT

## 2022-02-07 ENCOUNTER — CLINICAL SUPPORT (OUTPATIENT)
Dept: REHABILITATION | Facility: HOSPITAL | Age: 22
End: 2022-02-07
Payer: COMMERCIAL

## 2022-02-07 DIAGNOSIS — R29.898 IMPAIRED FLEXIBILITY OF UPPER EXTREMITY: Primary | ICD-10-CM

## 2022-02-07 DIAGNOSIS — M54.2 BILATERAL NECK PAIN: ICD-10-CM

## 2022-02-07 DIAGNOSIS — M53.82 IMPAIRED RANGE OF MOTION OF CERVICAL SPINE: ICD-10-CM

## 2022-02-07 PROCEDURE — 97110 THERAPEUTIC EXERCISES: CPT | Mod: PO

## 2022-02-07 NOTE — PROGRESS NOTES
"[facility]   Physical Therapy Treatment Note     Name: Vanessa Sinclair  Clinic Number: [id number]    Therapy Diagnosis:   Encounter Diagnoses   Name Primary?    Impaired flexibility of upper extremity Yes    Bilateral neck pain     Impaired range of motion of cervical spine      Physician: Marry Roque MD    Visit Date: 2/7/2022    Physician Orders: PT Eval and Treat   Medical Diagnosis from Referral: neck pain  Evaluation Date: 12/13/2021  Authorization Period Expiration: 2/7/22  Plan of Care Expiration: 3/21/22  Progress Note Due: 2/24/22  Visit # / Visits authorized: 5 / 20  FOTO: 2 / 3     Precautions: asthma,  seizures , syncope, pacemaker       Time In:12:57 PM  Time Out 141 PM  Total Appointment Time (timed & untimed codes): 38  minutes (TE-3) +6 min MHP (untimed)     SUBJECTIVE     Pt reports: neck is feeling a little better. The stretches are not really hurting as much.   She was compliant with home exercise program.  Response to previous treatment: pain  Functional change: decreased frequency of pain    Pain: 6 / 10  Location: neck (more right sided today)    OBJECTIVE   n/a    Treatment     MHP to neck at the beginning of session for 6 mins for pain relief      April received the treatments listed below:      Therex for flexibility, strength, endurance and posture x 38 minutes    Bold=exercises performed    Diaphragmatic breathing in supine x3 mins   Supine Chin Tuck with Towel Under Head 30 x 3 seconds   Supine Chin Tuck with Rotation 2 x 10 to Each Side  rows BTB 3x10  +shoulder extensions OTB 3x10  Upper Trap Stretch Bilateral 2 x 30 seconds to each side  Open Books 2 x 10 to Each Side   LS stretch Bilateral: 3 x 30 Seconds   +pec stretch in doorway 3x30"  +thoracic extension in chair 2x10    Manual therapy technique: x00 mins- not performed today  Gentle STM to B UT, cervical paraspinals and suboccipital musculature            Patient Education and Home Exercises "     Home Exercises Provided and Patient Education Provided     Education provided:   - exercises  - pt educated on all interventions today  - continue HEP     Written Home Exercises Provided: Patient instructed to cont prior HEP. Exercises were reviewed and April was able to demonstrate them prior to the end of the session.  April demonstrated good  understanding of the education provided. See EMR under Patient Instructions for exercises provided during therapy sessions    ASSESSMENT     Pt with good tolerance to session. She is improving in her tolerance to and performance of therapeutic exercises today. Pt will continue to benefit from skilled outpatient PT to address deficits and improve symptom management.      April Is progressing well towards her goals.   Pt prognosis is Good.     Pt will continue to benefit from skilled outpatient physical therapy to address the deficits listed in the problem list box on initial evaluation, provide pt/family education and to maximize pt's level of independence in the home and community environment.     Pt's spiritual, cultural and educational needs considered and pt agreeable to plan of care and goals.     Anticipated barriers to physical therapy: None    Goals:     Short Term Goals: 3 weeks  1. Pt to be independent in HEP to improve independence with symptoms.-met    2. Pt to require min VC from PT for proper scapular retraction in order to improve postural awareness. -almost met   3. Pt to improve periscapular muscles strength by 1/2 muscle grade to improve scapular stability.   4. Pt to report pain at worst to be </=4/10 in order to improve upon symptom management and quality of life.-progressing   5. Pt will demonstrate improved sitting posture to decrease pain experienced in neck and improve cervicothoracic endurance.     Long Term Goals: 6 weeks  PROGRESSING  1. Pt to be independent in HEP progressions to improve independence with symptoms.   2. Pt to require no verbal  or tactile from PT for proper scapular retraction in order to improve postural awareness.   3. Pt to improve periscapular muscles strength by 1 muscle grade to improve scapular stability.   4. Pt to report pain at worst to be </=2/10 in order to improve upon symptom management and quality of life.  5. Pt to improve cervical AROM to WFL to improve tolerance to daily activities at home and work.   6. Pt to show flexibility restrictions to </= min restrictions for improved posture and movement.  7. Pt will improve FOTO to </=30% limitation to improve perceived limitation        PLAN     Continue PT POC    Terri Galvez, PT

## 2022-02-08 ENCOUNTER — OFFICE VISIT (OUTPATIENT)
Dept: OBSTETRICS AND GYNECOLOGY | Facility: CLINIC | Age: 22
End: 2022-02-08
Payer: COMMERCIAL

## 2022-02-08 VITALS
WEIGHT: 148.13 LBS | SYSTOLIC BLOOD PRESSURE: 114 MMHG | DIASTOLIC BLOOD PRESSURE: 72 MMHG | HEIGHT: 66 IN | BODY MASS INDEX: 23.81 KG/M2

## 2022-02-08 DIAGNOSIS — Z11.3 SCREEN FOR STD (SEXUALLY TRANSMITTED DISEASE): ICD-10-CM

## 2022-02-08 DIAGNOSIS — Z30.41 ENCOUNTER FOR SURVEILLANCE OF CONTRACEPTIVE PILLS: ICD-10-CM

## 2022-02-08 DIAGNOSIS — Z12.39 BREAST CANCER SCREENING OTHER THAN MAMMOGRAM: ICD-10-CM

## 2022-02-08 DIAGNOSIS — N63.20 LEFT BREAST MASS: ICD-10-CM

## 2022-02-08 DIAGNOSIS — Z01.419 ENCOUNTER FOR ANNUAL ROUTINE GYNECOLOGICAL EXAMINATION: Primary | ICD-10-CM

## 2022-02-08 PROBLEM — R55 PRE-SYNCOPE: Status: RESOLVED | Noted: 2021-05-25 | Resolved: 2022-02-08

## 2022-02-08 PROCEDURE — 3008F BODY MASS INDEX DOCD: CPT | Mod: CPTII,S$GLB,, | Performed by: OBSTETRICS & GYNECOLOGY

## 2022-02-08 PROCEDURE — 87591 N.GONORRHOEAE DNA AMP PROB: CPT | Performed by: OBSTETRICS & GYNECOLOGY

## 2022-02-08 PROCEDURE — 1159F MED LIST DOCD IN RCRD: CPT | Mod: CPTII,S$GLB,, | Performed by: OBSTETRICS & GYNECOLOGY

## 2022-02-08 PROCEDURE — 87801 DETECT AGNT MULT DNA AMPLI: CPT | Performed by: OBSTETRICS & GYNECOLOGY

## 2022-02-08 PROCEDURE — 3008F PR BODY MASS INDEX (BMI) DOCUMENTED: ICD-10-PCS | Mod: CPTII,S$GLB,, | Performed by: OBSTETRICS & GYNECOLOGY

## 2022-02-08 PROCEDURE — 1159F PR MEDICATION LIST DOCUMENTED IN MEDICAL RECORD: ICD-10-PCS | Mod: CPTII,S$GLB,, | Performed by: OBSTETRICS & GYNECOLOGY

## 2022-02-08 PROCEDURE — 3078F PR MOST RECENT DIASTOLIC BLOOD PRESSURE < 80 MM HG: ICD-10-PCS | Mod: CPTII,S$GLB,, | Performed by: OBSTETRICS & GYNECOLOGY

## 2022-02-08 PROCEDURE — 99395 PREV VISIT EST AGE 18-39: CPT | Mod: S$GLB,,, | Performed by: OBSTETRICS & GYNECOLOGY

## 2022-02-08 PROCEDURE — 3074F SYST BP LT 130 MM HG: CPT | Mod: CPTII,S$GLB,, | Performed by: OBSTETRICS & GYNECOLOGY

## 2022-02-08 PROCEDURE — 88175 CYTOPATH C/V AUTO FLUID REDO: CPT | Performed by: OBSTETRICS & GYNECOLOGY

## 2022-02-08 PROCEDURE — 87481 CANDIDA DNA AMP PROBE: CPT | Mod: 59 | Performed by: OBSTETRICS & GYNECOLOGY

## 2022-02-08 PROCEDURE — 99395 PR PREVENTIVE VISIT,EST,18-39: ICD-10-PCS | Mod: S$GLB,,, | Performed by: OBSTETRICS & GYNECOLOGY

## 2022-02-08 PROCEDURE — 87491 CHLMYD TRACH DNA AMP PROBE: CPT | Mod: 59 | Performed by: OBSTETRICS & GYNECOLOGY

## 2022-02-08 PROCEDURE — 3074F PR MOST RECENT SYSTOLIC BLOOD PRESSURE < 130 MM HG: ICD-10-PCS | Mod: CPTII,S$GLB,, | Performed by: OBSTETRICS & GYNECOLOGY

## 2022-02-08 PROCEDURE — 3078F DIAST BP <80 MM HG: CPT | Mod: CPTII,S$GLB,, | Performed by: OBSTETRICS & GYNECOLOGY

## 2022-02-08 NOTE — PROGRESS NOTES
Chief Complaint: Well Woman Exam     HPI:      April Lorrie Sinclair is a 21 y.o.  who presents today for well woman exam.  LMP: Patient's last menstrual period was 2022.  No issues, problems, or complaints. Specifically, patient denies abnormal vaginal bleeding, discharge, pelvic pain, urinary problems, or changes in appetite. Ms. Sinclair is currently sexually active with a single male partner. She is currently using oral contraceptives (estrogen/progesterone) for contraception. She would like STD screening today.    Previous Pap:    (No result found)  Previous Mammogram:   No results found for this or any previous visit.  Most Recent Dexa: not indicated  Colonoscopy: never had    COVID Vaccine: declined  Flu Vaccine: completed  Gardasil:Completed     Patient Active Problem List   Diagnosis    Migraine headache    Convulsive syncope    Vitamin D deficiency    Anxiety    Cardiac pacemaker in situ    SSS (sick sinus syndrome)    Neck pain    Paresthesia and pain of right extremity    Palpitations    Intercostal pain    Pacemaker syndrome    Impaired flexibility of upper extremity    Bilateral neck pain    Impaired range of motion of cervical spine       Past Medical History:   Diagnosis Date    Asthma     Headache(784.0)     Migraine     Pre-syncope 2021    Seizures     Syncope and collapse        Past Surgical History:   Procedure Laterality Date    A-V CARDIAC PACEMAKER INSERTION Left 2019    Procedure: INSERTION, CARDIAC PACEMAKER, DUAL CHAMBER;  Surgeon: Piter Cooley MD;  Location: [facility] CATH LAB/EP;  Service: Cardiology;  Laterality: Left;    PHLEBOGRAPHY  2021    Procedure: Venogram;  Surgeon: Piter Cooley MD;  Location: [facility] EP LAB;  Service: Cardiology;;    REPLACEMENT OF PACEMAKER GENERATOR  2021    Procedure: REPLACEMENT, PACEMAKER GENERATOR;  Surgeon: Piter Cooley MD;  Location: [facility] EP LAB;  Service: Cardiology;;    WISDOM TOOTH EXTRACTION    "      OB History        0    Para   0    Term   0       0    AB   0    Living   0       SAB   0    IAB   0    Ectopic   0    Multiple   0    Live Births   0           Obstetric Comments     No abnormal pap  No STDs             ROS:     Review of Systems   Constitutional: Negative for activity change, appetite change and fatigue.   Respiratory: Negative for shortness of breath.    Cardiovascular: Negative for chest pain.   Gastrointestinal: Negative for abdominal pain, constipation and diarrhea.   Endocrine: Negative for cold intolerance and heat intolerance.   Genitourinary: Negative for dysuria, frequency, menstrual irregularity, pelvic pain and vaginal discharge.   Integumentary:  Negative for breast mass, breast discharge and breast tenderness.   Psychiatric/Behavioral: Negative for dysphoric mood. The patient is not nervous/anxious.    Breast: Negative for mass and tenderness      Physical Exam:      PHYSICAL EXAM:  /72   Ht 5' 6" (1.676 m)   Wt 67.2 kg (148 lb 2.4 oz)   LMP 2022   BMI 23.91 kg/m²   Body mass index is 23.91 kg/m².     APPEARANCE: Well nourished, well developed, in no acute distress.  PSYCH: Appropriate mood and affect.  SKIN: No acne or hirsutism  NECK: Neck symmetric without masses or thyromegaly  NODES: No inguinal, axillary, or supraclavicular lymph node enlargement  ABDOMEN: Soft.  No tenderness or masses.    CARDIOVASCULAR: No edema of peripheral extremities  BREASTS: Symmetrical, no skin changes or visible lesions.  No nipple discharge bilaterally. Palpable left breast mass at 11 oclock position.    PELVIC: Normal external genitalia without lesions.  Normal hair distribution.  Adequate perineal body, normal urethral meatus.  Vagina moist and well rugated without lesions or discharge.  Cervix pink, without lesions, discharge or tenderness.  No significant cystocele or rectocele.  Bimanual exam shows uterus to be normal size, regular, mobile and nontender. "  Adnexa without masses or tenderness.      Assessment:     1. Encounter for annual routine gynecological examination  Liquid-Based Pap Smear, Screening   2. Encounter for surveillance of contraceptive pills     3. Breast cancer screening other than mammogram     4. Screen for STD (sexually transmitted disease)  C. trachomatis/N. gonorrhoeae by AMP DNA [facility]; Cervicovaginal    Hepatitis B Surface Antigen    Hepatitis C Antibody    HIV 1/2 Ag/Ab (4th Gen)    RPR    Vaginosis Screen by DNA Probe   5. Left breast mass  US Breast Left Complete         Plan:     1. Clinical breast exam performed. Breast US ordered for breast mass.  2. Pap today.  3. STD screening.   4. Mammogram at 38.  5. DEXA at 63.  6. Colonoscopy at 43.  7. Continue OCPs.    8. Follow up in about 1 year (around 2/8/2023) for annual well woman exam or as needed.    Counseling:     Patient was counseled today on current ASCCP pap guidelines, the recommendation for yearly pelvic exams, healthy diet and exercise routines, breast self awareness.She is to see her PCP for other health maintenance.     Use of the [employer] Patient Portal discussed and encouraged during today's visit.         Amanda N. Thomas, MD [facility]  02/08/2022

## 2022-02-10 LAB
C TRACH DNA SPEC QL NAA+PROBE: NOT DETECTED
N GONORRHOEA DNA SPEC QL NAA+PROBE: NOT DETECTED

## 2022-02-14 ENCOUNTER — LAB VISIT (OUTPATIENT)
Dept: LAB | Facility: HOSPITAL | Age: 22
End: 2022-02-14
Attending: OBSTETRICS & GYNECOLOGY
Payer: COMMERCIAL

## 2022-02-14 DIAGNOSIS — Z11.3 SCREEN FOR STD (SEXUALLY TRANSMITTED DISEASE): ICD-10-CM

## 2022-02-14 LAB
BACTERIAL VAGINOSIS DNA: NEGATIVE
CANDIDA GLABRATA DNA: NEGATIVE
CANDIDA KRUSEI DNA: NEGATIVE
CANDIDA RRNA VAG QL PROBE: NEGATIVE
CLINICAL INFO: NORMAL
CYTO CVX: NORMAL
CYTOLOGIST CVX/VAG CYTO: NORMAL
CYTOLOGIST CVX/VAG CYTO: NORMAL
CYTOLOGY CMNT CVX/VAG CYTO-IMP: NORMAL
CYTOLOGY PAP THIN PREP EXPLANATION: NORMAL
DATE OF PREVIOUS PAP: NORMAL
DATE PREVIOUS BX: NO
GEN CATEG CVX/VAG CYTO-IMP: NORMAL
LMP START DATE: NORMAL
MICROORGANISM CVX/VAG CYTO: NORMAL
PATHOLOGIST CVX/VAG CYTO: NORMAL
SERVICE CMNT-IMP: NORMAL
SPECIMEN SOURCE CVX/VAG CYTO: NORMAL
STAT OF ADQ CVX/VAG CYTO-IMP: NORMAL
T VAGINALIS RRNA GENITAL QL PROBE: NEGATIVE

## 2022-02-14 PROCEDURE — 86803 HEPATITIS C AB TEST: CPT | Performed by: OBSTETRICS & GYNECOLOGY

## 2022-02-14 PROCEDURE — 86592 SYPHILIS TEST NON-TREP QUAL: CPT | Performed by: OBSTETRICS & GYNECOLOGY

## 2022-02-14 PROCEDURE — 87389 HIV-1 AG W/HIV-1&-2 AB AG IA: CPT | Performed by: OBSTETRICS & GYNECOLOGY

## 2022-02-14 PROCEDURE — 87340 HEPATITIS B SURFACE AG IA: CPT | Performed by: OBSTETRICS & GYNECOLOGY

## 2022-02-14 PROCEDURE — 36415 COLL VENOUS BLD VENIPUNCTURE: CPT | Performed by: OBSTETRICS & GYNECOLOGY

## 2022-02-15 ENCOUNTER — CLINICAL SUPPORT (OUTPATIENT)
Dept: REHABILITATION | Facility: HOSPITAL | Age: 22
End: 2022-02-15
Payer: COMMERCIAL

## 2022-02-15 DIAGNOSIS — M53.82 IMPAIRED RANGE OF MOTION OF CERVICAL SPINE: ICD-10-CM

## 2022-02-15 DIAGNOSIS — R29.898 IMPAIRED FLEXIBILITY OF UPPER EXTREMITY: Primary | ICD-10-CM

## 2022-02-15 DIAGNOSIS — M54.2 BILATERAL NECK PAIN: ICD-10-CM

## 2022-02-15 LAB — RPR SER QL: NORMAL

## 2022-02-15 PROCEDURE — 97110 THERAPEUTIC EXERCISES: CPT | Mod: PO

## 2022-02-15 NOTE — PROGRESS NOTES
"[facility]   Physical Therapy Treatment Note     Name: Vanessa Sinclair  Clinic Number: [id number]    Therapy Diagnosis:   Encounter Diagnoses   Name Primary?    Impaired flexibility of upper extremity Yes    Bilateral neck pain     Impaired range of motion of cervical spine      Physician: Marry Roque MD    Visit Date: 2/15/2022    Physician Orders: PT Eval and Treat   Medical Diagnosis from Referral: neck pain  Evaluation Date: 12/13/2021  Authorization Period Expiration: 2/7/22  Plan of Care Expiration: 3/21/22  Progress Note Due: 2/24/22  Visit # / Visits authorized: 6 / 20  FOTO: 2 / 3     Precautions: asthma,  seizures , syncope, pacemaker       Time In:11:22 AM  Time Out 12:00 PM  Total Appointment Time (timed & untimed codes): 38  minutes (TE-3)     SUBJECTIVE     Pt reports: she had been having longer hours in school and busy with work so she notes some increased pain.   She was compliant with home exercise program.  Response to previous treatment: "I felt good"  Functional change: when she is not as busy- she was feeling more improvement ; pain has decreased in frequency     Pain: 7 / 10  Location: neck (more right sided)    OBJECTIVE   n/a    Treatment         April received the treatments listed below:      Therex for flexibility, strength, endurance and posture x 38 minutes    Bold=exercises performed      +UBE 5 mins backwards   Diaphragmatic breathing in supine x3 mins   Supine Chin Tuck with Towel Under Head 30 x 3 seconds   Supine Chin Tuck with Rotation 2 x 10 to Each Side +with cervical flexion  rows BTB 3x10  +shoulder extensions OTB 3x10   Upper Trap Stretch Right 3 x 30 seconds   +prone scapular retractions 2x10   +prone Y's 2x10 each arm  +prone T's 2x10 each arm  Open Books 2 x 10 to Each Side   LS stretch Bilateral: 3 x 30 Seconds   pec stretch in doorway 3x30"  +thoracic extension in chair 2x10    Manual therapy technique: x00 mins- not performed " today  Gentle STM to B UT, cervical paraspinals and suboccipital musculature            Patient Education and Home Exercises     Home Exercises Provided and Patient Education Provided     Education provided:   - exercises  - pt educated on all interventions today  - continue HEP     Written Home Exercises Provided: Patient instructed to cont prior HEP. Exercises were reviewed and April was able to demonstrate them prior to the end of the session.  April demonstrated good  understanding of the education provided. See EMR under Patient Instructions for exercises provided during therapy sessions    ASSESSMENT     Pt with good tolerance to session and is improving in her tolerance to and performance of therapeutic exercises today. She is able to tolerate addition of prone periscapular strengthening exercises today with cuing for appropriate scapular positioning/form. Pt will continue to benefit from skilled outpatient PT to address deficits and improve symptom management.      April Is progressing well towards her goals.   Pt prognosis is Good.     Pt will continue to benefit from skilled outpatient physical therapy to address the deficits listed in the problem list box on initial evaluation, provide pt/family education and to maximize pt's level of independence in the home and community environment.     Pt's spiritual, cultural and educational needs considered and pt agreeable to plan of care and goals.     Anticipated barriers to physical therapy: None    Goals:     Short Term Goals: 3 weeks  1. Pt to be independent in HEP to improve independence with symptoms.-met    2. Pt to require min VC from PT for proper scapular retraction in order to improve postural awareness. -almost met   3. Pt to improve periscapular muscles strength by 1/2 muscle grade to improve scapular stability.   4. Pt to report pain at worst to be </=4/10 in order to improve upon symptom management and quality of life.-progressing   5. Pt will  demonstrate improved sitting posture to decrease pain experienced in neck and improve cervicothoracic endurance.     Long Term Goals: 6 weeks  PROGRESSING  1. Pt to be independent in HEP progressions to improve independence with symptoms.   2. Pt to require no verbal or tactile from PT for proper scapular retraction in order to improve postural awareness.   3. Pt to improve periscapular muscles strength by 1 muscle grade to improve scapular stability.   4. Pt to report pain at worst to be </=2/10 in order to improve upon symptom management and quality of life.  5. Pt to improve cervical AROM to WFL to improve tolerance to daily activities at home and work.   6. Pt to show flexibility restrictions to </= min restrictions for improved posture and movement.  7. Pt will improve FOTO to </=30% limitation to improve perceived limitation        PLAN     Continue PT POC    Terri Galvez, PT

## 2022-02-16 LAB
HBV SURFACE AG SERPL QL IA: NEGATIVE
HCV AB SERPL QL IA: NEGATIVE
HIV 1+2 AB+HIV1 P24 AG SERPL QL IA: NEGATIVE

## 2022-02-22 ENCOUNTER — HOSPITAL ENCOUNTER (OUTPATIENT)
Dept: RADIOLOGY | Facility: HOSPITAL | Age: 22
Discharge: HOME | End: 2022-02-22
Attending: OBSTETRICS & GYNECOLOGY
Payer: COMMERCIAL

## 2022-02-22 DIAGNOSIS — N63.20 LEFT BREAST MASS: ICD-10-CM

## 2022-02-22 PROCEDURE — 76642 ULTRASOUND BREAST LIMITED: CPT | Mod: TC,LT

## 2022-02-22 PROCEDURE — 76642 ULTRASOUND BREAST LIMITED: CPT | Mod: 26,LT,, | Performed by: RADIOLOGY

## 2022-02-22 PROCEDURE — 76642 US BREAST LEFT LIMITED: ICD-10-PCS | Mod: 26,LT,, | Performed by: RADIOLOGY

## 2022-02-23 ENCOUNTER — PATIENT MESSAGE (OUTPATIENT)
Dept: OBSTETRICS AND GYNECOLOGY | Facility: CLINIC | Age: 22
End: 2022-02-23
Payer: COMMERCIAL

## 2022-03-07 ENCOUNTER — CLINICAL SUPPORT (OUTPATIENT)
Dept: REHABILITATION | Facility: HOSPITAL | Age: 22
End: 2022-03-07
Payer: COMMERCIAL

## 2022-03-07 DIAGNOSIS — R29.898 IMPAIRED FLEXIBILITY OF UPPER EXTREMITY: Primary | ICD-10-CM

## 2022-03-07 DIAGNOSIS — M53.82 IMPAIRED RANGE OF MOTION OF CERVICAL SPINE: ICD-10-CM

## 2022-03-07 DIAGNOSIS — M54.2 BILATERAL NECK PAIN: ICD-10-CM

## 2022-03-07 PROCEDURE — 97110 THERAPEUTIC EXERCISES: CPT | Mod: PO

## 2022-03-07 NOTE — PROGRESS NOTES
[facility]   Physical Therapy Treatment Note / Progress  Note    Name: Vanessa Sinclair  Clinic Number: [id number]    Therapy Diagnosis:   Encounter Diagnoses   Name Primary?    Impaired flexibility of upper extremity Yes    Bilateral neck pain     Impaired range of motion of cervical spine      Physician: Marry Roque MD    Visit Date: 3/7/2022    Physician Orders: PT Eval and Treat   Medical Diagnosis from Referral: neck pain  Evaluation Date: 12/13/2021  Authorization Period Expiration: 2/7/22  Plan of Care Expiration: 3/21/22  Progress Note Due: 3/21/22  Visit # / Visits authorized: 7 / 20  FOTO: 2 / 3     Precautions: asthma,  seizures , syncope, pacemaker       Time In:10:05 AM  Time Out 10:51 AM  Total Appointment Time (timed & untimed codes): 46  minutes (TE-3)     SUBJECTIVE     Pt reports:pain has gotten better, but she still has days where the pain gets back. She had school and work for 2 jobs, and she was not in any pain.  She was compliant with home exercise program.  Response to previous treatment: soreness   Functional change: pain has decreased in frequency     Pain: 5 / 10  Location: neck (more left sided)    OBJECTIVE     Bold=updated measurements     Cervical Range of Motion:     Degrees Pain   Flexion 48 60     no   Extension 43 60 no   Right Rotation 65 70     no   Left Rotation 65 70     no   Right Side Bending 45 45 no   Left Side Bending 45 50 no      Shoulder Range of Motion:  WFL           Upper Extremity Strength  (R) UE   (L) UE     Lower trap 4/5 4+/5 Lower trap  4-/5 4/5   Middle trap 4-/5 4+/5 Middle trap  4-/5 >/=4/5   Rhomboids 4-/5 4/5 Rhomboids 4-/5 >/=4/5          Limitation/Restriction for FOTO Neck Survey     Therapist reviewed FOTO scores for Vanessa Sinclair on 3/7/2022.   FOTO documents entered into [employer] - see Media section.     Limitation Score: 28%               Treatment         April received the treatments listed below:   "    Therex for flexibility, strength, endurance and posture x 46 minutes    Bold=exercises performed      UBE 6mins backwards   Diaphragmatic breathing in supine x3 mins   Supine Chin Tuck with Towel Under Head 30 x 3 seconds   Supine Chin Tuck with Rotation 2 x 10 to Each Side +with cervical flexion  rows BTB 3x10  +shoulder extensions OTB 3x10   Upper Trap Stretch Right 2 x 30 seconds   +prone scapular retractions 2x10   prone Y's 2x10 each arm  +prone rows 2x10  +prone T's 2x10 each arm  Open Books 2 x 10 to Each Side   LS stretch Bilateral: 2 x 30 Seconds   pec stretch in doorway 3x30"  +thoracic extension in chair 2x10    Manual therapy technique: x00 mins- not performed today  Gentle STM to B UT, cervical paraspinals and suboccipital musculature            Patient Education and Home Exercises     Home Exercises Provided and Patient Education Provided     Education provided:   - exercises  - pt educated on all interventions today  - PT POC   - progress to date   - continue HEP   - updated HEP    Written Home Exercises Provided: yes. Exercises were reviewed and April was able to demonstrate them prior to the end of the session.  April demonstrated good  understanding of the education provided. See EMR under Patient Instructions for exercises provided during therapy sessions    ASSESSMENT     Upon reassessment, pt is showing improvement in cervical AROM, periscapular strength, and overall postural control. She is responding well to current PT POC and making good progress towards goals. She will be appropriate to discharge to independent HEP at the end of the month and will be able to maintain current level of function independently through home program. Pt is agreeable to this plan.      April Is progressing well towards her goals.   Pt prognosis is Good.     Pt will continue to benefit from skilled outpatient physical therapy to address the deficits listed in the problem list box on initial evaluation, provide " pt/family education and to maximize pt's level of independence in the home and community environment.     Pt's spiritual, cultural and educational needs considered and pt agreeable to plan of care and goals.     Anticipated barriers to physical therapy: None    Goals:     Short Term Goals: 3 weeks  1. Pt to be independent in HEP to improve independence with symptoms.-met    2. Pt to require min VC from PT for proper scapular retraction in order to improve postural awareness. MET  3. Pt to improve periscapular muscles strength by 1/2 muscle grade to improve scapular stability. -MET  4. Pt to report pain at worst to be </=4/10 in order to improve upon symptom management and quality of life.-progressing   5. Pt will demonstrate improved sitting posture to decrease pain experienced in neck and improve cervicothoracic endurance.-MET     Long Term Goals: 6 weeks    1. Pt to be independent in HEP progressions to improve independence with symptoms. -MET  2. Pt to require no verbal or tactile from PT for proper scapular retraction in order to improve postural awareness. -MET  3. Pt to improve periscapular muscles strength by 1 muscle grade to improve scapular stability. -progressing  4. Pt to report pain at worst to be </=2/10 in order to improve upon symptom management and quality of life.  5. Pt to improve cervical AROM to WFL to improve tolerance to daily activities at home and work. -MET   6. Pt to show flexibility restrictions to </= min restrictions for improved posture and movement.  7. Pt will improve FOTO to </=30% limitation to improve perceived limitation -MET       PLAN     Continue PT POC    Terri Galvez, PT

## 2022-03-08 ENCOUNTER — CLINICAL SUPPORT (OUTPATIENT)
Dept: CARDIOLOGY | Facility: HOSPITAL | Age: 22
End: 2022-03-08
Payer: COMMERCIAL

## 2022-03-08 DIAGNOSIS — Z95.0 PRESENCE OF CARDIAC PACEMAKER: ICD-10-CM

## 2022-03-08 DIAGNOSIS — I49.5 SICK SINUS SYNDROME: ICD-10-CM

## 2022-03-08 PROCEDURE — 93296 REM INTERROG EVL PM/IDS: CPT | Performed by: INTERNAL MEDICINE

## 2022-03-08 PROCEDURE — 93294 REM INTERROG EVL PM/LDLS PM: CPT | Mod: ,,, | Performed by: INTERNAL MEDICINE

## 2022-03-08 PROCEDURE — 93294 CARDIAC DEVICE CHECK - REMOTE: ICD-10-PCS | Mod: ,,, | Performed by: INTERNAL MEDICINE

## 2022-03-14 ENCOUNTER — CLINICAL SUPPORT (OUTPATIENT)
Dept: REHABILITATION | Facility: HOSPITAL | Age: 22
End: 2022-03-14
Payer: COMMERCIAL

## 2022-03-14 DIAGNOSIS — M53.82 IMPAIRED RANGE OF MOTION OF CERVICAL SPINE: ICD-10-CM

## 2022-03-14 DIAGNOSIS — R29.898 IMPAIRED FLEXIBILITY OF UPPER EXTREMITY: Primary | ICD-10-CM

## 2022-03-14 DIAGNOSIS — M54.2 BILATERAL NECK PAIN: ICD-10-CM

## 2022-03-14 PROCEDURE — 97110 THERAPEUTIC EXERCISES: CPT | Mod: PO

## 2022-03-14 NOTE — PROGRESS NOTES
[facility]   Physical Therapy Treatment Note / Progress  Note    Name: Vanessa Sinclair  Clinic Number: [id number]    Therapy Diagnosis:   Encounter Diagnoses   Name Primary?    Impaired flexibility of upper extremity Yes    Bilateral neck pain     Impaired range of motion of cervical spine      Physician: Marry Roque MD    Visit Date: 3/14/2022    Physician Orders: PT Eval and Treat   Medical Diagnosis from Referral: neck pain  Evaluation Date: 12/13/2021  Authorization Period Expiration: 2/7/22  Plan of Care Expiration: 3/21/22  Progress Note Due: 3/21/22  Visit # / Visits authorized: 8 / 20  FOTO: 2 / 3     Precautions: asthma,  seizures , syncope, pacemaker       Time In:10:02 AM  Time Out 10:42 AM  Total Appointment Time (timed & untimed codes): 40 minutes (TE-3)     SUBJECTIVE     Pt reports:things are going about the same but feels that the exercises are helping.   She was compliant with home exercise program.  Response to previous treatment: soreness (resolved within 48 hours)  Functional change: pain has decreased in frequency     Pain: 6 / 10  Location: neck (more left sided)    OBJECTIVE      Not assessed today    Treatment       April received the treatments listed below:      Therex for flexibility, strength, endurance and posture x 40 minutes    Bold=exercises performed      UBE 6mins backwards + level 1.5  Diaphragmatic breathing in supine x3 mins   Supine Chin Tuck with Towel Under Head 30 x 3 seconds   Supine Chin Tuck with Rotation 2 x 10 to Each Side +with cervical flexion  rows BTB 3x10  +shoulder extensions OTB 3x10   Upper Trap Stretch bilateral 3 x 30 seconds    + supine serratus punches with dowel at 120 degrees GHJ flexion 2 x 10  +prone scapular retractions 2x10   prone Y's 2x10 each arm  Single arm prone rows + 1# HW with R (no wt L) 2x10 each  prone T's 2x10 each arm  + bilateral standing D2 flexion/extension OTB with R UE and YTB with L UE 2 x 10  "each  Open Books 2 x 10 to Each Side   LS stretch Bilateral: 2 x 30 Seconds   pec stretch in doorway 3x30"  +thoracic extension in chair 2x10    Manual therapy technique: x00 mins- not performed today  Gentle STM to B UT, cervical paraspinals and suboccipital musculature            Patient Education and Home Exercises     Home Exercises Provided and Patient Education Provided     Education provided:   - exercises  - pt educated on all interventions today  - PT POC   - progress to date   - continue HEP   - updated HEP    Written Home Exercises Provided: yes. Exercises were reviewed and April was able to demonstrate them prior to the end of the session.  April demonstrated good  understanding of the education provided. See EMR under Patient Instructions for exercises provided during therapy sessions    ASSESSMENT   Pt  Tolerated PT session well with focus on bilateral periscapular strengthening. Added resistance with exercises and increased complexity of standing periscapular strengthening today with good tolerance. Pt demonstrates more signficant weakness of L UE vs R UE but able to complete exercises today with modifications.     I certify that I was present in the room directing the student in service delivery and guiding them using my skilled judgment.  As the co-signing therapist I have reviewed the students documentation and am responsible for the treatment, assessment, and plan Aidan Patrick Mendez Is progressing well towards her goals.   Pt prognosis is Good.     Pt will continue to benefit from skilled outpatient physical therapy to address the deficits listed in the problem list box on initial evaluation, provide pt/family education and to maximize pt's level of independence in the home and community environment.     Pt's spiritual, cultural and educational needs considered and pt agreeable to plan of care and goals.     Anticipated barriers to physical therapy: None    Goals:     Short Term Goals: 3 " weeks  1. Pt to be independent in HEP to improve independence with symptoms.-met    2. Pt to require min VC from PT for proper scapular retraction in order to improve postural awareness. MET  3. Pt to improve periscapular muscles strength by 1/2 muscle grade to improve scapular stability. -MET  4. Pt to report pain at worst to be </=4/10 in order to improve upon symptom management and quality of life.-progressing   5. Pt will demonstrate improved sitting posture to decrease pain experienced in neck and improve cervicothoracic endurance.-MET     Long Term Goals: 6 weeks    1. Pt to be independent in HEP progressions to improve independence with symptoms. -MET  2. Pt to require no verbal or tactile from PT for proper scapular retraction in order to improve postural awareness. -MET  3. Pt to improve periscapular muscles strength by 1 muscle grade to improve scapular stability. -progressing  4. Pt to report pain at worst to be </=2/10 in order to improve upon symptom management and quality of life.  5. Pt to improve cervical AROM to WFL to improve tolerance to daily activities at home and work. -MET   6. Pt to show flexibility restrictions to </= min restrictions for improved posture and movement.  7. Pt will improve FOTO to </=30% limitation to improve perceived limitation -MET       PLAN     Continue PT POC    Aidan Nguyen, PT

## 2022-03-21 ENCOUNTER — CLINICAL SUPPORT (OUTPATIENT)
Dept: REHABILITATION | Facility: HOSPITAL | Age: 22
End: 2022-03-21
Payer: COMMERCIAL

## 2022-03-21 DIAGNOSIS — M54.2 BILATERAL NECK PAIN: ICD-10-CM

## 2022-03-21 DIAGNOSIS — M53.82 IMPAIRED RANGE OF MOTION OF CERVICAL SPINE: ICD-10-CM

## 2022-03-21 DIAGNOSIS — R29.898 IMPAIRED FLEXIBILITY OF UPPER EXTREMITY: Primary | ICD-10-CM

## 2022-03-21 PROCEDURE — 97110 THERAPEUTIC EXERCISES: CPT | Mod: PO

## 2022-03-21 NOTE — PROGRESS NOTES
"[facility]       Physical Therapy Treatment Note / Discharge Note    Name: Vanessa Sinclair  Clinic Number: [id number]    Therapy Diagnosis:   Encounter Diagnoses   Name Primary?    Impaired flexibility of upper extremity Yes    Bilateral neck pain     Impaired range of motion of cervical spine      Physician: Marry Roque MD    Visit Date: 3/21/2022    Physician Orders: PT Eval and Treat   Medical Diagnosis from Referral: neck pain  Evaluation Date: 12/13/2021  Authorization Period Expiration: 2/7/22  Plan of Care Expiration: 3/21/22  Progress Note Due: 3/21/22  Visit # / Visits authorized: 9 / 20  FOTO: 3/ 3     Precautions: asthma,  seizures , syncope, pacemaker       Time In:10:03 AM  Time Out 10:34 AM  Total Appointment Time (timed & untimed codes): 31 minutes (TE-2)     SUBJECTIVE     Pt reports has been having a lot of headaches. She thinks it is because she has had no days off recently as she has been busy with work and school.   She was compliant with home exercise program.  Response to previous treatment: "I was actually in pain a couple days after but it went away after 2-3 days"  Functional change: pain has improved since start of care     Pain: 7 / 10  Location: neck (more left sided)    OBJECTIVE           Upper Extremity Strength Bold=updated measurements   (R) UE   (L) UE     Shoulder flexion: 4+/5 5/5 Shoulder flexion: >/=4/5 5/5   Shoulder Abduction: 4+/5 5/5 Shoulder abduction: 4+/5 5/5   Shoulder ER 5/5 5/5 Shoulder ER 5/5 5/5   Shoulder IR 5/5 5/5 Shoulder IR 5/5 5/5   Lower trap 4/5 4+/5 Lower trap  4-/5 4/5   Middle trap 4-/5 4/5 Middle trap  4-/5 4/5   Rhomboids 4-/5 4/5 Rhomboids 4-/5 >/4/5         Treatment       April received the treatments listed below:      Therex for flexibility, strength, endurance and posture x 31 minutes    Bold=exercises performed      UBE 6mins backwards + level 2  Diaphragmatic breathing in supine x3 mins   Supine Chin Tuck " "with Towel Under Head 30 x 3 seconds   Supine Chin Tuck with Rotation 2 x 10 to Each Side +with cervical flexion  rows BTB 3x10  +shoulder extensions OTB 3x10   Upper Trap Stretch bilateral 3 x 30 seconds    + supine serratus punches with dowel at 120 degrees GHJ flexion 2 x 10  +prone scapular retractions 2x10   prone Y's 2x10 each arm  Single arm prone rows + 1# HW with R (no wt L) 2x10 each  prone T's 2x10 each arm  + bilateral standing D2 flexion/extension OTB with R UE and YTB with L UE 2 x 10 each  Open Books 2 x 10 to Each Side   LS stretch Bilateral: 2 x 30 Seconds   pec stretch in doorway 3x30"  +thoracic extension in chair 2x10    Manual therapy technique: x00 mins- not performed today  Gentle STM to B UT, cervical paraspinals and suboccipital musculature            Patient Education and Home Exercises     Home Exercises Provided and Patient Education Provided     Education provided:   - pt educated on all interventions today  - discharge plan  - HEP    Written Home Exercises Provided: yes. Exercises were reviewed and April was able to demonstrate them prior to the end of the session.  April demonstrated good  understanding of the education provided. See EMR under Patient Instructions for exercises provided during therapy sessions    ASSESSMENT     Patient with good progress towards all goals since start of care. She is appropriate to maintain current level of function through independent HEP. HEP is updated and reviewed with patient and she verbalizes understanding. Pt is discharged from skilled outpatient physical therapy today        Goals:     Short Term Goals: 3 weeks  1. Pt to be independent in HEP to improve independence with symptoms.-met    2. Pt to require min VC from PT for proper scapular retraction in order to improve postural awareness. MET  3. Pt to improve periscapular muscles strength by 1/2 muscle grade to improve scapular stability. -MET  4. Pt to report pain at worst to be </=4/10 in " order to improve upon symptom management and quality of life.-progressing   5. Pt will demonstrate improved sitting posture to decrease pain experienced in neck and improve cervicothoracic endurance.-MET     Long Term Goals: 6 weeks    1. Pt to be independent in HEP progressions to improve independence with symptoms. -MET  2. Pt to require no verbal or tactile from PT for proper scapular retraction in order to improve postural awareness. -MET  3. Pt to improve periscapular muscles strength by 1 muscle grade to improve scapular stability. -progressing  4. Pt to report pain at worst to be </=2/10 in order to improve upon symptom management and quality of life.  5. Pt to improve cervical AROM to WFL to improve tolerance to daily activities at home and work. -MET   6. Pt to show flexibility restrictions to </= min restrictions for improved posture and movement.  7. Pt will improve FOTO to </=30% limitation to improve perceived limitation -MET       PLAN   Patient is discharged today    Terri Galvez, PT, DPT

## 2022-05-04 ENCOUNTER — TELEPHONE (OUTPATIENT)
Dept: ELECTROPHYSIOLOGY | Facility: CLINIC | Age: 22
End: 2022-05-04
Payer: COMMERCIAL

## 2022-05-05 ENCOUNTER — PATIENT MESSAGE (OUTPATIENT)
Dept: ELECTROPHYSIOLOGY | Facility: CLINIC | Age: 22
End: 2022-05-05
Payer: COMMERCIAL

## 2022-05-11 ENCOUNTER — PATIENT MESSAGE (OUTPATIENT)
Dept: ELECTROPHYSIOLOGY | Facility: CLINIC | Age: 22
End: 2022-05-11
Payer: COMMERCIAL

## 2022-05-31 ENCOUNTER — OFFICE VISIT (OUTPATIENT)
Dept: INTERNAL MEDICINE | Facility: CLINIC | Age: 22
End: 2022-05-31
Payer: COMMERCIAL

## 2022-05-31 VITALS
SYSTOLIC BLOOD PRESSURE: 114 MMHG | WEIGHT: 145.81 LBS | DIASTOLIC BLOOD PRESSURE: 76 MMHG | BODY MASS INDEX: 24.29 KG/M2 | OXYGEN SATURATION: 97 % | HEIGHT: 65 IN | HEART RATE: 76 BPM

## 2022-05-31 DIAGNOSIS — Z95.0 CARDIAC PACEMAKER IN SITU: ICD-10-CM

## 2022-05-31 DIAGNOSIS — K21.9 GASTROESOPHAGEAL REFLUX DISEASE WITHOUT ESOPHAGITIS: Primary | ICD-10-CM

## 2022-05-31 DIAGNOSIS — G43.109 MIGRAINE WITH AURA AND WITHOUT STATUS MIGRAINOSUS, NOT INTRACTABLE: ICD-10-CM

## 2022-05-31 PROCEDURE — 1159F MED LIST DOCD IN RCRD: CPT | Mod: CPTII,S$GLB,, | Performed by: INTERNAL MEDICINE

## 2022-05-31 PROCEDURE — 3074F SYST BP LT 130 MM HG: CPT | Mod: CPTII,S$GLB,, | Performed by: INTERNAL MEDICINE

## 2022-05-31 PROCEDURE — 99999 PR PBB SHADOW E&M-EST. PATIENT-LVL III: CPT | Mod: PBBFAC,,, | Performed by: INTERNAL MEDICINE

## 2022-05-31 PROCEDURE — 3078F PR MOST RECENT DIASTOLIC BLOOD PRESSURE < 80 MM HG: ICD-10-PCS | Mod: CPTII,S$GLB,, | Performed by: INTERNAL MEDICINE

## 2022-05-31 PROCEDURE — 3078F DIAST BP <80 MM HG: CPT | Mod: CPTII,S$GLB,, | Performed by: INTERNAL MEDICINE

## 2022-05-31 PROCEDURE — 99999 PR PBB SHADOW E&M-EST. PATIENT-LVL III: ICD-10-PCS | Mod: PBBFAC,,, | Performed by: INTERNAL MEDICINE

## 2022-05-31 PROCEDURE — 1159F PR MEDICATION LIST DOCUMENTED IN MEDICAL RECORD: ICD-10-PCS | Mod: CPTII,S$GLB,, | Performed by: INTERNAL MEDICINE

## 2022-05-31 PROCEDURE — 3008F BODY MASS INDEX DOCD: CPT | Mod: CPTII,S$GLB,, | Performed by: INTERNAL MEDICINE

## 2022-05-31 PROCEDURE — 99213 OFFICE O/P EST LOW 20 MIN: CPT | Mod: S$GLB,,, | Performed by: INTERNAL MEDICINE

## 2022-05-31 PROCEDURE — 3074F PR MOST RECENT SYSTOLIC BLOOD PRESSURE < 130 MM HG: ICD-10-PCS | Mod: CPTII,S$GLB,, | Performed by: INTERNAL MEDICINE

## 2022-05-31 PROCEDURE — 3008F PR BODY MASS INDEX (BMI) DOCUMENTED: ICD-10-PCS | Mod: CPTII,S$GLB,, | Performed by: INTERNAL MEDICINE

## 2022-05-31 PROCEDURE — 1160F PR REVIEW ALL MEDS BY PRESCRIBER/CLIN PHARMACIST DOCUMENTED: ICD-10-PCS | Mod: CPTII,S$GLB,, | Performed by: INTERNAL MEDICINE

## 2022-05-31 PROCEDURE — 1160F RVW MEDS BY RX/DR IN RCRD: CPT | Mod: CPTII,S$GLB,, | Performed by: INTERNAL MEDICINE

## 2022-05-31 PROCEDURE — 99213 PR OFFICE/OUTPT VISIT, EST, LEVL III, 20-29 MIN: ICD-10-PCS | Mod: S$GLB,,, | Performed by: INTERNAL MEDICINE

## 2022-05-31 RX ORDER — FAMOTIDINE 20 MG/1
20 TABLET, FILM COATED ORAL 2 TIMES DAILY
Qty: 60 TABLET | Refills: 3 | Status: SHIPPED | OUTPATIENT
Start: 2022-05-31 | End: 2023-06-30 | Stop reason: ALTCHOICE

## 2022-05-31 NOTE — PROGRESS NOTES
"INTERNAL MEDICINE ESTABLISHED PATIENT VISIT NOTE    Subjective:     Chief Complaint: Follow-up       Patient ID: April Lorrie Sincalir is a 21 y.o. female with asthma, migraines, seizures vs convulsive syncope, SSS s/p PPM, Vit D def, last seen by me 11/2021, here today for follow-up.    Reports aching chest pain for past few months. Occurs more after meals, focused to center of chest. Associated cough after meals, especially with spicy, fried foods. Often eats fast food meals late at night. Occasionally wakes up with sore throat or bad taste in mouth. Happening daily.     Past Medical History:  Past Medical History:   Diagnosis Date    Asthma     Headache(784.0)     Migraine     Pre-syncope 5/25/2021    Seizures     Syncope and collapse           Review of Systems:  Review of Systems   Constitutional: Negative for chills and fever.   HENT: Negative for congestion.    Respiratory: Negative for cough and shortness of breath.    Cardiovascular: Positive for chest pain.   Gastrointestinal: Positive for heartburn. Negative for constipation, nausea and vomiting.   Genitourinary: Negative for hematuria and urgency.   Musculoskeletal: Negative for falls.   Skin: Negative for rash.   Neurological: Negative for dizziness and loss of consciousness.       Health Maintenance:   Immunizations:   Influenza - fall 2022  Tdap - 12/2020   Covid 19 - discussed  HPV - complete  Prevnar rec at 63  Shingrix rec at 48     Cancer Screening:  PAP: 6/2020  Mammogram:  n/a  Colonoscopy:  n/a  DEXA:  n/a    Objective:   /76 (BP Location: Left arm, Patient Position: Sitting)   Pulse 76   Ht 5' 5" (1.651 m)   Wt 66.2 kg (145 lb 13.4 oz)   SpO2 97%   BMI 24.27 kg/m²      General: AAO x3, no apparent distress  HEENT: PERRL  CV: RRR, no m/r/g  Pulm: Lungs CTAB, no crackles, no wheezes  Abd: s/NT/ND +BS  Extremities: no c/c/e    Labs:       Assessment/Plan     Gastroesophageal reflux disease without esophagitis  Trial of histamine " blockers; counseled on trigger avoidance  Provided with educational handout  -     famotidine (PEPCID) 20 MG tablet; Take 1 tablet (20 mg total) by mouth 2 (two) times daily.  Dispense: 60 tablet; Refill: 3    Migraine with aura and without status migrainosus, not intractable  Stable    Cardiac pacemaker in situ  Stable, follows with EP    HM as above.    RTC in 6 mo, sooner if needed.    Patt Roque MD  Department of Internal Medicine - [facility]  06/01/2022

## 2022-06-01 ENCOUNTER — TELEPHONE (OUTPATIENT)
Dept: ELECTROPHYSIOLOGY | Facility: CLINIC | Age: 22
End: 2022-06-01
Payer: COMMERCIAL

## 2022-06-02 ENCOUNTER — OFFICE VISIT (OUTPATIENT)
Dept: CARDIOLOGY | Facility: CLINIC | Age: 22
End: 2022-06-02
Payer: COMMERCIAL

## 2022-06-02 VITALS
DIASTOLIC BLOOD PRESSURE: 74 MMHG | SYSTOLIC BLOOD PRESSURE: 109 MMHG | BODY MASS INDEX: 24.43 KG/M2 | HEART RATE: 69 BPM | WEIGHT: 146.63 LBS | HEIGHT: 65 IN

## 2022-06-02 DIAGNOSIS — R00.2 PALPITATIONS: ICD-10-CM

## 2022-06-02 DIAGNOSIS — R55 SYNCOPE, UNSPECIFIED SYNCOPE TYPE: ICD-10-CM

## 2022-06-02 DIAGNOSIS — I49.5 SSS (SICK SINUS SYNDROME): Primary | ICD-10-CM

## 2022-06-02 PROCEDURE — 93010 RHYTHM STRIP: ICD-10-PCS | Mod: S$GLB,,, | Performed by: INTERNAL MEDICINE

## 2022-06-02 PROCEDURE — 1159F PR MEDICATION LIST DOCUMENTED IN MEDICAL RECORD: ICD-10-PCS | Mod: CPTII,S$GLB,, | Performed by: INTERNAL MEDICINE

## 2022-06-02 PROCEDURE — 93005 ELECTROCARDIOGRAM TRACING: CPT | Mod: S$GLB,,, | Performed by: INTERNAL MEDICINE

## 2022-06-02 PROCEDURE — 99999 PR PBB SHADOW E&M-EST. PATIENT-LVL III: ICD-10-PCS | Mod: PBBFAC,,, | Performed by: INTERNAL MEDICINE

## 2022-06-02 PROCEDURE — 99999 PR PBB SHADOW E&M-EST. PATIENT-LVL III: CPT | Mod: PBBFAC,,, | Performed by: INTERNAL MEDICINE

## 2022-06-02 PROCEDURE — 3074F SYST BP LT 130 MM HG: CPT | Mod: CPTII,S$GLB,, | Performed by: INTERNAL MEDICINE

## 2022-06-02 PROCEDURE — 93010 ELECTROCARDIOGRAM REPORT: CPT | Mod: S$GLB,,, | Performed by: INTERNAL MEDICINE

## 2022-06-02 PROCEDURE — 99215 OFFICE O/P EST HI 40 MIN: CPT | Mod: S$GLB,,, | Performed by: INTERNAL MEDICINE

## 2022-06-02 PROCEDURE — 3074F PR MOST RECENT SYSTOLIC BLOOD PRESSURE < 130 MM HG: ICD-10-PCS | Mod: CPTII,S$GLB,, | Performed by: INTERNAL MEDICINE

## 2022-06-02 PROCEDURE — 99215 PR OFFICE/OUTPT VISIT, EST, LEVL V, 40-54 MIN: ICD-10-PCS | Mod: S$GLB,,, | Performed by: INTERNAL MEDICINE

## 2022-06-02 PROCEDURE — 3078F DIAST BP <80 MM HG: CPT | Mod: CPTII,S$GLB,, | Performed by: INTERNAL MEDICINE

## 2022-06-02 PROCEDURE — 3008F BODY MASS INDEX DOCD: CPT | Mod: CPTII,S$GLB,, | Performed by: INTERNAL MEDICINE

## 2022-06-02 PROCEDURE — 3008F PR BODY MASS INDEX (BMI) DOCUMENTED: ICD-10-PCS | Mod: CPTII,S$GLB,, | Performed by: INTERNAL MEDICINE

## 2022-06-02 PROCEDURE — 93005 RHYTHM STRIP: ICD-10-PCS | Mod: S$GLB,,, | Performed by: INTERNAL MEDICINE

## 2022-06-02 PROCEDURE — 3078F PR MOST RECENT DIASTOLIC BLOOD PRESSURE < 80 MM HG: ICD-10-PCS | Mod: CPTII,S$GLB,, | Performed by: INTERNAL MEDICINE

## 2022-06-02 PROCEDURE — 1159F MED LIST DOCD IN RCRD: CPT | Mod: CPTII,S$GLB,, | Performed by: INTERNAL MEDICINE

## 2022-06-02 PROCEDURE — 1160F RVW MEDS BY RX/DR IN RCRD: CPT | Mod: CPTII,S$GLB,, | Performed by: INTERNAL MEDICINE

## 2022-06-02 PROCEDURE — 1160F PR REVIEW ALL MEDS BY PRESCRIBER/CLIN PHARMACIST DOCUMENTED: ICD-10-PCS | Mod: CPTII,S$GLB,, | Performed by: INTERNAL MEDICINE

## 2022-06-02 NOTE — PROGRESS NOTES
"  Subjective:   Patient ID:  April Lorrie Sinclair is a 21 y.o. female who presents for follow-up of SSS, PPM.     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    "Seizure d/o" by history, but likely actually related to seizure-type activity following cardioinhibitory syncope  migraine HA on meds  asthma  recurrent syncope with a very significant cardioinhibitory component (asystole x 50 sec!) -- no syncope since PPM    Recurrent syncope. Often exacerbated by IV placement, blood draws, etc.  Underwent TTT, during which there was syncope, sinus slowing, and ventricular asystole for somewhere be 30 s and 50 s (and CHB). Very similar sx to past episodes.  Grandfather had sudden death at age 50. Parents, sibs: no syncope; no other SCD.    PPM placement: (V lead only, given pt's youth). Since, no syncope at all! No syncope since PPM. However, 40% V pacing and sx with it led to RA lead placement.  5/25/2021: Successful addition of RA lead.  6/1/2021: Patient reported LH and dizziness. Reprogramming at this visit: changed CLS response from medium to high.    Feeling great since last seen. No syncope. Occasional vasovagal LH.    She paces 55% in the RA and 0% in the RV. Estimated battery longevity 95%.    I have personally reviewed the patient's EKG today, which shows ASVS at 79 bpm      Current Outpatient Medications   Medication Sig    famotidine (PEPCID) 20 MG tablet Take 1 tablet (20 mg total) by mouth 2 (two) times daily.    norethindrone-e.estradioL-iron (BLISOVI 24 FE) 1 mg-20 mcg (24)/75 mg (4) per tablet Take 1 tablet by mouth once daily.    sumatriptan (IMITREX) 50 MG tablet Take 1 tab PO X 1 PRN for migraines. Do not repeat for 24 hours. NO FURTHER REFILL WITHOUT APT     No current facility-administered medications for this visit.       Review of Systems   Constitutional: Negative for malaise/fatigue.   Cardiovascular: Negative for chest " "pain, dyspnea on exertion, irregular heartbeat, leg swelling and palpitations.   Respiratory: Negative for shortness of breath.    Hematologic/Lymphatic: Negative for bleeding problem.   Skin: Negative for rash.   Musculoskeletal: Negative for myalgias.   Gastrointestinal: Negative for hematemesis, hematochezia and nausea.   Genitourinary: Negative for hematuria.   Neurological: Negative for light-headedness.   Psychiatric/Behavioral: Negative for altered mental status.   Allergic/Immunologic: Negative for persistent infections.     Objective:        /74   Pulse 69   Ht 5' 5" (1.651 m)   Wt 66.5 kg (146 lb 9.7 oz)   BMI 24.40 kg/m²     Physical Exam  Vitals and nursing note reviewed.   Constitutional:       Appearance: Normal appearance. She is well-developed.   HENT:      Head: Normocephalic.      Nose: Nose normal.   Eyes:      Pupils: Pupils are equal, round, and reactive to light.   Cardiovascular:      Rate and Rhythm: Normal rate and regular rhythm.   Pulmonary:      Effort: No respiratory distress.      Breath sounds: Normal breath sounds.   Chest:      Comments: Device to LUCW. Incision well healed. Pocket in good repair with no s/s infection noted.  Musculoskeletal:         General: Normal range of motion.   Skin:     General: Skin is warm and dry.      Findings: No erythema.   Neurological:      Mental Status: She is alert and oriented to person, place, and time.   Psychiatric:         Speech: Speech normal.         Behavior: Behavior normal.       Lab Results   Component Value Date     05/21/2021    K 4.2 05/21/2021    MG 1.9 03/27/2021    BUN 13 05/21/2021    CREATININE 0.8 05/21/2021    ALT 8 (L) 03/27/2021    AST 11 03/27/2021    HGB 14.5 05/21/2021    HCT 44.7 05/21/2021    TSH 0.877 03/26/2021    LDLCALC 112.2 02/27/2017       Recent Labs   Lab 07/18/19  1348 03/26/21  1514 05/21/21  0901   INR 1.1 1.1 1.1         Assessment:     1. SSS (sick sinus syndrome)    2. Syncope, unspecified " syncope type    3. Palpitations      Plan:     In summary, Ms. Sinclair is a 21 y.o. female with seizure, migraine, asthma, syncope, PPM here for follow up.  She is s/p addition of RA lead. Since then no ventricular pacing. LH improved after device changes.     Doing well.   f/u 1 year or earlier prn.

## 2022-06-03 ENCOUNTER — OFFICE VISIT (OUTPATIENT)
Dept: NEUROLOGY | Facility: CLINIC | Age: 22
End: 2022-06-03
Payer: COMMERCIAL

## 2022-06-03 VITALS
HEIGHT: 65 IN | BODY MASS INDEX: 24.37 KG/M2 | SYSTOLIC BLOOD PRESSURE: 105 MMHG | WEIGHT: 146.25 LBS | HEART RATE: 74 BPM | DIASTOLIC BLOOD PRESSURE: 76 MMHG

## 2022-06-03 DIAGNOSIS — G43.109 MIGRAINE WITH AURA AND WITHOUT STATUS MIGRAINOSUS, NOT INTRACTABLE: ICD-10-CM

## 2022-06-03 PROCEDURE — 3078F DIAST BP <80 MM HG: CPT | Mod: CPTII,S$GLB,, | Performed by: PSYCHIATRY & NEUROLOGY

## 2022-06-03 PROCEDURE — 1159F MED LIST DOCD IN RCRD: CPT | Mod: CPTII,S$GLB,, | Performed by: PSYCHIATRY & NEUROLOGY

## 2022-06-03 PROCEDURE — 3074F PR MOST RECENT SYSTOLIC BLOOD PRESSURE < 130 MM HG: ICD-10-PCS | Mod: CPTII,S$GLB,, | Performed by: PSYCHIATRY & NEUROLOGY

## 2022-06-03 PROCEDURE — 1160F RVW MEDS BY RX/DR IN RCRD: CPT | Mod: CPTII,S$GLB,, | Performed by: PSYCHIATRY & NEUROLOGY

## 2022-06-03 PROCEDURE — 3008F PR BODY MASS INDEX (BMI) DOCUMENTED: ICD-10-PCS | Mod: CPTII,S$GLB,, | Performed by: PSYCHIATRY & NEUROLOGY

## 2022-06-03 PROCEDURE — 99999 PR PBB SHADOW E&M-EST. PATIENT-LVL III: CPT | Mod: PBBFAC,,, | Performed by: PSYCHIATRY & NEUROLOGY

## 2022-06-03 PROCEDURE — 3074F SYST BP LT 130 MM HG: CPT | Mod: CPTII,S$GLB,, | Performed by: PSYCHIATRY & NEUROLOGY

## 2022-06-03 PROCEDURE — 3078F PR MOST RECENT DIASTOLIC BLOOD PRESSURE < 80 MM HG: ICD-10-PCS | Mod: CPTII,S$GLB,, | Performed by: PSYCHIATRY & NEUROLOGY

## 2022-06-03 PROCEDURE — 1160F PR REVIEW ALL MEDS BY PRESCRIBER/CLIN PHARMACIST DOCUMENTED: ICD-10-PCS | Mod: CPTII,S$GLB,, | Performed by: PSYCHIATRY & NEUROLOGY

## 2022-06-03 PROCEDURE — 3008F BODY MASS INDEX DOCD: CPT | Mod: CPTII,S$GLB,, | Performed by: PSYCHIATRY & NEUROLOGY

## 2022-06-03 PROCEDURE — 99213 OFFICE O/P EST LOW 20 MIN: CPT | Mod: S$GLB,,, | Performed by: PSYCHIATRY & NEUROLOGY

## 2022-06-03 PROCEDURE — 99999 PR PBB SHADOW E&M-EST. PATIENT-LVL III: ICD-10-PCS | Mod: PBBFAC,,, | Performed by: PSYCHIATRY & NEUROLOGY

## 2022-06-03 PROCEDURE — 99213 PR OFFICE/OUTPT VISIT, EST, LEVL III, 20-29 MIN: ICD-10-PCS | Mod: S$GLB,,, | Performed by: PSYCHIATRY & NEUROLOGY

## 2022-06-03 PROCEDURE — 1159F PR MEDICATION LIST DOCUMENTED IN MEDICAL RECORD: ICD-10-PCS | Mod: CPTII,S$GLB,, | Performed by: PSYCHIATRY & NEUROLOGY

## 2022-06-03 RX ORDER — LANOLIN ALCOHOL/MO/W.PET/CERES
400 CREAM (GRAM) TOPICAL DAILY
Qty: 90 TABLET | Refills: 3 | Status: SHIPPED | OUTPATIENT
Start: 2022-06-03

## 2022-06-03 RX ORDER — SUMATRIPTAN 50 MG/1
TABLET, FILM COATED ORAL
Qty: 12 TABLET | Refills: 3 | Status: SHIPPED | OUTPATIENT
Start: 2022-06-03

## 2022-06-03 RX ORDER — RIBOFLAVIN (VITAMIN B2) 100 MG
200 TABLET ORAL DAILY
Qty: 180 TABLET | Refills: 3 | Status: SHIPPED | OUTPATIENT
Start: 2022-06-03

## 2022-06-03 NOTE — PROGRESS NOTES
[facility] - NEUROLOGY - [address]  [facility], [place]    Date: 6/3/22  Patient Name: Vanessa Sinclair   MRN: [id number]   PCP: Marry Roque  Referring Provider: No ref. provider found    Assessment:   Vanessa Sinclair is a 21 y.o. female Presenting in follow-up presenting in follow-up for management of migraine.  Continuing p.r.n. Imitrex.  Discussed prophylactic therapeutic options with the patient.  Initiating magnesium and riboflavin prophylaxis.  Questions answered about COVID-19 vaccine.  Plan:     Problem List Items Addressed This Visit        Neuro    Migraine headache    Relevant Medications    sumatriptan (IMITREX) 50 MG tablet        I spent a total of 21 minutes preparing to see the patient, obtaining and reviewing history and results, performing a medically appropriate exam, counseling and educating the patient/family/caregiver, documenting clinical information, coordinating care, and ordering medications, tests, procedures, and referrals.    Odilon Brown MD  [facility]   Department of Neurology    Patient note was created using MModal Dictation.  Any errors in syntax or even information may not have been identified and edited on initial review prior to signing this note.  Subjective:        HPI:   Ms. Vanessa Sinclair is a 21 y.o. female presenting in follow-up.  The patient was last seen by me nearly 2 years ago for reported seizure like events that were ultimately found to be due to sick sinus syndrome (asystole for 50 seconds captured).  The patient has since undergone pacemaker placement with no further episodes.  Patient now follows with a for management of migraine headache.  Patient has been lost to follow-up for a year.  She reports she experiences approximately 6 migraines per month but do rapidly responded Imitrex.  She states the headaches are most frequent around her periods but denies other triggers.  She has no other complaints  today.    PAST MEDICAL HISTORY:  Past Medical History:   Diagnosis Date    Asthma     Headache(784.0)     Migraine     Pre-syncope 5/25/2021    Seizures     Syncope and collapse        PAST SURGICAL HISTORY:  Past Surgical History:   Procedure Laterality Date    A-V CARDIAC PACEMAKER INSERTION Left 7/18/2019    Procedure: INSERTION, CARDIAC PACEMAKER, DUAL CHAMBER;  Surgeon: Piter Cooley MD;  [facility] CATH LAB[facility]EP;  Service: Cardiology;  Laterality: Left;    PHLEBOGRAPHY  5/25/2021    Procedure: Venogram;  Surgeon: Piter Cooley MD;  Location: [facility] EP LAB;  Service: Cardiology;;    REPLACEMENT OF PACEMAKER GENERATOR  5/25/2021    Procedure: REPLACEMENT, PACEMAKER GENERATOR;  Surgeon: Piter Cooley MD;  Location: [facility] EP LAB;  Service: Cardiology;;    WISDOM TOOTH EXTRACTION         CURRENT MEDS:  Current Outpatient Medications   Medication Sig Dispense Refill    famotidine (PEPCID) 20 MG tablet Take 1 tablet (20 mg total) by mouth 2 (two) times daily. 60 tablet 3    norethindrone-e.estradioL-iron (BLISOVI 24 FE) 1 mg-20 mcg (24)/75 mg (4) per tablet Take 1 tablet by mouth once daily. 84 tablet 3    magnesium oxide (MAG-OX) 400 mg (241.3 mg magnesium) tablet Take 1 tablet (400 mg total) by mouth once daily. 90 tablet 3    riboflavin, vitamin B2, (VITAMIN B-2) 100 mg Tab tablet Take 2 tablets (200 mg total) by mouth once daily. 180 tablet 3    sumatriptan (IMITREX) 50 MG tablet Take 1 tab PO X 1 PRN for migraines. Do not repeat for 24 hours. 12 tablet 3     No current facility-administered medications for this visit.       ALLERGIES:  Review of patient's allergies indicates:   Allergen Reactions    Lamictal  [lamotrigine]      Other reaction(s): HIVES    Cefzil [cefprozil] Rash    Demerol [meperidine] Rash     Other reaction(s): Rash    Pcn [penicillins] Rash     Other reaction(s): Rash       FAMILY HISTORY:  Family History   Problem Relation Age of Onset    Diabetes Mother      "Stroke Maternal Grandfather     Heart disease Maternal Grandfather     Diabetes Maternal Grandfather     Irregular heart beat Sister     Diabetes Maternal Grandmother     Cancer Maternal Grandmother     Arthritis Paternal Grandmother     Diabetes Paternal Grandmother     Diabetes Paternal Grandfather     Diabetes Father     Breast cancer Neg Hx     Ovarian cancer Neg Hx     Colon cancer Neg Hx        SOCIAL HISTORY:  Social History     Tobacco Use    Smoking status: Never Smoker    Smokeless tobacco: Never Used   Substance Use Topics    Alcohol use: Not Currently     Alcohol/week: 0.0 standard drinks     Comment: occasionally     Drug use: Never       Review of Systems:  12 system review of systems is negative except for the symptoms mentioned in HPI.      Objective:     Vitals:    06/03/22 1345   BP: 105/76   Pulse: 74   Weight: 66.3 kg (146 lb 4.4 oz)   Height: 5' 5" (1.651 m)     General: NAD, well nourished   Eyes: no tearing, discharge, no erythema   ENT: moist mucous membranes of the oral cavity, nares patent    Neck: Supple, full range of motion  Cardiovascular: Warm and well perfused, pulses equal and symmetrical  Lungs: Normal work of breathing, normal chest wall excursions  Skin: No rash, lesions, or breakdown on exposed skin  Psychiatry: Mood and affect are appropriate   Abdomen: soft, non tender, non distended  Extremeties: No cyanosis, clubbing or edema.    Neurological   MENTAL STATUS: Alert and oriented to person, place, and time. Attention and concentration within normal limits. Speech without dysarthria, able to name and repeat without difficulty. Recent and remote memory within normal limits   CRANIAL NERVES: Visual fields intact. PERRL. EOMI. Facial sensation intact. Face symmetrical. Hearing grossly intact. Full shoulder shrug bilaterally. Tongue protrudes midline   SENSORY: Sensation is intact to light touch throughout.   MOTOR: Normal bulk and tone. 5/5 deltoid, biceps, triceps, " interosseous, hand  bilaterally. 5/5 iliopsoas, knee extension/flexion, foot dorsi/plantarflexion bilaterally.    REFLEXES: Symmetric and 2+ throughout.    CEREBELLAR/COORDINATION/GAIT: Gait steady with normal arm swing and stride length.  Normal rapid alternating movements.

## 2022-06-06 ENCOUNTER — CLINICAL SUPPORT (OUTPATIENT)
Dept: CARDIOLOGY | Facility: HOSPITAL | Age: 22
End: 2022-06-06
Payer: COMMERCIAL

## 2022-06-06 DIAGNOSIS — R55 SYNCOPE AND COLLAPSE: ICD-10-CM

## 2022-06-06 DIAGNOSIS — Z95.0 PRESENCE OF CARDIAC PACEMAKER: ICD-10-CM

## 2022-06-06 PROCEDURE — 93296 REM INTERROG EVL PM/IDS: CPT | Performed by: INTERNAL MEDICINE

## 2022-06-07 ENCOUNTER — PATIENT MESSAGE (OUTPATIENT)
Dept: OBSTETRICS AND GYNECOLOGY | Facility: CLINIC | Age: 22
End: 2022-06-07
Payer: COMMERCIAL

## 2022-06-17 ENCOUNTER — OFFICE VISIT (OUTPATIENT)
Dept: OBSTETRICS AND GYNECOLOGY | Facility: CLINIC | Age: 22
End: 2022-06-17
Payer: COMMERCIAL

## 2022-06-17 VITALS
DIASTOLIC BLOOD PRESSURE: 68 MMHG | SYSTOLIC BLOOD PRESSURE: 108 MMHG | WEIGHT: 143 LBS | HEIGHT: 65 IN | BODY MASS INDEX: 23.82 KG/M2

## 2022-06-17 DIAGNOSIS — Z30.41 ENCOUNTER FOR SURVEILLANCE OF CONTRACEPTIVE PILLS: ICD-10-CM

## 2022-06-17 DIAGNOSIS — N64.4 PAIN OF LEFT BREAST: Primary | ICD-10-CM

## 2022-06-17 DIAGNOSIS — N63.20 BREAST MASS, LEFT: ICD-10-CM

## 2022-06-17 DIAGNOSIS — N64.52 NIPPLE DISCHARGE: ICD-10-CM

## 2022-06-17 PROCEDURE — 3008F BODY MASS INDEX DOCD: CPT | Mod: CPTII,S$GLB,, | Performed by: OBSTETRICS & GYNECOLOGY

## 2022-06-17 PROCEDURE — 99999 PR PBB SHADOW E&M-EST. PATIENT-LVL III: CPT | Mod: PBBFAC,,, | Performed by: OBSTETRICS & GYNECOLOGY

## 2022-06-17 PROCEDURE — 3078F PR MOST RECENT DIASTOLIC BLOOD PRESSURE < 80 MM HG: ICD-10-PCS | Mod: CPTII,S$GLB,, | Performed by: OBSTETRICS & GYNECOLOGY

## 2022-06-17 PROCEDURE — 3074F PR MOST RECENT SYSTOLIC BLOOD PRESSURE < 130 MM HG: ICD-10-PCS | Mod: CPTII,S$GLB,, | Performed by: OBSTETRICS & GYNECOLOGY

## 2022-06-17 PROCEDURE — 3074F SYST BP LT 130 MM HG: CPT | Mod: CPTII,S$GLB,, | Performed by: OBSTETRICS & GYNECOLOGY

## 2022-06-17 PROCEDURE — 3008F PR BODY MASS INDEX (BMI) DOCUMENTED: ICD-10-PCS | Mod: CPTII,S$GLB,, | Performed by: OBSTETRICS & GYNECOLOGY

## 2022-06-17 PROCEDURE — 99999 PR PBB SHADOW E&M-EST. PATIENT-LVL III: ICD-10-PCS | Mod: PBBFAC,,, | Performed by: OBSTETRICS & GYNECOLOGY

## 2022-06-17 PROCEDURE — 1159F PR MEDICATION LIST DOCUMENTED IN MEDICAL RECORD: ICD-10-PCS | Mod: CPTII,S$GLB,, | Performed by: OBSTETRICS & GYNECOLOGY

## 2022-06-17 PROCEDURE — 99213 PR OFFICE/OUTPT VISIT, EST, LEVL III, 20-29 MIN: ICD-10-PCS | Mod: S$GLB,,, | Performed by: OBSTETRICS & GYNECOLOGY

## 2022-06-17 PROCEDURE — 1159F MED LIST DOCD IN RCRD: CPT | Mod: CPTII,S$GLB,, | Performed by: OBSTETRICS & GYNECOLOGY

## 2022-06-17 PROCEDURE — 99213 OFFICE O/P EST LOW 20 MIN: CPT | Mod: S$GLB,,, | Performed by: OBSTETRICS & GYNECOLOGY

## 2022-06-17 PROCEDURE — 3078F DIAST BP <80 MM HG: CPT | Mod: CPTII,S$GLB,, | Performed by: OBSTETRICS & GYNECOLOGY

## 2022-06-17 RX ORDER — NORETHINDRONE ACETATE AND ETHINYL ESTRADIOL 1MG-20(24)
1 KIT ORAL DAILY
Qty: 84 TABLET | Refills: 2 | Status: SHIPPED | OUTPATIENT
Start: 2022-06-17 | End: 2023-02-22

## 2022-06-17 NOTE — PROGRESS NOTES
"Subjective:       Patient ID: April Lorrie Sinclair is a 21 y.o. female.    Chief Complaint:  Breast Pain      History of Present Illness  22 yo G0 with history of breast mass (fibroadenoma versus cluster of cysts) presents with a 1 month history of worsening left breast pain.  Denies change in ATIF or caffeine consumption.  No recent trauma or injury to breast.  Also notes clear discharge from left nipple x 1 week.     Patient Active Problem List   Diagnosis    Migraine headache    Syncope, vasovagal    Vitamin D deficiency    Anxiety    Cardiac pacemaker in situ    SSS (sick sinus syndrome)    Neck pain    Paresthesia and pain of right extremity    Palpitations    Intercostal pain    Pacemaker syndrome    Impaired flexibility of upper extremity    Bilateral neck pain    Impaired range of motion of cervical spine       Past Medical History:   Diagnosis Date    Asthma     Headache(784.0)     Migraine     Pre-syncope 2021    Seizures     Syncope and collapse        Past Surgical History:   Procedure Laterality Date    A-V CARDIAC PACEMAKER INSERTION Left 2019    Procedure: INSERTION, CARDIAC PACEMAKER, DUAL CHAMBER;  Surgeon: Piter Cooley MD;  Location: [facility] CATH LAB/EP;  Service: Cardiology;  Laterality: Left;    PHLEBOGRAPHY  2021    Procedure: Venogram;  Surgeon: Piter Cooley MD;  Location: [facility] EP LAB;  Service: Cardiology;;    REPLACEMENT OF PACEMAKER GENERATOR  2021    Procedure: REPLACEMENT, PACEMAKER GENERATOR;  Surgeon: Piter Cooley MD;  Location: [facility] EP LAB;  Service: Cardiology;;    WISDOM TOOTH EXTRACTION         OB History    Para Term  AB Living   0 0 0 0 0 0   SAB IAB Ectopic Multiple Live Births   0 0 0 0 0   Obstetric Comments      No abnormal pap   No STDs       Patient's last menstrual period was 2022 (approximate).   Date of Last Pap: No result found       Objective:   /68   Ht 5' 5" (1.651 m)   Wt 64.9 kg (143 " lb)   LMP 06/16/2022 (Approximate)   BMI 23.80 kg/m²   Body mass index is 23.8 kg/m².    APPEARANCE: Well nourished, well developed, in no acute distress.  PSYCH: Appropriate mood and affect.  SKIN: No acne or hirsutism  NECK: Neck symmetric without masses or thyromegaly  NODES: No inguinal, axillary, or supraclavicular lymph node enlargement  ABDOMEN: Soft.  No tenderness or masses.    CARDIOVASCULAR: No edema of peripheral extremities  BREASTS: Symmetrical, no skin changes or visible lesions.  No palpable masses or nipple discharge bilaterally.     Result:   US Breast Left Limited     History:  Patient is 21 y.o. and is seen for diagnostic imaging. Palpable abnormality and tenderness in the left breast.      Films Compared:  Prior images (if available) were compared.     Findings:  There is a 24 mm x 8 mm x 15 mm oval, parallel mass versus cluster of cysts with circumscribed margins seen in the left breast at 11 o'clock, 3 cm from the nipple. The mass correlates with the palpable mass and tenderness reported by the patient.      Impression:  Left  Mass: Left breast 24 mm x 8 mm x 15 mm mass at the 11 o'clock position. Assessment: 3 - Probably benign fibroadenoma versus cluster of cysts. Short Interval Follow-Up in 6 Months is recommended.      BI-RADS Category:   Overall: 3 - Probably Benign     Recommendation:  Short interval follow-up is recommended in 6 Months.        Your estimated lifetime risk of breast cancer (to age 85) based on Tyrer-Cuzick risk assessment model is Tyrer-Cuzick: 14.39 %. According to the [employer] Cancer Society, patients with a lifetime breast cancer risk of 20% or higher might benefit from supplemental screening tests.     These findings and recommendations were discussed with the patient at the time of the exam by Dr. Janet Branch.                 Exam Ended: 02/22/22 14:49 Last Resulted: 02/22/22 15:22               Assessment/ Plan:     1. Pain of left breast  US Breast Left Complete     Ambulatory referral/consult to Breast Surgery   2. Breast mass, left  US Breast Left Complete    Ambulatory referral/consult to Breast Surgery   3. Nipple discharge  US Breast Left Complete    Ambulatory referral/consult to Breast Surgery   4. Encounter for surveillance of contraceptive pills       Repeat breast imaging.   Refer to breast surgery.   OCPs refilled.     Follow up for annual well woman exam or as needed.          Karin Rushing MD  [facility]  06/17/2022

## 2022-09-06 ENCOUNTER — CLINICAL SUPPORT (OUTPATIENT)
Dept: CARDIOLOGY | Facility: HOSPITAL | Age: 22
End: 2022-09-06
Payer: COMMERCIAL

## 2022-09-06 DIAGNOSIS — Z95.0 PRESENCE OF CARDIAC PACEMAKER: ICD-10-CM

## 2022-09-06 PROCEDURE — 93294 CARDIAC DEVICE CHECK - REMOTE: ICD-10-PCS | Mod: ,,, | Performed by: INTERNAL MEDICINE

## 2022-09-06 PROCEDURE — 93294 REM INTERROG EVL PM/LDLS PM: CPT | Mod: ,,, | Performed by: INTERNAL MEDICINE

## 2022-09-06 PROCEDURE — 93296 REM INTERROG EVL PM/IDS: CPT | Performed by: INTERNAL MEDICINE

## 2022-11-30 ENCOUNTER — OFFICE VISIT (OUTPATIENT)
Dept: INTERNAL MEDICINE | Facility: CLINIC | Age: 22
End: 2022-11-30
Payer: COMMERCIAL

## 2022-11-30 ENCOUNTER — TELEPHONE (OUTPATIENT)
Dept: INTERNAL MEDICINE | Facility: CLINIC | Age: 22
End: 2022-11-30
Payer: COMMERCIAL

## 2022-11-30 VITALS
DIASTOLIC BLOOD PRESSURE: 84 MMHG | HEART RATE: 97 BPM | BODY MASS INDEX: 24.29 KG/M2 | TEMPERATURE: 98 F | OXYGEN SATURATION: 98 % | SYSTOLIC BLOOD PRESSURE: 108 MMHG | HEIGHT: 65 IN | WEIGHT: 145.81 LBS

## 2022-11-30 DIAGNOSIS — G93.31 POST VIRAL SYNDROME: Primary | ICD-10-CM

## 2022-11-30 PROCEDURE — 1159F PR MEDICATION LIST DOCUMENTED IN MEDICAL RECORD: ICD-10-PCS | Mod: CPTII,S$GLB,, | Performed by: INTERNAL MEDICINE

## 2022-11-30 PROCEDURE — 3079F PR MOST RECENT DIASTOLIC BLOOD PRESSURE 80-89 MM HG: ICD-10-PCS | Mod: CPTII,S$GLB,, | Performed by: INTERNAL MEDICINE

## 2022-11-30 PROCEDURE — 99213 PR OFFICE/OUTPT VISIT, EST, LEVL III, 20-29 MIN: ICD-10-PCS | Mod: S$GLB,,, | Performed by: INTERNAL MEDICINE

## 2022-11-30 PROCEDURE — 3079F DIAST BP 80-89 MM HG: CPT | Mod: CPTII,S$GLB,, | Performed by: INTERNAL MEDICINE

## 2022-11-30 PROCEDURE — 1160F PR REVIEW ALL MEDS BY PRESCRIBER/CLIN PHARMACIST DOCUMENTED: ICD-10-PCS | Mod: CPTII,S$GLB,, | Performed by: INTERNAL MEDICINE

## 2022-11-30 PROCEDURE — 3074F SYST BP LT 130 MM HG: CPT | Mod: CPTII,S$GLB,, | Performed by: INTERNAL MEDICINE

## 2022-11-30 PROCEDURE — 99213 OFFICE O/P EST LOW 20 MIN: CPT | Mod: S$GLB,,, | Performed by: INTERNAL MEDICINE

## 2022-11-30 PROCEDURE — 99999 PR PBB SHADOW E&M-EST. PATIENT-LVL IV: ICD-10-PCS | Mod: PBBFAC,,, | Performed by: INTERNAL MEDICINE

## 2022-11-30 PROCEDURE — 3008F PR BODY MASS INDEX (BMI) DOCUMENTED: ICD-10-PCS | Mod: CPTII,S$GLB,, | Performed by: INTERNAL MEDICINE

## 2022-11-30 PROCEDURE — 99999 PR PBB SHADOW E&M-EST. PATIENT-LVL IV: CPT | Mod: PBBFAC,,, | Performed by: INTERNAL MEDICINE

## 2022-11-30 PROCEDURE — 1159F MED LIST DOCD IN RCRD: CPT | Mod: CPTII,S$GLB,, | Performed by: INTERNAL MEDICINE

## 2022-11-30 PROCEDURE — 1160F RVW MEDS BY RX/DR IN RCRD: CPT | Mod: CPTII,S$GLB,, | Performed by: INTERNAL MEDICINE

## 2022-11-30 PROCEDURE — 3074F PR MOST RECENT SYSTOLIC BLOOD PRESSURE < 130 MM HG: ICD-10-PCS | Mod: CPTII,S$GLB,, | Performed by: INTERNAL MEDICINE

## 2022-11-30 PROCEDURE — 3008F BODY MASS INDEX DOCD: CPT | Mod: CPTII,S$GLB,, | Performed by: INTERNAL MEDICINE

## 2022-11-30 RX ORDER — BENZONATATE 200 MG/1
200 CAPSULE ORAL 3 TIMES DAILY PRN
Qty: 30 CAPSULE | Refills: 0 | Status: SHIPPED | OUTPATIENT
Start: 2022-11-30 | End: 2022-12-10

## 2022-11-30 NOTE — PATIENT INSTRUCTIONS
Can try Pepcid and Pepto-Bismol to reduce indigestion and stomach cramps.     Avoid anti-inflammatories such as Advil/Motrin/Ibuprofen/Naproxen and alcohol.

## 2022-11-30 NOTE — PROGRESS NOTES
"INTERNAL MEDICINE ESTABLISHED PATIENT VISIT NOTE    Subjective:     Chief Complaint: Follow-up       Patient ID: April Lorrie Sinclair is a 22 y.o. female with asthma, migraines, seizures vs convulsive syncope, SSS s/p PPM, Vit D def, last seen by me 5/2022, here today for follow-up.    Reports decreased energy, body aches, fever/chills and cough that started on Thanksgiving. Tested for Flu and Covid the following Saturday, negative. Prescribed promethazine cough syrup and dicyclomine for abdominal pain and diarrhea, minimally helpful.     Fever, body aches have improved. Lingering cough. Persistent abdominal pain and loose stools; however, occurring less frequently. Minimal appetite for solids, drinking liquids.     Past Medical History:  Past Medical History:   Diagnosis Date    Asthma     Headache(784.0)     Migraine     Pre-syncope 5/25/2021    Seizures     Syncope and collapse           Review of Systems:  Review of Systems   Constitutional:  Negative for chills and fever.   HENT:  Negative for congestion.    Respiratory:  Negative for cough and shortness of breath.    Cardiovascular:  Negative for chest pain.   Gastrointestinal:  Positive for abdominal pain and diarrhea. Negative for constipation, nausea and vomiting.   Genitourinary:  Negative for hematuria and urgency.   Musculoskeletal:  Negative for falls.   Skin:  Negative for rash.   Neurological:  Negative for dizziness and loss of consciousness.     Health Maintenance:   Immunizations:   Influenza - fall 2022  Tdap - 12/2020   Covid 19 - discussed  HPV - complete  Prevnar rec at 63  Shingrix rec at 48     Cancer Screening:  PAP: 6/2020  Mammogram:  n/a  Colonoscopy:  n/a  DEXA:  n/a    Objective:   /84 (BP Location: Left arm, Patient Position: Sitting, BP Method: Medium (Manual))   Pulse 97   Temp 98.2 °F (36.8 °C)   Ht 5' 5" (1.651 m)   Wt 66.2 kg (145 lb 13.4 oz)   SpO2 98%   BMI 24.27 kg/m²        General: AAO x3, no apparent " distress  HEENT: PERRL  CV: RRR, no m/r/g  Pulm: Lungs CTAB, no crackles, no wheezes  Abd: s/ND +BS, diffuse mild tenderness  Extremities: no c/c/e    Labs:       Assessment/Plan     Post viral syndrome  Advised constellation of symptoms all viral mediated  Clinically improving, continue symptom management, increase fluid intake  Advised on bland diet and provided with educational handout   To notify if any clinical change  -     benzonatate (TESSALON) 200 MG capsule; Take 1 capsule (200 mg total) by mouth 3 (three) times daily as needed for Cough.  Dispense: 30 capsule; Refill: 0    HM as above.    Patt Roque MD  Department of Internal Medicine - [facility]  11/30/2022

## 2022-12-05 ENCOUNTER — CLINICAL SUPPORT (OUTPATIENT)
Dept: CARDIOLOGY | Facility: HOSPITAL | Age: 22
End: 2022-12-05
Payer: COMMERCIAL

## 2022-12-05 DIAGNOSIS — Z95.0 PRESENCE OF CARDIAC PACEMAKER: ICD-10-CM

## 2022-12-05 PROCEDURE — 93296 REM INTERROG EVL PM/IDS: CPT | Performed by: INTERNAL MEDICINE

## 2023-03-05 ENCOUNTER — CLINICAL SUPPORT (OUTPATIENT)
Dept: CARDIOLOGY | Facility: HOSPITAL | Age: 23
End: 2023-03-05
Payer: COMMERCIAL

## 2023-03-05 DIAGNOSIS — Z95.0 PRESENCE OF CARDIAC PACEMAKER: ICD-10-CM

## 2023-03-05 PROCEDURE — 93294 REM INTERROG EVL PM/LDLS PM: CPT | Mod: ,,, | Performed by: INTERNAL MEDICINE

## 2023-03-05 PROCEDURE — 93296 REM INTERROG EVL PM/IDS: CPT | Performed by: INTERNAL MEDICINE

## 2023-03-05 PROCEDURE — 93294 CARDIAC DEVICE CHECK - REMOTE: ICD-10-PCS | Mod: ,,, | Performed by: INTERNAL MEDICINE

## 2023-03-28 RX ORDER — NORETHINDRONE ACETATE AND ETHINYL ESTRADIOL AND FERROUS FUMARATE 1MG-20(24)
1 KIT ORAL DAILY
Qty: 84 TABLET | Refills: 0 | Status: CANCELLED | OUTPATIENT
Start: 2023-03-28

## 2023-05-30 ENCOUNTER — OFFICE VISIT (OUTPATIENT)
Dept: OBSTETRICS AND GYNECOLOGY | Facility: CLINIC | Age: 23
End: 2023-05-30
Payer: COMMERCIAL

## 2023-05-30 VITALS
HEIGHT: 65 IN | SYSTOLIC BLOOD PRESSURE: 110 MMHG | DIASTOLIC BLOOD PRESSURE: 74 MMHG | WEIGHT: 151.44 LBS | BODY MASS INDEX: 25.23 KG/M2

## 2023-05-30 DIAGNOSIS — Z11.3 SCREEN FOR STD (SEXUALLY TRANSMITTED DISEASE): ICD-10-CM

## 2023-05-30 DIAGNOSIS — Z98.890 HISTORY OF BREAST LUMP/MASS EXCISION: ICD-10-CM

## 2023-05-30 DIAGNOSIS — N63.20 MASS OF LEFT BREAST, UNSPECIFIED QUADRANT: ICD-10-CM

## 2023-05-30 DIAGNOSIS — Z30.41 ENCOUNTER FOR SURVEILLANCE OF CONTRACEPTIVE PILLS: ICD-10-CM

## 2023-05-30 DIAGNOSIS — Z01.419 ENCOUNTER FOR ANNUAL ROUTINE GYNECOLOGICAL EXAMINATION: Primary | ICD-10-CM

## 2023-05-30 PROCEDURE — 3008F BODY MASS INDEX DOCD: CPT | Mod: CPTII,S$GLB,, | Performed by: OBSTETRICS & GYNECOLOGY

## 2023-05-30 PROCEDURE — 99395 PREV VISIT EST AGE 18-39: CPT | Mod: S$GLB,,, | Performed by: OBSTETRICS & GYNECOLOGY

## 2023-05-30 PROCEDURE — 99395 PR PREVENTIVE VISIT,EST,18-39: ICD-10-PCS | Mod: S$GLB,,, | Performed by: OBSTETRICS & GYNECOLOGY

## 2023-05-30 PROCEDURE — 3078F DIAST BP <80 MM HG: CPT | Mod: CPTII,S$GLB,, | Performed by: OBSTETRICS & GYNECOLOGY

## 2023-05-30 PROCEDURE — 3074F PR MOST RECENT SYSTOLIC BLOOD PRESSURE < 130 MM HG: ICD-10-PCS | Mod: CPTII,S$GLB,, | Performed by: OBSTETRICS & GYNECOLOGY

## 2023-05-30 PROCEDURE — 3008F PR BODY MASS INDEX (BMI) DOCUMENTED: ICD-10-PCS | Mod: CPTII,S$GLB,, | Performed by: OBSTETRICS & GYNECOLOGY

## 2023-05-30 PROCEDURE — 3078F PR MOST RECENT DIASTOLIC BLOOD PRESSURE < 80 MM HG: ICD-10-PCS | Mod: CPTII,S$GLB,, | Performed by: OBSTETRICS & GYNECOLOGY

## 2023-05-30 PROCEDURE — 3074F SYST BP LT 130 MM HG: CPT | Mod: CPTII,S$GLB,, | Performed by: OBSTETRICS & GYNECOLOGY

## 2023-05-30 PROCEDURE — 87591 N.GONORRHOEAE DNA AMP PROB: CPT | Performed by: OBSTETRICS & GYNECOLOGY

## 2023-05-30 RX ORDER — NORETHINDRONE ACETATE AND ETHINYL ESTRADIOL AND FERROUS FUMARATE 1MG-20(24)
1 KIT ORAL DAILY
Qty: 84 TABLET | Refills: 3 | Status: SHIPPED | OUTPATIENT
Start: 2023-05-30 | End: 2023-09-01 | Stop reason: SDUPTHER

## 2023-05-30 NOTE — PROGRESS NOTES
Chief Complaint: Well Woman Exam     HPI:      April Lorrie Sinclair is a 22 y.o.  who presents today for well woman exam.  LMP: Patient's last menstrual period was 2023 (exact date).  No issues, problems, or complaints. Specifically, patient denies abnormal vaginal bleeding, discharge, pelvic pain, urinary problems, or changes in appetite. Ms. Sinclair is currently sexually active with a single male partner. She is currently using oral contraceptives (estrogen/progesterone) for contraception. She would like STD screening today.    Previous Pap:   normal  ()  Previous Breast Imaging:     Assessment    Overall Left   3 - Probably Benign 3 - Probably Benign     Mammography Recommendations     Side Due   Short Interval Follow-Up in 6 Months Left 2022       Details    Reading Physician Reading Date Result Priority   Janet Branch MD  [phone] 2022 Routine     Physician Responsible for MQSA Outcome Reason    Janet Branch MD Signed      Narrative & Impression  Result:   US Breast Left Limited     History:  Patient is 21 y.o. and is seen for diagnostic imaging. Palpable abnormality and tenderness in the left breast.      Films Compared:  Prior images (if available) were compared.     Findings:  There is a 24 mm x 8 mm x 15 mm oval, parallel mass versus cluster of cysts with circumscribed margins seen in the left breast at 11 o'clock, 3 cm from the nipple. The mass correlates with the palpable mass and tenderness reported by the patient.      Impression:  Left  Mass: Left breast 24 mm x 8 mm x 15 mm mass at the 11 o'clock position. Assessment: 3 - Probably benign fibroadenoma versus cluster of cysts. Short Interval Follow-Up in 6 Months is recommended.      BI-RADS Category:   Overall: 3 - Probably Benign     Recommendation:  Short interval follow-up is recommended in 6 Months.        Your estimated lifetime risk of breast cancer (to age 85) based on Tyrer-Cuzick risk assessment  model is Lan: 14.39 %. According to the American Cancer Society, patients with a lifetime breast cancer risk of 20% or higher might benefit from supplemental screening tests.     These findings and recommendations were discussed with the patient at the time of the exam by Dr. Janet Branch.     Most Recent Dexa: not indicated  Colonoscopy: never had    COVID Vaccine: declined  Flu Vaccine: completed  Gardasil:Completed     Patient Active Problem List   Diagnosis    Migraine headache    Syncope, vasovagal    Vitamin D deficiency    Anxiety    Cardiac pacemaker in situ    SSS (sick sinus syndrome)    Neck pain    Paresthesia and pain of right extremity    Palpitations    Intercostal pain    Pacemaker syndrome    Impaired flexibility of upper extremity    Bilateral neck pain    Impaired range of motion of cervical spine       Past Medical History:   Diagnosis Date    Asthma     Headache(784.0)     Migraine     Pre-syncope 2021    Seizures     Syncope and collapse        Past Surgical History:   Procedure Laterality Date    A-V CARDIAC PACEMAKER INSERTION Left 2019    Procedure: INSERTION, CARDIAC PACEMAKER, DUAL CHAMBER;  Surgeon: Piter Cooley MD;  Location: [facility] CATH LAB/EP;  Service: Cardiology;  Laterality: Left;    PHLEBOGRAPHY  2021    Procedure: Venogram;  Surgeon: Piter Cooley MD;  Location: [facility] EP LAB;  Service: Cardiology;;    REPLACEMENT OF PACEMAKER GENERATOR  2021    Procedure: REPLACEMENT, PACEMAKER GENERATOR;  Surgeon: Piter Cooley MD;  Location: [facility] EP LAB;  Service: Cardiology;;    WISDOM TOOTH EXTRACTION         OB History          0    Para   0    Term   0       0    AB   0    Living   0         SAB   0    IAB   0    Ectopic   0    Multiple   0    Live Births   0           Obstetric Comments     No abnormal pap  No STDs               ROS:     Review of Systems   Constitutional:  Negative for activity change, appetite change, chills, fatigue  "and fever.   Respiratory:  Negative for shortness of breath.    Cardiovascular:  Negative for chest pain.   Gastrointestinal:  Negative for abdominal pain, constipation, diarrhea, nausea and vomiting.   Endocrine: Negative for cold intolerance and heat intolerance.   Genitourinary:  Negative for dysuria, flank pain, frequency, hematuria, menstrual irregularity, pelvic pain, urgency and vaginal discharge.   Musculoskeletal:  Negative for back pain.   Integumentary:  Negative for rash, breast mass, breast discharge and breast tenderness.   Neurological:  Negative for headaches.   Psychiatric/Behavioral:  Negative for dysphoric mood. The patient is not nervous/anxious.    Breast: Negative for mass and tenderness    Physical Exam:      PHYSICAL EXAM:  /74   Ht 5' 5" (1.651 m)   Wt 68.7 kg (151 lb 7.3 oz)   LMP 05/01/2023 (Exact Date)   BMI 25.20 kg/m²   Body mass index is 25.2 kg/m².     APPEARANCE: Well nourished, well developed, in no acute distress.  PSYCH: Appropriate mood and affect.  SKIN: No acne or hirsutism  NECK: Neck symmetric without masses or thyromegaly  NODES: No inguinal, axillary, or supraclavicular lymph node enlargement  ABDOMEN: Soft.  No tenderness or masses.    CARDIOVASCULAR: No edema of peripheral extremities  BREASTS: Symmetrical, no skin changes or visible lesions.  No nipple discharge bilaterally. Palpable left breast mass at 11 oclock position.    PELVIC: Normal external genitalia without lesions.  Normal hair distribution.  Adequate perineal body, normal urethral meatus.  Vagina moist and well rugated without lesions or discharge.  Cervix pink, without lesions, discharge or tenderness.  No significant cystocele or rectocele.  Bimanual exam shows uterus to be normal size, regular, mobile and nontender.  Adnexa without masses or tenderness.      Assessment:     1. Encounter for annual routine gynecological examination        2. Encounter for surveillance of contraceptive pills  " norethindrone-e.estradioL-iron (JUNEL FE 24) 1 mg-20 mcg (24)/75 mg (4) per tablet      3. Screen for STD (sexually transmitted disease)  C. trachomatis/N. gonorrhoeae by AMP [facility]; Cervicovaginal      4. History of breast lump/mass excision        5. Mass of left breast, unspecified quadrant  US Breast Left Complete              Plan:     Clinical breast exam performed. Repeat Breast US ordered for breast mass.  Pap UTD.  STD screening.   Mammogram at 38.   DEXA at 63.  Colonoscopy at 43.  Continue OCPs.    Follow up in about 1 year (around 2024) for annual well woman exam or as needed.    Counseling:     Patient was counseled today on current ASCCP pap guidelines, the recommendation for yearly pelvic exams, healthy diet and exercise routines, breast self awareness.She is to see her PCP for other health maintenance.     Use of the [employer] Patient Portal discussed and encouraged during today's visit.         Amanda N. Thomas, MD [facility] - [facility]  2023          Answers submitted by the patient for this visit:  Gynecologic Exam Questionnaire  (Submitted on 2023)  Chief Complaint: Gynecologic exam  genital itching: No  genital lesions: No  genital odor: No  genital rash: No  missed menses: No  vaginal bleeding: No  Pregnant now?: No  anorexia: No  discolored urine: No  painful intercourse: No  Please select the characteristics of your discharge: : normal  Vaginal bleeding: typical of menses  Passing clots?: No  Passing tissue?: No  Sexual activity: sexually active  Partner with STD symptoms: no  Birth control: oral contraceptives  Menstrual history: regular  STD: No  abdominal surgery: No   section: No  Ectopic pregnancy: No  Endometriosis: No  herpes simplex: No  menorrhagia: No  metrorrhagia: No  miscarriage: No  ovarian cysts: No  perineal abscess: No  PID: No  terminated pregnancy: No  vaginosis: No

## 2023-05-31 LAB
C TRACH DNA SPEC QL NAA+PROBE: NOT DETECTED
N GONORRHOEA DNA SPEC QL NAA+PROBE: NOT DETECTED

## 2023-06-05 ENCOUNTER — CLINICAL SUPPORT (OUTPATIENT)
Dept: CARDIOLOGY | Facility: HOSPITAL | Age: 23
End: 2023-06-05
Payer: COMMERCIAL

## 2023-06-05 DIAGNOSIS — R55 SYNCOPE AND COLLAPSE: ICD-10-CM

## 2023-06-05 DIAGNOSIS — Z95.0 PRESENCE OF CARDIAC PACEMAKER: ICD-10-CM

## 2023-06-05 PROCEDURE — 93296 REM INTERROG EVL PM/IDS: CPT | Performed by: INTERNAL MEDICINE

## 2023-06-05 PROCEDURE — 93294 REM INTERROG EVL PM/LDLS PM: CPT | Mod: ,,, | Performed by: INTERNAL MEDICINE

## 2023-06-05 PROCEDURE — 93294 CARDIAC DEVICE CHECK - REMOTE: ICD-10-PCS | Mod: ,,, | Performed by: INTERNAL MEDICINE

## 2023-06-30 ENCOUNTER — OFFICE VISIT (OUTPATIENT)
Dept: INTERNAL MEDICINE | Facility: CLINIC | Age: 23
End: 2023-06-30
Payer: COMMERCIAL

## 2023-06-30 VITALS
WEIGHT: 154.31 LBS | DIASTOLIC BLOOD PRESSURE: 70 MMHG | HEART RATE: 86 BPM | HEIGHT: 65 IN | OXYGEN SATURATION: 99 % | SYSTOLIC BLOOD PRESSURE: 118 MMHG | BODY MASS INDEX: 25.71 KG/M2

## 2023-06-30 DIAGNOSIS — K21.9 GASTROESOPHAGEAL REFLUX DISEASE, UNSPECIFIED WHETHER ESOPHAGITIS PRESENT: ICD-10-CM

## 2023-06-30 DIAGNOSIS — Z95.0 CARDIAC PACEMAKER IN SITU: ICD-10-CM

## 2023-06-30 DIAGNOSIS — I49.5 SSS (SICK SINUS SYNDROME): ICD-10-CM

## 2023-06-30 DIAGNOSIS — Z00.00 ANNUAL PHYSICAL EXAM: Primary | ICD-10-CM

## 2023-06-30 PROCEDURE — 3008F BODY MASS INDEX DOCD: CPT | Mod: CPTII,S$GLB,, | Performed by: PHYSICIAN ASSISTANT

## 2023-06-30 PROCEDURE — 3078F DIAST BP <80 MM HG: CPT | Mod: CPTII,S$GLB,, | Performed by: PHYSICIAN ASSISTANT

## 2023-06-30 PROCEDURE — 99395 PR PREVENTIVE VISIT,EST,18-39: ICD-10-PCS | Mod: S$GLB,,, | Performed by: PHYSICIAN ASSISTANT

## 2023-06-30 PROCEDURE — 3074F PR MOST RECENT SYSTOLIC BLOOD PRESSURE < 130 MM HG: ICD-10-PCS | Mod: CPTII,S$GLB,, | Performed by: PHYSICIAN ASSISTANT

## 2023-06-30 PROCEDURE — 99395 PREV VISIT EST AGE 18-39: CPT | Mod: S$GLB,,, | Performed by: PHYSICIAN ASSISTANT

## 2023-06-30 PROCEDURE — 3008F PR BODY MASS INDEX (BMI) DOCUMENTED: ICD-10-PCS | Mod: CPTII,S$GLB,, | Performed by: PHYSICIAN ASSISTANT

## 2023-06-30 PROCEDURE — 99999 PR PBB SHADOW E&M-EST. PATIENT-LVL IV: CPT | Mod: PBBFAC,,, | Performed by: PHYSICIAN ASSISTANT

## 2023-06-30 PROCEDURE — 3074F SYST BP LT 130 MM HG: CPT | Mod: CPTII,S$GLB,, | Performed by: PHYSICIAN ASSISTANT

## 2023-06-30 PROCEDURE — 1159F MED LIST DOCD IN RCRD: CPT | Mod: CPTII,S$GLB,, | Performed by: PHYSICIAN ASSISTANT

## 2023-06-30 PROCEDURE — 1159F PR MEDICATION LIST DOCUMENTED IN MEDICAL RECORD: ICD-10-PCS | Mod: CPTII,S$GLB,, | Performed by: PHYSICIAN ASSISTANT

## 2023-06-30 PROCEDURE — 99999 PR PBB SHADOW E&M-EST. PATIENT-LVL IV: ICD-10-PCS | Mod: PBBFAC,,, | Performed by: PHYSICIAN ASSISTANT

## 2023-06-30 PROCEDURE — 1160F RVW MEDS BY RX/DR IN RCRD: CPT | Mod: CPTII,S$GLB,, | Performed by: PHYSICIAN ASSISTANT

## 2023-06-30 PROCEDURE — 1160F PR REVIEW ALL MEDS BY PRESCRIBER/CLIN PHARMACIST DOCUMENTED: ICD-10-PCS | Mod: CPTII,S$GLB,, | Performed by: PHYSICIAN ASSISTANT

## 2023-06-30 PROCEDURE — 3078F PR MOST RECENT DIASTOLIC BLOOD PRESSURE < 80 MM HG: ICD-10-PCS | Mod: CPTII,S$GLB,, | Performed by: PHYSICIAN ASSISTANT

## 2023-06-30 RX ORDER — OMEPRAZOLE 40 MG/1
40 CAPSULE, DELAYED RELEASE ORAL DAILY
Qty: 30 CAPSULE | Refills: 1 | Status: SHIPPED | OUTPATIENT
Start: 2023-06-30 | End: 2024-06-29

## 2023-06-30 NOTE — PROGRESS NOTES
"Subjective     Patient ID: April Lorrie Sinclair is a 23 y.o. female.    Chief Complaint: Establish Care (\) and Annual Exam    HPI      Here for annual exam.   Prior PCP Dr. Roque    Overall doing well, only c/o GERD, famotidine not helpful  Migraine stable about twice a week, imitrex provides relief eval'd by neurology  No recent syncope, follow with device clinic and cardiology for her hx of SSS s/p pacemaker.                                      Past Medical History:   Diagnosis Date    Asthma     Headache(784.0)     Migraine     Pre-syncope 5/25/2021    Seizures     Syncope and collapse      Social History     Tobacco Use    Smoking status: Never    Smokeless tobacco: Never   Substance Use Topics    Alcohol use: Not Currently     Comment: occasionally     Drug use: Never     Review of patient's allergies indicates:   Allergen Reactions    Lamictal  [lamotrigine]      Other reaction(s): HIVES    Cefzil [cefprozil] Rash    Demerol [meperidine] Rash     Other reaction(s): Rash    Pcn [penicillins] Rash     Other reaction(s): Rash         Review of Systems   Constitutional:  Negative for chills, fever and unexpected weight change.   Respiratory:  Negative for cough and shortness of breath.    Cardiovascular:  Negative for chest pain and leg swelling.   Gastrointestinal:  Positive for reflux. Negative for abdominal pain, nausea and vomiting.   Integumentary:  Negative for rash.   Neurological:  Negative for weakness and headaches.        Objective  /70 (BP Location: Left arm, Patient Position: Sitting, BP Method: Medium (Manual))   Pulse 86   Ht 5' 5" (1.651 m)   Wt 70 kg (154 lb 5.2 oz)   SpO2 99%   BMI 25.68 kg/m²       Physical Exam  Vitals reviewed.   Constitutional:       General: She is not in acute distress.     Appearance: She is well-developed.   HENT:      Head: Normocephalic and atraumatic.      Right Ear: Tympanic membrane, ear canal and external ear normal.      Left Ear: Tympanic membrane, ear " canal and external ear normal.   Cardiovascular:      Rate and Rhythm: Normal rate and regular rhythm.      Heart sounds: No murmur heard.  Pulmonary:      Effort: Pulmonary effort is normal.      Breath sounds: Normal breath sounds. No wheezing or rales.   Abdominal:      General: Bowel sounds are normal.      Palpations: Abdomen is soft.      Tenderness: There is no abdominal tenderness.   Musculoskeletal:      Right lower leg: No edema.      Left lower leg: No edema.   Skin:     General: Skin is warm and dry.      Findings: No rash.   Neurological:      Mental Status: She is alert and oriented to person, place, and time.   Psychiatric:         Mood and Affect: Mood normal.          Assessment and Plan     1. Annual physical exam  HM reviewed and updated  -     CBC Auto Differential; Future; Expected date: 06/30/2023  -     Comprehensive Metabolic Panel; Future; Expected date: 06/30/2023  -     TSH; Future; Expected date: 06/30/2023  -     Lipid Panel; Future; Expected date: 06/30/2023  -     Hemoglobin A1C; Future; Expected date: 06/30/2023    2. Gastroesophageal reflux disease, unspecified whether esophagitis present  Trial of PPI  Notify clinic if symptoms worsen or fail to improve  -     omeprazole (PRILOSEC) 40 MG capsule; Take 1 capsule (40 mg total) by mouth once daily.  Dispense: 30 capsule; Refill: 1    3. SSS (sick sinus syndrome)  4. Cardiac pacemaker in situ  Followed by Cardiology      RTC in 1 year for next annual with PCP or sooner if needed  Pt elects to establish with [facility] with Dr. Farmer.     Tracy Chau PA-C    Future Appointments   Date Time Provider Department Center   7/1/2023 10:00 AM [facility] Mikey Cameron PC[facility]   7/12/2023  2:15 PM [facility] US ROOM 2 [facility]   8/24/2023  2:40 PM Piter Cooley MD [facility]   9/3/2023 10:00 AM HOME MONITOR DEVICE CHECK, [facility] CHELO Cameron

## 2023-07-01 ENCOUNTER — LAB VISIT (OUTPATIENT)
Dept: LAB | Facility: HOSPITAL | Age: 23
End: 2023-07-01
Payer: COMMERCIAL

## 2023-07-01 DIAGNOSIS — Z00.00 ANNUAL PHYSICAL EXAM: ICD-10-CM

## 2023-07-01 LAB
ALBUMIN SERPL BCP-MCNC: 3.8 G/DL (ref 3.5–5.2)
ALP SERPL-CCNC: 38 U/L (ref 55–135)
ALT SERPL W/O P-5'-P-CCNC: 10 U/L (ref 10–44)
ANION GAP SERPL CALC-SCNC: 11 MMOL/L (ref 8–16)
AST SERPL-CCNC: 10 U/L (ref 10–40)
BASOPHILS # BLD AUTO: 0.03 K/UL (ref 0–0.2)
BASOPHILS NFR BLD: 0.5 % (ref 0–1.9)
BILIRUB SERPL-MCNC: 0.3 MG/DL (ref 0.1–1)
BUN SERPL-MCNC: 14 MG/DL (ref 6–20)
CALCIUM SERPL-MCNC: 9 MG/DL (ref 8.7–10.5)
CHLORIDE SERPL-SCNC: 105 MMOL/L (ref 95–110)
CHOLEST SERPL-MCNC: 200 MG/DL (ref 120–199)
CHOLEST/HDLC SERPL: 4.5 {RATIO} (ref 2–5)
CO2 SERPL-SCNC: 23 MMOL/L (ref 23–29)
CREAT SERPL-MCNC: 0.7 MG/DL (ref 0.5–1.4)
DIFFERENTIAL METHOD: NORMAL
EOSINOPHIL # BLD AUTO: 0.1 K/UL (ref 0–0.5)
EOSINOPHIL NFR BLD: 1.6 % (ref 0–8)
ERYTHROCYTE [DISTWIDTH] IN BLOOD BY AUTOMATED COUNT: 13.2 % (ref 11.5–14.5)
EST. GFR  (NO RACE VARIABLE): >60 ML/MIN/1.73 M^2
ESTIMATED AVG GLUCOSE: 97 MG/DL (ref 68–131)
GLUCOSE SERPL-MCNC: 89 MG/DL (ref 70–110)
HBA1C MFR BLD: 5 % (ref 4–5.6)
HCT VFR BLD AUTO: 41.5 % (ref 37–48.5)
HDLC SERPL-MCNC: 44 MG/DL (ref 40–75)
HDLC SERPL: 22 % (ref 20–50)
HGB BLD-MCNC: 14 G/DL (ref 12–16)
IMM GRANULOCYTES # BLD AUTO: 0.01 K/UL (ref 0–0.04)
IMM GRANULOCYTES NFR BLD AUTO: 0.2 % (ref 0–0.5)
LDLC SERPL CALC-MCNC: 130 MG/DL (ref 63–159)
LYMPHOCYTES # BLD AUTO: 2.6 K/UL (ref 1–4.8)
LYMPHOCYTES NFR BLD: 42.1 % (ref 18–48)
MCH RBC QN AUTO: 30 PG (ref 27–31)
MCHC RBC AUTO-ENTMCNC: 33.7 G/DL (ref 32–36)
MCV RBC AUTO: 89 FL (ref 82–98)
MONOCYTES # BLD AUTO: 0.4 K/UL (ref 0.3–1)
MONOCYTES NFR BLD: 7.1 % (ref 4–15)
NEUTROPHILS # BLD AUTO: 3 K/UL (ref 1.8–7.7)
NEUTROPHILS NFR BLD: 48.5 % (ref 38–73)
NONHDLC SERPL-MCNC: 156 MG/DL
NRBC BLD-RTO: 0 /100 WBC
PLATELET # BLD AUTO: 226 K/UL (ref 150–450)
PMV BLD AUTO: 12.1 FL (ref 9.2–12.9)
POTASSIUM SERPL-SCNC: 4 MMOL/L (ref 3.5–5.1)
PROT SERPL-MCNC: 6.8 G/DL (ref 6–8.4)
RBC # BLD AUTO: 4.66 M/UL (ref 4–5.4)
SODIUM SERPL-SCNC: 139 MMOL/L (ref 136–145)
TRIGL SERPL-MCNC: 130 MG/DL (ref 30–150)
TSH SERPL DL<=0.005 MIU/L-ACNC: 1.95 UIU/ML (ref 0.4–4)
WBC # BLD AUTO: 6.2 K/UL (ref 3.9–12.7)

## 2023-07-01 PROCEDURE — 83036 HEMOGLOBIN GLYCOSYLATED A1C: CPT | Performed by: PHYSICIAN ASSISTANT

## 2023-07-01 PROCEDURE — 84443 ASSAY THYROID STIM HORMONE: CPT | Performed by: PHYSICIAN ASSISTANT

## 2023-07-01 PROCEDURE — 85025 COMPLETE CBC W/AUTO DIFF WBC: CPT | Performed by: PHYSICIAN ASSISTANT

## 2023-07-01 PROCEDURE — 80061 LIPID PANEL: CPT | Performed by: PHYSICIAN ASSISTANT

## 2023-07-01 PROCEDURE — 36415 COLL VENOUS BLD VENIPUNCTURE: CPT | Performed by: PHYSICIAN ASSISTANT

## 2023-07-01 PROCEDURE — 80053 COMPREHEN METABOLIC PANEL: CPT | Performed by: PHYSICIAN ASSISTANT

## 2023-07-12 ENCOUNTER — HOSPITAL ENCOUNTER (OUTPATIENT)
Dept: RADIOLOGY | Facility: HOSPITAL | Age: 23
Discharge: HOME | End: 2023-07-12
Attending: OBSTETRICS & GYNECOLOGY
Payer: COMMERCIAL

## 2023-07-12 VITALS — WEIGHT: 154 LBS | HEIGHT: 65 IN | BODY MASS INDEX: 25.66 KG/M2

## 2023-07-12 DIAGNOSIS — N63.20 MASS OF LEFT BREAST, UNSPECIFIED QUADRANT: ICD-10-CM

## 2023-07-12 PROCEDURE — 76642 ULTRASOUND BREAST LIMITED: CPT | Mod: 26,LT,, | Performed by: RADIOLOGY

## 2023-07-12 PROCEDURE — 76642 ULTRASOUND BREAST LIMITED: CPT | Mod: TC,LT

## 2023-07-12 PROCEDURE — 76642 US BREAST LEFT LIMITED: ICD-10-PCS | Mod: 26,LT,, | Performed by: RADIOLOGY

## 2023-08-11 ENCOUNTER — PATIENT MESSAGE (OUTPATIENT)
Dept: ELECTROPHYSIOLOGY | Facility: CLINIC | Age: 23
End: 2023-08-11
Payer: COMMERCIAL

## 2023-08-22 DIAGNOSIS — Z95.0 PRESENCE OF CARDIAC PACEMAKER: Primary | ICD-10-CM

## 2023-08-24 ENCOUNTER — OFFICE VISIT (OUTPATIENT)
Dept: CARDIOLOGY | Facility: CLINIC | Age: 23
End: 2023-08-24
Payer: COMMERCIAL

## 2023-08-24 VITALS
SYSTOLIC BLOOD PRESSURE: 117 MMHG | HEIGHT: 65 IN | DIASTOLIC BLOOD PRESSURE: 83 MMHG | BODY MASS INDEX: 26.93 KG/M2 | WEIGHT: 161.63 LBS | HEART RATE: 96 BPM

## 2023-08-24 DIAGNOSIS — Z95.0 CARDIAC PACEMAKER IN SITU: ICD-10-CM

## 2023-08-24 DIAGNOSIS — R55 SYNCOPE, VASOVAGAL: Primary | ICD-10-CM

## 2023-08-24 DIAGNOSIS — Z95.0 PRESENCE OF CARDIAC PACEMAKER: ICD-10-CM

## 2023-08-24 DIAGNOSIS — I49.5 SSS (SICK SINUS SYNDROME): ICD-10-CM

## 2023-08-24 DIAGNOSIS — R00.2 PALPITATIONS: ICD-10-CM

## 2023-08-24 PROCEDURE — 1159F MED LIST DOCD IN RCRD: CPT | Mod: CPTII,S$GLB,, | Performed by: INTERNAL MEDICINE

## 2023-08-24 PROCEDURE — 93010 ELECTROCARDIOGRAM REPORT: CPT | Mod: S$GLB,,, | Performed by: INTERNAL MEDICINE

## 2023-08-24 PROCEDURE — 99999 PR PBB SHADOW E&M-EST. PATIENT-LVL IV: CPT | Mod: PBBFAC,,, | Performed by: INTERNAL MEDICINE

## 2023-08-24 PROCEDURE — 3074F PR MOST RECENT SYSTOLIC BLOOD PRESSURE < 130 MM HG: ICD-10-PCS | Mod: CPTII,S$GLB,, | Performed by: INTERNAL MEDICINE

## 2023-08-24 PROCEDURE — 3008F PR BODY MASS INDEX (BMI) DOCUMENTED: ICD-10-PCS | Mod: CPTII,S$GLB,, | Performed by: INTERNAL MEDICINE

## 2023-08-24 PROCEDURE — 93010 RHYTHM STRIP: ICD-10-PCS | Mod: S$GLB,,, | Performed by: INTERNAL MEDICINE

## 2023-08-24 PROCEDURE — 3079F DIAST BP 80-89 MM HG: CPT | Mod: CPTII,S$GLB,, | Performed by: INTERNAL MEDICINE

## 2023-08-24 PROCEDURE — 3044F HG A1C LEVEL LT 7.0%: CPT | Mod: CPTII,S$GLB,, | Performed by: INTERNAL MEDICINE

## 2023-08-24 PROCEDURE — 3074F SYST BP LT 130 MM HG: CPT | Mod: CPTII,S$GLB,, | Performed by: INTERNAL MEDICINE

## 2023-08-24 PROCEDURE — 3079F PR MOST RECENT DIASTOLIC BLOOD PRESSURE 80-89 MM HG: ICD-10-PCS | Mod: CPTII,S$GLB,, | Performed by: INTERNAL MEDICINE

## 2023-08-24 PROCEDURE — 99215 PR OFFICE/OUTPT VISIT, EST, LEVL V, 40-54 MIN: ICD-10-PCS | Mod: S$GLB,,, | Performed by: INTERNAL MEDICINE

## 2023-08-24 PROCEDURE — 1160F PR REVIEW ALL MEDS BY PRESCRIBER/CLIN PHARMACIST DOCUMENTED: ICD-10-PCS | Mod: CPTII,S$GLB,, | Performed by: INTERNAL MEDICINE

## 2023-08-24 PROCEDURE — 93005 RHYTHM STRIP: ICD-10-PCS | Mod: S$GLB,,, | Performed by: INTERNAL MEDICINE

## 2023-08-24 PROCEDURE — 3008F BODY MASS INDEX DOCD: CPT | Mod: CPTII,S$GLB,, | Performed by: INTERNAL MEDICINE

## 2023-08-24 PROCEDURE — 3044F PR MOST RECENT HEMOGLOBIN A1C LEVEL <7.0%: ICD-10-PCS | Mod: CPTII,S$GLB,, | Performed by: INTERNAL MEDICINE

## 2023-08-24 PROCEDURE — 99999 PR PBB SHADOW E&M-EST. PATIENT-LVL IV: ICD-10-PCS | Mod: PBBFAC,,, | Performed by: INTERNAL MEDICINE

## 2023-08-24 PROCEDURE — 93005 ELECTROCARDIOGRAM TRACING: CPT | Mod: S$GLB,,, | Performed by: INTERNAL MEDICINE

## 2023-08-24 PROCEDURE — 99215 OFFICE O/P EST HI 40 MIN: CPT | Mod: S$GLB,,, | Performed by: INTERNAL MEDICINE

## 2023-08-24 PROCEDURE — 1160F RVW MEDS BY RX/DR IN RCRD: CPT | Mod: CPTII,S$GLB,, | Performed by: INTERNAL MEDICINE

## 2023-08-24 PROCEDURE — 1159F PR MEDICATION LIST DOCUMENTED IN MEDICAL RECORD: ICD-10-PCS | Mod: CPTII,S$GLB,, | Performed by: INTERNAL MEDICINE

## 2023-08-24 NOTE — PROGRESS NOTES
"  Subjective:   Patient ID:  April Lorrie Sinclair is a 23 y.o. female who presents for follow-up of SSS, PPM.     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    "Seizure d/o" by history, but likely actually related to seizure-type activity following cardioinhibitory syncope  migraine HA on meds  asthma  recurrent syncope with a very significant cardioinhibitory component (asystole x 50 sec!) -- no syncope since PPM    Recurrent syncope. Often exacerbated by IV placement, blood draws, etc.  Underwent TTT, during which there was syncope, sinus slowing, and ventricular asystole for somewhere between 30 s and 50 s (and CHB). Very similar sx to past episodes.  Grandfather had sudden death at age 50. Parents, sibs: no syncope; no other SCD.    PPM placement: (V lead only, given pt's youth). Since, no syncope at all! No syncope since PPM. However, 40% V pacing and sx with it led to RA lead addition later.  5/25/2021: Successful addition of RA lead.  6/1/2021: Patient reported LH and dizziness. Reprogramming at this visit: changed CLS response from medium to high.  Feeling great since last seen. No syncope. Occasional vasovagal LH.    Update 6/23:  Feeling well overall, but has been having episodes of nausea, LH, weakness, while standing or when lying down. Lasts 5 mins then slowly get back to normal... sometimes not normal until HOURS later. Vision sometimes goes "purple" during episodes, but no syncope.    She paces 58% in the RA and 0% in the RV.     I have personally reviewed the patient's EKG today, which shows ASVS at 96 bpm      Current Outpatient Medications   Medication Sig    magnesium oxide (MAG-OX) 400 mg (241.3 mg magnesium) tablet Take 1 tablet (400 mg total) by mouth once daily.    norethindrone-e.estradioL-iron (JUNEL FE 24) 1 mg-20 mcg (24)/75 mg (4) per tablet Take 1 tablet by mouth once daily.    omeprazole (PRILOSEC) 40 MG capsule " "Take 1 capsule (40 mg total) by mouth once daily.    riboflavin, vitamin B2, (VITAMIN B-2) 100 mg Tab tablet Take 2 tablets (200 mg total) by mouth once daily.    sumatriptan (IMITREX) 50 MG tablet Take 1 tab PO X 1 PRN for migraines. Do not repeat for 24 hours.     No current facility-administered medications for this visit.       Review of Systems   Constitutional: Negative. Negative for malaise/fatigue.   HENT: Negative.  Negative for ear pain and tinnitus.    Eyes:  Negative for blurred vision.   Cardiovascular: Negative.  Negative for chest pain, dyspnea on exertion, irregular heartbeat, leg swelling, near-syncope, palpitations and syncope.   Respiratory: Negative.  Negative for shortness of breath.    Endocrine: Negative.  Negative for polyuria.   Hematologic/Lymphatic: Negative for bleeding problem. Does not bruise/bleed easily.   Skin: Negative.  Negative for rash.   Musculoskeletal: Negative.  Negative for joint pain, muscle weakness and myalgias.   Gastrointestinal:  Positive for nausea. Negative for abdominal pain, change in bowel habit, hematemesis and hematochezia.   Genitourinary:  Negative for frequency and hematuria.   Neurological:  Positive for light-headedness. Negative for dizziness and weakness.   Psychiatric/Behavioral: Negative.  Negative for altered mental status and depression. The patient is not nervous/anxious.    Allergic/Immunologic: Negative for environmental allergies and persistent infections.     Objective:        /83   Pulse 96   Ht 5' 5" (1.651 m)   Wt 73.3 kg (161 lb 9.6 oz)   BMI 26.89 kg/m²     Physical Exam  Vitals and nursing note reviewed.   Constitutional:       Appearance: Normal appearance. She is well-developed.   HENT:      Head: Normocephalic and atraumatic.      Nose: Nose normal.   Eyes:      Conjunctiva/sclera: Conjunctivae normal.      Pupils: Pupils are equal, round, and reactive to light.   Neck:      Thyroid: No thyroid mass or thyromegaly.      " Trachea: No tracheal deviation.   Cardiovascular:      Rate and Rhythm: Normal rate and regular rhythm.   Pulmonary:      Effort: Pulmonary effort is normal. No respiratory distress.      Breath sounds: No wheezing.   Chest:      Comments: Device to LUCW. Incision well healed. Pocket in good repair with no s/s infection noted.  Abdominal:      General: There is no distension.   Musculoskeletal:         General: Normal range of motion.      Cervical back: Normal range of motion. No edema.   Skin:     General: Skin is warm and dry.      Findings: No erythema or rash.   Neurological:      Mental Status: She is alert and oriented to person, place, and time.      Coordination: Coordination normal.   Psychiatric:         Speech: Speech normal.         Behavior: Behavior normal. Behavior is cooperative.         Thought Content: Thought content normal.       Lab Results   Component Value Date     07/01/2023    K 4.0 07/01/2023    MG 1.9 03/27/2021    BUN 14 07/01/2023    CREATININE 0.7 07/01/2023    ALT 10 07/01/2023    AST 10 07/01/2023    HGB 14.0 07/01/2023    HCT 41.5 07/01/2023    TSH 1.947 07/01/2023    LDLCALC 130.0 07/01/2023       Recent Labs   Lab 03/26/21  1514 05/21/21  0901   INR 1.1 1.1           Assessment:     1. Syncope, vasovagal    2. SSS (sick sinus syndrome)    3. Cardiac pacemaker in situ    4. Palpitations      Plan:     In summary, Ms. Sinclair is a 23 y.o. female with seizure, migraine, asthma, syncope, PPM here for follow up.    PPM working well.  Episodes of N, LH, presyncope are perhaps symptomatic of her underlying vasovagal syndrome.  Will consult Dr. Carmel Clancy for eval and treatment of these episodes.  She doesn't drive, with which I agree.

## 2023-08-24 NOTE — LETTER
August 24, 2023        Favian Hess MD  [address]  [place] [address]             [facility]  [address]  [place] [address]  Phone: [phone]   Patient: Vanessa Sinclair   MR Number: [id number]   YOB: 2000   Date of Visit: 8/24/2023       Dear Dr. Hess:    Thank you for referring Vanessa Sinclair to me for evaluation. Below are the relevant portions of my assessment and plan of care.            If you have questions, please do not hesitate to call me. I look forward to following April along with you.    Sincerely,      Piter Cooley MD           CC    No Recipients

## 2023-09-01 DIAGNOSIS — Z30.41 ENCOUNTER FOR SURVEILLANCE OF CONTRACEPTIVE PILLS: ICD-10-CM

## 2023-09-01 RX ORDER — NORETHINDRONE ACETATE AND ETHINYL ESTRADIOL AND FERROUS FUMARATE 1MG-20(24)
1 KIT ORAL DAILY
Qty: 84 TABLET | Refills: 2 | Status: SHIPPED | OUTPATIENT
Start: 2023-09-01 | End: 2024-03-20 | Stop reason: SDUPTHER

## 2023-09-01 NOTE — TELEPHONE ENCOUNTER
Refill Decision Note   Vanessa Sinclair  is requesting a refill authorization.  Brief Assessment and Rationale for Refill:  Approve     Medication Therapy Plan:         Comments:     Note composed:2:51 PM 09/01/2023

## 2023-09-05 ENCOUNTER — CLINICAL SUPPORT (OUTPATIENT)
Dept: CARDIOLOGY | Facility: HOSPITAL | Age: 23
End: 2023-09-05
Payer: COMMERCIAL

## 2023-09-05 DIAGNOSIS — Z95.0 PRESENCE OF CARDIAC PACEMAKER: ICD-10-CM

## 2023-09-05 PROCEDURE — 93296 REM INTERROG EVL PM/IDS: CPT | Performed by: INTERNAL MEDICINE

## 2023-09-05 PROCEDURE — 93294 CARDIAC DEVICE CHECK - REMOTE: ICD-10-PCS | Mod: ,,, | Performed by: INTERNAL MEDICINE

## 2023-09-05 PROCEDURE — 93294 REM INTERROG EVL PM/LDLS PM: CPT | Mod: ,,, | Performed by: INTERNAL MEDICINE

## 2023-09-15 DIAGNOSIS — Z95.0 PRESENCE OF CARDIAC PACEMAKER: Primary | ICD-10-CM

## 2023-09-15 DIAGNOSIS — R55 SYNCOPE AND COLLAPSE: ICD-10-CM

## 2023-09-15 DIAGNOSIS — I49.5 SSS (SICK SINUS SYNDROME): ICD-10-CM

## 2023-09-15 DIAGNOSIS — R55 SYNCOPE, VASOVAGAL: ICD-10-CM

## 2023-09-18 ENCOUNTER — HOSPITAL ENCOUNTER (OUTPATIENT)
Dept: CARDIOLOGY | Facility: HOSPITAL | Age: 23
Discharge: HOME | End: 2023-09-18
Attending: INTERNAL MEDICINE
Payer: COMMERCIAL

## 2023-09-18 ENCOUNTER — OFFICE VISIT (OUTPATIENT)
Dept: CARDIOLOGY | Facility: CLINIC | Age: 23
End: 2023-09-18
Payer: COMMERCIAL

## 2023-09-18 VITALS
HEIGHT: 65 IN | DIASTOLIC BLOOD PRESSURE: 79 MMHG | HEART RATE: 89 BPM | OXYGEN SATURATION: 97 % | WEIGHT: 161.63 LBS | SYSTOLIC BLOOD PRESSURE: 116 MMHG | BODY MASS INDEX: 26.93 KG/M2

## 2023-09-18 DIAGNOSIS — R55 SYNCOPE, VASOVAGAL: ICD-10-CM

## 2023-09-18 DIAGNOSIS — Z95.0 CARDIAC PACEMAKER IN SITU: ICD-10-CM

## 2023-09-18 DIAGNOSIS — R55 SYNCOPE, VASOVAGAL: Primary | ICD-10-CM

## 2023-09-18 DIAGNOSIS — R55 SYNCOPE AND COLLAPSE: ICD-10-CM

## 2023-09-18 DIAGNOSIS — I97.190 PACEMAKER SYNDROME: ICD-10-CM

## 2023-09-18 DIAGNOSIS — I49.5 SSS (SICK SINUS SYNDROME): ICD-10-CM

## 2023-09-18 DIAGNOSIS — Z95.0 PRESENCE OF CARDIAC PACEMAKER: ICD-10-CM

## 2023-09-18 PROCEDURE — 3078F DIAST BP <80 MM HG: CPT | Mod: CPTII,S$GLB,, | Performed by: INTERNAL MEDICINE

## 2023-09-18 PROCEDURE — 1159F PR MEDICATION LIST DOCUMENTED IN MEDICAL RECORD: ICD-10-PCS | Mod: CPTII,S$GLB,, | Performed by: INTERNAL MEDICINE

## 2023-09-18 PROCEDURE — 3074F PR MOST RECENT SYSTOLIC BLOOD PRESSURE < 130 MM HG: ICD-10-PCS | Mod: CPTII,S$GLB,, | Performed by: INTERNAL MEDICINE

## 2023-09-18 PROCEDURE — 93010 ELECTROCARDIOGRAM REPORT: CPT | Mod: ,,, | Performed by: INTERNAL MEDICINE

## 2023-09-18 PROCEDURE — 99213 PR OFFICE/OUTPT VISIT, EST, LEVL III, 20-29 MIN: ICD-10-PCS | Mod: 25,S$GLB,, | Performed by: INTERNAL MEDICINE

## 2023-09-18 PROCEDURE — 93280 PM DEVICE PROGR EVAL DUAL: CPT

## 2023-09-18 PROCEDURE — 3078F PR MOST RECENT DIASTOLIC BLOOD PRESSURE < 80 MM HG: ICD-10-PCS | Mod: CPTII,S$GLB,, | Performed by: INTERNAL MEDICINE

## 2023-09-18 PROCEDURE — 1159F MED LIST DOCD IN RCRD: CPT | Mod: CPTII,S$GLB,, | Performed by: INTERNAL MEDICINE

## 2023-09-18 PROCEDURE — 93280 CARDIAC DEVICE CHECK - IN CLINIC & HOSPITAL: ICD-10-PCS | Mod: 26,,, | Performed by: INTERNAL MEDICINE

## 2023-09-18 PROCEDURE — 93010 EKG 12-LEAD: ICD-10-PCS | Mod: ,,, | Performed by: INTERNAL MEDICINE

## 2023-09-18 PROCEDURE — 3044F HG A1C LEVEL LT 7.0%: CPT | Mod: CPTII,S$GLB,, | Performed by: INTERNAL MEDICINE

## 2023-09-18 PROCEDURE — 99213 OFFICE O/P EST LOW 20 MIN: CPT | Mod: 25,S$GLB,, | Performed by: INTERNAL MEDICINE

## 2023-09-18 PROCEDURE — 93005 ELECTROCARDIOGRAM TRACING: CPT

## 2023-09-18 PROCEDURE — 1160F RVW MEDS BY RX/DR IN RCRD: CPT | Mod: CPTII,S$GLB,, | Performed by: INTERNAL MEDICINE

## 2023-09-18 PROCEDURE — 3074F SYST BP LT 130 MM HG: CPT | Mod: CPTII,S$GLB,, | Performed by: INTERNAL MEDICINE

## 2023-09-18 PROCEDURE — 3008F BODY MASS INDEX DOCD: CPT | Mod: CPTII,S$GLB,, | Performed by: INTERNAL MEDICINE

## 2023-09-18 PROCEDURE — 99999 PR PBB SHADOW E&M-EST. PATIENT-LVL V: ICD-10-PCS | Mod: PBBFAC,,, | Performed by: INTERNAL MEDICINE

## 2023-09-18 PROCEDURE — 3008F PR BODY MASS INDEX (BMI) DOCUMENTED: ICD-10-PCS | Mod: CPTII,S$GLB,, | Performed by: INTERNAL MEDICINE

## 2023-09-18 PROCEDURE — 1160F PR REVIEW ALL MEDS BY PRESCRIBER/CLIN PHARMACIST DOCUMENTED: ICD-10-PCS | Mod: CPTII,S$GLB,, | Performed by: INTERNAL MEDICINE

## 2023-09-18 PROCEDURE — 93280 PM DEVICE PROGR EVAL DUAL: CPT | Mod: 26,,, | Performed by: INTERNAL MEDICINE

## 2023-09-18 PROCEDURE — 3044F PR MOST RECENT HEMOGLOBIN A1C LEVEL <7.0%: ICD-10-PCS | Mod: CPTII,S$GLB,, | Performed by: INTERNAL MEDICINE

## 2023-09-18 PROCEDURE — 99999 PR PBB SHADOW E&M-EST. PATIENT-LVL V: CPT | Mod: PBBFAC,,, | Performed by: INTERNAL MEDICINE

## 2023-09-18 NOTE — PROGRESS NOTES
"  Subjective:   Patient ID:  April Lorrie Sinclair is a 23 y.o. 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 Often exacerbated by IV placement, blood draws, etc.  Underwent TTT, during which there was syncope, sinus slowing, and ventricular asystole for somewhere between 30 s and 50 s (and CHB). Very similar sx to past episodes.  Grandfather had sudden death at age 50. Parents, sibs: no syncope; no other SCD.     PPM placement: (V lead only, given pt's youth). Since, no syncope at all! No syncope since PPM. However, 40% V pacing and sx with it led to RA lead addition later.  5/25/2021: Successful addition of RA lead.  6/1/2021: Patient reported LH and dizziness. Reprogramming at this visit: changed CLS response from medium to high.  Feeling great since last seen. No syncope. Occasional vasovagal LH.     Update 6/23:  Feeling well overall, but has been having episodes of nausea, LH, weakness, while standing or when lying down. Lasts 5 mins then slowly get back to normal... sometimes not normal until HOURS later. Vision sometimes goes "purple" during episodes, but no syncope.     She paces 58% in the RA and 0% in the RV.      I have personally reviewed the patient's EKG today, which shows ASVS at 96 bpm     Update 09/18/2023 :  Continues to have complaints of chest discomfort, dyspnea on exertion, palpitations as well as orthostatic lightheadedness.    I have reviewed the actual image of the ECG tracing obtained today and it shows NSR at 83 beats per minute with normal intervals  Patient's device (PPM-DC)was fully " evaluated today under my direct supervision and real-time feedback.  Summary of findings are as listed below:  Device is in good repair.   The battery is not near DOMINIK.  The sensing and pacing thresholds are favorable with well maintained safety margins.   There were no significant tachy-dysrhythmias noted.  There were no device data indicating possible ongoing fluid retention.   >>  Presenting egram demonstrates: AsVs  Underlying rhythm c/w: SR @ 84, IN 129ms  RA pacing 57%, RV pacing 0%, PVCs 0%     Battery Status/Longevity: 8y 2m, 80% remaining     Atrial arrhythmias: 1 on 9/17/23, 10 sec ST, V rate 160s  Ventricular arrhythmias: none     Reprogramming at this visit:   Changed mode DDD-CLS --> DDD  Increased lower rate 50 --> 60 bpm  Reset statistics     Nurse note: Seen today by Dr. Kramer, referral from Dr. Cooley for syncope.  Programming changes made to rule out CLS being too aggressive with atrial pacing.     Recommendation:  F/U via clinic check in 1 week to evaluate programming changes and plan next steps.  Device follow up as per clinic routine with remote and in house checks    Current Outpatient Medications   Medication Sig    magnesium oxide (MAG-OX) 400 mg (241.3 mg magnesium) tablet Take 1 tablet (400 mg total) by mouth once daily.    norethindrone-e.estradioL-iron (JUNEL FE 24) 1 mg-20 mcg (24)/75 mg (4) per tablet Take 1 tablet by mouth once daily.    omeprazole (PRILOSEC) 40 MG capsule Take 1 capsule (40 mg total) by mouth once daily.    riboflavin, vitamin B2, (VITAMIN B-2) 100 mg Tab tablet Take 2 tablets (200 mg total) by mouth once daily.    sumatriptan (IMITREX) 50 MG tablet Take 1 tab PO X 1 PRN for migraines. Do not repeat for 24 hours.     No current facility-administered medications for this visit.     Review of Systems     Constitutional: Reviewed  for decreased appetite, weight gain and weight loss.   HENT: Reviewed for nosebleeds.    Eyes:  Reviewed for blurred vision and visual  disturbance.   Cardiovascular: Reviewed for chest pain, claudication, cyanosis,dyspnea on exertion, leg swelling, orthopnea,paroxysmal nocturnal dyspnearregular heartbeats, palpitations, near-syncope, and syncope.   Respiratory: Reviewed for cough, shortness of breath, wheezing, sleep disturbances due to breathing and snoring, .    Endocrine: Reviewed for heat intolerance.   Hematologic/Lymphatic: Reviewed for easy bruisability/bleeding.   Skin: Reviewed for rash.   Musculoskeletal: Reviewed for muscle weakness and myalgias.   Gastrointestinal: Reviewed for abdominal pain, anorexia, melena, nausea and vomiting.   Genitourinary: Reviewed for menorrhagia, frequency, nocturia and incontinence.   Neurological: Reviewed for excessive daytime sleepiness, dizziness, vertigo, weakness, headaches, loss of balance and seizures,   Psychiatric/Behavioral:  Reviewed for insomnia, altered mental status, depression, anxiety and nervousness.       All symptoms reviewed above were negative except for what is mentioned in HPI in addition to headaches, neck pain.       Social History     Tobacco Use   Smoking Status Never   Smokeless Tobacco Never        Objective:     Physical Exam  Vitals and nursing note reviewed.   Constitutional:       Appearance: She is well-developed.   HENT:      Head: Normocephalic and atraumatic.      Right Ear: External ear normal.      Left Ear: External ear normal.   Eyes:      General: No scleral icterus.        Left eye: No discharge.      Conjunctiva/sclera: Conjunctivae normal.      Pupils: Pupils are equal, round, and reactive to light.   Neck:      Thyroid: No thyromegaly.   Cardiovascular:      Rate and Rhythm: Normal rate and regular rhythm.      Pulses: Intact distal pulses.           Carotid pulses are 2+ on the right side and 2+ on the left side.       Radial pulses are 2+ on the right side and 2+ on the left side.        Dorsalis pedis pulses are 2+ on the right side and 2+ on the left side.  "       Posterior tibial pulses are 2+ on the right side and 2+ on the left side.      Heart sounds: Normal heart sounds. No midsystolic click and no opening snap. No murmur heard.     No friction rub. No gallop. No S3 or S4 sounds.      Comments: Orthostatic testing in clinic:   Sitting 116/79 and 89 beats per minute   Standing for 1 minute 112/80 and 86   Standing for 3 minutes 120/95 and 97.  Patient complained of weakness  Standing for 5 minutes 105/82 and 94.  Patient complained of weakness.  Patient the felt so dizzy that she said she could not go anymore and needed to sit.    Pulmonary:      Effort: Pulmonary effort is normal.      Breath sounds: Normal breath sounds.   Chest:      Comments: Device pocket is in great repair.  Abdominal:      General: There is no distension.      Palpations: Abdomen is soft. There is no hepatomegaly.      Tenderness: There is no abdominal tenderness. There is no guarding.   Musculoskeletal:      Cervical back: Normal range of motion and neck supple.      Right lower leg: No swelling.      Left lower leg: No swelling.      Right ankle: No swelling.      Left ankle: No swelling.   Skin:     General: Skin is warm and dry.      Findings: No rash.      Nails: There is no clubbing.   Neurological:      Mental Status: She is alert and oriented to person, place, and time.      Cranial Nerves: No cranial nerve deficit.      Gait: Gait normal.   Psychiatric:         Speech: Speech normal.         Behavior: Behavior normal.         Thought Content: Thought content normal.       /79 Comment: sitting  Pulse 89   Ht 5' 5" (1.651 m)   Wt 73.3 kg (161 lb 9.6 oz)   SpO2 97%   BMI 26.89 kg/m²       Results for orders placed during the hospital encounter of 07/02/21    Echo Color Flow Doppler? Yes    Interpretation Summary  · Concentric remodeling and normal systolic function.  · The estimated ejection fraction is 55%.  · Normal left ventricular diastolic function.  · Normal right " ventricular size with normal right ventricular systolic function.  · Normal central venous pressure (3 mmHg).  · The estimated PA systolic pressure is 21 mmHg.    WBC   Date Value Ref Range Status   07/01/2023 6.20 3.90 - 12.70 K/uL Final     Hematocrit   Date Value Ref Range Status   07/01/2023 41.5 37.0 - 48.5 % Final     Hemoglobin   Date Value Ref Range Status   07/01/2023 14.0 12.0 - 16.0 g/dL Final     Lab Results   Component Value Date     07/01/2023     Lab Results   Component Value Date    CREATININE 0.7 07/01/2023    EGFRNORACEVR >60.0 07/01/2023    K 4.0 07/01/2023     Lab Results   Component Value Date    BNP 34 03/26/2021            reports that she does not currently use alcohol.  Past Medical History:   Diagnosis Date    Asthma     Headache(784.0)     Migraine     Pre-syncope 5/25/2021    Seizures     Syncope and collapse      Past Surgical History:   Procedure Laterality Date    A-V CARDIAC PACEMAKER INSERTION Left 7/18/2019    Procedure: INSERTION, CARDIAC PACEMAKER, DUAL CHAMBER;  Surgeon: Piter Cooley MD;  Location: [facility] CATH LAB/EP;  Service: Cardiology;  Laterality: Left;    PHLEBOGRAPHY  5/25/2021    Procedure: Venogram;  Surgeon: Piter Cooley MD;  Location: [facility] EP LAB;  Service: Cardiology;;    REPLACEMENT OF PACEMAKER GENERATOR  5/25/2021    Procedure: REPLACEMENT, PACEMAKER GENERATOR;  Surgeon: Piter Cooley MD;  Location: [facility] EP LAB;  Service: Cardiology;;    WISDOM TOOTH EXTRACTION       Family History   Problem Relation Age of Onset    Diabetes Mother     Stroke Maternal Grandfather     Heart disease Maternal Grandfather     Diabetes Maternal Grandfather     Irregular heart beat Sister     Diabetes Maternal Grandmother     Cancer Maternal Grandmother     Arthritis Paternal Grandmother     Diabetes Paternal Grandmother     Diabetes Paternal Grandfather     Diabetes Father     Breast cancer Neg Hx     Ovarian cancer Neg Hx     Colon cancer Neg Hx        Assessment:   Her  device statistics suggest that she is the frequently tachycardic and that this is driven by her CLS.  This could be partially what is giving her her disturbing symptoms of palpitations.  1. Syncope, vasovagal    2. SSS (sick sinus syndrome)    3. Pacemaker syndrome    4. Cardiac pacemaker in situ        Plan:      Pacemaker has been reprogrammed as noted above.  We will look at her statistics and see how she is doing in about a week.   No orders of the defined types were placed in this encounter.      No follow-ups on file.    There are no discontinued medications.         Medication List with Changes/Refills   Current Medications    MAGNESIUM OXIDE (MAG-OX) 400 MG (241.3 MG MAGNESIUM) TABLET    Take 1 tablet (400 mg total) by mouth once daily.    NORETHINDRONE-E.ESTRADIOL-IRON (JUNEL FE 24) 1 MG-20 MCG (24)/75 MG (4) PER TABLET    Take 1 tablet by mouth once daily.    OMEPRAZOLE (PRILOSEC) 40 MG CAPSULE    Take 1 capsule (40 mg total) by mouth once daily.    RIBOFLAVIN, VITAMIN B2, (VITAMIN B-2) 100 MG TAB TABLET    Take 2 tablets (200 mg total) by mouth once daily.    SUMATRIPTAN (IMITREX) 50 MG TABLET    Take 1 tab PO X 1 PRN for migraines. Do not repeat for 24 hours.        This note is at least partially dictated using the M*Modal Fluency Direct word recognition program. There are word recognition mistakes that are occasionally missed on review.

## 2023-09-25 ENCOUNTER — HOSPITAL ENCOUNTER (OUTPATIENT)
Dept: CARDIOLOGY | Facility: HOSPITAL | Age: 23
Discharge: HOME | End: 2023-09-25
Attending: INTERNAL MEDICINE
Payer: COMMERCIAL

## 2023-09-25 DIAGNOSIS — R55 SYNCOPE, VASOVAGAL: ICD-10-CM

## 2023-09-25 DIAGNOSIS — Z95.0 PRESENCE OF CARDIAC PACEMAKER: ICD-10-CM

## 2023-09-25 DIAGNOSIS — R55 SYNCOPE AND COLLAPSE: ICD-10-CM

## 2023-09-25 DIAGNOSIS — I49.5 SSS (SICK SINUS SYNDROME): ICD-10-CM

## 2023-09-25 PROCEDURE — 93280 CARDIAC DEVICE CHECK - IN CLINIC & HOSPITAL: ICD-10-PCS | Mod: 26,,, | Performed by: INTERNAL MEDICINE

## 2023-09-25 PROCEDURE — 93280 PM DEVICE PROGR EVAL DUAL: CPT

## 2023-09-25 PROCEDURE — 93280 PM DEVICE PROGR EVAL DUAL: CPT | Mod: 26,,, | Performed by: INTERNAL MEDICINE

## 2023-09-26 ENCOUNTER — PATIENT MESSAGE (OUTPATIENT)
Dept: CARDIOLOGY | Facility: HOSPITAL | Age: 23
End: 2023-09-26
Payer: COMMERCIAL

## 2023-10-11 ENCOUNTER — HOSPITAL ENCOUNTER (OUTPATIENT)
Dept: CARDIOLOGY | Facility: HOSPITAL | Age: 23
Discharge: HOME | End: 2023-10-11
Attending: INTERNAL MEDICINE
Payer: COMMERCIAL

## 2023-10-11 DIAGNOSIS — Z95.0 PRESENCE OF CARDIAC PACEMAKER: ICD-10-CM

## 2023-10-11 DIAGNOSIS — R55 SYNCOPE, VASOVAGAL: ICD-10-CM

## 2023-10-11 DIAGNOSIS — R55 SYNCOPE AND COLLAPSE: ICD-10-CM

## 2023-10-11 DIAGNOSIS — I49.5 SSS (SICK SINUS SYNDROME): ICD-10-CM

## 2023-10-11 PROCEDURE — 93280 PM DEVICE PROGR EVAL DUAL: CPT | Mod: 26,,, | Performed by: INTERNAL MEDICINE

## 2023-10-11 PROCEDURE — 93280 CARDIAC DEVICE CHECK - IN CLINIC & HOSPITAL: ICD-10-PCS | Mod: 26,,, | Performed by: INTERNAL MEDICINE

## 2023-11-03 DIAGNOSIS — R55 NEAR SYNCOPE: Primary | ICD-10-CM

## 2023-11-06 ENCOUNTER — TELEPHONE (OUTPATIENT)
Dept: CARDIOLOGY | Facility: HOSPITAL | Age: 23
End: 2023-11-06
Payer: COMMERCIAL

## 2023-11-06 ENCOUNTER — HOSPITAL ENCOUNTER (OUTPATIENT)
Dept: CARDIOLOGY | Facility: HOSPITAL | Age: 23
Discharge: HOME | End: 2023-11-06
Attending: INTERNAL MEDICINE
Payer: COMMERCIAL

## 2023-11-06 ENCOUNTER — DOCUMENTATION ONLY (OUTPATIENT)
Dept: CARDIOLOGY | Facility: HOSPITAL | Age: 23
End: 2023-11-06
Payer: COMMERCIAL

## 2023-11-06 DIAGNOSIS — R55 SYNCOPE, VASOVAGAL: Primary | ICD-10-CM

## 2023-11-06 DIAGNOSIS — R55 NEAR SYNCOPE: ICD-10-CM

## 2023-11-06 DIAGNOSIS — R55 NEAR SYNCOPE: Primary | ICD-10-CM

## 2023-11-06 PROCEDURE — 82088 ASSAY OF ALDOSTERONE: CPT | Performed by: INTERNAL MEDICINE

## 2023-11-06 PROCEDURE — 83835 ASSAY OF METANEPHRINES: CPT | Performed by: INTERNAL MEDICINE

## 2023-11-06 PROCEDURE — 93226 XTRNL ECG REC<48 HR SCAN A/R: CPT

## 2023-11-06 PROCEDURE — 36415 COLL VENOUS BLD VENIPUNCTURE: CPT | Performed by: INTERNAL MEDICINE

## 2023-11-06 PROCEDURE — 84244 ASSAY OF RENIN: CPT | Mod: 91 | Performed by: INTERNAL MEDICINE

## 2023-11-06 PROCEDURE — 93660 TILT TABLE EVALUATION: CPT | Mod: 26,,, | Performed by: INTERNAL MEDICINE

## 2023-11-06 PROCEDURE — 93227 HOLTER MONITOR - 48 HOUR (CUPID ONLY): ICD-10-PCS | Mod: ,,, | Performed by: INTERNAL MEDICINE

## 2023-11-06 PROCEDURE — 82384 ASSAY THREE CATECHOLAMINES: CPT | Mod: 91 | Performed by: INTERNAL MEDICINE

## 2023-11-06 PROCEDURE — 93660 TILT TABLE (CUPID ONLY): ICD-10-PCS | Mod: 26,,, | Performed by: INTERNAL MEDICINE

## 2023-11-06 PROCEDURE — 93227 XTRNL ECG REC<48 HR R&I: CPT | Mod: ,,, | Performed by: INTERNAL MEDICINE

## 2023-11-06 PROCEDURE — 93660 TILT TABLE EVALUATION: CPT

## 2023-11-06 NOTE — PROGRESS NOTES
Pt requested to stop Isuprel test prior to reaching max heart rate. 24 hour urine collection ordered and instructions reviewed with pt, no questions or concerns. Holter monitor was placed on pt prior to discharge, instructions reviewed with pt, states understanding. Results sent to MD for review. Pt DC'd home with no complaints or questions. VSS

## 2023-11-08 ENCOUNTER — LAB VISIT (OUTPATIENT)
Dept: LAB | Facility: HOSPITAL | Age: 23
End: 2023-11-08
Attending: INTERNAL MEDICINE
Payer: COMMERCIAL

## 2023-11-08 DIAGNOSIS — R55 SYNCOPE, VASOVAGAL: ICD-10-CM

## 2023-11-08 PROCEDURE — 84300 ASSAY OF URINE SODIUM: CPT | Performed by: INTERNAL MEDICINE

## 2023-11-08 PROCEDURE — 84133 ASSAY OF URINE POTASSIUM: CPT | Performed by: INTERNAL MEDICINE

## 2023-11-09 LAB
ALDOST SERPL-MCNC: 11.2 NG/DL
ALDOST SERPL-MCNC: 18.4 NG/DL
OHS CV EVENT MONITOR DAY: 0
OHS CV HOLTER LENGTH DECIMAL HOURS: 46.43
OHS CV HOLTER LENGTH HOURS: 46
OHS CV HOLTER LENGTH MINUTES: 26
OHS CV HOLTER SINUS AVERAGE HR: 81
OHS CV HOLTER SINUS MAX HR: 141
OHS CV HOLTER SINUS MIN HR: 65
POTASSIUM 24H UR-SRATE: 1.8 MMOL/HR (ref 1–5)
POTASSIUM UR-SCNC: 84 MMOL/L (ref 15–95)
POTASSIUM URINE (MMOL/SPEC): 42 MMOL/SPEC
SODIUM 24H UR-SRATE: 3.3 MMOL/HR (ref 2–9)
SODIUM UR-SCNC: 160 MMOL/L (ref 20–250)
SODIUM URINE (MMOL/SPEC): 80 MMOL/SPEC
URINE COLLECTION DURATION: 24 HR
URINE COLLECTION DURATION: 24 HR
URINE VOLUME: 500 ML
URINE VOLUME: 500 ML

## 2023-11-11 LAB
RENIN PLAS-CCNC: 1.3 NG/ML/H
RENIN PLAS-CCNC: 2 NG/ML/H

## 2023-11-12 NOTE — PROGRESS NOTES
See comments below and call patient to discuss.   Please close encounter when done -- no need to route back to me.  Thanks  Holter not in MUSE - I need to see strips  Tx

## 2023-11-14 LAB
METANEPH FREE SERPL-MCNC: 32 PG/ML
METANEPHS SERPL-MCNC: 78 PG/ML
NORMETANEPH FREE SERPL-MCNC: 46 PG/ML

## 2023-11-17 LAB
CATECHOLS PLAS-MCNC: 360 PG/ML
DOPAMINE SERPL-MCNC: <10 PG/ML
EPINEPH PLAS-MCNC: 52 PG/ML
NOREPINEPH PLAS-MCNC: 308 PG/ML

## 2023-11-18 LAB
METANEPH FREE SERPL-MCNC: <25 PG/ML
METANEPHS SERPL-MCNC: 69 PG/ML
NORMETANEPH FREE SERPL-MCNC: 69 PG/ML

## 2023-12-05 ENCOUNTER — CLINICAL SUPPORT (OUTPATIENT)
Dept: CARDIOLOGY | Facility: HOSPITAL | Age: 23
End: 2023-12-05
Attending: INTERNAL MEDICINE
Payer: COMMERCIAL

## 2023-12-05 ENCOUNTER — CLINICAL SUPPORT (OUTPATIENT)
Dept: CARDIOLOGY | Facility: HOSPITAL | Age: 23
End: 2023-12-05
Payer: COMMERCIAL

## 2023-12-05 DIAGNOSIS — I49.5 SICK SINUS SYNDROME: ICD-10-CM

## 2023-12-05 PROCEDURE — 93294 CARDIAC DEVICE CHECK - REMOTE: ICD-10-PCS | Mod: ,,, | Performed by: INTERNAL MEDICINE

## 2023-12-05 PROCEDURE — 93296 REM INTERROG EVL PM/IDS: CPT | Performed by: INTERNAL MEDICINE

## 2023-12-05 PROCEDURE — 93294 REM INTERROG EVL PM/LDLS PM: CPT | Mod: ,,, | Performed by: INTERNAL MEDICINE

## 2023-12-07 LAB
CATECHOLS PLAS-MCNC: 309 PG/ML
DOPAMINE SERPL-MCNC: 12 PG/ML
EPINEPH PLAS-MCNC: 20 PG/ML
NOREPINEPH PLAS-MCNC: 277 PG/ML

## 2023-12-08 LAB
OHS CV AF BURDEN PERCENT: < 1
OHS CV DC REMOTE DEVICE TYPE: NORMAL
OHS CV RV PACING PERCENT: 0 %

## 2023-12-29 ENCOUNTER — OFFICE VISIT (OUTPATIENT)
Dept: INTERNAL MEDICINE | Facility: CLINIC | Age: 23
End: 2023-12-29
Payer: COMMERCIAL

## 2023-12-29 VITALS
HEIGHT: 65 IN | DIASTOLIC BLOOD PRESSURE: 76 MMHG | SYSTOLIC BLOOD PRESSURE: 108 MMHG | WEIGHT: 160.06 LBS | HEART RATE: 76 BPM | OXYGEN SATURATION: 95 % | BODY MASS INDEX: 26.67 KG/M2

## 2023-12-29 DIAGNOSIS — J32.9 RHINOSINUSITIS: Primary | ICD-10-CM

## 2023-12-29 LAB
CTP QC/QA: YES
POC MOLECULAR INFLUENZA A AGN: NEGATIVE
POC MOLECULAR INFLUENZA B AGN: NEGATIVE

## 2023-12-29 PROCEDURE — 99999 PR PBB SHADOW E&M-EST. PATIENT-LVL IV: CPT | Mod: PBBFAC,,, | Performed by: PHYSICIAN ASSISTANT

## 2023-12-29 PROCEDURE — 99213 PR OFFICE/OUTPT VISIT, EST, LEVL III, 20-29 MIN: ICD-10-PCS | Mod: S$GLB,,, | Performed by: PHYSICIAN ASSISTANT

## 2023-12-29 PROCEDURE — 3078F PR MOST RECENT DIASTOLIC BLOOD PRESSURE < 80 MM HG: ICD-10-PCS | Mod: CPTII,S$GLB,, | Performed by: PHYSICIAN ASSISTANT

## 2023-12-29 PROCEDURE — 1160F PR REVIEW ALL MEDS BY PRESCRIBER/CLIN PHARMACIST DOCUMENTED: ICD-10-PCS | Mod: CPTII,S$GLB,, | Performed by: PHYSICIAN ASSISTANT

## 2023-12-29 PROCEDURE — 87502 POCT INFLUENZA A/B MOLECULAR: ICD-10-PCS | Mod: QW,S$GLB,, | Performed by: PHYSICIAN ASSISTANT

## 2023-12-29 PROCEDURE — 3078F DIAST BP <80 MM HG: CPT | Mod: CPTII,S$GLB,, | Performed by: PHYSICIAN ASSISTANT

## 2023-12-29 PROCEDURE — 3074F PR MOST RECENT SYSTOLIC BLOOD PRESSURE < 130 MM HG: ICD-10-PCS | Mod: CPTII,S$GLB,, | Performed by: PHYSICIAN ASSISTANT

## 2023-12-29 PROCEDURE — 1159F PR MEDICATION LIST DOCUMENTED IN MEDICAL RECORD: ICD-10-PCS | Mod: CPTII,S$GLB,, | Performed by: PHYSICIAN ASSISTANT

## 2023-12-29 PROCEDURE — 3074F SYST BP LT 130 MM HG: CPT | Mod: CPTII,S$GLB,, | Performed by: PHYSICIAN ASSISTANT

## 2023-12-29 PROCEDURE — 3008F PR BODY MASS INDEX (BMI) DOCUMENTED: ICD-10-PCS | Mod: CPTII,S$GLB,, | Performed by: PHYSICIAN ASSISTANT

## 2023-12-29 PROCEDURE — 99213 OFFICE O/P EST LOW 20 MIN: CPT | Mod: S$GLB,,, | Performed by: PHYSICIAN ASSISTANT

## 2023-12-29 PROCEDURE — 99999 PR PBB SHADOW E&M-EST. PATIENT-LVL IV: ICD-10-PCS | Mod: PBBFAC,,, | Performed by: PHYSICIAN ASSISTANT

## 2023-12-29 PROCEDURE — 87502 INFLUENZA DNA AMP PROBE: CPT | Mod: QW,S$GLB,, | Performed by: PHYSICIAN ASSISTANT

## 2023-12-29 PROCEDURE — 3044F HG A1C LEVEL LT 7.0%: CPT | Mod: CPTII,S$GLB,, | Performed by: PHYSICIAN ASSISTANT

## 2023-12-29 PROCEDURE — 3008F BODY MASS INDEX DOCD: CPT | Mod: CPTII,S$GLB,, | Performed by: PHYSICIAN ASSISTANT

## 2023-12-29 PROCEDURE — 1160F RVW MEDS BY RX/DR IN RCRD: CPT | Mod: CPTII,S$GLB,, | Performed by: PHYSICIAN ASSISTANT

## 2023-12-29 PROCEDURE — 3044F PR MOST RECENT HEMOGLOBIN A1C LEVEL <7.0%: ICD-10-PCS | Mod: CPTII,S$GLB,, | Performed by: PHYSICIAN ASSISTANT

## 2023-12-29 PROCEDURE — 1159F MED LIST DOCD IN RCRD: CPT | Mod: CPTII,S$GLB,, | Performed by: PHYSICIAN ASSISTANT

## 2023-12-29 RX ORDER — FLUTICASONE PROPIONATE 50 MCG
2 SPRAY, SUSPENSION (ML) NASAL DAILY
Qty: 16 G | Refills: 1 | Status: SHIPPED | OUTPATIENT
Start: 2023-12-29

## 2023-12-29 RX ORDER — LEVOCETIRIZINE DIHYDROCHLORIDE 5 MG/1
5 TABLET, FILM COATED ORAL NIGHTLY
Qty: 30 TABLET | Refills: 1 | Status: SHIPPED | OUTPATIENT
Start: 2023-12-29 | End: 2024-12-28

## 2023-12-29 NOTE — PROGRESS NOTES
"Subjective     Patient ID: April Lorrie Sinclair is a 23 y.o. female.    Chief Complaint: Sore Throat and ear popping    HPI  Same day/urgent care    Uri s/s onset about 3 days ago   Sneezing, itchy throat runny nose, nasal congestion, ears poppy  Clear rhinorrhea  Tried mucinex. Nyquil   Home COVID test negative  No fever no body aches.     Past Medical History:   Diagnosis Date    Asthma     Headache(784.0)     Migraine     Pre-syncope 5/25/2021    Seizures     Syncope and collapse      Social History     Tobacco Use    Smoking status: Never    Smokeless tobacco: Never   Substance Use Topics    Alcohol use: Not Currently     Comment: occasionally     Drug use: Never     Review of patient's allergies indicates:   Allergen Reactions    Lamictal  [lamotrigine]      Other reaction(s): HIVES    Cefzil [cefprozil] Rash    Demerol [meperidine] Rash     Other reaction(s): Rash    Pcn [penicillins] Rash     Other reaction(s): Rash        Review of Systems   Constitutional:  Negative for chills and fever.   HENT:  Positive for rhinorrhea, sneezing and sore throat.    Respiratory:  Negative for cough and shortness of breath.    Cardiovascular:  Negative for chest pain and leg swelling.   Gastrointestinal:  Negative for abdominal pain, nausea and vomiting.   Integumentary:  Negative for rash.          Objective  /76 (BP Location: Left arm, Patient Position: Sitting, BP Method: Large (Manual))   Pulse 76   Ht 5' 5" (1.651 m)   Wt 72.6 kg (160 lb 0.9 oz)   LMP 12/01/2023   SpO2 95%   BMI 26.63 kg/m²       Physical Exam  Vitals reviewed.   Constitutional:       General: She is not in acute distress.     Appearance: She is well-developed. She is not ill-appearing.   HENT:      Head: Normocephalic and atraumatic.      Right Ear: Tympanic membrane, ear canal and external ear normal.      Left Ear: Tympanic membrane, ear canal and external ear normal.      Nose: Rhinorrhea present.      Right Sinus: Maxillary sinus " tenderness present.      Left Sinus: Maxillary sinus tenderness present.      Mouth/Throat:      Mouth: Mucous membranes are moist.      Pharynx: Oropharynx is clear.   Cardiovascular:      Rate and Rhythm: Normal rate and regular rhythm.      Heart sounds: No murmur heard.  Pulmonary:      Effort: Pulmonary effort is normal.      Breath sounds: Normal breath sounds. No wheezing or rales.   Musculoskeletal:      Right lower leg: No edema.      Left lower leg: No edema.   Skin:     General: Skin is warm and dry.      Findings: No rash.   Neurological:      Mental Status: She is alert.   Psychiatric:         Mood and Affect: Mood normal.            Assessment and Plan     1. Rhinosinusitis  -     POCT Influenza A/B Molecular  -     fluticasone propionate (FLONASE) 50 mcg/actuation nasal spray; 2 sprays (100 mcg total) by Each Nostril route once daily.  Dispense: 16 g; Refill: 1  -     levocetirizine (XYZAL) 5 MG tablet; Take 1 tablet (5 mg total) by mouth every evening.  Dispense: 30 tablet; Refill: 1    POC FLU negative  Symptomatic care discussed  RTC prn         Tracy Chau, PAC

## 2024-01-02 NOTE — PROGRESS NOTES
See comments below and call patient to discuss.   Please close encounter when done -- no need to route back to me.  Thanks  She will need to be started on Theodur and needs a return visit with me

## 2024-01-03 ENCOUNTER — TELEPHONE (OUTPATIENT)
Dept: CARDIOLOGY | Facility: CLINIC | Age: 24
End: 2024-01-03
Payer: COMMERCIAL

## 2024-01-03 NOTE — TELEPHONE ENCOUNTER
Left message with date and time of her appt pb    ----- Message from Favian Hess MD sent at 1/2/2024 12:17 PM CST -----  See comments below and call patient to discuss.   Please close encounter when done -- no need to route back to me.  Thanks  She will need to be started on Theodur and needs a return visit with me

## 2024-01-24 ENCOUNTER — PATIENT MESSAGE (OUTPATIENT)
Dept: CARDIOLOGY | Facility: CLINIC | Age: 24
End: 2024-01-24
Payer: COMMERCIAL

## 2024-01-24 ENCOUNTER — TELEPHONE (OUTPATIENT)
Dept: CARDIOLOGY | Facility: CLINIC | Age: 24
End: 2024-01-24
Payer: COMMERCIAL

## 2024-01-24 NOTE — TELEPHONE ENCOUNTER
Called to notified her that her appt on 02- will be cancelled and rescheduled for a later date but her message on phone number states she is not taking calls pb

## 2024-03-05 ENCOUNTER — CLINICAL SUPPORT (OUTPATIENT)
Dept: CARDIOLOGY | Facility: HOSPITAL | Age: 24
End: 2024-03-05
Attending: INTERNAL MEDICINE
Payer: COMMERCIAL

## 2024-03-05 ENCOUNTER — CLINICAL SUPPORT (OUTPATIENT)
Dept: CARDIOLOGY | Facility: HOSPITAL | Age: 24
End: 2024-03-05
Payer: COMMERCIAL

## 2024-03-05 DIAGNOSIS — I49.5 SICK SINUS SYNDROME: ICD-10-CM

## 2024-03-05 PROCEDURE — 93294 REM INTERROG EVL PM/LDLS PM: CPT | Mod: ,,, | Performed by: INTERNAL MEDICINE

## 2024-03-05 PROCEDURE — 93296 REM INTERROG EVL PM/IDS: CPT | Performed by: INTERNAL MEDICINE

## 2024-03-15 ENCOUNTER — PATIENT MESSAGE (OUTPATIENT)
Dept: CARDIOLOGY | Facility: CLINIC | Age: 24
End: 2024-03-15
Payer: COMMERCIAL

## 2024-03-15 DIAGNOSIS — R55 SYNCOPE, VASOVAGAL: Primary | ICD-10-CM

## 2024-03-15 DIAGNOSIS — R55 NEAR SYNCOPE: ICD-10-CM

## 2024-03-15 LAB
OHS CV AF BURDEN PERCENT: < 1
OHS CV DC REMOTE DEVICE TYPE: NORMAL
OHS CV RV PACING PERCENT: 0 %

## 2024-03-18 ENCOUNTER — OFFICE VISIT (OUTPATIENT)
Dept: CARDIOLOGY | Facility: CLINIC | Age: 24
End: 2024-03-18
Payer: COMMERCIAL

## 2024-03-18 ENCOUNTER — HOSPITAL ENCOUNTER (OUTPATIENT)
Dept: CARDIOLOGY | Facility: HOSPITAL | Age: 24
Discharge: HOME | End: 2024-03-18
Attending: INTERNAL MEDICINE
Payer: COMMERCIAL

## 2024-03-18 VITALS
HEART RATE: 94 BPM | DIASTOLIC BLOOD PRESSURE: 76 MMHG | SYSTOLIC BLOOD PRESSURE: 120 MMHG | BODY MASS INDEX: 26.85 KG/M2 | HEIGHT: 65 IN | WEIGHT: 161.19 LBS | OXYGEN SATURATION: 99 %

## 2024-03-18 DIAGNOSIS — R00.2 PALPITATIONS: ICD-10-CM

## 2024-03-18 DIAGNOSIS — Z95.0 CARDIAC PACEMAKER IN SITU: ICD-10-CM

## 2024-03-18 DIAGNOSIS — I49.5 SSS (SICK SINUS SYNDROME): ICD-10-CM

## 2024-03-18 DIAGNOSIS — G47.25 CIRCADIAN RHYTHM SLEEP DISORDER, JET LAG TYPE: ICD-10-CM

## 2024-03-18 DIAGNOSIS — R55 SYNCOPE AND COLLAPSE: ICD-10-CM

## 2024-03-18 DIAGNOSIS — R55 SYNCOPE, VASOVAGAL: Primary | ICD-10-CM

## 2024-03-18 DIAGNOSIS — Z95.0 PRESENCE OF CARDIAC PACEMAKER: ICD-10-CM

## 2024-03-18 DIAGNOSIS — F41.9 ANXIETY: ICD-10-CM

## 2024-03-18 DIAGNOSIS — R55 SYNCOPE, VASOVAGAL: ICD-10-CM

## 2024-03-18 DIAGNOSIS — I97.190 PACEMAKER SYNDROME: ICD-10-CM

## 2024-03-18 PROCEDURE — 93280 PM DEVICE PROGR EVAL DUAL: CPT

## 2024-03-18 PROCEDURE — 1159F MED LIST DOCD IN RCRD: CPT | Mod: CPTII,S$GLB,, | Performed by: INTERNAL MEDICINE

## 2024-03-18 PROCEDURE — 3074F SYST BP LT 130 MM HG: CPT | Mod: CPTII,S$GLB,, | Performed by: INTERNAL MEDICINE

## 2024-03-18 PROCEDURE — 99999 PR PBB SHADOW E&M-EST. PATIENT-LVL III: CPT | Mod: PBBFAC,,, | Performed by: INTERNAL MEDICINE

## 2024-03-18 PROCEDURE — 99215 OFFICE O/P EST HI 40 MIN: CPT | Mod: S$GLB,,, | Performed by: INTERNAL MEDICINE

## 2024-03-18 PROCEDURE — 3078F DIAST BP <80 MM HG: CPT | Mod: CPTII,S$GLB,, | Performed by: INTERNAL MEDICINE

## 2024-03-18 PROCEDURE — 93280 PM DEVICE PROGR EVAL DUAL: CPT | Mod: 26,,, | Performed by: INTERNAL MEDICINE

## 2024-03-18 PROCEDURE — 3008F BODY MASS INDEX DOCD: CPT | Mod: CPTII,S$GLB,, | Performed by: INTERNAL MEDICINE

## 2024-03-18 RX ORDER — BETAXOLOL 10 MG/1
10 TABLET, FILM COATED ORAL DAILY
Qty: 90 TABLET | Refills: 3 | Status: SHIPPED | OUTPATIENT
Start: 2024-03-18 | End: 2025-03-18

## 2024-03-18 NOTE — PROGRESS NOTES
"  Subjective:   Patient ID:  April Lorrie Sinclair is a 23 y.o. 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 Often exacerbated by IV placement, blood draws, etc.  Underwent TTT, during which there was syncope, sinus slowing, and ventricular asystole for somewhere between 30 s and 50 s (and CHB). Very similar sx to past episodes.  Grandfather had sudden death at age 50. Parents, sibs: no syncope; no other SCD.     PPM placement: (V lead only, given pt's youth). Since, no syncope at all! No syncope since PPM. However, 40% V pacing and sx with it led to RA lead addition later.  5/25/2021: Successful addition of RA lead.  6/1/2021: Patient reported LH and dizziness. Reprogramming at this visit: changed CLS response from medium to high.  Feeling great since last seen. No syncope. Occasional vasovagal LH.     Update 6/23:  Feeling well overall, but has been having episodes of nausea, LH, weakness, while standing or when lying down. Lasts 5 mins then slowly get back to normal... sometimes not normal until HOURS later. Vision sometimes goes "purple" during episodes, but no syncope.     She paces 58% in the RA and 0% in the RV.      I have personally reviewed the patient's EKG today, which shows ASVS at 96 bpm      Update 09/18/2023 :  Continues to have complaints of chest discomfort, dyspnea on exertion, palpitations as well as orthostatic lightheadedness.     I have reviewed the actual image of the ECG tracing obtained today and it shows NSR at 83 beats per minute with normal intervals  Patient's device (PPM-DC)was fully evaluated today under my direct " supervision and real-time feedback.  Summary of findings are as listed below:  Device is in good repair.   The battery is not near DOMINIK.  The sensing and pacing thresholds are favorable with well maintained safety margins.   There were no significant tachy-dysrhythmias noted.  There were no device data indicating possible ongoing fluid retention.   >>  Presenting egram demonstrates: AsVs  Underlying rhythm c/w: SR @ 84, UT 129ms  RA pacing 57%, RV pacing 0%, PVCs 0%     Battery Status/Longevity: 8y 2m, 80% remaining     Atrial arrhythmias: 1 on 9/17/23, 10 sec ST, V rate 160s  Ventricular arrhythmias: none     Reprogramming at this visit:   Changed mode DDD-CLS --> DDD  Increased lower rate 50 --> 60 bpm  Reset statistics     Nurse note: Seen today by Dr. Kramer, referral from Dr. Cooley for syncope.  Programming changes made to rule out CLS being too aggressive with atrial pacing.      Recommendation:  F/U via clinic check in 1 week to evaluate programming changes and plan next steps.  Device follow up as per clinic routine with remote and in house checks  >>  We switched her to a DDDR mode.    Update 03/18/2024 :  Since the pacemaker was reprogrammed to DDDR at the acceptable rate, she has had less palpitations and generally feels better.  However she continues to have some symptoms.  She we feels weak and dizzy and she is somewhat nauseated in the morning (she says she has not pregnant).  The Holter she had in November, suggested the jet lag type sleep.    As an aside, she says that in the past she used to have seizures.  She says that she would lose consciousness and have the shakes after seeing blood or getting stuck with a needle.  Current Outpatient Medications   Medication Sig    norethindrone-e.estradioL-iron (JUNEL FE 24) 1 mg-20 mcg (24)/75 mg (4) per tablet Take 1 tablet by mouth once daily.    betaxoloL (KERLONE) 10 mg Tab Take 1 tablet (10 mg total) by mouth once daily. Start with 1/4 pill a day for 2  weeks. Increase to 1/2 pill a day for the following 2 weeks. Increase to a full tablet (10 mg) a day thereafter.Progression schedule from 1/4 tab to a full tab to be modified in case of perceived side effects.Future further increases will be tailored to clinical response.    fluticasone propionate (FLONASE) 50 mcg/actuation nasal spray 2 sprays (100 mcg total) by Each Nostril route once daily. (Patient not taking: Reported on 3/18/2024)    levocetirizine (XYZAL) 5 MG tablet Take 1 tablet (5 mg total) by mouth every evening. (Patient not taking: Reported on 3/18/2024)    magnesium oxide (MAG-OX) 400 mg (241.3 mg magnesium) tablet Take 1 tablet (400 mg total) by mouth once daily. (Patient not taking: Reported on 12/29/2023)    omeprazole (PRILOSEC) 40 MG capsule Take 1 capsule (40 mg total) by mouth once daily. (Patient not taking: Reported on 12/29/2023)    riboflavin, vitamin B2, (VITAMIN B-2) 100 mg Tab tablet Take 2 tablets (200 mg total) by mouth once daily. (Patient not taking: Reported on 12/29/2023)    sumatriptan (IMITREX) 50 MG tablet Take 1 tab PO X 1 PRN for migraines. Do not repeat for 24 hours. (Patient not taking: Reported on 12/29/2023)     No current facility-administered medications for this visit.     Review of Systems     Constitutional: Reviewed  for decreased appetite, weight gain and weight loss.   HENT: Reviewed for nosebleeds.    Eyes:  Reviewed for blurred vision and visual disturbance.   Cardiovascular: Reviewed for chest pain, claudication, cyanosis,dyspnea on exertion, leg swelling, orthopnea,paroxysmal nocturnal dyspnearregular heartbeats, palpitations, near-syncope, and syncope.   Respiratory: Reviewed for cough, shortness of breath, wheezing, sleep disturbances due to breathing and snoring, .    Endocrine: Reviewed for heat intolerance.   Hematologic/Lymphatic: Reviewed for easy bruisability/bleeding.   Skin: Reviewed for rash.   Musculoskeletal: Reviewed for muscle weakness and  myalgias.   Gastrointestinal: Reviewed for abdominal pain, anorexia, melena, nausea and vomiting.   Genitourinary: Reviewed for menorrhagia, frequency, nocturia and incontinence.   Neurological: Reviewed for excessive daytime sleepiness, dizziness, vertigo, weakness, headaches, loss of balance and seizures,   Psychiatric/Behavioral:  Reviewed for insomnia, altered mental status, depression, anxiety and nervousness.       All symptoms reviewed above were negative except for daytime sleepiness, shortness of breath, palpitations.       Social History     Tobacco Use   Smoking Status Never   Smokeless Tobacco Never        Objective:     Physical Exam  Vitals and nursing note reviewed.   Constitutional:       Appearance: She is well-developed.   HENT:      Head: Normocephalic and atraumatic.      Right Ear: External ear normal.      Left Ear: External ear normal.   Neck:      Thyroid: No thyromegaly.   Cardiovascular:      Rate and Rhythm: Normal rate and regular rhythm.      Pulses: Intact distal pulses.           Carotid pulses are 2+ on the right side and 2+ on the left side.       Radial pulses are 2+ on the right side and 2+ on the left side.        Dorsalis pedis pulses are 2+ on the right side and 2+ on the left side.        Posterior tibial pulses are 2+ on the right side and 2+ on the left side.      Heart sounds: Normal heart sounds. No midsystolic click and no opening snap. No murmur heard.     No friction rub. No gallop. No S3 or S4 sounds.   Pulmonary:      Effort: Pulmonary effort is normal.      Breath sounds: Normal breath sounds.   Abdominal:      General: There is no distension.      Palpations: Abdomen is soft.      Tenderness: There is no abdominal tenderness.   Musculoskeletal:      Cervical back: Normal range of motion and neck supple.      Right lower leg: No swelling.      Left lower leg: No swelling.      Right ankle: No swelling.      Left ankle: No swelling.   Skin:     General: Skin is warm.  "     Findings: No rash.      Nails: There is no clubbing.   Neurological:      Mental Status: She is alert and oriented to person, place, and time.      Cranial Nerves: No cranial nerve deficit.      Gait: Gait normal.   Psychiatric:         Speech: Speech normal.         Behavior: Behavior normal.         Thought Content: Thought content normal.       /76   Pulse 94   Ht 5' 5" (1.651 m)   Wt 73.1 kg (161 lb 2.5 oz)   SpO2 99%   BMI 26.82 kg/m²       Results for orders placed during the hospital encounter of 07/02/21    Echo Color Flow Doppler? Yes    Interpretation Summary  · Concentric remodeling and normal systolic function.  · The estimated ejection fraction is 55%.  · Normal left ventricular diastolic function.  · Normal right ventricular size with normal right ventricular systolic function.  · Normal central venous pressure (3 mmHg).  · The estimated PA systolic pressure is 21 mmHg.    WBC   Date Value Ref Range Status   07/01/2023 6.20 3.90 - 12.70 K/uL Final     Hematocrit   Date Value Ref Range Status   07/01/2023 41.5 37.0 - 48.5 % Final     Hemoglobin   Date Value Ref Range Status   07/01/2023 14.0 12.0 - 16.0 g/dL Final     Lab Results   Component Value Date     07/01/2023     Lab Results   Component Value Date    CREATININE 0.7 07/01/2023    EGFRNORACEVR >60.0 07/01/2023    K 4.0 07/01/2023     Lab Results   Component Value Date    BNP 34 03/26/2021            reports that she does not currently use alcohol.  Past Medical History:   Diagnosis Date    Asthma     Headache(784.0)     Migraine     Pre-syncope 5/25/2021    Seizures     Syncope and collapse      Past Surgical History:   Procedure Laterality Date    A-V CARDIAC PACEMAKER INSERTION Left 7/18/2019    Procedure: INSERTION, CARDIAC PACEMAKER, DUAL CHAMBER;  Surgeon: Piter Cooley MD;  Location: [facility] CATH LAB/EP;  Service: Cardiology;  Laterality: Left;    PHLEBOGRAPHY  5/25/2021    Procedure: Venogram;  Surgeon: Piter BOWDEN" "MD Yasir;  Location: [facility] EP LAB;  Service: Cardiology;;    REPLACEMENT OF PACEMAKER GENERATOR  5/25/2021    Procedure: REPLACEMENT, PACEMAKER GENERATOR;  Surgeon: Piter Cooley MD;  Location: [facility] EP LAB;  Service: Cardiology;;    WISDOM TOOTH EXTRACTION       Family History   Problem Relation Age of Onset    Diabetes Mother     Stroke Maternal Grandfather     Heart disease Maternal Grandfather     Diabetes Maternal Grandfather     Irregular heart beat Sister     Diabetes Maternal Grandmother     Cancer Maternal Grandmother     Arthritis Paternal Grandmother     Diabetes Paternal Grandmother     Diabetes Paternal Grandfather     Diabetes Father     Breast cancer Neg Hx     Ovarian cancer Neg Hx     Colon cancer Neg Hx        Assessment:   There is no seizure disorder.  Her shaking episodes were related to vasodepressor syncope.  Will pacemaker seems to be appropriately programmed.  We will try her on small dose of beta-blocker to attenuate C fiber hyperreactivity.  1. Syncope, vasovagal    2. Circadian rhythm sleep disorder, jet lag type    3. Cardiac pacemaker in situ    4. SSS (sick sinus syndrome)    5. Pacemaker syndrome    6. Palpitations    7. Anxiety        Plan:      I had a long talk with patient and discussed the various treatment options available. Effective symptom relief and long term management rely heavily on instituting life style changes and "holistic" measures.   Pharmalogical treatment is used as a supplemental therapy rather than a primary approach.    As to practical measures to avoid initiation of syncopal/near syncopal events:        Avoid sudden standing from a supine or sitting position especially in the morning after awakening.  You might want to move and tense the muscles of your shoulders, neck and lower extremities prior to standing.  This has to be done for a full minute (which seems like a very long time when one is doing boring activities). Sometimes, a short 15 second period " of exercising the legs could back fire and enhance the likelihood of initiating an event.              Sleep on a bed that is tilted up (place 6 inch blocks to raise the head of the bed) helps decrease the dehydration that results from increased renal (kidney) filtration associated with the supine position.            Avoid prolonged still standing (keep tensing and  moving the legs while in a line etc.), prolonged car rides without hydration nor lower body activity etc..            Avoid excessive heat, excessively hot showers, alcohol intake etc.        Although they are often impractical and uncomfortable, properly measured lower body garments with sequential counter pressure can be quite helpful.  In general, we recommend starting with knee-high Jobst stockings that provide 35 mm of counter pressure.  >>  Specifically, wear support stockings as follows:       Start with knee highs, toe +/- heel cut outs +/- side zippers.   For best performance,apply in am, after showering, while lying in bed: Massage your legs towards your abdomen then roll the stockings up towards the knees, making sure that the pressure is higher at the ankles than at the shins and knees.  Buy at least 4 pairs and rotate them each day, hand wash, cold water, gentle detergent, drip dry.    In the [facility] area, fitted graduated counter-pressure lower body garments (knee, thigh or waist high stockings, abdominal binders etc) can be obtained at a reduced, affordable price from:                    # [facility], [address] [place], [place] [address],  [place] [address]                                                           Tel:    [phone]       Fax: [phone]                    # [facility]   [phone]                    #  clinic                          [phone]                    # [facility]                            [phone]                    # [facility]                                 # [place]                                     In the [place] area:                   # [facility]·  [address]. Tel  [phone]                   # [facility]: [address] · Tel [phone]                   # [facility]: [address] [facility] · [phone]    Or on the internet:                     [employer]                    [email or web address]                    [email or web address] (their own brand: Truform)    As to practical measures to avoid worsened unpleasant symptoms around acute events:      As soon as you recognize symptoms that could lead to a significant dizziness/near syncopal/syncopal event, you should immediately lie down wherever you are supporting herself with the closest wall, sturdy furniture etc. it is not advisable to try and get to the soft couch or bed. The symptoms that you feel in the aftermath will be greatly reduced in duration if you act immediately.  You should thrive to overcome your embarrassment when these events happen in public and react your symptoms rather than to your social environment.     After you get on to a secure spot, stay down and if possible, raise your legs above the level of your head/shoulders and stayed there for 15 to 20 minutes.  Do not allow good Samaritans to try and prop you up.  This will only restart the syncopal reaction.     In most cases, medical help and or visits to the emergency room are unnecessary.  Once you recover, simply increase your fluid intake by trying to ingest perhaps a L of water and or your favorite electrolytic drink within the hour after your symptoms.  Be wary the rest of the day and certainly avoid driving because symptoms tend to recur more frequently within the 24 to 48 hours following any syncopal event.    As to the holistic measures for long-term management:     1.Sleep:  Establish an appropriate sleep  "schedule.  No jet lag sleep.  My estimate is that patient needs at least 8  hours of restful sleep a night/day. This should be allocated fully and patient should aim to go to bed and wake up at the same time each day using an alarm clock to awaken them.  After a month of regular 8  hour nights, if they still awaken only after the alarm rings (and snoozes), they to increase their time allotment by at least 30 minutes and repeat the same process for a month. At the end of each month, the allotment should be similarly increased by 30 more minutes as needed each month until patient starts waking up reliably on their own, beating the alarm clock   Again, the schedule should be the same on both week days and weekends.    As to the turning in routine, patient should avoid using any blue light devices (cell phones, computers, TVs etc) while in bed.    2. Diet:  Diet should favor a high protein caloric intake (meats of course but grains -especially chickpeas, cheeses, greek yogurt are healthy options that are "power packed" with proteins).     Of course, a high salt high fluid intake is recommended (considers supplements such as Pedialyte adult etc.).    3. Exercise:     .After many years of working with patients with orthostatic symptoms, a retiring physical therapist we worked with put together a booklet of exercises recommended for improving lower body venous/vascular health/tone. We will supply a copy. The recommended time commitment for these exercises is 20 min a day, 5 times a week.       Other excellent forms of exercise that will be helpful include:          Swimming (lazaro, competitive style swimming preferably under guidance of a "gentle"  - indoor pool at a [facility] or a [facility] for instance).          Resistance weight training (NOT weightlifting -ie no clean and jerk, clean and press or snatch moves) and low weight dumbbells.     4. Mental health measures:       Orthostatic hypotension often causes significant " "anxiety and the PTSD like syndrome.  In that case, patients are encouraged to seek professional psychiatric help as needed.        In general however, simple measures of daily relaxation are very helpful in helping reducing symptomatology.  We recommend a daily 30 minutes break of outdoors vegging" soaking up fresh air and sunlight while sipping on a refreshing non alcoholic drink..    Non holistic treatment measures:  Of course, in addition to holistic measures, we can offer pharmacological treatment as needed.  In general, this will be dispensed only in conjunction with holistic measures and generally after after a  workup that includes a 48 hour Holter monitor, a 24 hour collection for sodium, potassium and perhaps tilt studies when indicated.         No orders of the defined types were placed in this encounter.      Follow up in about 6 months (around 9/18/2024), or if symptoms worsen or fail to improve.    There are no discontinued medications.    Medications Ordered This Encounter   Medications    betaxoloL (KERLONE) 10 mg Tab     Sig: Take 1 tablet (10 mg total) by mouth once daily. Start with 1/4 pill a day for 2 weeks. Increase to 1/2 pill a day for the following 2 weeks. Increase to a full tablet (10 mg) a day thereafter.Progression schedule from 1/4 tab to a full tab to be modified in case of perceived side effects.Future further increases will be tailored to clinical response.     Dispense:  90 tablet     Refill:  3       Medication List with Changes/Refills   New Medications    BETAXOLOL (KERLONE) 10 MG TAB    Take 1 tablet (10 mg total) by mouth once daily. Start with 1/4 pill a day for 2 weeks. Increase to 1/2 pill a day for the following 2 weeks. Increase to a full tablet (10 mg) a day thereafter.Progression schedule from 1/4 tab to a full tab to be modified in case of perceived side effects.Future further increases will be tailored to clinical response.   Current Medications    FLUTICASONE " PROPIONATE (FLONASE) 50 MCG/ACTUATION NASAL SPRAY    2 sprays (100 mcg total) by Each Nostril route once daily.    LEVOCETIRIZINE (XYZAL) 5 MG TABLET    Take 1 tablet (5 mg total) by mouth every evening.    MAGNESIUM OXIDE (MAG-OX) 400 MG (241.3 MG MAGNESIUM) TABLET    Take 1 tablet (400 mg total) by mouth once daily.    NORETHINDRONE-E.ESTRADIOL-IRON (JUNEL FE 24) 1 MG-20 MCG (24)/75 MG (4) PER TABLET    Take 1 tablet by mouth once daily.    OMEPRAZOLE (PRILOSEC) 40 MG CAPSULE    Take 1 capsule (40 mg total) by mouth once daily.    RIBOFLAVIN, VITAMIN B2, (VITAMIN B-2) 100 MG TAB TABLET    Take 2 tablets (200 mg total) by mouth once daily.    SUMATRIPTAN (IMITREX) 50 MG TABLET    Take 1 tab PO X 1 PRN for migraines. Do not repeat for 24 hours.        This note is at least partially dictated using the M*Modal Fluency Direct word recognition program. There are word recognition mistakes that are occasionally missed on review.

## 2024-03-20 DIAGNOSIS — Z30.41 ENCOUNTER FOR SURVEILLANCE OF CONTRACEPTIVE PILLS: ICD-10-CM

## 2024-03-20 RX ORDER — NORETHINDRONE ACETATE AND ETHINYL ESTRADIOL AND FERROUS FUMARATE 1MG-20(24)
1 KIT ORAL DAILY
Qty: 84 TABLET | Refills: 1 | Status: SHIPPED | OUTPATIENT
Start: 2024-03-20 | End: 2024-06-04 | Stop reason: SDUPTHER

## 2024-04-12 ENCOUNTER — TELEPHONE (OUTPATIENT)
Dept: ELECTROPHYSIOLOGY | Facility: CLINIC | Age: 24
End: 2024-04-12
Payer: COMMERCIAL

## 2024-04-19 ENCOUNTER — CLINICAL SUPPORT (OUTPATIENT)
Dept: CARDIOLOGY | Facility: HOSPITAL | Age: 24
End: 2024-04-19
Attending: INTERNAL MEDICINE
Payer: COMMERCIAL

## 2024-04-19 ENCOUNTER — OFFICE VISIT (OUTPATIENT)
Dept: ELECTROPHYSIOLOGY | Facility: CLINIC | Age: 24
End: 2024-04-19
Payer: COMMERCIAL

## 2024-04-19 ENCOUNTER — HOSPITAL ENCOUNTER (OUTPATIENT)
Dept: CARDIOLOGY | Facility: CLINIC | Age: 24
Discharge: HOME | End: 2024-04-19
Payer: COMMERCIAL

## 2024-04-19 VITALS
WEIGHT: 162.25 LBS | HEIGHT: 65 IN | BODY MASS INDEX: 27.03 KG/M2 | SYSTOLIC BLOOD PRESSURE: 106 MMHG | DIASTOLIC BLOOD PRESSURE: 66 MMHG | HEART RATE: 75 BPM

## 2024-04-19 DIAGNOSIS — I49.5 SSS (SICK SINUS SYNDROME): ICD-10-CM

## 2024-04-19 DIAGNOSIS — R55 SYNCOPE AND COLLAPSE: ICD-10-CM

## 2024-04-19 DIAGNOSIS — R00.2 PALPITATIONS: ICD-10-CM

## 2024-04-19 DIAGNOSIS — Z95.0 PRESENCE OF CARDIAC PACEMAKER: ICD-10-CM

## 2024-04-19 DIAGNOSIS — I97.190 PACEMAKER SYNDROME: ICD-10-CM

## 2024-04-19 DIAGNOSIS — R55 SYNCOPE, VASOVAGAL: ICD-10-CM

## 2024-04-19 DIAGNOSIS — I49.5 SSS (SICK SINUS SYNDROME): Primary | ICD-10-CM

## 2024-04-19 LAB
OHS QRS DURATION: 86 MS
OHS QTC CALCULATION: 399 MS

## 2024-04-19 PROCEDURE — 1160F RVW MEDS BY RX/DR IN RCRD: CPT | Mod: CPTII,S$GLB,, | Performed by: INTERNAL MEDICINE

## 2024-04-19 PROCEDURE — 93280 PM DEVICE PROGR EVAL DUAL: CPT

## 2024-04-19 PROCEDURE — 3078F DIAST BP <80 MM HG: CPT | Mod: CPTII,S$GLB,, | Performed by: INTERNAL MEDICINE

## 2024-04-19 PROCEDURE — 99214 OFFICE O/P EST MOD 30 MIN: CPT | Mod: S$GLB,,, | Performed by: INTERNAL MEDICINE

## 2024-04-19 PROCEDURE — 1159F MED LIST DOCD IN RCRD: CPT | Mod: CPTII,S$GLB,, | Performed by: INTERNAL MEDICINE

## 2024-04-19 PROCEDURE — 93280 PM DEVICE PROGR EVAL DUAL: CPT | Mod: 26,,, | Performed by: INTERNAL MEDICINE

## 2024-04-19 PROCEDURE — 3074F SYST BP LT 130 MM HG: CPT | Mod: CPTII,S$GLB,, | Performed by: INTERNAL MEDICINE

## 2024-04-19 PROCEDURE — 93010 ELECTROCARDIOGRAM REPORT: CPT | Mod: S$GLB,,, | Performed by: INTERNAL MEDICINE

## 2024-04-19 PROCEDURE — 99999 PR PBB SHADOW E&M-EST. PATIENT-LVL III: CPT | Mod: PBBFAC,,, | Performed by: INTERNAL MEDICINE

## 2024-04-19 PROCEDURE — 3008F BODY MASS INDEX DOCD: CPT | Mod: CPTII,S$GLB,, | Performed by: INTERNAL MEDICINE

## 2024-04-19 PROCEDURE — 93005 ELECTROCARDIOGRAM TRACING: CPT | Mod: S$GLB,,, | Performed by: INTERNAL MEDICINE

## 2024-04-19 NOTE — PROGRESS NOTES
"  Subjective:   Patient ID:  April Lorrie Sinclair is a 23 y.o. female who presents for follow-up of SSS, PPM.     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    "Seizure d/o" by history, but likely actually related to seizure-type activity following cardioinhibitory syncope  migraine HA on meds  asthma  recurrent syncope with a very significant cardioinhibitory component (asystole x 50 sec!) -- no syncope since PPM    Recurrent syncope. Often exacerbated by IV placement, blood draws, etc.  Underwent TTT, during which there was syncope, sinus slowing, and ventricular asystole for somewhere between 30 s and 50 s (and CHB). Very similar sx to past episodes.  Grandfather had sudden death at age 50. Parents, sibs: no syncope; no other SCD.    PPM placement: (V lead only, given pt's youth). Since, no syncope at all! No syncope since PPM. However, 40% V pacing and sx with it led to RA lead addition later.  5/25/2021: Successful addition of RA lead.  6/1/2021: Patient reported LH and dizziness. Reprogramming at this visit: changed CLS response from medium to high.  Feeling great since last seen. No syncope. Occasional vasovagal LH.    Update 6/23:  Feeling well overall, but has been having episodes of nausea, LH, weakness, while standing or when lying down. Lasts 5 mins then slowly get back to normal... sometimes not normal until HOURS later. Vision sometimes goes "purple" during episodes, but no syncope.    4/2024: Feeling well overall. Dr AICHA Clancy started betaxolol. No syncope.    She paces 71% in the RA and 0% in the RV.     I have personally reviewed the patient's EKG today, which shows ASVS     Current Outpatient Medications   Medication Sig Dispense Refill    betaxoloL (KERLONE) 10 mg Tab Take 1 tablet (10 mg total) by mouth once daily. Start with 1/4 pill a day for 2 weeks. Increase to 1/2 pill a day for the following 2 weeks. Increase to a " "full tablet (10 mg) a day thereafter.Progression schedule from 1/4 tab to a full tab to be modified in case of perceived side effects.Future further increases will be tailored to clinical response. 90 tablet 3    norethindrone-e.estradioL-iron (JUNEL FE 24) 1 mg-20 mcg (24)/75 mg (4) per tablet Take 1 tablet by mouth once daily. 84 tablet 1     No current facility-administered medications for this visit.       Review of Systems   Constitutional: Negative. Negative for chills, diaphoresis, malaise/fatigue and night sweats.   HENT: Negative.  Negative for ear pain and tinnitus.    Eyes:  Negative for blurred vision.   Cardiovascular: Negative.  Negative for chest pain, dyspnea on exertion, irregular heartbeat, leg swelling, near-syncope, palpitations and syncope.   Respiratory: Negative.  Negative for cough, shortness of breath and wheezing.    Endocrine: Negative.  Negative for polyuria.   Hematologic/Lymphatic: Negative for bleeding problem. Does not bruise/bleed easily.   Skin: Negative.  Negative for color change, dry skin and rash.   Musculoskeletal: Negative.  Negative for joint pain, joint swelling, muscle weakness and myalgias.   Gastrointestinal:  Positive for nausea. Negative for abdominal pain, change in bowel habit, diarrhea, hematemesis, hematochezia and vomiting.   Genitourinary:  Negative for dysuria, frequency and hematuria.   Neurological:  Positive for light-headedness. Negative for dizziness, headaches and weakness.   Psychiatric/Behavioral: Negative.  Negative for altered mental status and depression. The patient is not nervous/anxious.    Allergic/Immunologic: Negative for environmental allergies and persistent infections.   All other systems reviewed and are negative.    Objective:        /66   Pulse 75   Ht 5' 5" (1.651 m)   Wt 73.6 kg (162 lb 4.1 oz)   BMI 27.00 kg/m²     Physical Exam  Vitals and nursing note reviewed.   Constitutional:       Appearance: Normal appearance. She is " well-developed.   HENT:      Head: Normocephalic and atraumatic.      Nose: Nose normal.   Eyes:      Conjunctiva/sclera: Conjunctivae normal.      Pupils: Pupils are equal, round, and reactive to light.   Neck:      Thyroid: No thyroid mass or thyromegaly.      Trachea: No tracheal deviation.   Cardiovascular:      Rate and Rhythm: Normal rate and regular rhythm.   Pulmonary:      Effort: Pulmonary effort is normal. No respiratory distress.      Breath sounds: No wheezing.   Chest:      Comments: Device to LUCW. Incision well healed. Pocket in good repair with no s/s infection noted.  Abdominal:      General: There is no distension.   Musculoskeletal:         General: Normal range of motion.      Cervical back: Normal range of motion. No edema.   Skin:     General: Skin is warm and dry.      Findings: No erythema or rash.   Neurological:      Mental Status: She is alert and oriented to person, place, and time.      Coordination: Coordination normal.   Psychiatric:         Speech: Speech normal.         Behavior: Behavior normal. Behavior is cooperative.         Thought Content: Thought content normal.       Lab Results   Component Value Date     07/01/2023    K 4.0 07/01/2023    MG 1.9 03/27/2021    BUN 14 07/01/2023    CREATININE 0.7 07/01/2023    ALT 10 07/01/2023    AST 10 07/01/2023    HGB 14.0 07/01/2023    HCT 41.5 07/01/2023    TSH 1.947 07/01/2023    LDLCALC 130.0 07/01/2023       Recent Labs   Lab 05/21/21  0901   INR 1.1         Assessment:     1. SSS (sick sinus syndrome)    2. Syncope, vasovagal    3. Palpitations    4. Pacemaker syndrome      Plan:     In summary, Ms. Sinclair is a 23 y.o. female with seizure, migraine, asthma, syncope, PPM here for follow up.    PPM working well.  Episodes of N, LH, presyncope are perhaps symptomatic of her underlying vasovagal syndrome.  Will continue to consult Dr. Carmel Clancy for eval and treatment of these episodes.  She doesn't drive, with which I  agree.

## 2024-04-21 LAB
OHS CV AF BURDEN PERCENT: < 1
OHS CV DC REMOTE DEVICE TYPE: NORMAL
OHS CV RV PACING PERCENT: 0 %
OHS CV RV PACING PERCENT: 0 %

## 2024-06-04 ENCOUNTER — OFFICE VISIT (OUTPATIENT)
Dept: OBSTETRICS AND GYNECOLOGY | Facility: CLINIC | Age: 24
End: 2024-06-04
Payer: COMMERCIAL

## 2024-06-04 ENCOUNTER — CLINICAL SUPPORT (OUTPATIENT)
Dept: CARDIOLOGY | Facility: HOSPITAL | Age: 24
End: 2024-06-04
Payer: COMMERCIAL

## 2024-06-04 ENCOUNTER — CLINICAL SUPPORT (OUTPATIENT)
Dept: CARDIOLOGY | Facility: HOSPITAL | Age: 24
End: 2024-06-04
Attending: INTERNAL MEDICINE
Payer: COMMERCIAL

## 2024-06-04 VITALS
DIASTOLIC BLOOD PRESSURE: 64 MMHG | WEIGHT: 162.94 LBS | SYSTOLIC BLOOD PRESSURE: 93 MMHG | BODY MASS INDEX: 27.11 KG/M2 | HEART RATE: 76 BPM

## 2024-06-04 DIAGNOSIS — R00.1 BRADYCARDIA, UNSPECIFIED: ICD-10-CM

## 2024-06-04 DIAGNOSIS — Z95.0 PRESENCE OF CARDIAC PACEMAKER: ICD-10-CM

## 2024-06-04 DIAGNOSIS — Z95.0 CARDIAC PACEMAKER IN SITU: ICD-10-CM

## 2024-06-04 DIAGNOSIS — I49.5 SSS (SICK SINUS SYNDROME): ICD-10-CM

## 2024-06-04 DIAGNOSIS — Z01.419 ENCOUNTER FOR ANNUAL ROUTINE GYNECOLOGICAL EXAMINATION: ICD-10-CM

## 2024-06-04 DIAGNOSIS — Z12.39 BREAST CANCER SCREENING OTHER THAN MAMMOGRAM: ICD-10-CM

## 2024-06-04 DIAGNOSIS — R55 SYNCOPE AND COLLAPSE: ICD-10-CM

## 2024-06-04 DIAGNOSIS — Z30.41 ENCOUNTER FOR SURVEILLANCE OF CONTRACEPTIVE PILLS: Primary | ICD-10-CM

## 2024-06-04 DIAGNOSIS — Z31.69 ENCOUNTER FOR PRECONCEPTION CONSULTATION: ICD-10-CM

## 2024-06-04 PROCEDURE — 99395 PREV VISIT EST AGE 18-39: CPT | Mod: S$GLB,,, | Performed by: OBSTETRICS & GYNECOLOGY

## 2024-06-04 PROCEDURE — 3074F SYST BP LT 130 MM HG: CPT | Mod: CPTII,S$GLB,, | Performed by: OBSTETRICS & GYNECOLOGY

## 2024-06-04 PROCEDURE — 1159F MED LIST DOCD IN RCRD: CPT | Mod: CPTII,S$GLB,, | Performed by: OBSTETRICS & GYNECOLOGY

## 2024-06-04 PROCEDURE — 3078F DIAST BP <80 MM HG: CPT | Mod: CPTII,S$GLB,, | Performed by: OBSTETRICS & GYNECOLOGY

## 2024-06-04 PROCEDURE — 99999 PR PBB SHADOW E&M-EST. PATIENT-LVL III: CPT | Mod: PBBFAC,,, | Performed by: OBSTETRICS & GYNECOLOGY

## 2024-06-04 PROCEDURE — 3008F BODY MASS INDEX DOCD: CPT | Mod: CPTII,S$GLB,, | Performed by: OBSTETRICS & GYNECOLOGY

## 2024-06-04 PROCEDURE — 93294 REM INTERROG EVL PM/LDLS PM: CPT | Mod: ,,, | Performed by: INTERNAL MEDICINE

## 2024-06-04 PROCEDURE — 93296 REM INTERROG EVL PM/IDS: CPT | Performed by: INTERNAL MEDICINE

## 2024-06-04 RX ORDER — NORETHINDRONE ACETATE AND ETHINYL ESTRADIOL AND FERROUS FUMARATE 1MG-20(24)
1 KIT ORAL DAILY
Qty: 84 TABLET | Refills: 3 | Status: SHIPPED | OUTPATIENT
Start: 2024-06-04

## 2024-06-04 NOTE — PROGRESS NOTES
Chief Complaint: Well Woman Exam     HPI:      April Lorrie Sinclair is a 23 y.o.  who presents today for well woman exam.  LMP: Patient's last menstrual period was 2024.  No issues, problems, or complaints. Specifically, patient denies abnormal vaginal bleeding, discharge, pelvic pain, urinary problems, or changes in appetite. Ms. Sinclair is currently sexually active with a single male partner. She is currently using oral contraceptives (estrogen/progesterone) for contraception. Considering pregnancy next year. She declines STD screening today.    Previous Pap:  no abnormalities ()  Previous Mammogram:  No results found for this or any previous visit.  Most Recent Dexa: not indicated  Colonoscopy: never had    Gardasil:Completed     Patient Active Problem List   Diagnosis    Migraine headache    Syncope, vasovagal    Vitamin D deficiency    Anxiety    Cardiac pacemaker in situ    SSS (sick sinus syndrome)    Neck pain    Paresthesia and pain of right extremity    Palpitations    Intercostal pain    Pacemaker syndrome    Impaired flexibility of upper extremity    Bilateral neck pain    Impaired range of motion of cervical spine    Circadian rhythm sleep disorder, jet lag type       Past Medical History:   Diagnosis Date    Asthma     Headache(784.0)     Migraine     Pre-syncope 2021    Seizures     Syncope and collapse        Past Surgical History:   Procedure Laterality Date    A-V CARDIAC PACEMAKER INSERTION Left 2019    Procedure: INSERTION, CARDIAC PACEMAKER, DUAL CHAMBER;  Surgeon: Piter Cooley MD;  Location: [facility] CATH LAB/EP;  Service: Cardiology;  Laterality: Left;    PHLEBOGRAPHY  2021    Procedure: Venogram;  Surgeon: Piter Cooley MD;  Location: [facility] EP LAB;  Service: Cardiology;;    REPLACEMENT OF PACEMAKER GENERATOR  2021    Procedure: REPLACEMENT, PACEMAKER GENERATOR;  Surgeon: Piter oColey MD;  Location: [facility] EP LAB;  Service: Cardiology;;    WISDOM TOOTH  EXTRACTION         OB History          0    Para   0    Term   0       0    AB   0    Living   0         SAB   0    IAB   0    Ectopic   0    Multiple   0    Live Births   0           Obstetric Comments     No abnormal pap  No STDs               ROS:     Review of Systems   Constitutional:  Negative for activity change, appetite change and fatigue.   Respiratory:  Negative for shortness of breath.    Cardiovascular:  Negative for chest pain.   Gastrointestinal:  Negative for abdominal pain, constipation and diarrhea.   Endocrine: Negative for cold intolerance and heat intolerance.   Genitourinary:  Negative for dysuria, frequency, menstrual irregularity, pelvic pain and vaginal discharge.   Integumentary:  Negative for breast mass, breast discharge and breast tenderness.   Psychiatric/Behavioral:  Negative for dysphoric mood. The patient is not nervous/anxious.    Breast: Negative for mass and tenderness      Physical Exam:      PHYSICAL EXAM:  BP 93/64   Pulse 76   Wt 73.9 kg (162 lb 14.7 oz)   LMP 2024   BMI 27.11 kg/m²   Body mass index is 27.11 kg/m².     APPEARANCE: Well nourished, well developed, in no acute distress.  PSYCH: Appropriate mood and affect.  SKIN: No acne or hirsutism  NECK: Neck symmetric without masses or thyromegaly  NODES: No inguinal, axillary, or supraclavicular lymph node enlargement  ABDOMEN: Soft.  No tenderness or masses.    CARDIOVASCULAR: No edema of peripheral extremities  BREASTS: Symmetrical, no skin changes or visible lesions.  No palpable masses or nipple discharge bilaterally.  PELVIC: Normal external genitalia without lesions.  Normal hair distribution.  Adequate perineal body, normal urethral meatus.  Vagina moist and well rugated without lesions or discharge.  Cervix pink, without lesions, discharge or tenderness.  No significant cystocele or rectocele.  Bimanual exam shows uterus to be normal size, regular, mobile and nontender.  Adnexa without  masses or tenderness.      Assessment:     1. Encounter for surveillance of contraceptive pills  norethindrone-e.estradioL-iron (JUNEL FE 24) 1 mg-20 mcg (24)/75 mg (4) per tablet      2. Encounter for annual routine gynecological examination        3. Breast cancer screening other than mammogram        4. SSS (sick sinus syndrome)  Ambulatory referral/consult to Perinatology      5. Cardiac pacemaker in situ  Ambulatory referral/consult to Perinatology      6. Encounter for preconception consultation  Ambulatory referral/consult to Perinatology            Plan:     Clinical breast exam performed.  Pap UTD.  Mammogram at 40 or as needed.  DEXA at 63.  Colonoscopy at 43 or as needed.  Contraception: continue ATIF.  Follow up in about 1 year (around 6/4/2025) for annual well woman exam or as needed.    Counseling:     Patient was counseled today on current ASCCP pap guidelines, the recommendation for yearly pelvic exams, healthy diet and exercise routines, breast self awareness.She is to see her PCP for other health maintenance.     Pre-Conception Counseling  Patient was counseled today on proper weight gain during pregnancy based on the [place] [employer] [facility]'s recommendations. Healthy diet emphasized.   Recommended prenatal vitamins with folic acid starting at least 3 months prior to conception.  Safety of exercise discussed with patient, and continued active lifestyle encouraged.   Avoidance of tobacco and alcohol when trying to conceive were discussed.   Discussed carrier screening and patient will call if interested.    Reviewed medical history and immunization history.  Recommend discussing any pregnancy concerns with cardiologist and establish with MFM for preconception counseling.     Use of the [facility] Patient Portal discussed and encouraged during today's visit.         Karin Rushing MD  [facility]  06/04/2024

## 2024-06-06 LAB
OHS CV AF BURDEN PERCENT: < 1
OHS CV DC REMOTE DEVICE TYPE: NORMAL
OHS CV RV PACING PERCENT: 0 %

## 2024-06-19 ENCOUNTER — PATIENT MESSAGE (OUTPATIENT)
Dept: NEUROLOGY | Facility: CLINIC | Age: 24
End: 2024-06-19
Payer: COMMERCIAL

## 2024-06-19 ENCOUNTER — PATIENT MESSAGE (OUTPATIENT)
Dept: MATERNAL FETAL MEDICINE | Facility: CLINIC | Age: 24
End: 2024-06-19
Payer: COMMERCIAL

## 2024-06-26 ENCOUNTER — PATIENT MESSAGE (OUTPATIENT)
Dept: OBSTETRICS AND GYNECOLOGY | Facility: CLINIC | Age: 24
End: 2024-06-26
Payer: COMMERCIAL

## 2024-06-28 ENCOUNTER — LAB VISIT (OUTPATIENT)
Dept: LAB | Facility: OTHER | Age: 24
End: 2024-06-28
Attending: OBSTETRICS & GYNECOLOGY
Payer: COMMERCIAL

## 2024-06-28 ENCOUNTER — OFFICE VISIT (OUTPATIENT)
Dept: OBSTETRICS AND GYNECOLOGY | Facility: CLINIC | Age: 24
End: 2024-06-28
Payer: COMMERCIAL

## 2024-06-28 VITALS
HEIGHT: 65 IN | SYSTOLIC BLOOD PRESSURE: 104 MMHG | DIASTOLIC BLOOD PRESSURE: 78 MMHG | BODY MASS INDEX: 27.22 KG/M2 | WEIGHT: 163.38 LBS

## 2024-06-28 DIAGNOSIS — N94.6 DYSMENORRHEA: ICD-10-CM

## 2024-06-28 DIAGNOSIS — N93.9 ABNORMAL UTERINE BLEEDING (AUB): Primary | ICD-10-CM

## 2024-06-28 DIAGNOSIS — N93.9 ABNORMAL UTERINE BLEEDING (AUB): ICD-10-CM

## 2024-06-28 LAB
B-HCG UR QL: NEGATIVE
BASOPHILS # BLD AUTO: 0.03 K/UL (ref 0–0.2)
BASOPHILS NFR BLD: 0.4 % (ref 0–1.9)
CTP QC/QA: YES
DIFFERENTIAL METHOD BLD: NORMAL
EOSINOPHIL # BLD AUTO: 0.2 K/UL (ref 0–0.5)
EOSINOPHIL NFR BLD: 2.9 % (ref 0–8)
ERYTHROCYTE [DISTWIDTH] IN BLOOD BY AUTOMATED COUNT: 13.2 % (ref 11.5–14.5)
HCT VFR BLD AUTO: 41.4 % (ref 37–48.5)
HGB BLD-MCNC: 13.5 G/DL (ref 12–16)
IMM GRANULOCYTES # BLD AUTO: 0.02 K/UL (ref 0–0.04)
IMM GRANULOCYTES NFR BLD AUTO: 0.3 % (ref 0–0.5)
LYMPHOCYTES # BLD AUTO: 3 K/UL (ref 1–4.8)
LYMPHOCYTES NFR BLD: 42.7 % (ref 18–48)
MCH RBC QN AUTO: 29.2 PG (ref 27–31)
MCHC RBC AUTO-ENTMCNC: 32.6 G/DL (ref 32–36)
MCV RBC AUTO: 89 FL (ref 82–98)
MONOCYTES # BLD AUTO: 0.6 K/UL (ref 0.3–1)
MONOCYTES NFR BLD: 8.1 % (ref 4–15)
NEUTROPHILS # BLD AUTO: 3.2 K/UL (ref 1.8–7.7)
NEUTROPHILS NFR BLD: 45.6 % (ref 38–73)
NRBC BLD-RTO: 0 /100 WBC
PLATELET # BLD AUTO: 238 K/UL (ref 150–450)
PMV BLD AUTO: 11.4 FL (ref 9.2–12.9)
RBC # BLD AUTO: 4.63 M/UL (ref 4–5.4)
TSH SERPL DL<=0.005 MIU/L-ACNC: 2.73 UIU/ML (ref 0.4–4)
WBC # BLD AUTO: 7.12 K/UL (ref 3.9–12.7)

## 2024-06-28 PROCEDURE — 85025 COMPLETE CBC W/AUTO DIFF WBC: CPT | Performed by: OBSTETRICS & GYNECOLOGY

## 2024-06-28 PROCEDURE — 99999 PR PBB SHADOW E&M-EST. PATIENT-LVL III: CPT | Mod: PBBFAC,,, | Performed by: OBSTETRICS & GYNECOLOGY

## 2024-06-28 PROCEDURE — 84443 ASSAY THYROID STIM HORMONE: CPT | Performed by: OBSTETRICS & GYNECOLOGY

## 2024-06-28 PROCEDURE — 36415 COLL VENOUS BLD VENIPUNCTURE: CPT | Performed by: OBSTETRICS & GYNECOLOGY

## 2024-06-28 PROCEDURE — 87491 CHLMYD TRACH DNA AMP PROBE: CPT | Performed by: OBSTETRICS & GYNECOLOGY

## 2024-06-28 RX ORDER — IBUPROFEN 800 MG/1
800 TABLET ORAL 3 TIMES DAILY PRN
Qty: 60 TABLET | Refills: 0 | Status: SHIPPED | OUTPATIENT
Start: 2024-06-28

## 2024-06-28 RX ORDER — NORETHINDRONE ACETATE AND ETHINYL ESTRADIOL 1MG-20(21)
1 KIT ORAL DAILY
Qty: 30 TABLET | Refills: 11 | Status: SHIPPED | OUTPATIENT
Start: 2024-06-28 | End: 2025-06-28

## 2024-06-28 NOTE — PROGRESS NOTES
Subjective:       Patient ID: April Lorrie Sinclair is a 24 y.o. female.    Chief Complaint:  Contraception      History of Present Illness  25 yo G0 presents with  irregular vaginal bleeding since 6/15.  Is currently taking Aurovela and has been using this norethindrone-EE  for over 2 years without complaints. Generic just recently changed from Blisovi to Aurovela. Bleeding has been prolonged, painful, and heavy.  Using up to 6 pads per day on heaviest day.  Notes lightheadedness, dizziness, headaches, and severe dysmenorrhea.     Patient Active Problem List   Diagnosis    Migraine headache    Syncope, vasovagal    Vitamin D deficiency    Anxiety    Cardiac pacemaker in situ    SSS (sick sinus syndrome)    Neck pain    Paresthesia and pain of right extremity    Palpitations    Intercostal pain    Pacemaker syndrome    Impaired flexibility of upper extremity    Bilateral neck pain    Impaired range of motion of cervical spine    Circadian rhythm sleep disorder, jet lag type       Past Medical History:   Diagnosis Date    Asthma     Headache(784.0)     Migraine     Pre-syncope 2021    Seizures     Syncope and collapse        Past Surgical History:   Procedure Laterality Date    A-V CARDIAC PACEMAKER INSERTION Left 2019    Procedure: INSERTION, CARDIAC PACEMAKER, DUAL CHAMBER;  Surgeon: Piter Cooley MD;  Location: [facility] CATH LAB/EP;  Service: Cardiology;  Laterality: Left;    PHLEBOGRAPHY  2021    Procedure: Venogram;  Surgeon: Piter Cooley MD;  Location: [facility] EP LAB;  Service: Cardiology;;    REPLACEMENT OF PACEMAKER GENERATOR  2021    Procedure: REPLACEMENT, PACEMAKER GENERATOR;  Surgeon: Piter Cooley MD;  Location: [facility] EP LAB;  Service: Cardiology;;    WISDOM TOOTH EXTRACTION         OB History    Para Term  AB Living   0 0 0 0 0 0   SAB IAB Ectopic Multiple Live Births   0 0 0 0 0   Obstetric Comments      No abnormal pap   No STDs       Patient's last  "menstrual period was 05/29/2024.   Date of Last Pap: 2/14/2022    Review of Systems  Review of Systems   Constitutional:  Negative for activity change, appetite change and fatigue.   Respiratory:  Negative for shortness of breath.    Cardiovascular:  Negative for chest pain.   Gastrointestinal:  Negative for abdominal pain, constipation and diarrhea.   Endocrine: Negative for cold intolerance and heat intolerance.   Genitourinary:  Positive for menstrual problem and vaginal bleeding. Negative for dysuria, frequency, menstrual irregularity, pelvic pain and vaginal discharge.   Integumentary:  Negative for breast mass, breast discharge and breast tenderness.   Neurological:  Positive for headaches.   Psychiatric/Behavioral:  Negative for dysphoric mood. The patient is not nervous/anxious.    Breast: Negative for mass and tenderness       Objective:   /78   Ht 5' 5" (1.651 m)   Wt 74.1 kg (163 lb 5.8 oz)   LMP 05/29/2024   BMI 27.18 kg/m²   Body mass index is 27.18 kg/m².    APPEARANCE: Well nourished, well developed, in no acute distress.  PSYCH: Appropriate mood and affect.  SKIN: No acne or hirsutism  NECK: Neck symmetric without masses or thyromegaly  NODES: No inguinal, axillary, or supraclavicular lymph node enlargement  ABDOMEN: Soft.  No tenderness or masses.    CARDIOVASCULAR: No edema of peripheral extremities  BREASTS: Symmetrical, no skin changes or visible lesions.  No palpable masses or nipple discharge bilaterally.  PELVIC: Normal external genitalia without lesions.  Normal hair distribution.  Adequate perineal body, normal urethral meatus.  Vagina moist and well rugated without lesions or discharge.  Cervix pink, without lesions, discharge or tenderness.  No significant cystocele or rectocele.  Bimanual exam shows uterus to be normal size, regular, mobile and nontender.  Adnexa without masses or tenderness.         Results for orders placed or performed in visit on 06/04/24   Cardiac device " check - Remote   Result Value Ref Range    Device Type Pacemaker     AF [place] % < 1     RV Paccing % 0 %       Assessment/ Plan:     1. Abnormal uterine bleeding (AUB)  POCT urine pregnancy    CBC Auto Differential    TSH    US Pelvis Comp with Transvag NON-OB (xpd    norethindrone-ethinyl estradiol (JUNEL FE 1/20) 1 mg-20 mcg (21)/75 mg (7) per tablet    C. trachomatis/N. gonorrhoeae by AMP DNA [facility]; Cervicovaginal      2. Dysmenorrhea  ibuprofen (ADVIL,MOTRIN) 800 MG tablet          No follow-ups on file.            Amanda N. Thomas, MD [facility]  06/28/2024

## 2024-06-29 LAB
C TRACH DNA SPEC QL NAA+PROBE: NOT DETECTED
N GONORRHOEA DNA SPEC QL NAA+PROBE: NOT DETECTED

## 2024-07-14 ENCOUNTER — HOSPITAL ENCOUNTER (OUTPATIENT)
Facility: HOSPITAL | Age: 24
Discharge: HOME | End: 2024-07-16
Attending: EMERGENCY MEDICINE | Admitting: HOSPITALIST
Payer: MEDICAID

## 2024-07-14 DIAGNOSIS — R07.9 CHEST PAIN: ICD-10-CM

## 2024-07-14 DIAGNOSIS — G43.409 HEMIPLEGIC MIGRAINE WITHOUT STATUS MIGRAINOSUS, NOT INTRACTABLE: Primary | ICD-10-CM

## 2024-07-14 DIAGNOSIS — R53.1 LEFT-SIDED WEAKNESS: ICD-10-CM

## 2024-07-14 DIAGNOSIS — R29.818 ACUTE FOCAL NEUROLOGICAL DEFICIT: ICD-10-CM

## 2024-07-14 DIAGNOSIS — Q21.12 PFO (PATENT FORAMEN OVALE): ICD-10-CM

## 2024-07-14 DIAGNOSIS — R29.818 NEUROLOGICAL SIGNS: ICD-10-CM

## 2024-07-14 PROBLEM — R20.0 LEFT SIDED NUMBNESS: Status: ACTIVE | Noted: 2024-07-14

## 2024-07-14 LAB
ALBUMIN SERPL BCP-MCNC: 3.8 G/DL (ref 3.5–5.2)
ALP SERPL-CCNC: 46 U/L (ref 55–135)
ALT SERPL W/O P-5'-P-CCNC: 12 U/L (ref 10–44)
ANION GAP SERPL CALC-SCNC: 8 MMOL/L (ref 8–16)
ASCENDING AORTA: 2.87 CM
AST SERPL-CCNC: 22 U/L (ref 10–40)
AV INDEX (PROSTH): 0.88
AV MEAN GRADIENT: 4 MMHG
AV PEAK GRADIENT: 6 MMHG
AV VALVE AREA BY VELOCITY RATIO: 2.82 CM²
AV VALVE AREA: 2.71 CM²
AV VELOCITY RATIO: 0.92
B-HCG UR QL: NEGATIVE
BASOPHILS # BLD AUTO: 0.03 K/UL (ref 0–0.2)
BASOPHILS NFR BLD: 0.3 % (ref 0–1.9)
BILIRUB SERPL-MCNC: 0.1 MG/DL (ref 0.1–1)
BILIRUB UR QL STRIP: NEGATIVE
BSA FOR ECHO PROCEDURE: 1.84 M2
BUN SERPL-MCNC: 15 MG/DL (ref 6–20)
BUN SERPL-MCNC: 20 MG/DL (ref 6–30)
CALCIUM SERPL-MCNC: 9 MG/DL (ref 8.7–10.5)
CHLORIDE SERPL-SCNC: 105 MMOL/L (ref 95–110)
CHLORIDE SERPL-SCNC: 109 MMOL/L (ref 95–110)
CHOLEST SERPL-MCNC: 185 MG/DL (ref 120–199)
CHOLEST/HDLC SERPL: 4.4 {RATIO} (ref 2–5)
CLARITY UR REFRACT.AUTO: CLEAR
CO2 SERPL-SCNC: 21 MMOL/L (ref 23–29)
COLOR UR AUTO: YELLOW
CREAT SERPL-MCNC: 0.9 MG/DL (ref 0.5–1.4)
CREAT SERPL-MCNC: 0.9 MG/DL (ref 0.5–1.4)
CREAT SERPL-MCNC: 1 MG/DL (ref 0.5–1.4)
CTP QC/QA: YES
CV ECHO LV RWT: 0.26 CM
DIFFERENTIAL METHOD BLD: ABNORMAL
DOP CALC AO PEAK VEL: 1.19 M/S
DOP CALC AO VTI: 23.77 CM
DOP CALC LVOT AREA: 3.1 CM2
DOP CALC LVOT DIAMETER: 1.98 CM
DOP CALC LVOT PEAK VEL: 1.09 M/S
DOP CALC LVOT STROKE VOLUME: 64.35 CM3
DOP CALCLVOT PEAK VEL VTI: 20.91 CM
E WAVE DECELERATION TIME: 172.47 MSEC
E/A RATIO: 1.5
E/E' RATIO: 3.82 M/S
ECHO LV POSTERIOR WALL: 0.63 CM (ref 0.6–1.1)
EOSINOPHIL # BLD AUTO: 0.2 K/UL (ref 0–0.5)
EOSINOPHIL NFR BLD: 1.7 % (ref 0–8)
ERYTHROCYTE [DISTWIDTH] IN BLOOD BY AUTOMATED COUNT: 12.6 % (ref 11.5–14.5)
EST. GFR  (NO RACE VARIABLE): >60 ML/MIN/1.73 M^2
FRACTIONAL SHORTENING: 31 % (ref 28–44)
GLUCOSE SERPL-MCNC: 100 MG/DL (ref 70–110)
GLUCOSE SERPL-MCNC: 103 MG/DL (ref 70–110)
GLUCOSE SERPL-MCNC: 103 MG/DL (ref 70–110)
GLUCOSE UR QL STRIP: NEGATIVE
HCT VFR BLD AUTO: 43 % (ref 37–48.5)
HCT VFR BLD CALC: 41 %PCV (ref 36–54)
HCV AB SERPL QL IA: NORMAL
HDLC SERPL-MCNC: 42 MG/DL (ref 40–75)
HDLC SERPL: 22.7 % (ref 20–50)
HGB BLD-MCNC: 13.6 G/DL (ref 12–16)
HGB UR QL STRIP: ABNORMAL
HIV 1+2 AB+HIV1 P24 AG SERPL QL IA: NORMAL
IMM GRANULOCYTES # BLD AUTO: 0.02 K/UL (ref 0–0.04)
IMM GRANULOCYTES NFR BLD AUTO: 0.2 % (ref 0–0.5)
INR PPP: 1.1 (ref 0.8–1.2)
INTERVENTRICULAR SEPTUM: 0.61 CM (ref 0.6–1.1)
IVRT: 79.92 MSEC
KETONES UR QL STRIP: NEGATIVE
LA MAJOR: 5.3 CM
LA MINOR: 5.31 CM
LA WIDTH: 3.22 CM
LDLC SERPL CALC-MCNC: 117 MG/DL (ref 63–159)
LEFT ATRIUM SIZE: 2.82 CM
LEFT ATRIUM VOLUME INDEX MOD: 20.1 ML/M2
LEFT ATRIUM VOLUME INDEX: 22.6 ML/M2
LEFT ATRIUM VOLUME MOD: 36.44 CM3
LEFT ATRIUM VOLUME: 40.95 CM3
LEFT INTERNAL DIMENSION IN SYSTOLE: 3.41 CM (ref 2.1–4)
LEFT VENTRICLE DIASTOLIC VOLUME INDEX: 63.09 ML/M2
LEFT VENTRICLE DIASTOLIC VOLUME: 114.2 ML
LEFT VENTRICLE MASS INDEX: 53 G/M2
LEFT VENTRICLE SYSTOLIC VOLUME INDEX: 26.5 ML/M2
LEFT VENTRICLE SYSTOLIC VOLUME: 47.91 ML
LEFT VENTRICULAR INTERNAL DIMENSION IN DIASTOLE: 4.93 CM (ref 3.5–6)
LEFT VENTRICULAR MASS: 96.33 G
LEUKOCYTE ESTERASE UR QL STRIP: NEGATIVE
LV LATERAL E/E' RATIO: 3.65 M/S
LV SEPTAL E/E' RATIO: 4 M/S
LYMPHOCYTES # BLD AUTO: 3.9 K/UL (ref 1–4.8)
LYMPHOCYTES NFR BLD: 43.1 % (ref 18–48)
MCH RBC QN AUTO: 29.5 PG (ref 27–31)
MCHC RBC AUTO-ENTMCNC: 31.6 G/DL (ref 32–36)
MCV RBC AUTO: 93 FL (ref 82–98)
MICROSCOPIC COMMENT: NORMAL
MONOCYTES # BLD AUTO: 0.8 K/UL (ref 0.3–1)
MONOCYTES NFR BLD: 8.5 % (ref 4–15)
MV PEAK A VEL: 0.56 M/S
MV PEAK E VEL: 0.84 M/S
MV STENOSIS PRESSURE HALF TIME: 50.02 MS
MV VALVE AREA P 1/2 METHOD: 4.4 CM2
NEUTROPHILS # BLD AUTO: 4.2 K/UL (ref 1.8–7.7)
NEUTROPHILS NFR BLD: 46.2 % (ref 38–73)
NITRITE UR QL STRIP: NEGATIVE
NONHDLC SERPL-MCNC: 143 MG/DL
NRBC BLD-RTO: 0 /100 WBC
OHS QRS DURATION: 152 MS
OHS QRS DURATION: 92 MS
OHS QTC CALCULATION: 414 MS
OHS QTC CALCULATION: 484 MS
PH UR STRIP: 8 [PH] (ref 5–8)
PISA TR MAX VEL: 2.01 M/S
PLATELET # BLD AUTO: 249 K/UL (ref 150–450)
PMV BLD AUTO: 12.8 FL (ref 9.2–12.9)
POC IONIZED CALCIUM: 1.14 MMOL/L (ref 1.06–1.42)
POC PTINR: 1.2 (ref 0.9–1.2)
POC PTWBT: 13.8 SEC (ref 9.7–14.3)
POC TCO2 (MEASURED): 25 MMOL/L (ref 23–29)
POTASSIUM BLD-SCNC: 4.6 MMOL/L (ref 3.5–5.1)
POTASSIUM SERPL-SCNC: 4.6 MMOL/L (ref 3.5–5.1)
PROT SERPL-MCNC: 7.3 G/DL (ref 6–8.4)
PROT UR QL STRIP: NEGATIVE
PROTHROMBIN TIME: 11.8 SEC (ref 9–12.5)
RA MAJOR: 4.34 CM
RA PRESSURE ESTIMATED: 3 MMHG
RA WIDTH: 2.74 CM
RBC # BLD AUTO: 4.61 M/UL (ref 4–5.4)
RBC #/AREA URNS AUTO: 1 /HPF (ref 0–4)
RIGHT VENTRICLE DIASTOLIC BASEL DIMENSION: 2.7 CM
RV TB RVSP: 5 MMHG
SAMPLE: NORMAL
SINUS: 3.27 CM
SODIUM BLD-SCNC: 139 MMOL/L (ref 136–145)
SODIUM SERPL-SCNC: 138 MMOL/L (ref 136–145)
SP GR UR STRIP: 1.02 (ref 1–1.03)
SQUAMOUS #/AREA URNS AUTO: 3 /HPF
STJ: 2.8 CM
TDI LATERAL: 0.23 M/S
TDI SEPTAL: 0.21 M/S
TDI: 0.22 M/S
TR MAX PG: 16 MMHG
TRICUSPID ANNULAR PLANE SYSTOLIC EXCURSION: 1.95 CM
TRIGL SERPL-MCNC: 130 MG/DL (ref 30–150)
TROPONIN I SERPL DL<=0.01 NG/ML-MCNC: <0.006 NG/ML (ref 0–0.03)
TROPONIN I SERPL DL<=0.01 NG/ML-MCNC: <0.006 NG/ML (ref 0–0.03)
TSH SERPL DL<=0.005 MIU/L-ACNC: 3.36 UIU/ML (ref 0.4–4)
TV REST PULMONARY ARTERY PRESSURE: 19 MMHG
URN SPEC COLLECT METH UR: ABNORMAL
WBC # BLD AUTO: 9.04 K/UL (ref 3.9–12.7)
WBC #/AREA URNS AUTO: 1 /HPF (ref 0–5)
Z-SCORE OF LEFT VENTRICULAR DIMENSION IN END DIASTOLE: -0.16
Z-SCORE OF LEFT VENTRICULAR DIMENSION IN END SYSTOLE: 0.77

## 2024-07-14 PROCEDURE — 84484 ASSAY OF TROPONIN QUANT: CPT | Mod: 91 | Performed by: EMERGENCY MEDICINE

## 2024-07-14 PROCEDURE — 99285 EMERGENCY DEPT VISIT HI MDM: CPT | Mod: 25

## 2024-07-14 PROCEDURE — G0378 HOSPITAL OBSERVATION PER HR: HCPCS

## 2024-07-14 PROCEDURE — 96365 THER/PROPH/DIAG IV INF INIT: CPT

## 2024-07-14 PROCEDURE — 82962 GLUCOSE BLOOD TEST: CPT

## 2024-07-14 PROCEDURE — 63600175 PHARM REV CODE 636 W HCPCS

## 2024-07-14 PROCEDURE — 97166 OT EVAL MOD COMPLEX 45 MIN: CPT

## 2024-07-14 PROCEDURE — 87389 HIV-1 AG W/HIV-1&-2 AB AG IA: CPT | Performed by: EMERGENCY MEDICINE

## 2024-07-14 PROCEDURE — 82803 BLOOD GASES ANY COMBINATION: CPT

## 2024-07-14 PROCEDURE — 80053 COMPREHEN METABOLIC PANEL: CPT | Performed by: EMERGENCY MEDICINE

## 2024-07-14 PROCEDURE — 84484 ASSAY OF TROPONIN QUANT: CPT | Performed by: STUDENT IN AN ORGANIZED HEALTH CARE EDUCATION/TRAINING PROGRAM

## 2024-07-14 PROCEDURE — 25000003 PHARM REV CODE 250

## 2024-07-14 PROCEDURE — 93010 ELECTROCARDIOGRAM REPORT: CPT | Mod: ,,, | Performed by: INTERNAL MEDICINE

## 2024-07-14 PROCEDURE — 85610 PROTHROMBIN TIME: CPT

## 2024-07-14 PROCEDURE — 99214 OFFICE O/P EST MOD 30 MIN: CPT | Mod: FS,,, | Performed by: PSYCHIATRY & NEUROLOGY

## 2024-07-14 PROCEDURE — 80061 LIPID PANEL: CPT | Performed by: EMERGENCY MEDICINE

## 2024-07-14 PROCEDURE — 97535 SELF CARE MNGMENT TRAINING: CPT

## 2024-07-14 PROCEDURE — 25500020 PHARM REV CODE 255: Performed by: EMERGENCY MEDICINE

## 2024-07-14 PROCEDURE — 81001 URINALYSIS AUTO W/SCOPE: CPT

## 2024-07-14 PROCEDURE — 96375 TX/PRO/DX INJ NEW DRUG ADDON: CPT

## 2024-07-14 PROCEDURE — 84443 ASSAY THYROID STIM HORMONE: CPT | Performed by: EMERGENCY MEDICINE

## 2024-07-14 PROCEDURE — 93005 ELECTROCARDIOGRAM TRACING: CPT

## 2024-07-14 PROCEDURE — 36415 COLL VENOUS BLD VENIPUNCTURE: CPT | Performed by: STUDENT IN AN ORGANIZED HEALTH CARE EDUCATION/TRAINING PROGRAM

## 2024-07-14 PROCEDURE — 81025 URINE PREGNANCY TEST: CPT

## 2024-07-14 PROCEDURE — 99900035 HC TECH TIME PER 15 MIN (STAT)

## 2024-07-14 PROCEDURE — 82565 ASSAY OF CREATININE: CPT

## 2024-07-14 PROCEDURE — 85610 PROTHROMBIN TIME: CPT | Performed by: EMERGENCY MEDICINE

## 2024-07-14 PROCEDURE — 86803 HEPATITIS C AB TEST: CPT | Performed by: EMERGENCY MEDICINE

## 2024-07-14 PROCEDURE — 85025 COMPLETE CBC W/AUTO DIFF WBC: CPT | Performed by: EMERGENCY MEDICINE

## 2024-07-14 RX ORDER — NORETHINDRONE ACETATE AND ETHINYL ESTRADIOL 1MG-20(21)
1 KIT ORAL DAILY
Status: DISCONTINUED | OUTPATIENT
Start: 2024-07-15 | End: 2024-07-16

## 2024-07-14 RX ORDER — GLUCAGON 1 MG
1 KIT INJECTION
Status: DISCONTINUED | OUTPATIENT
Start: 2024-07-14 | End: 2024-07-16 | Stop reason: HOSPADM

## 2024-07-14 RX ORDER — IBUPROFEN 200 MG
16 TABLET ORAL
Status: DISCONTINUED | OUTPATIENT
Start: 2024-07-14 | End: 2024-07-16 | Stop reason: HOSPADM

## 2024-07-14 RX ORDER — NALOXONE HCL 0.4 MG/ML
0.02 VIAL (ML) INJECTION
Status: DISCONTINUED | OUTPATIENT
Start: 2024-07-14 | End: 2024-07-16 | Stop reason: HOSPADM

## 2024-07-14 RX ORDER — METOPROLOL SUCCINATE 50 MG/1
50 TABLET, EXTENDED RELEASE ORAL DAILY
Status: DISCONTINUED | OUTPATIENT
Start: 2024-07-15 | End: 2024-07-16 | Stop reason: HOSPADM

## 2024-07-14 RX ORDER — TALC
6 POWDER (GRAM) TOPICAL NIGHTLY PRN
Status: DISCONTINUED | OUTPATIENT
Start: 2024-07-14 | End: 2024-07-16 | Stop reason: HOSPADM

## 2024-07-14 RX ORDER — IPRATROPIUM BROMIDE AND ALBUTEROL SULFATE 2.5; .5 MG/3ML; MG/3ML
3 SOLUTION RESPIRATORY (INHALATION) EVERY 6 HOURS PRN
Status: DISCONTINUED | OUTPATIENT
Start: 2024-07-14 | End: 2024-07-16 | Stop reason: HOSPADM

## 2024-07-14 RX ORDER — IBUPROFEN 200 MG
24 TABLET ORAL
Status: DISCONTINUED | OUTPATIENT
Start: 2024-07-14 | End: 2024-07-16 | Stop reason: HOSPADM

## 2024-07-14 RX ORDER — ACETAMINOPHEN 325 MG/1
650 TABLET ORAL EVERY 4 HOURS PRN
Status: DISCONTINUED | OUTPATIENT
Start: 2024-07-14 | End: 2024-07-16 | Stop reason: HOSPADM

## 2024-07-14 RX ORDER — MAGNESIUM SULFATE 1 G/100ML
1 INJECTION INTRAVENOUS ONCE
Status: COMPLETED | OUTPATIENT
Start: 2024-07-14 | End: 2024-07-14

## 2024-07-14 RX ORDER — ALUMINUM HYDROXIDE, MAGNESIUM HYDROXIDE, AND SIMETHICONE 1200; 120; 1200 MG/30ML; MG/30ML; MG/30ML
30 SUSPENSION ORAL 4 TIMES DAILY PRN
Status: DISCONTINUED | OUTPATIENT
Start: 2024-07-14 | End: 2024-07-16 | Stop reason: HOSPADM

## 2024-07-14 RX ORDER — ACETAMINOPHEN 500 MG
1000 TABLET ORAL
Status: COMPLETED | OUTPATIENT
Start: 2024-07-14 | End: 2024-07-14

## 2024-07-14 RX ORDER — SODIUM CHLORIDE 0.9 % (FLUSH) 0.9 %
10 SYRINGE (ML) INJECTION EVERY 12 HOURS PRN
Status: DISCONTINUED | OUTPATIENT
Start: 2024-07-14 | End: 2024-07-16 | Stop reason: HOSPADM

## 2024-07-14 RX ORDER — ONDANSETRON 8 MG/1
8 TABLET, ORALLY DISINTEGRATING ORAL EVERY 8 HOURS PRN
Status: DISCONTINUED | OUTPATIENT
Start: 2024-07-14 | End: 2024-07-16 | Stop reason: HOSPADM

## 2024-07-14 RX ORDER — ACETAMINOPHEN 325 MG/1
650 TABLET ORAL EVERY 8 HOURS PRN
Status: DISCONTINUED | OUTPATIENT
Start: 2024-07-14 | End: 2024-07-16 | Stop reason: HOSPADM

## 2024-07-14 RX ORDER — POLYETHYLENE GLYCOL 3350 17 G/17G
17 POWDER, FOR SOLUTION ORAL DAILY
Status: DISCONTINUED | OUTPATIENT
Start: 2024-07-14 | End: 2024-07-14

## 2024-07-14 RX ORDER — PROCHLORPERAZINE EDISYLATE 5 MG/ML
5 INJECTION INTRAMUSCULAR; INTRAVENOUS ONCE
Status: COMPLETED | OUTPATIENT
Start: 2024-07-14 | End: 2024-07-14

## 2024-07-14 RX ADMIN — MAGNESIUM SULFATE HEPTAHYDRATE 1 G: 500 INJECTION, SOLUTION INTRAMUSCULAR; INTRAVENOUS at 09:07

## 2024-07-14 RX ADMIN — ACETAMINOPHEN 1000 MG: 500 TABLET ORAL at 06:07

## 2024-07-14 RX ADMIN — IOHEXOL 75 ML: 350 INJECTION, SOLUTION INTRAVENOUS at 05:07

## 2024-07-14 RX ADMIN — ACETAMINOPHEN 650 MG: 325 TABLET ORAL at 08:07

## 2024-07-14 RX ADMIN — PROCHLORPERAZINE EDISYLATE 5 MG: 5 INJECTION INTRAMUSCULAR; INTRAVENOUS at 09:07

## 2024-07-14 NOTE — ED PROVIDER NOTES
Encounter Date: 7/14/2024       History     Chief Complaint   Patient presents with    Chest Pain     Left side with left arm numbness/tingling with vision changes     24-year-old female with history of sick sinus syndrome, migraine headaches, asthma, and s/p pacemaker who presents to the ED for chief complaint of midsternal chest pain that started today.  Patient states that she was woken up by a significant pressured chest pain and changes in her vision.  Patient notes that her pain appears to be worse when she is moving.  When she woke up, she states that she had left arm and left leg weakness.  She endorses feeling numbness and tingling in her left leg.  She states that she cannot feel much sensation in her left arm.  Patient was last known normal around midnight when she went to sleep.  Patient denies any fevers, SOB, abdominal pain, nausea, vomiting, or changes in urination.  Patient did not take any pain medications at home.  Stroke code was activated upon arrival to the emergency department.    The history is provided by the patient. No  was used.     Review of patient's allergies indicates:   Allergen Reactions    Lamictal  [lamotrigine]      Other reaction(s): HIVES    Cefzil [cefprozil] Rash    Demerol [meperidine] Rash     Other reaction(s): Rash    Pcn [penicillins] Rash     Other reaction(s): Rash     Past Medical History:   Diagnosis Date    Asthma     Headache(784.0)     Migraine     Pre-syncope 5/25/2021    Seizures     Syncope and collapse      Past Surgical History:   Procedure Laterality Date    A-V CARDIAC PACEMAKER INSERTION Left 7/18/2019    Procedure: INSERTION, CARDIAC PACEMAKER, DUAL CHAMBER;  Surgeon: Piter Cooley MD;  Location: [facility] CATH LAB/EP;  Service: Cardiology;  Laterality: Left;    PHLEBOGRAPHY  5/25/2021    Procedure: Venogram;  Surgeon: Piter Cooley MD;  Location: [facility] EP LAB;  Service: Cardiology;;    REPLACEMENT OF PACEMAKER GENERATOR  5/25/2021     Procedure: REPLACEMENT, PACEMAKER GENERATOR;  Surgeon: Piter Cooley MD;  Location: [facility] LAB;  Service: Cardiology;;    WISDOM TOOTH EXTRACTION       Family History   Problem Relation Name Age of Onset    Diabetes Mother      Stroke Maternal Grandfather Lul dudley     Heart disease Maternal Grandfather Lul dudley     Diabetes Maternal Grandfather Lul dudley     Irregular heart beat Sister Leigh     Diabetes Maternal Grandmother Jessica dudley     Cancer Maternal Grandmother Jessica dudley     Arthritis Paternal Grandmother      Diabetes Paternal Grandmother      Diabetes Paternal Grandfather      Diabetes Father Vivek reyes     Breast cancer Neg Hx      Ovarian cancer Neg Hx      Colon cancer Neg Hx       Social History     Tobacco Use    Smoking status: Never    Smokeless tobacco: Never   Substance Use Topics    Alcohol use: Not Currently     Comment: occasionally     Drug use: Never     Review of Systems    Physical Exam     Initial Vitals [07/14/24 0512]   BP Pulse Resp Temp SpO2   122/82 82 17 98.9 °F (37.2 °C) 100 %      MAP       --         Physical Exam    Nursing note and vitals reviewed.  Constitutional: She appears well-developed. No distress.   Patient is sitting in bed in no apparent distress   HENT:   Head: Normocephalic.   Mouth/Throat: Oropharynx is clear and moist.   Eyes: Conjunctivae and EOM are normal. No scleral icterus.   Neck: Neck supple.   Normal range of motion.  Cardiovascular:  Normal rate, regular rhythm, normal heart sounds and intact distal pulses.           Bilateral radial and distal pulses intact.   Pulmonary/Chest: Breath sounds normal. No respiratory distress. She has no wheezes. She has no rhonchi. She has no rales. She exhibits no tenderness.   Abdominal: Abdomen is soft. She exhibits no distension and no mass. There is no abdominal tenderness. There is no rebound and no guarding.   Musculoskeletal:         General: No tenderness or edema. Normal range of motion.       Cervical back: Normal range of motion and neck supple.     Neurological: She is alert. No sensory deficit.   5/5 motor strength of the RUE and RLE.  Light touch sensation is intact in all extremities except the LUE.  3/5 motor strength of the LLE and 2/5 motor strength of the LUE.  Decreased left hand  strength.   Skin: Skin is warm. Capillary refill takes less than 2 seconds.   Psychiatric: She has a normal mood and affect.         ED Course   Procedures  Labs Reviewed   CBC W/ AUTO DIFFERENTIAL - Abnormal; Notable for the following components:       Result Value    MCHC 31.6 (*)     All other components within normal limits    Narrative:     Release to patient->Immediate   COMPREHENSIVE METABOLIC PANEL - Abnormal; Notable for the following components:    CO2 21 (*)     Alkaline Phosphatase 46 (*)     All other components within normal limits    Narrative:     Release to patient->Immediate   HIV 1 / 2 ANTIBODY    Narrative:     Release to patient->Immediate   HEPATITIS C ANTIBODY    Narrative:     Release to patient->Immediate   PROTIME-INR    Narrative:     Release to patient->Immediate   TSH    Narrative:     Release to patient->Immediate   LIPID PANEL    Narrative:     Release to patient->Immediate   TROPONIN I   POCT GLUCOSE, HAND-HELD DEVICE   POCT URINE PREGNANCY   ISTAT PROCEDURE   ISTAT PROCEDURE   ISTAT CREATININE          Imaging Results              MRI Brain Ischemic Inter Pro Incl MRA W/O Con (In process)  Result time 07/14/24 07:48:29                     X-Ray Chest AP Portable (Final result)  Result time 07/14/24 07:59:04      Final result by Trevor Stacy MD (07/14/24 07:59:04)                   Impression:      No convincing evidence of acute cardiopulmonary disease.      Electronically signed by: Trevor Stacy  Date:    07/14/2024  Time:    07:59               Narrative:    EXAMINATION:  XR CHEST AP PORTABLE    CLINICAL HISTORY:  chest pain;    TECHNIQUE:  Single frontal view of  the chest was performed.    COMPARISON:  Chest radiograph performed 05/25/2021, 19:37 hours.    FINDINGS:  Monitoring leads overlie the chest.  Left subclavian approach dual lead pacer, as before.    Similar cardiomediastinal contours, considered to be within normal limits.    Lungs essentially clear.  No definite pneumothorax or large volume pleural effusion.    No definite acute findings in the visualized abdomen.    Osseous and soft tissue structures appear without definite acute change.                                       CTA STROKE MULTI-PHASE (Final result)  Result time 07/14/24 06:23:01      Final result by Trevor Stacy MD (07/14/24 06:23:01)                   Impression:      No evidence of acute major vascular distribution infarct or hemorrhage.    CTA head and neck demonstrate no high-grade stenosis or dissection.    Electronically signed by resident: René Valencia  Date:    07/14/2024  Time:    05:44    Electronically signed by: Trevor Stacy  Date:    07/14/2024  Time:    06:23               Narrative:    EXAMINATION:  CTA STROKE MULTI-PHASE    CLINICAL HISTORY:  Stroke, follow up;    TECHNIQUE:  CT angiogram was performed from through the cervical and intracranial vasculature during the IV bolus administration of 75mL of Omnipaque 350.  Multiplanar MPR and MIP reformats were performed.    COMPARISON:  MRI 04/04/2017    FINDINGS:  The brain appears within normal limits. No parenchymal mass, hemorrhage, edema or major vascular distribution infarct.      The ventricles are normal in size without evidence of hydrocephalus.    No extra-axial blood or fluid collections.    The cranium appears intact. Mastoid air cells and paranasal sinuses are essentially clear.      CTA:    The aortic arch maintains a normal branching pattern.    The common and internal carotid arteries are normal in course and caliber. No significant stenosis in either carotid bifurcation.    The vertebral origins are patent. The  cervical vertebral arteries are normal in course and caliber. Vertebrobasilar system is within normal limits without focal abnormality.    The anterior, middle, and posterior cerebral arteries are within normal limits, without evidence of significant stenosis, focal occlusion, or intracranial aneurysm formation.    Chest soft tissue demonstrate left AICD.  Lungs apex are clear.  No pleural effusion.                                       Medications   iohexoL (OMNIPAQUE 350) injection 75 mL (75 mLs Intravenous Given 7/14/24 1457)   acetaminophen tablet 1,000 mg (1,000 mg Oral Given 7/14/24 3911)     Medical Decision Making  24-year-old female who presents for midsternal chest pain.  Vitals are WNL.  On exam, she has weakness of her left upper and lower extremity.  Light touch sensation intact in all extremities except left upper extremity.  Please see physical exam findings above for additional details.  Differential diagnoses include but are not limited to stroke, ICH, ACS, CHF, electrolyte abnormality, aortic dissection, aortic aneurysm, MSK pain.  A code stroke was called given the patient's left-sided weakness.  Lower suspicion of aortic dissection since patient has intact and symmetrical radial and DP pulses and is not hypertensive.  Will evaluate for other cardiac and electrolyte abnormalities.  Obtained labs and ordered CXR and CTA stroke multiphase.  Administered Tylenol for pain.    Discussed patient with change of shift ED team.  At time of sign-out, patient is pending MRI brain.  Disposition will be dependent on MRI findings.  Patient will likely be admitted to the hospital in the setting of her left-sided weakness.    Amount and/or Complexity of Data Reviewed  Labs: ordered.  Radiology: ordered. Decision-making details documented in ED Course.  ECG/medicine tests: ordered and independent interpretation performed. Decision-making details documented in ED Course.    Risk  OTC drugs.  Prescription drug  management.               ED Course as of 07/14/24 0903   Sun Jul 14, 2024   0550 EKG 12-lead  EKG shows an atrial paced rhythm, rate of 76 beats per minute, no STEMI, and inverted T-waves in aVR and anterior septal leads similar to prior EKG. [MD]   0633 CTA STROKE MULTI-PHASE  CTA stroke multiphase showed vascular abnormalities, intracranial abnormalities, or high-grade stenosis or dissections. [MD]   0815 X-Ray Chest AP Portable  CXR shows no acute cardiopulmonary abnormalities. [MD]      ED Course User Index  [MD] Yossi Franks MD                             Clinical Impression:  Final diagnoses:  [R07.9] Chest pain  [R29.818] Acute focal neurological deficit                 Yossi Franks MD  Resident  07/14/24 0904

## 2024-07-14 NOTE — ED NOTES
I-STAT Chem-8+ Results:   Value Reference Range   Sodium 139 136-145 mmol/L   Potassium  4.6 3.5-5.1 mmol/L   Chloride 105  mmol/L   Ionized Calcium 1.14 1.06-1.42 mmol/L   CO2 (measured) 25 23-29 mmol/L   Glucose 103  mg/dL   BUN 20 6-30 mg/dL   Creatinine 1.0 0.5-1.4 mg/dL   Hematocrit 41 36-54%

## 2024-07-14 NOTE — ED NOTES
Patient urinated in bedpan with assistance from RN. Boyfriend retrieved from waiting room. Patient complaining of 8/10 chest pain at this time.

## 2024-07-14 NOTE — PT/OT/SLP EVAL
Occupational Therapy   Evaluation    Name: Vanessa Sinclair  MRN: [id number]  Admitting Diagnosis: Left-sided weakness  Recent Surgery: * No surgery found *      Recommendations:     Discharge Recommendations: Low Intensity Therapy  Discharge Equipment Recommendations:  none  Barriers to discharge:  None    Pt is safe to ambulate with hospital staff present with SBA.    Assessment:     Vanessa Sinclair is a 24 y.o. female with a medical diagnosis of Left-sided weakness.  She presents with great motivation and participation. Pt is currently not at her PLOF and would greatly benefit from continued skilled OT intervention in efforts to participate in meaningful occupations of choice at the highest level of independence. Pt with impaired AROM and strength of LUE and LLE impairing her ability to engage in meaningful occupations. Pt with anxious thoughts/feelings regarding current medical status and presentation compared to her PLOF requiring distraction and therapeutic listening throughout today's session.     Pt would greatly benefit from low intensity OT services upon discharge from this facility in efforts to progress to PLOF regarding meaningful occupations and home/community reintegration.      Performance deficits affecting function: weakness, impaired endurance, impaired self care skills, impaired functional mobility, gait instability, impaired balance, decreased coordination, decreased upper extremity function, decreased lower extremity function, pain, decreased ROM, impaired fine motor.      Rehab Prognosis: Good; patient would benefit from acute skilled OT services to address these deficits and reach maximum level of function.       Plan:     Patient to be seen 3 x/week to address the above listed problems via self-care/home management, therapeutic activities, therapeutic exercises, neuromuscular re-education  Plan of Care Expires: 07/19/24  Plan of Care Reviewed with: patient    Subjective     Chief  "Complaint: L shoulder pain  Patient/Family Comments/goals: "I can't believe I did this good I'm so happy. 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  Patient found supine with PureWick, telemetry upon OT entry to room.    General Precautions: Standard, fall, pacemaker  Orthopedic Precautions: N/A  Braces: N/A  Respiratory Status: Room air    Occupational Performance:    Bed Mobility:    Patient completed Rolling/Turning to Right with supervision  Patient completed Supine to Sit with supervision (HOB elevated)  Patient completed Sit to Supine with supervision    Functional Mobility/Transfers:  Patient completed Sit <> Stand Transfer with contact guard assistance  with  hand-held assist   Patient completed Toilet Transfer Step Transfer technique with stand by assistance with  grab bars  Functional Mobility: Pt engaging in functional mobility to simulate household/community distances approx 25 ft and 25 ft with CGA and SBA and utilizing HHA and no AD in order to maximize functional activity tolerance and standing balance required for engagement in occupations of choice.     Activities of Daily Living:  Feeding:  independence HOB elevated    Cognitive/Visual " Perceptual:  Cognitive/Psychosocial Skills:     -       Oriented to: Person, Place, Time, and Situation   -       Follows Commands/attention:Follows multistep  commands  -       Communication: clear/fluent  -       Memory: No Deficits noted  -       Safety awareness/insight to disability: intact   -       Mood/Affect/Coping skills/emotional control: Anxious    Physical Exam:  Edema:  None noted  Sensation:    -       Intact  light/touch BUE and BLE  Upper Extremity Range of Motion:     -       Right Upper Extremity: WNL  -       Left Upper Extremity: Deficits: full PROM of LUE, no proximal AROM (shoulder and elbow), limited distal AROM (full finger extension no flexion present, limited wrist flex/ext)   Upper Extremity Strength:    -       Right Upper Extremity: WNL  -       Left Upper Extremity: NT 2/2 pain and limited AROM   Strength:    -       Right Upper Extremity: WNL  -       Left Upper Extremity: Deficits: 2/5  Fine Motor Coordination:    -       Intact  Right hand thumb/finger opposition skills  -       Impaired  Left hand thumb/finger opposition skills    Gross motor coordination:   hemiplegia/paresis    AMPAC 6 Click ADL:  AMPAC Total Score: 19    Treatment & Education:  Provided education regarding role of OT, POC, & discharge recommendations with pt verbalizing understanding.  Pt had no further questions & when asked whether there were any concerns pt reported none.   Pt educated on the benefits of completing OOB ADL/functional mobility tasks with hospital staff present, pt with good understanding.   Whiteboard updated.    Patient left HOB elevated with all lines intact, call button in reach, and RN notified    GOALS:   Multidisciplinary Problems       Occupational Therapy Goals          Problem: Occupational Therapy    Goal Priority Disciplines Outcome Interventions   Occupational Therapy Goal     OT, PT/OT Progressing    Description: Goals to be met by: 7/19/2024     Patient will increase  functional independence with ADLs by performing:    Grooming while standing at sink with [place].  Toileting from toilet with [place] for hygiene and clothing management.   Toilet transfer to toilet with [place].  Increased functional strength to WFL for LUE.  Upper extremity exercise program x10 reps per handout, with independence.                         History:     Past Medical History:   Diagnosis Date    Asthma     Headache(784.0)     Migraine     Pre-syncope 5/25/2021    Seizures     Syncope and collapse          Past Surgical History:   Procedure Laterality Date    A-V CARDIAC PACEMAKER INSERTION Left 7/18/2019    Procedure: INSERTION, CARDIAC PACEMAKER, DUAL CHAMBER;  Surgeon: Piter Cooley MD;  Location: [facility] CATH LAB/EP;  Service: Cardiology;  Laterality: Left;    PHLEBOGRAPHY  5/25/2021    Procedure: Venogram;  Surgeon: Piter Cooley MD;  Location: [facility] EP LAB;  Service: Cardiology;;    REPLACEMENT OF PACEMAKER GENERATOR  5/25/2021    Procedure: REPLACEMENT, PACEMAKER GENERATOR;  Surgeon: Piter Cooley MD;  Location: [facility] EP LAB;  Service: Cardiology;;    WISDOM TOOTH EXTRACTION         Time Tracking:     OT Date of Treatment: 07/14/24  OT Start Time: 1414  OT Stop Time: 1442  OT Total Time (min): 28 min    Billable Minutes:Evaluation 15  Self Care/Home Management 13    7/14/2024

## 2024-07-14 NOTE — CARE UPDATE
Pacemaker Evaluation Report    July 14, 2024    Implanting MD: Yasir  : [employer]     Reason for evaluation:requested for MRI settings    Measurements  Atrial sensing - P wave: 2.5 mV  Atrial lead impedance: 468 ohms  Ventricular sensing - R wave: 7.9 mV  Ventricular lead impedance: 487 ohms  Atrial threshold 0.8  Ventricular threshold 1    Diagnostic Data  Atrial paced: 74 % Ventricular paced: 0%  Mode: DDDR  Battery status: 70%    MRI compatible    MRI Parameters - Switched to MRI mode - DOO  Mode: DOO Lower rate: 90    Plan  - Ok to go to MRI  - Please contact cardiology when patient is back to switch back to baseline mode    Quan Noriega  Cardiovascular Disease Fellowship - PGY5  [facility]

## 2024-07-14 NOTE — CARE UPDATE
[facility] - Emergency Dept  Vascular Neurology  [facility]  Consult Note        Patient examined at bedside along with Vascular Neurology NP.     Physical exam:   She was comfortable, no aphasia, no facial droop, normal gaze.   When asked to say how many fingers in field, she would try to guess, but was able to point appropriately where my hands were in all 4 vision fields.   On strength assessment she could lift LUE actively but when helped with passive motion she had good tone and was giving resistance and arm would fall to bed without hitting her head.   + Olivier bl lower extremities. Sensation diminished on left side       Cardiology on call was called to change PPM to MRI mode and MRI completed and no acute infarct noted.       A/P  No indication for thrombolytics and etiology of presentation less likely to be vascular in origin.   Given chest pain complaint and cardiac history, recommend cardiology consult and workup.   Defer to ED disposition.   Patient aware of our plan and in agreement.   Please call Vascular Neurology for any questions or concerns. This was discussed with Vascular Neurology staff.   See full consult note below.     Thank you for the consult. Vascular Neurology will sign off at this time.       Sabina Pan MD   Vascular Neurology Fellow- PGY5  [facility]

## 2024-07-14 NOTE — CONSULTS
[facility]  Neurology  Consult Note    Patient Name: Vanessa Reyes  MRN: [id number]  Admission Date: 7/14/2024  Hospital Length of Stay: 0 days  Code Status: Full Code   Attending Provider: Ana Mac MD   Consulting Provider: Thanh Kapoor MD  Primary Care Physician: Juan C Farmer MD  Principal Problem:Left-sided weakness    Inpatient consult to Neurology  Consult performed by: Thanh Kapoor MD  Consult ordered by: Elmira Fernandez MD  Reason for consult: left sided weakness         Subjective:     Chief Complaint:  Left sided weakness     HPI:   Ms. Vanessa reyes is a 24 year old female with a history of migraines that presents after waking up with chest pain and new onset left sided weakness and numbness. She reports she had a mild migraine the night prior to presentation but was otherwise in her normal state of health. After waking, patient noted the above symptoms alongside continue mild headache. She endorses nausea and photophobia. She remained ambulatory but noted L sided heaviness. She has never experienced these types of symptoms before now, and denies history of focal deficit during past migraines. For migraine management, she follows with Dr. Brown and is typically controlled with PRN sumatriptan. She reports she is currently out of this medication and hasn't taken it for weeks.     On presentation to [facility] ED, stroke code was activated. CTA was negative for large vessel occlusion and MRI ischemic protocol was remarkable for very small scattered white matter lesions, consistent with patient's history of migraine. On my interview, patient reports that her symptoms have markedly improved and denies current headache. She does report blurry vision R>L that is new. Neurology is consulted for L arm and leg weakness with negative stroke workup.     Past Medical History:   Diagnosis Date    Asthma     Headache(784.0)     Migraine     Pre-syncope 5/25/2021    Seizures     Syncope  and collapse        Past Surgical History:   Procedure Laterality Date    A-V CARDIAC PACEMAKER INSERTION Left 7/18/2019    Procedure: INSERTION, CARDIAC PACEMAKER, DUAL CHAMBER;  Surgeon: Piter Cooley MD;  Location: [facility] CATH LAB/EP;  Service: Cardiology;  Laterality: Left;    PHLEBOGRAPHY  5/25/2021    Procedure: Venogram;  Surgeon: Piter Cooley MD;  Location: [facility] EP LAB;  Service: Cardiology;;    REPLACEMENT OF PACEMAKER GENERATOR  5/25/2021    Procedure: REPLACEMENT, PACEMAKER GENERATOR;  Surgeon: Piter Cooley MD;  Location: [facility] EP LAB;  Service: Cardiology;;    WISDOM TOOTH EXTRACTION         Review of patient's allergies indicates:   Allergen Reactions    Lamictal  [lamotrigine]      Other reaction(s): HIVES    Cefzil [cefprozil] Rash    Demerol [meperidine] Rash     Other reaction(s): Rash    Pcn [penicillins] Rash     Other reaction(s): Rash     No current facility-administered medications on file prior to encounter.     Current Outpatient Medications on File Prior to Encounter   Medication Sig    betaxoloL (KERLONE) 10 mg Tab Take 1 tablet (10 mg total) by mouth once daily. Start with 1/4 pill a day for 2 weeks. Increase to 1/2 pill a day for the following 2 weeks. Increase to a full tablet (10 mg) a day thereafter.Progression schedule from 1/4 tab to a full tab to be modified in case of perceived side effects.Future further increases will be tailored to clinical response.    ibuprofen (ADVIL,MOTRIN) 800 MG tablet Take 1 tablet (800 mg total) by mouth 3 (three) times daily as needed for Pain.    norethindrone-ethinyl estradiol (JUNEL FE 1/20) 1 mg-20 mcg (21)/75 mg (7) per tablet Take 1 tablet by mouth once daily.     Family History       Problem Relation (Age of Onset)    Arthritis Paternal Grandmother    Cancer Maternal Grandmother    Diabetes Mother, Maternal Grandfather, Maternal Grandmother, Paternal Grandmother, Paternal Grandfather, Father    Heart disease Maternal Grandfather     Irregular heart beat Sister    Stroke Maternal Grandfather          Tobacco Use    Smoking status: Never    Smokeless tobacco: Never   Substance and Sexual Activity    Alcohol use: Not Currently     Comment: occasionally     Drug use: Never    Sexual activity: Yes     Partners: Male     Birth control/protection: OCP     Review of Systems   Constitutional:  Positive for fatigue. Negative for chills and fever.   HENT:  Negative for sore throat and trouble swallowing.    Eyes:  Positive for visual disturbance.   Respiratory:  Negative for cough and shortness of breath.    Cardiovascular:  Positive for chest pain.   Gastrointestinal:  Negative for nausea and vomiting.   Genitourinary:  Negative for difficulty urinating.   Neurological:  Positive for weakness, numbness and headaches. Negative for dizziness, tremors, seizures, syncope, facial asymmetry, speech difficulty and light-headedness.     Objective:     Vital Signs (Most Recent):  Temp: 98.4 °F (36.9 °C) (07/14/24 1312)  Pulse: 75 (07/14/24 1509)  Resp: 16 (07/14/24 1312)  BP: 103/68 (07/14/24 1312)  SpO2: 99 % (07/14/24 1312) Vital Signs (24h Range):  Temp:  [98.4 °F (36.9 °C)-98.9 °F (37.2 °C)] 98.4 °F (36.9 °C)  Pulse:  [74-99] 75  Resp:  [15-20] 16  SpO2:  [99 %-100 %] 99 %  BP: (103-129)/(68-89) 103/68     Weight: 74 kg (163 lb 2.3 oz)  Body mass index is 27.15 kg/m².     Physical Exam  Vitals and nursing note reviewed.   Constitutional:       General: She is not in acute distress.     Appearance: Normal appearance. She is not ill-appearing.   HENT:      Head: Normocephalic and atraumatic.      Mouth/Throat:      Mouth: Mucous membranes are moist.      Pharynx: Oropharynx is clear.   Eyes:      General: No scleral icterus.     Extraocular Movements: Extraocular movements intact.      Conjunctiva/sclera: Conjunctivae normal.      Pupils: Pupils are equal, round, and reactive to light.   Cardiovascular:      Rate and Rhythm: Normal rate.      Pulses: Normal  pulses.   Pulmonary:      Effort: Pulmonary effort is normal. No respiratory distress.   Abdominal:      General: Abdomen is flat. There is no distension.   Musculoskeletal:         General: No swelling.   Skin:     General: Skin is warm and dry.   Neurological:      Mental Status: She is alert and oriented to person, place, and time.      Coordination: Finger-Nose-Finger Test normal.      Deep Tendon Reflexes:      Reflex Scores:       Bicep reflexes are 2+ on the right side and 2+ on the left side.       Brachioradialis reflexes are 2+ on the right side and 2+ on the left side.       Patellar reflexes are 2+ on the right side and 2+ on the left side.       Achilles reflexes are 2+ on the right side and 2+ on the left side.  Psychiatric:         Speech: Speech normal.          NEUROLOGICAL EXAMINATION:     MENTAL STATUS   Oriented to person, place, and time.   Follows 3 step commands.   Attention: normal. Concentration: normal.   Speech: speech is normal   Level of consciousness: alert    CRANIAL NERVES     CN II   Visual fields full to confrontation.   Visual acuity: (decreased R>L)    CN III, IV, VI   Pupils are equal, round, and reactive to light.    CN V   Facial sensation intact.     CN VII   Facial expression full, symmetric.     CN VIII   CN VIII normal.     CN IX, X   CN IX normal.     CN XI   CN XI normal.     CN XII   CN XII normal.     MOTOR EXAM   Overall muscle tone: normal    Strength   Right deltoid: 5/5  Left deltoid: 4/5  Right biceps: 5/5  Left biceps: 4/5  Right triceps: 5/5  Left triceps: 4/5  Right iliopsoas: 5/5  Left iliopsoas: 4/5  Right quadriceps: 5/5  Left quadriceps: 4/5  Right hamstrin/5  Left hamstrin/5  Right anterior tibial: 5/5  Left anterior tibial: 4/5  Right gastroc: 5/5  Left gastroc: 4/5       Not able to cooperate with some parts of the motor exam. For example, would not release tone when asked to let her foot fall to the bed, but released tone after moving on to next  part of physical exam. Positive hoovers with LLE elevation.      REFLEXES     Reflexes   Right brachioradialis: 2+  Left brachioradialis: 2+  Right biceps: 2+  Left biceps: 2+  Right patellar: 2+  Left patellar: 2+  Right achilles: 2+  Left achilles: 2+  Right plantar: normal  Left plantar: normal  Right Crespo: absent  Left Crespo: absent  Right ankle clonus: absent  Left ankle clonus: absent    SENSORY EXAM   Light touch normal.     GAIT AND COORDINATION      Coordination   Finger to nose coordination: normal      Significant Labs: All pertinent lab results from the past 24 hours have been reviewed.    Significant Imaging: I have reviewed all pertinent imaging results/findings within the past 24 hours.  Assessment and Plan:     * Left-sided weakness  This is a 24 year old female with a history of migraine headache that presents with acute onset of chest pain and LSW. Stroke workup negative in the ED. She experienced marked improvement in L sided weakness and numbness prior to my interview with her, without intervention. On physical exam, she displays multiple signs concerning for possible functional disorder. However, her R>L blurriness of vision are concerning for possible venous thrombosis. She does not have a history of complicated migraine, and this would represent headache transformation. Imaging thus far has been unconcerning for acute neurological disease; does display findings c/w migraine disorder. Our differential includes hemiplegic migraine vs functional neurological disorder vs cerebral venous thrombosis. Will recommend ruling out venous thrombosis with MRV    Recommndations  - Obtain MRV with contrast to rule out cerebral venous thrombosis  - Will follow up imaging results  - Will need to follow up outpatient with Dr. Brown in Neurology  - Remainder of care per primary team    Neurology will continue to follow. Thank you for your consult. Please reach out with questions or changes in clinical  picture.        VTE Risk Mitigation (From admission, onward)           Ordered     IP VTE LOW RISK PATIENT  Once         07/14/24 1027     Place sequential compression device  Until discontinued         07/14/24 1027                    Thank you for your consult. I will follow-up with patient. Please contact us if you have any additional questions.    Thanh Kapoor MD  Neurology  [facility] - Observation 11H

## 2024-07-14 NOTE — PROVIDER PROGRESS NOTES - EMERGENCY DEPT.
"Encounter Date: 7/14/2024    ED Physician Progress Notes        ED Resident HAND-OFF NOTE:  7/14/2024  Vanessa Sinclair is a 24 y.o. female w/ history of sick sinus syndrome s/p pacemaker placement who presented to the ED on 7/14/2024 complaining of CP who was stroke coded for LUE and LLE weakness and paraesthesias. EKG showed paced rhythm. Cardiology and neurology on board.    On my evaluation Vanessa Sinclair, I appreciate:  /63 (BP Location: Right arm, Patient Position: Lying)   Pulse 75   Temp 98.1 °F (36.7 °C) (Tympanic)   Resp 16   Ht 5' 5" (1.651 m)   Wt 74 kg (163 lb 2.3 oz)   SpO2 100%   Breastfeeding No   BMI 27.15 kg/m²     I assumed care of patient from off-going ED physician team pending:    - Troponin WNL, reducing concern for ACS  - MRI Brain w/o acute intracranial abnormalities or evidence of ischemic events.    My evaluation, patient w/profound weakness of left upper extremity and left lower extremity.  Patient also endorses decreased sensation of left lower extremity.  On multiple re-evaluations, patient's weakness or paresthesias have not significantly improved.    Thus far, Vanessa Sinclair has received:  Medications   sodium chloride 0.9% flush 10 mL (has no administration in time range)   albuterol-ipratropium 2.5 mg-0.5 mg/3 mL nebulizer solution 3 mL (has no administration in time range)   melatonin tablet 6 mg (has no administration in time range)   ondansetron disintegrating tablet 8 mg (has no administration in time range)   acetaminophen tablet 650 mg (has no administration in time range)   aluminum-magnesium hydroxide-simethicone 200-200-20 mg/5 mL suspension 30 mL (has no administration in time range)   acetaminophen tablet 650 mg (has no administration in time range)   naloxone 0.4 mg/mL injection 0.02 mg (has no administration in time range)   glucose chewable tablet 16 g (has no administration in time range)   glucose chewable tablet 24 g (has no administration " in time range)   glucagon (human recombinant) injection 1 mg (has no administration in time range)   dextrose 10% bolus 125 mL 125 mL (has no administration in time range)   dextrose 10% bolus 250 mL 250 mL (has no administration in time range)   metoprolol succinate (TOPROL-XL) 24 hr tablet 50 mg (has no administration in time range)   norethindrone-ethinyl estradiol 1 mg-20 mcg (21)/75 mg (7) per tablet 1 tablet (has no administration in time range)   iohexoL (OMNIPAQUE 350) injection 75 mL (75 mLs Intravenous Given 7/14/24 0529)   acetaminophen tablet 1,000 mg (1,000 mg Oral Given 7/14/24 0639)         ED Course as of 07/14/24 1700   Sun Jul 14, 2024   0550 EKG 12-lead  EKG shows an atrial paced rhythm, rate of 76 beats per minute, no STEMI, and inverted T-waves in aVR and anterior septal leads similar to prior EKG. [MD]   0633 CTA STROKE MULTI-PHASE  CTA stroke multiphase showed vascular abnormalities, intracranial abnormalities, or high-grade stenosis or dissections. [MD]   0815 X-Ray Chest AP Portable  CXR shows no acute cardiopulmonary abnormalities. [MD]      ED Course User Index  [MD] Yossi Franks MD        Disposition:  Admitted to Hospital Medicine.  Patient w/weakness of unknown etiology and left upper extremity and left lower extremity.  I do not think that patient is stable for discharge home given her acute change in functional status.    I have discussed and counseled Vanessa Sinclair regarding exam, results, diagnosis, treatment, and plan.  ______________________    7/14/2024    Attending Note:  I have seen the patient, have repeated the key portions of the history and physical, reviewed and agree with the medical documentation, and supervised and managed the medical care of the patient. Additionally, I was present for the critical portion of any procedure(s) performed.    Patient seen, still has left sided weakness and unable to move herself to side of bed or stand.  Plan to obs to  w/  neurology consult, PT/OT    IVANA Fernandez MD  Staff ED Physician

## 2024-07-14 NOTE — ED TRIAGE NOTES
Vanessa Sinclair, a 24 y.o. female presents to the ED w/ complaint of chest pain    Triage note: Woke up with chest pain and green and blue vision. Now having left sided numbness and weakness  Chief Complaint   Patient presents with    Chest Pain     Left side with left arm numbness/tingling with vision changes     Review of patient's allergies indicates:   Allergen Reactions    Lamictal  [lamotrigine]      Other reaction(s): HIVES    Cefzil [cefprozil] Rash    Demerol [meperidine] Rash     Other reaction(s): Rash    Pcn [penicillins] Rash     Other reaction(s): Rash     Past Medical History:   Diagnosis Date    Asthma     Headache(784.0)     Migraine     Pre-syncope 5/25/2021    Seizures     Syncope and collapse

## 2024-07-14 NOTE — H&P
[facility] [address] [place] [address] Medicine  History & Physical    Patient Name: Vanessa Sinclair  MRN: [id number]  Patient Class: OP- Observation  Admission Date: 7/14/2024  Attending Physician: Ana Mac MD   Primary Care Provider: Juan C Farmer MD    Patient information was obtained from patient, past medical records, and ER records.     Subjective:     Principal Problem:Left-sided weakness    Chief Complaint:   Chief Complaint   Patient presents with    Chest Pain     Left side with left arm numbness/tingling with vision changes        HPI: Vanessa Sinclair is a 24 y.o. female with SSS s/p PPM and migraine being admitted to hospital medicine for management of acute onset left sided weakness and numbness. Patient reports waking from sleep with new onset left upper and lower extremity weakness and numbness. Reports she is able to move her left leg and arm some but has significantly diminished range of motion and decreased sensation that impairs her ability to ambulate. Endorses associated right eye > left eye blurriness. Notes she did have a headache before going to sleep last night but does not have one now. Overall symptoms are gradually improving since onset. Last normal yesterday, she was able to walk around the mall with her family without issue. No familiarity of symptoms, recent trauma or injury, fever, chills, nausea, vomiting, diarrhea, abdominal pain, dysuria, cough or congestion. Does not smoke or drink.         In ED: AFVSS. CBC and CMP grossly unremarkable. TSH and troponin wnl. Stroke code activated, CTA and MRI brain negative for acute infarction or hydrocephalus. Vasc neuro consulted, no indication for thrombotic agents. Given 1 g tylenol. Admitted to hospital medicine for further management.     Past Medical History:   Diagnosis Date    Asthma     Headache(784.0)     Migraine     Pre-syncope 5/25/2021    Seizures     Syncope and collapse        Past Surgical History:    Procedure Laterality Date    A-V CARDIAC PACEMAKER INSERTION Left 7/18/2019    Procedure: INSERTION, CARDIAC PACEMAKER, DUAL CHAMBER;  Surgeon: Piter Cooley MD;  Location: [facility] CATH LAB/EP;  Service: Cardiology;  Laterality: Left;    PHLEBOGRAPHY  5/25/2021    Procedure: Venogram;  Surgeon: Piter Cooley MD;  Location: [facility] EP LAB;  Service: Cardiology;;    REPLACEMENT OF PACEMAKER GENERATOR  5/25/2021    Procedure: REPLACEMENT, PACEMAKER GENERATOR;  Surgeon: Piter Cooley MD;  Location: [facility] EP LAB;  Service: Cardiology;;    WISDOM TOOTH EXTRACTION         Review of patient's allergies indicates:   Allergen Reactions    Lamictal  [lamotrigine]      Other reaction(s): HIVES    Cefzil [cefprozil] Rash    Demerol [meperidine] Rash     Other reaction(s): Rash    Pcn [penicillins] Rash     Other reaction(s): Rash       No current facility-administered medications on file prior to encounter.     Current Outpatient Medications on File Prior to Encounter   Medication Sig    betaxoloL (KERLONE) 10 mg Tab Take 1 tablet (10 mg total) by mouth once daily. Start with 1/4 pill a day for 2 weeks. Increase to 1/2 pill a day for the following 2 weeks. Increase to a full tablet (10 mg) a day thereafter.Progression schedule from 1/4 tab to a full tab to be modified in case of perceived side effects.Future further increases will be tailored to clinical response.    ibuprofen (ADVIL,MOTRIN) 800 MG tablet Take 1 tablet (800 mg total) by mouth 3 (three) times daily as needed for Pain.    norethindrone-ethinyl estradiol (JUNEL FE 1/20) 1 mg-20 mcg (21)/75 mg (7) per tablet Take 1 tablet by mouth once daily.     Family History       Problem Relation (Age of Onset)    Arthritis Paternal Grandmother    Cancer Maternal Grandmother    Diabetes Mother, Maternal Grandfather, Maternal Grandmother, Paternal Grandmother, Paternal Grandfather, Father    Heart disease Maternal Grandfather    Irregular heart beat Sister    Stroke Maternal  Grandfather          Tobacco Use    Smoking status: Never    Smokeless tobacco: Never   Substance and Sexual Activity    Alcohol use: Not Currently     Comment: occasionally     Drug use: Never    Sexual activity: Yes     Partners: Male     Birth control/protection: OCP     Review of Systems   Constitutional:  Negative for chills and fever.   Eyes:  Positive for visual disturbance.   Respiratory:  Negative for chest tightness and shortness of breath.    Cardiovascular:  Negative for chest pain and leg swelling.   Gastrointestinal:  Negative for abdominal pain, nausea and vomiting.   Genitourinary:  Negative for decreased urine volume and difficulty urinating.   Musculoskeletal:  Negative for back pain and neck pain.   Neurological:  Positive for weakness (left sided) and numbness (left sided). Negative for dizziness.     Objective:     Vital Signs (Most Recent):  Temp: 98.4 °F (36.9 °C) (07/14/24 1312)  Pulse: 75 (07/14/24 1509)  Resp: 16 (07/14/24 1312)  BP: 103/68 (07/14/24 1312)  SpO2: 99 % (07/14/24 1312) Vital Signs (24h Range):  Temp:  [98.4 °F (36.9 °C)-98.9 °F (37.2 °C)] 98.4 °F (36.9 °C)  Pulse:  [74-99] 75  Resp:  [15-20] 16  SpO2:  [99 %-100 %] 99 %  BP: (103-129)/(68-89) 103/68     Weight: 74 kg (163 lb 2.3 oz)  Body mass index is 27.15 kg/m².     Physical Exam  Vitals and nursing note reviewed.   Eyes:      General: No scleral icterus.        Right eye: No discharge.         Left eye: No discharge.   Cardiovascular:      Rate and Rhythm: Normal rate.   Pulmonary:      Effort: Pulmonary effort is normal. No respiratory distress.   Abdominal:      General: There is no distension.   Musculoskeletal:      Cervical back: Normal range of motion and neck supple.   Skin:     General: Skin is warm and dry.   Neurological:      Mental Status: She is alert and oriented to person, place, and time.      Sensory: Sensory deficit present.      Motor: Weakness (4/5 strenght to left upper and lower extremities, 5/5 to  all remaining) present.   Psychiatric:         Attention and Perception: Attention normal.         Mood and Affect: Affect is flat.     Significant Labs: All pertinent labs within the past 24 hours have been reviewed.  CBC:   Recent Labs   Lab 07/14/24 0524 07/14/24 0534   WBC  --  9.04   HGB  --  13.6   HCT 41 43.0   PLT  --  249     CMP:   Recent Labs   Lab 07/14/24 0534      K 4.6      CO2 21*      BUN 15   CREATININE 0.9   CALCIUM 9.0   PROT 7.3   ALBUMIN 3.8   BILITOT 0.1   ALKPHOS 46*   AST 22   ALT 12   ANIONGAP 8     Troponin:   Recent Labs   Lab 07/14/24 0744   TROPONINI <0.006     TSH:   Recent Labs   Lab 07/14/24 0534   TSH 3.364     Significant Imaging: I have reviewed all pertinent imaging results/findings within the past 24 hours.  Imaging Results              MRI Brain Ischemic Inter Pro Incl MRA W/O Con (Final result)  Result time 07/14/24 08:26:51      Final result by Aaron Menendez DO (07/14/24 08:26:51)                   Impression:      MRI brain: Unremarkable MRI brain specifically without evidence for acute infarction or hydrocephalus.    MRA head: Unremarkable MRA head specifically without evidence for high-grade proximal stenosis or proximal occlusion.      Electronically signed by: Aaron Menendez DO  Date:    07/14/2024  Time:    08:26               Narrative:    EXAMINATION:  MRI BRAIN ISCHEMIC INTERVENTIONAL PROTOCOL INCL MRA W/O CONTRAST    CLINICAL HISTORY:  wake up stroke LSW;    TECHNIQUE:  Axial diffusion, axial FLAIR and axial gradient imaging of the whole brain without contrast.  In addition Separate acquisition axial source noncontrast 3D  time-of-flight MRA head with 3 dimensional MIPS reformatted images.    COMPARISON:  CTA 7/14/24    FINDINGS:  MRI brain: There is no restricted diffusion to suggest acute infarction.  Ventricles normal without hydrocephalus.  There is no midline shift or mass effect.  Few punctate regions of T2 FLAIR signal  hyperintensity in the supratentorial white matter which are nonspecific and may represent mild chronic ischemic change which would be advanced for patient's age.  There is no abnormal parenchymal susceptibility to suggest parenchymal hemorrhage.    MRA head: Anterior circulation:  The distal cervical, petrous, cavernous and supraclinoid segments of the ICAs as well as the visualized anterior and middle cerebral arteries are patent without high-grade proximal stenosis or definite aneurysm.    Posterior circulation: The distal vertebral arteries, basilar artery and posterior cerebral arteries are patent without significant stenosis or aneurysm.  Proximal basilar artery fenestration                                       X-Ray Chest AP Portable (Final result)  Result time 07/14/24 07:59:04      Final result by Trevor Stacy MD (07/14/24 07:59:04)                   Impression:      No convincing evidence of acute cardiopulmonary disease.      Electronically signed by: Trevor Stacy  Date:    07/14/2024  Time:    07:59               Narrative:    EXAMINATION:  XR CHEST AP PORTABLE    CLINICAL HISTORY:  chest pain;    TECHNIQUE:  Single frontal view of the chest was performed.    COMPARISON:  Chest radiograph performed 05/25/2021, 19:37 hours.    FINDINGS:  Monitoring leads overlie the chest.  Left subclavian approach dual lead pacer, as before.    Similar cardiomediastinal contours, considered to be within normal limits.    Lungs essentially clear.  No definite pneumothorax or large volume pleural effusion.    No definite acute findings in the visualized abdomen.    Osseous and soft tissue structures appear without definite acute change.                                       CTA STROKE MULTI-PHASE (Final result)  Result time 07/14/24 06:23:01      Final result by Trevor Stacy MD (07/14/24 06:23:01)                   Impression:      No evidence of acute major vascular distribution infarct or  hemorrhage.    CTA head and neck demonstrate no high-grade stenosis or dissection.    Electronically signed by resident: René Valencia  Date:    07/14/2024  Time:    05:44    Electronically signed by: Trevor Stacy  Date:    07/14/2024  Time:    06:23               Narrative:    EXAMINATION:  CTA STROKE MULTI-PHASE    CLINICAL HISTORY:  Stroke, follow up;    TECHNIQUE:  CT angiogram was performed from through the cervical and intracranial vasculature during the IV bolus administration of 75mL of Omnipaque 350.  Multiplanar MPR and MIP reformats were performed.    COMPARISON:  MRI 04/04/2017    FINDINGS:  The brain appears within normal limits. No parenchymal mass, hemorrhage, edema or major vascular distribution infarct.      The ventricles are normal in size without evidence of hydrocephalus.    No extra-axial blood or fluid collections.    The cranium appears intact. Mastoid air cells and paranasal sinuses are essentially clear.      CTA:    The aortic arch maintains a normal branching pattern.    The common and internal carotid arteries are normal in course and caliber. No significant stenosis in either carotid bifurcation.    The vertebral origins are patent. The cervical vertebral arteries are normal in course and caliber. Vertebrobasilar system is within normal limits without focal abnormality.    The anterior, middle, and posterior cerebral arteries are within normal limits, without evidence of significant stenosis, focal occlusion, or intracranial aneurysm formation.    Chest soft tissue demonstrate left AICD.  Lungs apex are clear.  No pleural effusion.                                    Assessment/Plan:     * Left-sided weakness  Left sided numbness   - CTA and MRI brain negative for ischemia   - CBC and CMP grossly unremarkable   - trop and TSH wnl  - PT/OT rec low intensity therapy   - vasc neuro consulted   No indication for thrombolytics and etiology of presentation less likely to be vascular in  origin.   - neurology consulted, appreciate recommendations  - fall precautions  - q4h neuro checks     SSS (sick sinus syndrome)  Cardiac pace maker in situ   - trop wnl   - home betaxolol not on formulary, sub with MTP   - monitor on telemetry     Migraine headache  - history noted  - follows with outpatient neurology       VTE Risk Mitigation (From admission, onward)           Ordered     IP VTE LOW RISK PATIENT  Once         07/14/24 1027     Place sequential compression device  Until discontinued         07/14/24 1027                    On 07/14/2024, patient should be placed in hospital observation services under my care in collaboration with Ana Mac MD.        Natasha Foss PA-C  Department of Hospital Medicine  [facility] 11H

## 2024-07-14 NOTE — ASSESSMENT & PLAN NOTE
-Stroke mimic. The patient initially presented and stated she had a HA, blue spots in vision, and blurred vision.   -On my assessment the patient currently denies HA.

## 2024-07-14 NOTE — CARE UPDATE
Device was reprogrammed out of MRI settings.   Back to baseline settings as prior note    [facility]  Cardiovascular Disease Fellowship - PGY5  [facility]

## 2024-07-14 NOTE — HPI
Ms. Vanessa reyes is a 24 year old female with a history of migraines that presents after waking up with chest pain and new onset left sided weakness and numbness. She reports she had a mild migraine the night prior to presentation but was otherwise in her normal state of health. After waking, patient noted the above symptoms alongside continue mild headache. She endorses nausea and photophobia. She remained ambulatory but noted L sided heaviness. She has never experienced these types of symptoms before now, and denies history of focal deficit during past migraines. For migraine management, she follows with Dr. Brown and is typically controlled with PRN sumatriptan. She reports she is currently out of this medication and hasn't taken it for weeks.     On presentation to [facility] ED, stroke code was activated. CTA was negative for large vessel occlusion and MRI ischemic protocol was remarkable for very small scattered white matter lesions, consistent with patient's history of migraine. On my interview, patient reports that her symptoms have markedly improved and denies current headache. She does report blurry vision R>L that is new. Neurology is consulted for L arm and leg weakness with negative stroke workup.

## 2024-07-14 NOTE — PLAN OF CARE
OT manohar completed and goals are appropriate.   Problem: Occupational Therapy  Goal: Occupational Therapy Goal  Description: Goals to be met by: 7/19/2024     Patient will increase functional independence with ADLs by performing:    Grooming while standing at sink with [place].  Toileting from toilet with [place] for hygiene and clothing management.   Toilet transfer to toilet with [place].  Increased functional strength to WFL for LUE.  Upper extremity exercise program x10 reps per handout, with independence.    Outcome: Progressing

## 2024-07-14 NOTE — ASSESSMENT & PLAN NOTE
Left sided numbness   - CTA and MRI brain negative for ischemia   - CBC and CMP grossly unremarkable   - trop and TSH wnl  - PT/OT rec low intensity therapy   - vasc neuro consulted   No indication for thrombolytics and etiology of presentation less likely to be vascular in origin.   - neurology consulted, appreciate recommendations  - fall precautions  - q4h neuro checks

## 2024-07-14 NOTE — ASSESSMENT & PLAN NOTE
Vanessa Sinclair is a 24 y.o. female with a significant medical history of SSS s/p pacemaker placement, Migraine HAs who presents to the hospital for evaluation of left sided numbness and weakness. LKN midnight, when the patient went to bed. She woke up around 0400 w/chest pain/pressure. At that time she also noted left sided numbness. She presented to the ED for evaluation. Initial NIHSS 4 for LSW and drift. CTA Stroke MP is negative for acute findings, No LVO.    -Unable to immediately determine eligibility for TNK due to the patient having wake up stroke symptoms and pacemaker in place. Reached out to the MRI tech and on-call Cardiology, appreciate the assistance.   -MRI brain pending  -Further management recs pending MRI brain.

## 2024-07-14 NOTE — HPI
Vanessa Sinclair is a 24 y.o. female with a significant medical history of SSS s/p pacemaker placement who presents to the hospital for evaluation of left sided numbness and weakness. The patient was LKN at midnight when she went to bed. She woke up this AM at 0400 w/chest pain and noted left numbness at that time. She also had blurred vision and HA. The patient presented to the ED for evaluation. A Stroke code was activated. The patient currently denies a HA but endorses dizziness.

## 2024-07-14 NOTE — ASSESSMENT & PLAN NOTE
Vanessa Sinclair is a 24 y.o. female with a significant medical history of SSS s/p pacemaker placement, Migraine HAs who presents to the hospital for evaluation of left sided numbness and weakness. LKN midnight, when the patient went to bed. She woke up around 0400 w/chest pain/pressure. At that time she also noted left sided numbness. She presented to the ED for evaluation. Initial NIHSS 4 for LSW and drift. Of note, there was variability in her exam with each provider, resistance when examining strength and + cordon sign. CTA Stroke MP is negative for acute findings, No LVO.    -Unable to immediately determine eligibility for TNK due to the patient having wake up stroke symptoms and pacemaker in place. Though her exam seems consistent with being functional, will pursue MRI given the patient's symptoms and age. Reached out to the MRI tech and on-call Cardiology, appreciate the assistance.   -MRI brain pending  -Further management recs pending MRI brain.

## 2024-07-14 NOTE — ED NOTES
Assumed care for pt after recieving report from nightshift RN. Pt. resting in bed in NAD, RR e/u. Vital signs stable and within desired limits at this time of assessment. Pt. offered bathroom assistance and denies need at this time. Explanation of care/wait provided. Pt verbalizes no needs at this time. Bed in low, locked position with rails up and call bell in reach. Pt's white board updated with today's care team and plan.     Patient identifiers for April Lorrie Sinclair 24 y.o. female checked and correct.  Chief Complaint   Patient presents with    Chest Pain     Left side with left arm numbness/tingling with vision changes     Past Medical History:   Diagnosis Date    Asthma     Headache(784.0)     Migraine     Pre-syncope 5/25/2021    Seizures     Syncope and collapse      Allergies reported:   Review of patient's allergies indicates:   Allergen Reactions    Lamictal  [lamotrigine]      Other reaction(s): HIVES    Cefzil [cefprozil] Rash    Demerol [meperidine] Rash     Other reaction(s): Rash    Pcn [penicillins] Rash     Other reaction(s): Rash         LOC: Patient is awake, alert, and aware of environment with an appropriate affect. Patient is oriented x 4 and speaking appropriately.  APPEARANCE: Patient resting comfortably and in no acute distress. Patient is clean and well groomed, patient's clothing is properly fastened.  HEENT: WDL  SKIN: The skin is warm and dry. Patient has normal skin turgor and moist mucus membranes.   MUSCULOSKELETAL: Patient is moving all right sided extremities well, no obvious deformities noted. Pulses intact. Pt's left arm and left leg are flaccid subjectively.   RESPIRATORY: Airway is open and patent. Respirations are spontaneous and non-labored with normal effort and rate.  CARDIAC: Patient has a paced rhythm of 75 on cardiac monitor, Cardiology at bedside interrogating pacemaker. No peripheral edema noted. Denies chest pain and SOB at at time of assessment.   ABDOMEN: No  distention noted. Soft and non-tender upon palpation.  NEUROLOGICAL: pupils 3mm, PERRL. Facial expression is symmetrical. Hand grasps are equal bilaterally. Normal sensation in all extremities when touched with finger.

## 2024-07-14 NOTE — SUBJECTIVE & OBJECTIVE
Past Medical History:   Diagnosis Date    Asthma     Headache(784.0)     Migraine     Pre-syncope 5/25/2021    Seizures     Syncope and collapse        Past Surgical History:   Procedure Laterality Date    A-V CARDIAC PACEMAKER INSERTION Left 7/18/2019    Procedure: INSERTION, CARDIAC PACEMAKER, DUAL CHAMBER;  Surgeon: Piter Cooley MD;  Location: [facility] CATH LAB/EP;  Service: Cardiology;  Laterality: Left;    PHLEBOGRAPHY  5/25/2021    Procedure: Venogram;  Surgeon: Piter Cooley MD;  Location: [facility] EP LAB;  Service: Cardiology;;    REPLACEMENT OF PACEMAKER GENERATOR  5/25/2021    Procedure: REPLACEMENT, PACEMAKER GENERATOR;  Surgeon: Piter Cooley MD;  Location: [facility] EP LAB;  Service: Cardiology;;    WISDOM TOOTH EXTRACTION         Review of patient's allergies indicates:   Allergen Reactions    Lamictal  [lamotrigine]      Other reaction(s): HIVES    Cefzil [cefprozil] Rash    Demerol [meperidine] Rash     Other reaction(s): Rash    Pcn [penicillins] Rash     Other reaction(s): Rash     No current facility-administered medications on file prior to encounter.     Current Outpatient Medications on File Prior to Encounter   Medication Sig    betaxoloL (KERLONE) 10 mg Tab Take 1 tablet (10 mg total) by mouth once daily. Start with 1/4 pill a day for 2 weeks. Increase to 1/2 pill a day for the following 2 weeks. Increase to a full tablet (10 mg) a day thereafter.Progression schedule from 1/4 tab to a full tab to be modified in case of perceived side effects.Future further increases will be tailored to clinical response.    ibuprofen (ADVIL,MOTRIN) 800 MG tablet Take 1 tablet (800 mg total) by mouth 3 (three) times daily as needed for Pain.    norethindrone-ethinyl estradiol (JUNEL FE 1/20) 1 mg-20 mcg (21)/75 mg (7) per tablet Take 1 tablet by mouth once daily.     Family History       Problem Relation (Age of Onset)    Arthritis Paternal Grandmother    Cancer Maternal Grandmother    Diabetes Mother, Maternal  Grandfather, Maternal Grandmother, Paternal Grandmother, Paternal Grandfather, Father    Heart disease Maternal Grandfather    Irregular heart beat Sister    Stroke Maternal Grandfather          Tobacco Use    Smoking status: Never    Smokeless tobacco: Never   Substance and Sexual Activity    Alcohol use: Not Currently     Comment: occasionally     Drug use: Never    Sexual activity: Yes     Partners: Male     Birth control/protection: OCP     Review of Systems   Constitutional:  Positive for fatigue. Negative for chills and fever.   HENT:  Negative for sore throat and trouble swallowing.    Eyes:  Positive for visual disturbance.   Respiratory:  Negative for cough and shortness of breath.    Cardiovascular:  Positive for chest pain.   Gastrointestinal:  Negative for nausea and vomiting.   Genitourinary:  Negative for difficulty urinating.   Neurological:  Positive for weakness, numbness and headaches. Negative for dizziness, tremors, seizures, syncope, facial asymmetry, speech difficulty and light-headedness.     Objective:     Vital Signs (Most Recent):  Temp: 98.4 °F (36.9 °C) (07/14/24 1312)  Pulse: 75 (07/14/24 1509)  Resp: 16 (07/14/24 1312)  BP: 103/68 (07/14/24 1312)  SpO2: 99 % (07/14/24 1312) Vital Signs (24h Range):  Temp:  [98.4 °F (36.9 °C)-98.9 °F (37.2 °C)] 98.4 °F (36.9 °C)  Pulse:  [74-99] 75  Resp:  [15-20] 16  SpO2:  [99 %-100 %] 99 %  BP: (103-129)/(68-89) 103/68     Weight: 74 kg (163 lb 2.3 oz)  Body mass index is 27.15 kg/m².     Physical Exam  Vitals and nursing note reviewed.   Constitutional:       General: She is not in acute distress.     Appearance: Normal appearance. She is not ill-appearing.   HENT:      Head: Normocephalic and atraumatic.      Mouth/Throat:      Mouth: Mucous membranes are moist.      Pharynx: Oropharynx is clear.   Eyes:      General: No scleral icterus.     Extraocular Movements: Extraocular movements intact.      Conjunctiva/sclera: Conjunctivae normal.       Pupils: Pupils are equal, round, and reactive to light.   Cardiovascular:      Rate and Rhythm: Normal rate.      Pulses: Normal pulses.   Pulmonary:      Effort: Pulmonary effort is normal. No respiratory distress.   Abdominal:      General: Abdomen is flat. There is no distension.   Musculoskeletal:         General: No swelling.   Skin:     General: Skin is warm and dry.   Neurological:      Mental Status: She is alert and oriented to person, place, and time.      Coordination: Finger-Nose-Finger Test normal.      Deep Tendon Reflexes:      Reflex Scores:       Bicep reflexes are 2+ on the right side and 2+ on the left side.       Brachioradialis reflexes are 2+ on the right side and 2+ on the left side.       Patellar reflexes are 2+ on the right side and 2+ on the left side.       Achilles reflexes are 2+ on the right side and 2+ on the left side.  Psychiatric:         Speech: Speech normal.          NEUROLOGICAL EXAMINATION:     MENTAL STATUS   Oriented to person, place, and time.   Follows 3 step commands.   Attention: normal. Concentration: normal.   Speech: speech is normal   Level of consciousness: alert    CRANIAL NERVES     CN II   Visual fields full to confrontation.   Visual acuity: (decreased R>L)    CN III, IV, VI   Pupils are equal, round, and reactive to light.    CN V   Facial sensation intact.     CN VII   Facial expression full, symmetric.     CN VIII   CN VIII normal.     CN IX, X   CN IX normal.     CN XI   CN XI normal.     CN XII   CN XII normal.     MOTOR EXAM   Overall muscle tone: normal    Strength   Right deltoid: 5/5  Left deltoid: 4/5  Right biceps: 5/5  Left biceps: 4/5  Right triceps: 5/5  Left triceps: 4/5  Right iliopsoas: 5/5  Left iliopsoas: 4/5  Right quadriceps: 5/5  Left quadriceps: 4/5  Right hamstrin/5  Left hamstrin/5  Right anterior tibial: 5/5  Left anterior tibial: 4/5  Right gastroc: 5/5  Left gastroc: 4/5       Not able to cooperate with some parts of the motor  exam. For example, would not release tone when asked to let her foot fall to the bed, but released tone after moving on to next part of physical exam. Positive hoovers with LLE elevation.      REFLEXES     Reflexes   Right brachioradialis: 2+  Left brachioradialis: 2+  Right biceps: 2+  Left biceps: 2+  Right patellar: 2+  Left patellar: 2+  Right achilles: 2+  Left achilles: 2+  Right plantar: normal  Left plantar: normal  Right Crespo: absent  Left Crespo: absent  Right ankle clonus: absent  Left ankle clonus: absent    SENSORY EXAM   Light touch normal.     GAIT AND COORDINATION      Coordination   Finger to nose coordination: normal      Significant Labs: All pertinent lab results from the past 24 hours have been reviewed.    Significant Imaging: I have reviewed all pertinent imaging results/findings within the past 24 hours.

## 2024-07-14 NOTE — ASSESSMENT & PLAN NOTE
Cardiac pacemaker in situ   -Patient presented with concern for chest pain that woke her up. Cardiology consulted by ED.  -Will need device check.

## 2024-07-14 NOTE — ED NOTES
Telemetry Verification   Patient placed on Telemetry Box.  Verified with War Room.  Box # [id number]   Monitor Tech    Rate 75   Rhythm NSR

## 2024-07-14 NOTE — ASSESSMENT & PLAN NOTE
This is a 24 year old female with a history of migraine headache that presents with acute onset of chest pain and LSW. Stroke workup negative in the ED. She experienced marked improvement in L sided weakness and numbness prior to my interview with her, without intervention. On physical exam, she displays multiple signs concerning for possible functional disorder. However, her R>L blurriness of vision are concerning for possible venous thrombosis. She does not have a history of complicated migraine, and this would represent headache transformation. Imaging thus far has been unconcerning for acute neurological disease; does display findings c/w migraine disorder. Our differential includes hemiplegic migraine vs functional neurological disorder vs cerebral venous thrombosis. Will recommend ruling out venous thrombosis with MRV    Recommndations  - Obtain MRV with contrast to rule out cerebral venous thrombosis  - Will follow up imaging results  - Will need to follow up outpatient with Dr. Brown in Neurology  - Remainder of care per primary team    Neurology will continue to follow. Thank you for your consult. Please reach out with questions or changes in clinical picture.

## 2024-07-14 NOTE — PLAN OF CARE
[facility] - Emergency Dept  Initial Discharge Assessment       Primary Care Provider: Juan C Farmer MD    Admission Diagnosis: Left-sided weakness [R53.1]    Admission Date: 7/14/2024  Expected Discharge Date:     Transition of Care Barriers: (P) None    Payor: [employer] BLUE SHIELD / Plan: [place] PPO / Product Type: PPO /     Extended Emergency Contact Information  Primary Emergency Contact: Johanna Sinclair  [place] [address] [facility] [place] [address] [place]  Mobile Phone: [phone]  Relation: Mother  Secondary Emergency Contact: Vivek Sinclair  Address: [place] [address] [place]  Mobile Phone: [phone]  Relation: Father    Discharge Plan A: (P) Home, Other (out pt PT/OT)  Discharge Plan B: (P) Rehab      [id number] - [place]  [address]  AT [facility] [address]  [address] [place] [address]  Phone: [phone] Fax: [phone]      Initial Assessment (most recent)       Adult Discharge Assessment - 07/14/24 1241          Discharge Assessment    Assessment Type Discharge Planning Assessment (P)      Confirmed/corrected address, phone number and insurance Yes (P)      Confirmed Demographics Correct on Facesheet (P)      Source of Information patient;health record (P)      Does patient/caregiver understand observation status Yes (P)      Communicated KETURAH with patient/caregiver Yes (P)      People in Home significant other (P)      Do you expect to return to your current living situation? Yes (P)      Do you have help at home or someone to help you manage your care at home? Yes (P)      Prior to hospitilization cognitive status: Alert/Oriented (P)      Current cognitive status: Alert/Oriented (P)      Equipment Currently Used at Home none (P)      Readmission within 30 days? No (P)      Patient currently being followed by outpatient case management? No (P)      Do you currently have service(s) that  help you manage your care at home? No (P)      Do you take prescription medications? Yes (P)      Do you have prescription coverage? Yes (P)      Do you have any problems affording any of your prescribed medications? No (P)      Is the patient taking medications as prescribed? yes (P)      How do you get to doctors appointments? car, drives self;family or friend will provide (P)      Are you on dialysis? No (P)      Discharge Plan A Home;Other (P)    out pt PT/OT    Discharge Plan B Rehab (P)      DME Needed Upon Discharge  other (see comments) (P)    tbd    Discharge Plan discussed with: Patient;Friend (P)      Transition of Care Barriers None (P)         Financial Resource Strain    How hard is it for you to pay for the very basics like food, housing, medical care, and heating? Not very hard (P)         Housing Stability    In the last 12 months, was there a time when you were not able to pay the mortgage or rent on time? No (P)         Transportation Needs    Has the lack of transportation kept you from medical appointments, meetings, work or from getting things needed for daily living? No (P)         Food Insecurity    Within the past 12 months, you worried that your food would run out before you got the money to buy more. Never true (P)         Stress    Do you feel stress - tense, restless, nervous, or anxious, or unable to sleep at night because your mind is troubled all the time - these days? Only a little (P)         Social Isolation    How often do you feel lonely or isolated from those around you?  Rarely (P)         Alcohol Use    Q1: How often do you have a drink containing alcohol? Never (P)      Q2: How many drinks containing alcohol do you have on a typical day when you are drinking? Patient does not drink (P)         Utilities    In the past 12 months has the electric, gas, oil, or water company threatened to shut off services in your home? No (P)         Health Literacy    How often do you need to  have someone help you when you read instructions, pamphlets, or other written material from your doctor or pharmacy? Rarely (P)

## 2024-07-14 NOTE — SUBJECTIVE & OBJECTIVE
Past Medical History:   Diagnosis Date    Asthma     Headache(784.0)     Migraine     Pre-syncope 5/25/2021    Seizures     Syncope and collapse      Past Surgical History:   Procedure Laterality Date    A-V CARDIAC PACEMAKER INSERTION Left 7/18/2019    Procedure: INSERTION, CARDIAC PACEMAKER, DUAL CHAMBER;  Surgeon: Piter Cooley MD;  Location: [facility] CATH LAB/EP;  Service: Cardiology;  Laterality: Left;    PHLEBOGRAPHY  5/25/2021    Procedure: Venogram;  Surgeon: Piter Cooley MD;  Location: [facility] EP LAB;  Service: Cardiology;;    REPLACEMENT OF PACEMAKER GENERATOR  5/25/2021    Procedure: REPLACEMENT, PACEMAKER GENERATOR;  Surgeon: Piter Cooley MD;  Location: [facility] EP LAB;  Service: Cardiology;;    WISDOM TOOTH EXTRACTION       Social History     Tobacco Use    Smoking status: Never    Smokeless tobacco: Never   Substance Use Topics    Alcohol use: Not Currently     Comment: occasionally     Drug use: Never     Review of patient's allergies indicates:   Allergen Reactions    Lamictal  [lamotrigine]      Other reaction(s): HIVES    Cefzil [cefprozil] Rash    Demerol [meperidine] Rash     Other reaction(s): Rash    Pcn [penicillins] Rash     Other reaction(s): Rash       Medications: I have reviewed the current medication administration record.    Current Outpatient Medications   Medication Instructions    betaxoloL (KERLONE) 10 mg, Oral, Daily, Start with 1/4 pill a day for 2 weeks. Increase to 1/2 pill a day for the following 2 weeks. Increase to a full tablet (10 mg) a day thereafter.Progression schedule from 1/4 tab to a full tab to be modified in case of perceived side effects.Future further increases will be tailored to clinical response.    ibuprofen (ADVIL,MOTRIN) 800 mg, Oral, 3 times daily PRN    norethindrone-ethinyl estradiol (JUNEL FE 1/20) 1 mg-20 mcg (21)/75 mg (7) per tablet 1 tablet, Oral, Daily         Review of Systems   Eyes:  Positive for visual disturbance.   Cardiovascular:   Positive for chest pain.   Neurological:  Positive for dizziness, weakness, numbness and headaches.     Objective:     Vital Signs (Most Recent):  Temp: 98.9 °F (37.2 °C) (07/14/24 0512)  Pulse: 75 (07/14/24 0645)  Resp: 20 (07/14/24 0645)  BP: 125/79 (07/14/24 0645)  SpO2: 100 % (07/14/24 0645)    Vital Signs Range (Last 24H):  Temp:  [98.9 °F (37.2 °C)]   Pulse:  [75-82]   Resp:  [15-20]   BP: (122-129)/(74-89)   SpO2:  [99 %-100 %]        Physical Exam  Vitals and nursing note reviewed.   Eyes:      General: No scleral icterus.        Right eye: No discharge.         Left eye: No discharge.      Extraocular Movements: Extraocular movements intact.      Conjunctiva/sclera: Conjunctivae normal.      Pupils: Pupils are equal, round, and reactive to light.   Cardiovascular:      Rate and Rhythm: Normal rate.   Pulmonary:      Effort: Pulmonary effort is normal. No respiratory distress.   Abdominal:      General: There is no distension.   Musculoskeletal:      Cervical back: Normal range of motion and neck supple.   Skin:     General: Skin is warm and dry.   Neurological:      Mental Status: She is alert.      Sensory: Sensory deficit present.      Motor: Weakness present.   Psychiatric:         Attention and Perception: Attention normal.         Mood and Affect: Affect is flat.            Neurological Exam:   LOC: alert  Attention Span: Good   Visual Fields: Full  EOM (CN III, IV, VI): Full/intact  Pupils (CN II, III): PERRL  Facial Movement (CN VII): Symmetric facial expression    Sensation: [place]-anesthesia left    Laboratory:  CMP:   Recent Labs   Lab 07/14/24  0534   CALCIUM 9.0   ALBUMIN 3.8   PROT 7.3      K 4.6   CO2 21*      BUN 15   CREATININE 0.9   ALKPHOS 46*   ALT 12   AST 22   BILITOT 0.1     CBC:   Recent Labs   Lab 07/14/24  0534   WBC 9.04   RBC 4.61   HGB 13.6   HCT 43.0      MCV 93   MCH 29.5   MCHC 31.6*     Lipid Panel:   Recent Labs   Lab 07/14/24  0534   CHOL 185   LDLCALC  "117.0   HDL 42   TRIG 130     Coagulation:   Recent Labs   Lab 07/14/24  0534   INR 1.2     Hgb A1C: No results for input(s): "HGBA1C" in the last 168 hours.  TSH:   Recent Labs   Lab 07/14/24  0534   TSH 3.364       Diagnostic Results:    Brain imaging/Vessel Imaging:  MRI Brain pending    CTA Stroke MP 7/14/2024 Imaging reviewed as acquired and in conjunction with Vascular Neurology Fellow, Dr. Wilburn. CTH is negative for acute findings. CTA is negative for LVO.    Cardiac Evaluation:   EKG 7/14/2024 Atrial paced rhythm    "

## 2024-07-14 NOTE — SUBJECTIVE & OBJECTIVE
Past Medical History:   Diagnosis Date    Asthma     Headache(784.0)     Migraine     Pre-syncope 5/25/2021    Seizures     Syncope and collapse        Past Surgical History:   Procedure Laterality Date    A-V CARDIAC PACEMAKER INSERTION Left 7/18/2019    Procedure: INSERTION, CARDIAC PACEMAKER, DUAL CHAMBER;  Surgeon: Piter Cooley MD;  Location: [facility] CATH LAB/EP;  Service: Cardiology;  Laterality: Left;    PHLEBOGRAPHY  5/25/2021    Procedure: Venogram;  Surgeon: Piter Cooley MD;  Location: [facility] EP LAB;  Service: Cardiology;;    REPLACEMENT OF PACEMAKER GENERATOR  5/25/2021    Procedure: REPLACEMENT, PACEMAKER GENERATOR;  Surgeon: Piter Cooley MD;  Location: [facility] EP LAB;  Service: Cardiology;;    WISDOM TOOTH EXTRACTION         Review of patient's allergies indicates:   Allergen Reactions    Lamictal  [lamotrigine]      Other reaction(s): HIVES    Cefzil [cefprozil] Rash    Demerol [meperidine] Rash     Other reaction(s): Rash    Pcn [penicillins] Rash     Other reaction(s): Rash       No current facility-administered medications on file prior to encounter.     Current Outpatient Medications on File Prior to Encounter   Medication Sig    betaxoloL (KERLONE) 10 mg Tab Take 1 tablet (10 mg total) by mouth once daily. Start with 1/4 pill a day for 2 weeks. Increase to 1/2 pill a day for the following 2 weeks. Increase to a full tablet (10 mg) a day thereafter.Progression schedule from 1/4 tab to a full tab to be modified in case of perceived side effects.Future further increases will be tailored to clinical response.    ibuprofen (ADVIL,MOTRIN) 800 MG tablet Take 1 tablet (800 mg total) by mouth 3 (three) times daily as needed for Pain.    norethindrone-ethinyl estradiol (JUNEL FE 1/20) 1 mg-20 mcg (21)/75 mg (7) per tablet Take 1 tablet by mouth once daily.     Family History       Problem Relation (Age of Onset)    Arthritis Paternal Grandmother    Cancer Maternal Grandmother    Diabetes Mother, Maternal  Grandfather, Maternal Grandmother, Paternal Grandmother, Paternal Grandfather, Father    Heart disease Maternal Grandfather    Irregular heart beat Sister    Stroke Maternal Grandfather          Tobacco Use    Smoking status: Never    Smokeless tobacco: Never   Substance and Sexual Activity    Alcohol use: Not Currently     Comment: occasionally     Drug use: Never    Sexual activity: Yes     Partners: Male     Birth control/protection: OCP     Review of Systems   Constitutional:  Negative for chills and fever.   Eyes:  Positive for visual disturbance.   Respiratory:  Negative for chest tightness and shortness of breath.    Cardiovascular:  Negative for chest pain and leg swelling.   Gastrointestinal:  Negative for abdominal pain, nausea and vomiting.   Genitourinary:  Negative for decreased urine volume and difficulty urinating.   Musculoskeletal:  Negative for back pain and neck pain.   Neurological:  Positive for weakness (left sided) and numbness (left sided). Negative for dizziness.     Objective:     Vital Signs (Most Recent):  Temp: 98.4 °F (36.9 °C) (07/14/24 1312)  Pulse: 75 (07/14/24 1509)  Resp: 16 (07/14/24 1312)  BP: 103/68 (07/14/24 1312)  SpO2: 99 % (07/14/24 1312) Vital Signs (24h Range):  Temp:  [98.4 °F (36.9 °C)-98.9 °F (37.2 °C)] 98.4 °F (36.9 °C)  Pulse:  [74-99] 75  Resp:  [15-20] 16  SpO2:  [99 %-100 %] 99 %  BP: (103-129)/(68-89) 103/68     Weight: 74 kg (163 lb 2.3 oz)  Body mass index is 27.15 kg/m².     Physical Exam  Vitals and nursing note reviewed.   Eyes:      General: No scleral icterus.        Right eye: No discharge.         Left eye: No discharge.   Cardiovascular:      Rate and Rhythm: Normal rate.   Pulmonary:      Effort: Pulmonary effort is normal. No respiratory distress.   Abdominal:      General: There is no distension.   Musculoskeletal:      Cervical back: Normal range of motion and neck supple.   Skin:     General: Skin is warm and dry.   Neurological:      Mental Status:  She is alert and oriented to person, place, and time.      Sensory: Sensory deficit present.      Motor: Weakness (4/5 strenght to left upper and lower extremities, 5/5 to all remaining) present.   Psychiatric:         Attention and Perception: Attention normal.         Mood and Affect: Affect is flat.     Significant Labs: All pertinent labs within the past 24 hours have been reviewed.  CBC:   Recent Labs   Lab 07/14/24  0524 07/14/24  0534   WBC  --  9.04   HGB  --  13.6   HCT 41 43.0   PLT  --  249     CMP:   Recent Labs   Lab 07/14/24  0534      K 4.6      CO2 21*      BUN 15   CREATININE 0.9   CALCIUM 9.0   PROT 7.3   ALBUMIN 3.8   BILITOT 0.1   ALKPHOS 46*   AST 22   ALT 12   ANIONGAP 8     Troponin:   Recent Labs   Lab 07/14/24  0744   TROPONINI <0.006     TSH:   Recent Labs   Lab 07/14/24  0534   TSH 3.364     Significant Imaging: I have reviewed all pertinent imaging results/findings within the past 24 hours.  Imaging Results              MRI Brain Ischemic Inter Pro Incl MRA W/O Con (Final result)  Result time 07/14/24 08:26:51      Final result by Aaron Menendez DO (07/14/24 08:26:51)                   Impression:      MRI brain: Unremarkable MRI brain specifically without evidence for acute infarction or hydrocephalus.    MRA head: Unremarkable MRA head specifically without evidence for high-grade proximal stenosis or proximal occlusion.      Electronically signed by: Aaron Menendez DO  Date:    07/14/2024  Time:    08:26               Narrative:    EXAMINATION:  MRI BRAIN ISCHEMIC INTERVENTIONAL PROTOCOL INCL MRA W/O CONTRAST    CLINICAL HISTORY:  wake up stroke LSW;    TECHNIQUE:  Axial diffusion, axial FLAIR and axial gradient imaging of the whole brain without contrast.  In addition Separate acquisition axial source noncontrast 3D  time-of-flight MRA head with 3 dimensional MIPS reformatted images.    COMPARISON:  CTA 7/14/24    FINDINGS:  MRI brain: There is no restricted  diffusion to suggest acute infarction.  Ventricles normal without hydrocephalus.  There is no midline shift or mass effect.  Few punctate regions of T2 FLAIR signal hyperintensity in the supratentorial white matter which are nonspecific and may represent mild chronic ischemic change which would be advanced for patient's age.  There is no abnormal parenchymal susceptibility to suggest parenchymal hemorrhage.    MRA head: Anterior circulation:  The distal cervical, petrous, cavernous and supraclinoid segments of the ICAs as well as the visualized anterior and middle cerebral arteries are patent without high-grade proximal stenosis or definite aneurysm.    Posterior circulation: The distal vertebral arteries, basilar artery and posterior cerebral arteries are patent without significant stenosis or aneurysm.  Proximal basilar artery fenestration                                       X-Ray Chest AP Portable (Final result)  Result time 07/14/24 07:59:04      Final result by Trevor Stacy MD (07/14/24 07:59:04)                   Impression:      No convincing evidence of acute cardiopulmonary disease.      Electronically signed by: Trevor Stacy  Date:    07/14/2024  Time:    07:59               Narrative:    EXAMINATION:  XR CHEST AP PORTABLE    CLINICAL HISTORY:  chest pain;    TECHNIQUE:  Single frontal view of the chest was performed.    COMPARISON:  Chest radiograph performed 05/25/2021, 19:37 hours.    FINDINGS:  Monitoring leads overlie the chest.  Left subclavian approach dual lead pacer, as before.    Similar cardiomediastinal contours, considered to be within normal limits.    Lungs essentially clear.  No definite pneumothorax or large volume pleural effusion.    No definite acute findings in the visualized abdomen.    Osseous and soft tissue structures appear without definite acute change.                                       CTA STROKE MULTI-PHASE (Final result)  Result time 07/14/24 06:23:01      Final  result by Trevor Stacy MD (07/14/24 06:23:01)                   Impression:      No evidence of acute major vascular distribution infarct or hemorrhage.    CTA head and neck demonstrate no high-grade stenosis or dissection.    Electronically signed by resident: René Valencia  Date:    07/14/2024  Time:    05:44    Electronically signed by: Trevor Stacy  Date:    07/14/2024  Time:    06:23               Narrative:    EXAMINATION:  CTA STROKE MULTI-PHASE    CLINICAL HISTORY:  Stroke, follow up;    TECHNIQUE:  CT angiogram was performed from through the cervical and intracranial vasculature during the IV bolus administration of 75mL of Omnipaque 350.  Multiplanar MPR and MIP reformats were performed.    COMPARISON:  MRI 04/04/2017    FINDINGS:  The brain appears within normal limits. No parenchymal mass, hemorrhage, edema or major vascular distribution infarct.      The ventricles are normal in size without evidence of hydrocephalus.    No extra-axial blood or fluid collections.    The cranium appears intact. Mastoid air cells and paranasal sinuses are essentially clear.      CTA:    The aortic arch maintains a normal branching pattern.    The common and internal carotid arteries are normal in course and caliber. No significant stenosis in either carotid bifurcation.    The vertebral origins are patent. The cervical vertebral arteries are normal in course and caliber. Vertebrobasilar system is within normal limits without focal abnormality.    The anterior, middle, and posterior cerebral arteries are within normal limits, without evidence of significant stenosis, focal occlusion, or intracranial aneurysm formation.    Chest soft tissue demonstrate left AICD.  Lungs apex are clear.  No pleural effusion.

## 2024-07-14 NOTE — CONSULTS
[facility] - Emergency Dept  Vascular Neurology  Comprehensive Stroke Center  Consult Note    Inpatient consult to Vascular (Stroke) Neurology  Consult performed by: Tereza Triana DNP  Consult ordered by: Deloris Shen Jr., MD  Reason for consult: LSW, L numbness        Assessment/Plan:     Left sided numbness  Vanessa Sinclair is a 24 y.o. female with a significant medical history of SSS s/p pacemaker placement, Migraine HAs who presents to the hospital for evaluation of left sided numbness and weakness. LKN midnight, when the patient went to bed. She woke up around 0400 w/chest pain/pressure. At that time she also noted left sided numbness. She presented to the ED for evaluation. Initial NIHSS 4 for LSW and drift. Of note, there was variability in her exam with each provider, resistance when examining strength and + cordon sign. CTA Stroke MP is negative for acute findings, No LVO.    -Unable to immediately determine eligibility for TNK due to the patient having wake up stroke symptoms and pacemaker in place. Though her exam seems consistent with being functional, will pursue MRI given the patient's symptoms and age. Reached out to the MRI tech and on-call Cardiology, appreciate the assistance.   -MRI brain pending  -Further management recs pending MRI brain.    SSS (sick sinus syndrome)  Cardiac pacemaker in situ   -Patient presented with concern for chest pain that woke her up. Cardiology consulted by ED.  -Will need device check.    Migraine headache  -Stroke mimic. The patient initially presented and stated she had a HA, blue spots in vision, and blurred vision.   -On my assessment the patient currently denies HA.        STROKE DOCUMENTATION     Acute Stroke Times   Last Known Normal Date: 07/14/24  Last Known Normal Time: 0000  Symptom Onset Date: 07/14/24  Unknown Symptom Onset Time: Unknown Time  Stroke Team Called Date: 07/14/24  Stroke Team Called Time: 0508  Stroke Team Arrival Date:  07/14/24  Stroke Team Arrival Time: 0514  CT Interpretation Time: 0523  Thrombolytic Therapy Recommended: No  CTA Interpretation Time: 0525  Thrombectomy Recommended: No    NIH Scale:  Interval: baseline  1a. Level of Consciousness: 0-->Alert, keenly responsive  1b. LOC Questions: 0-->Answers both questions correctly  1c. LOC Commands: 0-->Performs both tasks correctly  2. Best Gaze: 0-->Normal  3. Visual: 0-->No visual loss  4. Facial Palsy: 0-->Normal symmetrical movements  5a. Motor Arm, Left: 1-->Drift, limb holds 90 (or 45) degrees, but drifts down before full 10 seconds, does not hit bed or other support  5b. Motor Arm, Right: 0-->No drift, limb holds 90 (or 45) degrees for full 10 secs  6a. Motor Leg, Left: 1-->Drift, leg falls by the end of the 5-sec period but does not hit bed  6b. Motor Leg, Right: 0-->No drift, leg holds 30 degree position for full 5 secs  7. Limb Ataxia: 0-->Absent  8. Sensory: 2-->Severe to total sensory loss, patient is not aware of being touched in the face, arm, and leg  9. Best Language: 0-->No aphasia, normal  10. Dysarthria: 0-->Normal  11. Extinction and Inattention (formerly Neglect): 0-->No abnormality  Total (NIH Stroke Scale): 4    Modified Douglas Score: 0  Shira Coma Scale:15   ABCD2 Score:    [id number] Score:   HAS -BLED Score:   ICH Score:   Hunt & Garces Classification:       Thrombolysis Candidate?  Currently attempting to facilitate MRI brain to clarify TNK eligibility as her symptoms qualify for wake up stroke. She has a pacemaker in place. Coordinating w/MRI techs and Cardiology to expedite MRI brain.    Delays to Thrombolysis?  Yes, Care-team unable to determine eligibility (Timeline of stroke onset evolved- wakeup stroke requiring MRI, etc)    Interventional Revascularization Candidate?   Is the patient eligible for mechanical endovascular reperfusion (SHIVANI)?  No; No large vessel occlusion identified on imaging     Delays to Thrombectomy? Not  Applicable    Hemorrhagic change of an Ischemic Stroke: Does this patient have an ischemic stroke with hemorrhagic changes? No     Subjective:     History of Present Illness:  Vanessa Sinclair is a 24 y.o. female with a significant medical history of SSS s/p pacemaker placement who presents to the hospital for evaluation of left sided numbness and weakness. The patient was LKN at midnight when she went to bed. She woke up this AM at 0400 w/chest pain and noted left numbness at that time. She also had blurred vision and HA. The patient presented to the ED for evaluation. A Stroke code was activated. The patient currently denies a HA but endorses dizziness.            Past Medical History:   Diagnosis Date    Asthma     Headache(784.0)     Migraine     Pre-syncope 5/25/2021    Seizures     Syncope and collapse      Past Surgical History:   Procedure Laterality Date    A-V CARDIAC PACEMAKER INSERTION Left 7/18/2019    Procedure: INSERTION, CARDIAC PACEMAKER, DUAL CHAMBER;  Surgeon: Piter Cooley MD;  Location: [facility] CATH LAB/EP;  Service: Cardiology;  Laterality: Left;    PHLEBOGRAPHY  5/25/2021    Procedure: Venogram;  Surgeon: Piter Cooley MD;  Location: [facility] EP LAB;  Service: Cardiology;;    REPLACEMENT OF PACEMAKER GENERATOR  5/25/2021    Procedure: REPLACEMENT, PACEMAKER GENERATOR;  Surgeon: Piter Cooley MD;  Location: [facility] EP LAB;  Service: Cardiology;;    WISDOM TOOTH EXTRACTION       Social History     Tobacco Use    Smoking status: Never    Smokeless tobacco: Never   Substance Use Topics    Alcohol use: Not Currently     Comment: occasionally     Drug use: Never     Review of patient's allergies indicates:   Allergen Reactions    Lamictal  [lamotrigine]      Other reaction(s): HIVES    Cefzil [cefprozil] Rash    Demerol [meperidine] Rash     Other reaction(s): Rash    Pcn [penicillins] Rash     Other reaction(s): Rash       Medications: I have reviewed the current medication administration  record.    Current Outpatient Medications   Medication Instructions    betaxoloL (KERLONE) 10 mg, Oral, Daily, Start with 1/4 pill a day for 2 weeks. Increase to 1/2 pill a day for the following 2 weeks. Increase to a full tablet (10 mg) a day thereafter.Progression schedule from 1/4 tab to a full tab to be modified in case of perceived side effects.Future further increases will be tailored to clinical response.    ibuprofen (ADVIL,MOTRIN) 800 mg, Oral, 3 times daily PRN    norethindrone-ethinyl estradiol (JUNEL FE 1/20) 1 mg-20 mcg (21)/75 mg (7) per tablet 1 tablet, Oral, Daily         Review of Systems   Eyes:  Positive for visual disturbance.   Cardiovascular:  Positive for chest pain.   Neurological:  Positive for dizziness, weakness, numbness and headaches.     Objective:     Vital Signs (Most Recent):  Temp: 98.9 °F (37.2 °C) (07/14/24 0512)  Pulse: 75 (07/14/24 0645)  Resp: 20 (07/14/24 0645)  BP: 125/79 (07/14/24 0645)  SpO2: 100 % (07/14/24 0645)    Vital Signs Range (Last 24H):  Temp:  [98.9 °F (37.2 °C)]   Pulse:  [75-82]   Resp:  [15-20]   BP: (122-129)/(74-89)   SpO2:  [99 %-100 %]        Physical Exam  Vitals and nursing note reviewed.   Eyes:      General: No scleral icterus.        Right eye: No discharge.         Left eye: No discharge.      Extraocular Movements: Extraocular movements intact.      Conjunctiva/sclera: Conjunctivae normal.      Pupils: Pupils are equal, round, and reactive to light.   Cardiovascular:      Rate and Rhythm: Normal rate.   Pulmonary:      Effort: Pulmonary effort is normal. No respiratory distress.   Abdominal:      General: There is no distension.   Musculoskeletal:      Cervical back: Normal range of motion and neck supple.   Skin:     General: Skin is warm and dry.   Neurological:      Mental Status: She is alert.      Sensory: Sensory deficit present.      Motor: Weakness present.   Psychiatric:         Attention and Perception: Attention normal.         Mood and  "Affect: Affect is flat.            Neurological Exam:   LOC: alert  Attention Span: Good   Visual Fields: Full  EOM (CN III, IV, VI): Full/intact  Pupils (CN II, III): PERRL  Facial Movement (CN VII): Symmetric facial expression    Sensation: [place]air-anesthesia left    Laboratory:  CMP:   Recent Labs   Lab 07/14/24  0534   CALCIUM 9.0   ALBUMIN 3.8   PROT 7.3      K 4.6   CO2 21*      BUN 15   CREATININE 0.9   ALKPHOS 46*   ALT 12   AST 22   BILITOT 0.1     CBC:   Recent Labs   Lab 07/14/24  0534   WBC 9.04   RBC 4.61   HGB 13.6   HCT 43.0      MCV 93   MCH 29.5   MCHC 31.6*     Lipid Panel:   Recent Labs   Lab 07/14/24  0534   CHOL 185   LDLCALC 117.0   HDL 42   TRIG 130     Coagulation:   Recent Labs   Lab 07/14/24  0534   INR 1.2     Hgb A1C: No results for input(s): "HGBA1C" in the last 168 hours.  TSH:   Recent Labs   Lab 07/14/24  0534   TSH 3.364       Diagnostic Results:    Brain imaging/Vessel Imaging:  MRI Brain pending    CTA Stroke MP 7/14/2024 Imaging reviewed as acquired and in conjunction with Vascular Neurology Fellow, Dr. Wilburn. CTH is negative for acute findings. CTA is negative for LVO.    Cardiac Evaluation:   EKG 7/14/2024 Atrial paced rhythm      Tereza Triana DNP  [facility] Stroke Center  Department of Vascular Neurology   [facility] - Emergency Dept   "

## 2024-07-14 NOTE — NURSING
Nurses Note -- 4 Eyes      7/14/2024   3:44 PM      Skin assessed during: Admit      [x] No Altered Skin Integrity Present    []Prevention Measures Documented      [] Yes- Altered Skin Integrity Present or Discovered   [] LDA Added if Not in Epic (Describe Wound)   [] New Altered Skin Integrity was Present on Admit and Documented in LDA   [] Wound Image Taken    Wound Care Consulted? No    Attending Nurse:  Helio Nam RN/Staff Member:   Valery

## 2024-07-14 NOTE — HPI
Vanessa Sinclair is a 24 y.o. female with SSS s/p PPM and migraine being admitted to hospital medicine for management of acute onset left sided weakness and numbness. Patient reports waking from sleep with new onset left upper and lower extremity weakness and numbness. Reports she is able to move her left leg and arm some but has significantly diminished range of motion and decreased sensation that impairs her ability to ambulate. Endorses associated right eye > left eye blurriness. Notes she did have a headache before going to sleep last night but does not have one now. Overall symptoms are gradually improving since onset. Last normal yesterday, she was able to walk around the mall with her family without issue. No familiarity of symptoms, recent trauma or injury, fever, chills, nausea, vomiting, diarrhea, abdominal pain, dysuria, cough or congestion. Does not smoke or drink.         In ED: AFVSS. CBC and CMP grossly unremarkable. TSH and troponin wnl. Stroke code activated, CTA and MRI brain negative for acute infarction or hydrocephalus. Vasc neuro consulted, no indication for thrombotic agents. Given 1 g tylenol. Admitted to hospital medicine for further management.

## 2024-07-14 NOTE — ASSESSMENT & PLAN NOTE
Cardiac pace maker in situ   - trop wnl   - home betaxolol not on formulary, sub with MTP   - monitor on telemetry

## 2024-07-15 ENCOUNTER — DOCUMENTATION ONLY (OUTPATIENT)
Dept: CARDIOLOGY | Facility: HOSPITAL | Age: 24
End: 2024-07-15
Payer: MEDICAID

## 2024-07-15 PROBLEM — G43.409 HEMIPLEGIC MIGRAINE WITHOUT STATUS MIGRAINOSUS: Status: RESOLVED | Noted: 2024-07-15 | Resolved: 2024-07-15

## 2024-07-15 PROBLEM — G43.409 HEMIPLEGIC MIGRAINE WITHOUT STATUS MIGRAINOSUS: Status: ACTIVE | Noted: 2024-07-15

## 2024-07-15 PROBLEM — R07.9 CHEST PAIN: Status: ACTIVE | Noted: 2024-07-15

## 2024-07-15 LAB
CRP SERPL-MCNC: 3.4 MG/L (ref 0–8.2)
ERYTHROCYTE [SEDIMENTATION RATE] IN BLOOD BY PHOTOMETRIC METHOD: 7 MM/HR (ref 0–36)

## 2024-07-15 PROCEDURE — 36415 COLL VENOUS BLD VENIPUNCTURE: CPT

## 2024-07-15 PROCEDURE — G0378 HOSPITAL OBSERVATION PER HR: HCPCS

## 2024-07-15 PROCEDURE — 85652 RBC SED RATE AUTOMATED: CPT

## 2024-07-15 PROCEDURE — 97110 THERAPEUTIC EXERCISES: CPT

## 2024-07-15 PROCEDURE — 99214 OFFICE O/P EST MOD 30 MIN: CPT | Mod: ,,, | Performed by: PSYCHIATRY & NEUROLOGY

## 2024-07-15 PROCEDURE — 25000003 PHARM REV CODE 250

## 2024-07-15 PROCEDURE — 97116 GAIT TRAINING THERAPY: CPT

## 2024-07-15 PROCEDURE — 97161 PT EVAL LOW COMPLEX 20 MIN: CPT

## 2024-07-15 PROCEDURE — 86140 C-REACTIVE PROTEIN: CPT

## 2024-07-15 RX ORDER — AMITRIPTYLINE HYDROCHLORIDE 10 MG/1
10 TABLET, FILM COATED ORAL DAILY
Status: DISCONTINUED | OUTPATIENT
Start: 2024-07-15 | End: 2024-07-16 | Stop reason: HOSPADM

## 2024-07-15 RX ADMIN — AMITRIPTYLINE HYDROCHLORIDE 10 MG: 10 TABLET, FILM COATED ORAL at 05:07

## 2024-07-15 NOTE — ASSESSMENT & PLAN NOTE
This is a 24 year old female with a history of migraine headache that presents with acute onset of chest pain and LSW. Stroke workup negative in the ED. She experienced marked improvement in L sided weakness and numbness prior to my interview with her, without intervention. On physical exam, she displays multiple signs concerning for possible functional disorder. However, her R>L blurriness of vision are concerning for possible venous thrombosis. She does not have a history of complicated migraine, and this would represent headache transformation. Imaging thus far has been unconcerning for acute neurological disease; does display findings c/w migraine disorder. Our differential includes hemiplegic migraine vs functional neurological disorder vs cerebral venous thrombosis. MRV negative for thrombosis. Patient's symptoms are associated with headache severity and are alleviated with resolution of headache. Symptoms are most consistent with complex migraine. Will start patient on daily controller and instructed her to avoid triptan use in future.     Recommndations  - Start amitriptyline 10mg daily  - Triptans are contraindicated in complex migraine. Instructed patient to stop imitrex use after discharge.  - Instructed patient to use advil-migraine for abortive therapy  - Instructed patient to add Vitamin B2 (riboflavin) to her supplements alongside CoQ10 and B12  - Please refer to Dr. Brown in Neurology for follow up migraine management  - discussed return precautions. Patient expressed understanding.  - Remainder of care per primary team    Neurology will sign off. Thank you for your consult. Please reach out with questions or changes in clinical picture.

## 2024-07-15 NOTE — PROGRESS NOTES
Called [facility] Room and confirmed pt Tele Unit on and paced / pt with NAD and waiting no transport back to room

## 2024-07-15 NOTE — PLAN OF CARE
Problem: Physical Therapy  Goal: Physical Therapy Goal  Description: Goals to be met by: 2024     Patient will increase functional independence with mobility by performin. Supine to sit with supervision  2. Sit to stand transfer with Supervision and no AD  3. Gait  x 400 feet with Supervision using No Assistive Device.   4. Ascend/descend 3 flights of stairs with bilateral Handrails Supervision using No Assistive Device.     Outcome: Progressing     Eval completed. Goals appropriate

## 2024-07-15 NOTE — PROGRESS NOTES
[facility]  Neurology  Progress Note    Patient Name: Vanessa Reyes  MRN: [id number]  Admission Date: 7/14/2024  Hospital Length of Stay: 0 days  Code Status: Full Code   Attending Provider: Ana Mac MD  Primary Care Physician: Juan C Farmer MD   Principal Problem:Left-sided weakness    HPI:   Ms. Vanessa reyes is a 24 year old female with a history of migraines that presents after waking up with chest pain and new onset left sided weakness and numbness. She reports she had a mild migraine the night prior to presentation but was otherwise in her normal state of health. After waking, patient noted the above symptoms alongside continue mild headache. She endorses nausea and photophobia. She remained ambulatory but noted L sided heaviness. She has never experienced these types of symptoms before now, and denies history of focal deficit during past migraines. For migraine management, she follows with Dr. Brown and is typically controlled with PRN sumatriptan. She reports she is currently out of this medication and hasn't taken it for weeks.     On presentation to [facility] [place] [facility] ED, stroke code was activated. CTA was negative for large vessel occlusion and MRI ischemic protocol was remarkable for very small scattered white matter lesions, consistent with patient's history of migraine. On my interview, patient reports that her symptoms have markedly improved and denies current headache. She does report blurry vision R>L that is new. Neurology is consulted for L arm and leg weakness with negative stroke workup.      Subjective:     Interval History: Patient reports that symptoms improved yesterday after resolution of headache. At 8pm, patient experienced worsening headache with return of LSW and numbness. The headache and deficits subsided after administration of IV magnesium. This morning patient reports she is at neurological baseline.    Current Facility-Administered Medications   Medication  Dose Route Frequency Provider Last Rate Last Admin    acetaminophen tablet 650 mg  650 mg Oral Q8H PRN Natasha Foss PA-C        acetaminophen tablet 650 mg  650 mg Oral Q4H PRN Natasha Foss PA-C   650 mg at 07/14/24 2019    albuterol-ipratropium 2.5 mg-0.5 mg/3 mL nebulizer solution 3 mL  3 mL Nebulization Q6H PRN Natasha Foss PA-C        aluminum-magnesium hydroxide-simethicone 200-200-20 mg/5 mL suspension 30 mL  30 mL Oral QID PRN Natasha Foss PA-C        amitriptyline tablet 10 mg  10 mg Oral Daily Natasha Foss PA-C   10 mg at 07/15/24 1710    dextrose 10% bolus 125 mL 125 mL  12.5 g Intravenous PRN Natasha Foss PA-C        dextrose 10% bolus 250 mL 250 mL  25 g Intravenous PRN Natasha Foss PA-C        glucagon (human recombinant) injection 1 mg  1 mg Intramuscular PRN Natasha Foss PA-C        glucose chewable tablet 16 g  16 g Oral PRN Natasha Foss PA-C        glucose chewable tablet 24 g  24 g Oral PRN Natasha Foss PA-C        melatonin tablet 6 mg  6 mg Oral Nightly PRN Natasha Foss PA-C        metoprolol succinate (TOPROL-XL) 24 hr tablet 50 mg  50 mg Oral Daily Natasha Foss PA-C        naloxone 0.4 mg/mL injection 0.02 mg  0.02 mg Intravenous PRN Natasha Foss PA-C        norethindrone-ethinyl estradiol 1 mg-20 mcg (21)/75 mg (7) per tablet 1 tablet  1 tablet Oral Daily Natasha Foss PA-C        ondansetron disintegrating tablet 8 mg  8 mg Oral Q8H PRN Natasha Foss PA-C        sodium chloride 0.9% flush 10 mL  10 mL Intravenous Q12H PRN Natasha Foss PA-C           Review of Systems   Constitutional:  Positive for fatigue. Negative for chills and fever.   HENT:  Negative for sore throat and trouble swallowing.    Eyes:  Negative for visual disturbance.   Respiratory:  Negative for cough and shortness of breath.    Cardiovascular:  Negative for chest pain.   Gastrointestinal:  Negative for nausea and  vomiting.   Genitourinary:  Negative for difficulty urinating.   Neurological:  Positive for weakness, numbness and headaches. Negative for dizziness, tremors, seizures, syncope, facial asymmetry, speech difficulty and light-headedness.     Objective:     Vital Signs (Most Recent):  Temp: 99.3 °F (37.4 °C) (07/15/24 1624)  Pulse: 76 (07/15/24 1624)  Resp: 18 (07/15/24 1624)  BP: 112/64 (07/15/24 1624)  SpO2: 97 % (07/15/24 1624) Vital Signs (24h Range):  Temp:  [97.9 °F (36.6 °C)-99.3 °F (37.4 °C)] 99.3 °F (37.4 °C)  Pulse:  [65-82] 76  Resp:  [16-20] 18  SpO2:  [97 %-99 %] 97 %  BP: ()/(51-76) 112/64     Weight: 73.9 kg (163 lb)  Body mass index is 27.12 kg/m².     Physical Exam  Vitals and nursing note reviewed.   Constitutional:       General: She is not in acute distress.     Appearance: Normal appearance. She is not ill-appearing.   HENT:      Head: Normocephalic and atraumatic.      Mouth/Throat:      Mouth: Mucous membranes are moist.      Pharynx: Oropharynx is clear.   Eyes:      General: No scleral icterus.     Extraocular Movements: Extraocular movements intact and EOM normal.      Conjunctiva/sclera: Conjunctivae normal.      Pupils: Pupils are equal, round, and reactive to light.   Cardiovascular:      Rate and Rhythm: Normal rate.      Pulses: Normal pulses.   Pulmonary:      Effort: Pulmonary effort is normal. No respiratory distress.   Abdominal:      General: Abdomen is flat. There is no distension.   Musculoskeletal:         General: No swelling.   Skin:     General: Skin is warm and dry.   Neurological:      Mental Status: She is alert and oriented to person, place, and time.      Coordination: Finger-Nose-Finger Test and Heel to Shin Test normal.      Deep Tendon Reflexes:      Reflex Scores:       Bicep reflexes are 2+ on the right side and 2+ on the left side.       Brachioradialis reflexes are 2+ on the right side and 2+ on the left side.       Patellar reflexes are 2+ on the right side  and 2+ on the left side.       Achilles reflexes are 2+ on the right side and 2+ on the left side.  Psychiatric:         Speech: Speech normal.          NEUROLOGICAL EXAMINATION:     MENTAL STATUS   Oriented to person, place, and time.   Attention: normal. Concentration: normal.   Speech: speech is normal   Level of consciousness: alert    CRANIAL NERVES     CN II   Visual fields full to confrontation.   Visual acuity: normal    CN III, IV, VI   Pupils are equal, round, and reactive to light.  Extraocular motions are normal.     CN V   Facial sensation intact.     CN VII   Facial expression full, symmetric.     CN VIII   CN VIII normal.     CN IX, X   CN IX normal.     CN XI   CN XI normal.     CN XII   CN XII normal.     MOTOR EXAM   Overall muscle tone: normal    Strength   Right deltoid: 5/5  Left deltoid: 5/5  Right biceps: 5/5  Left biceps: 5/5  Right triceps: 5/5  Left triceps: 5/5  Right wrist flexion: 5/5  Left wrist flexion: 5/5  Right wrist extension: 5/5  Left wrist extension: 5/5  Right iliopsoas: 5/5  Left iliopsoas: 5/5  Right quadriceps: 5/5  Left quadriceps: 5/5  Right hamstrin/5  Left hamstrin/5  Right anterior tibial: 5/5  Left anterior tibial: 5/5  Right gastroc: 5/5  Left gastroc: 5/5    REFLEXES     Reflexes   Right brachioradialis: 2+  Left brachioradialis: 2+  Right biceps: 2+  Left biceps: 2+  Right patellar: 2+  Left patellar: 2+  Right achilles: 2+  Left achilles: 2+    SENSORY EXAM   Light touch normal.     GAIT AND COORDINATION      Coordination   Finger to nose coordination: normal  Heel to shin coordination: normal      Significant Labs: All pertinent lab results from the past 24 hours have been reviewed.    Significant Imaging: I have reviewed all pertinent imaging results/findings within the past 24 hours.  Assessment and Plan:     Hemiplegic migraine without status migrainosus  This is a 24 year old female with a history of migraine headache that presents with acute onset of  chest pain and LSW. Stroke workup negative in the ED. She experienced marked improvement in L sided weakness and numbness prior to my interview with her, without intervention. On physical exam, she displays multiple signs concerning for possible functional disorder. However, her R>L blurriness of vision are concerning for possible venous thrombosis. She does not have a history of complicated migraine, and this would represent headache transformation. Imaging thus far has been unconcerning for acute neurological disease; does display findings c/w migraine disorder. Our differential includes hemiplegic migraine vs functional neurological disorder vs cerebral venous thrombosis. MRV negative for thrombosis. Patient's symptoms are associated with headache severity and are alleviated with resolution of headache. Symptoms are most consistent with complex migraine. Will start patient on daily controller and instructed her to avoid triptan use in future.     Recommndations  - Start amitriptyline 10mg daily  - Triptans are contraindicated in complex migraine. Instructed patient to stop imitrex use after discharge.  - Instructed patient to use advil-migraine for abortive therapy  - Instructed patient to add Vitamin B2 (riboflavin) to her supplements alongside CoQ10 and B12  - Please refer to Dr. Brown in Neurology for follow up migraine management  - discussed return precautions. Patient expressed understanding.  - Remainder of care per primary team    Neurology will sign off. Thank you for your consult. Please reach out with questions or changes in clinical picture.          VTE Risk Mitigation (From admission, onward)           Ordered     IP VTE LOW RISK PATIENT  Once         07/14/24 1027     Place sequential compression device  Until discontinued         07/14/24 1027                    Thanh Kapoor MD  Neurology  [facility]

## 2024-07-15 NOTE — PROGRESS NOTES
Pt AAO w/ NAD arrived to MRI and cardiac device placed to MRI safe mode by Rep from [employer]  / pt placed to monitor and O2 Sat and Tele War Room called War Room called and informed pt off Tele monitor / moved to MRI table and placed on monitor

## 2024-07-15 NOTE — PT/OT/SLP PROGRESS
"Occupational Therapy   Treatment    Name: Vanessa Sinclair  MRN: [id number]  Admitting Diagnosis:  Left-sided weakness       Recommendations:     Discharge Recommendations: No Therapy Indicated  Discharge Equipment Recommendations:  none  Barriers to discharge:  None    Assessment:     Vanessa Sinclair is a 24 y.o. female with a medical diagnosis of Left-sided weakness.  She presents with great motivation/participation. At this time, pt has exhibited significant improvement in LUE AROM and strength, warranting there to be no acute post discharge OT needs. Pt exhibits overall functional strength and use of LUE at her PLOF and her DC recommendations have been adjusted to no therapy indicated. Today's session focused on HEP education and strengthening of LUE. Pt with appropriate demonstration and understanding of HEP this date. Pt is currently not at her PLOF and would greatly benefit from continued skilled OT intervention in efforts to participate in meaningful occupations of choice at the highest level of independence.      Performance deficits affecting function are impaired endurance, impaired self care skills, impaired functional mobility, impaired balance, decreased upper extremity function.     Rehab Prognosis:  Good; patient would benefit from acute skilled OT services to address these deficits and reach maximum level of function.       Plan:     Patient to be seen 3 x/week to address the above listed problems via self-care/home management, therapeutic activities, therapeutic exercises, neuromuscular re-education  Plan of Care Expires: 07/19/24  Plan of Care Reviewed with: patient, mother    Subjective     Chief Complaint: "I was worried when the other therapist came that you weren't going to come."  Patient/Family Comments/goals: "I feel so much better thank you for everything."  Pain/Comfort:  Pain Rating 1: 0/10  Pain Rating Post-Intervention 1: 0/10    Objective:     Communicated with: Helio ARDON " to session.  Patient found supine with pulse ox (continuous), telemetry upon OT entry to room.    General Precautions: Standard, fall    Orthopedic Precautions:N/A  Braces: N/A  Respiratory Status: Room air     Occupational Performance:     Bed Mobility:    Patient completed Supine to Sit with independence     Activities of Daily Living:  Pt politely declined    [facility] 6 Click ADL: 19    Treatment & Education:  Pt provided with education regarding self AAROM HEP in efforts to maintain and restore functional LUE strength/endurance for preferred occupations.     Patient left sitting edge of bed with all lines intact, call button in reach, RN notified, and mother/friend present    GOALS:   Multidisciplinary Problems       Occupational Therapy Goals          Problem: Occupational Therapy    Goal Priority Disciplines Outcome Interventions   Occupational Therapy Goal     OT, PT/OT Progressing    Description: Goals to be met by: 7/19/2024     Patient will increase functional independence with ADLs by performing:    Grooming while standing at sink with Cross.  Toileting from toilet with Cross for hygiene and clothing management.   Toilet transfer to toilet with Cross.  Increased functional strength to WFL for LUE.  Upper extremity exercise program x10 reps per handout, with independence.                         Time Tracking:     OT Date of Treatment: 07/15/24  OT Start Time: 1330  OT Stop Time: 1353  OT Total Time (min): 23 min    Billable Minutes:Therapeutic Exercise 23    OT/JOSE RAFAEL: OT          7/15/2024

## 2024-07-15 NOTE — SUBJECTIVE & OBJECTIVE
Subjective:     Interval History: Patient reports that symptoms improved yesterday after resolution of headache. At 8pm, patient experienced worsening headache with return of LSW and numbness. The headache and deficits subsided after administration of IV magnesium. This morning patient reports she is at neurological baseline.    Current Facility-Administered Medications   Medication Dose Route Frequency Provider Last Rate Last Admin    acetaminophen tablet 650 mg  650 mg Oral Q8H PRN Natasha Foss PA-C        acetaminophen tablet 650 mg  650 mg Oral Q4H PRN Natasha Foss PA-C   650 mg at 07/14/24 2019    albuterol-ipratropium 2.5 mg-0.5 mg/3 mL nebulizer solution 3 mL  3 mL Nebulization Q6H PRN Natasha Foss PA-C        aluminum-magnesium hydroxide-simethicone 200-200-20 mg/5 mL suspension 30 mL  30 mL Oral QID PRN Natasha Foss PA-C        amitriptyline tablet 10 mg  10 mg Oral Daily Natasha Foss PA-C   10 mg at 07/15/24 1710    dextrose 10% bolus 125 mL 125 mL  12.5 g Intravenous PRN Natasha Foss PA-C        dextrose 10% bolus 250 mL 250 mL  25 g Intravenous PRN Natasha Foss PA-C        glucagon (human recombinant) injection 1 mg  1 mg Intramuscular PRN Natasha Foss PA-C        glucose chewable tablet 16 g  16 g Oral PRN Natasha Foss PA-C        glucose chewable tablet 24 g  24 g Oral PRN Natasha Foss PA-C        melatonin tablet 6 mg  6 mg Oral Nightly PRN Natasha Foss PA-C        metoprolol succinate (TOPROL-XL) 24 hr tablet 50 mg  50 mg Oral Daily Natasha Foss PA-C        naloxone 0.4 mg/mL injection 0.02 mg  0.02 mg Intravenous PRN Natasha Foss PA-C        norethindrone-ethinyl estradiol 1 mg-20 mcg (21)/75 mg (7) per tablet 1 tablet  1 tablet Oral Daily Natasha Foss PA-C        ondansetron disintegrating tablet 8 mg  8 mg Oral Q8H PRN Natasha Foss PA-C        sodium chloride 0.9% flush 10 mL  10 mL  Intravenous Q12H PRN Natasha Foss PA-C           Review of Systems   Constitutional:  Positive for fatigue. Negative for chills and fever.   HENT:  Negative for sore throat and trouble swallowing.    Eyes:  Negative for visual disturbance.   Respiratory:  Negative for cough and shortness of breath.    Cardiovascular:  Negative for chest pain.   Gastrointestinal:  Negative for nausea and vomiting.   Genitourinary:  Negative for difficulty urinating.   Neurological:  Positive for weakness, numbness and headaches. Negative for dizziness, tremors, seizures, syncope, facial asymmetry, speech difficulty and light-headedness.     Objective:     Vital Signs (Most Recent):  Temp: 99.3 °F (37.4 °C) (07/15/24 1624)  Pulse: 76 (07/15/24 1624)  Resp: 18 (07/15/24 1624)  BP: 112/64 (07/15/24 1624)  SpO2: 97 % (07/15/24 1624) Vital Signs (24h Range):  Temp:  [97.9 °F (36.6 °C)-99.3 °F (37.4 °C)] 99.3 °F (37.4 °C)  Pulse:  [65-82] 76  Resp:  [16-20] 18  SpO2:  [97 %-99 %] 97 %  BP: ()/(51-76) 112/64     Weight: 73.9 kg (163 lb)  Body mass index is 27.12 kg/m².     Physical Exam  Vitals and nursing note reviewed.   Constitutional:       General: She is not in acute distress.     Appearance: Normal appearance. She is not ill-appearing.   HENT:      Head: Normocephalic and atraumatic.      Mouth/Throat:      Mouth: Mucous membranes are moist.      Pharynx: Oropharynx is clear.   Eyes:      General: No scleral icterus.     Extraocular Movements: Extraocular movements intact and EOM normal.      Conjunctiva/sclera: Conjunctivae normal.      Pupils: Pupils are equal, round, and reactive to light.   Cardiovascular:      Rate and Rhythm: Normal rate.      Pulses: Normal pulses.   Pulmonary:      Effort: Pulmonary effort is normal. No respiratory distress.   Abdominal:      General: Abdomen is flat. There is no distension.   Musculoskeletal:         General: No swelling.   Skin:     General: Skin is warm and dry.    Neurological:      Mental Status: She is alert and oriented to person, place, and time.      Coordination: Finger-Nose-Finger Test and Heel to Shin Test normal.      Deep Tendon Reflexes:      Reflex Scores:       Bicep reflexes are 2+ on the right side and 2+ on the left side.       Brachioradialis reflexes are 2+ on the right side and 2+ on the left side.       Patellar reflexes are 2+ on the right side and 2+ on the left side.       Achilles reflexes are 2+ on the right side and 2+ on the left side.  Psychiatric:         Speech: Speech normal.          NEUROLOGICAL EXAMINATION:     MENTAL STATUS   Oriented to person, place, and time.   Attention: normal. Concentration: normal.   Speech: speech is normal   Level of consciousness: alert    CRANIAL NERVES     CN II   Visual fields full to confrontation.   Visual acuity: normal    CN III, IV, VI   Pupils are equal, round, and reactive to light.  Extraocular motions are normal.     CN V   Facial sensation intact.     CN VII   Facial expression full, symmetric.     CN VIII   CN VIII normal.     CN IX, X   CN IX normal.     CN XI   CN XI normal.     CN XII   CN XII normal.     MOTOR EXAM   Overall muscle tone: normal    Strength   Right deltoid: 5/5  Left deltoid: 5/5  Right biceps: 5/5  Left biceps: 5/5  Right triceps: 5/5  Left triceps: 5/5  Right wrist flexion: 5/5  Left wrist flexion: 5/5  Right wrist extension: 5/5  Left wrist extension: 5/5  Right iliopsoas: 5/5  Left iliopsoas: 5/5  Right quadriceps: 5/5  Left quadriceps: 5/5  Right hamstrin/5  Left hamstrin/5  Right anterior tibial: 5/5  Left anterior tibial: 5/5  Right gastroc: 5/5  Left gastroc: 5/5    REFLEXES     Reflexes   Right brachioradialis: 2+  Left brachioradialis: 2+  Right biceps: 2+  Left biceps: 2+  Right patellar: 2+  Left patellar: 2+  Right achilles: 2+  Left achilles: 2+    SENSORY EXAM   Light touch normal.     GAIT AND COORDINATION      Coordination   Finger to nose coordination:  normal  Heel to shin coordination: normal      Significant Labs: All pertinent lab results from the past 24 hours have been reviewed.    Significant Imaging: I have reviewed all pertinent imaging results/findings within the past 24 hours.

## 2024-07-15 NOTE — HOSPITAL COURSE
April Lorrie Sinclair was admitted to hospital medicine for management of acute left upper and lower extremity weakness. Stroke code activated, CTA and MRI brain negative for acute infarction or hydrocephalus. Vasc neuro consulted, no indication for thrombotic agents. Reported some chest pain in the ED, cards consulted. TTE and inflammatory markers obtained to rule out pericarditis; imaging revealed normal EF and diastolic function and lab work wnl. Neurology consulted for ongoing neurological symptoms. MRV ordered to rule out venous thrombosis and was negative. Suspect complex migraines. MRV negative for thrombosis, started on 10 mg PO elavil with improvement in symptoms. PT/OT consulted and working with patient to improve functional status. Gradually able to ambulate and complete ADLs. Cardiology ordered saline bubble echo to investigate for PFO, study positive. No plans for inpatient intervention but recommend outpatient follow up with interventional cardiology placed for possible PFO closure. Referrals to neurology and IC placed prior to DC. Prescription for 10 mg elavil placed on DC, advised to stop triptans and added this to her allergy list.    On day of discharge patient's vital signs were stable and patient appeared clinically ready for discharge. Hospital course, discharge plan and return precautions discussed - patient expressed understanding. All questions answered at that time.         Physical Exam  Vitals and nursing note reviewed.   Eyes:      General: No scleral icterus.        Right eye: No discharge.         Left eye: No discharge.   Cardiovascular:      Rate and Rhythm: Normal rate.   Pulmonary:      Effort: Pulmonary effort is normal. No respiratory distress.   Abdominal:      General: There is no distension.   Musculoskeletal:      Cervical back: Normal range of motion and neck supple.   Skin:     General: Skin is warm and dry.   Neurological:      Mental Status: She is alert and oriented to  person, place, and time.      Sensory: Sensory deficit present.      Motor: Weakness (improvin/5 strenght to left upper and lower extremities, 5/5 to all remaining) present.   Psychiatric:         Attention and Perception: Attention normal.

## 2024-07-15 NOTE — PLAN OF CARE
Problem: Fall Injury Risk  Goal: Absence of Fall and Fall-Related Injury  7/15/2024 1608 by Helio Franz, RN  Outcome: Progressing  7/15/2024 1608 by Helio Franz, RN  Outcome: Progressing

## 2024-07-15 NOTE — SUBJECTIVE & OBJECTIVE
Past Medical History:   Diagnosis Date    Asthma     Headache(784.0)     Migraine     Pre-syncope 5/25/2021    Seizures     Syncope and collapse        Past Surgical History:   Procedure Laterality Date    A-V CARDIAC PACEMAKER INSERTION Left 7/18/2019    Procedure: INSERTION, CARDIAC PACEMAKER, DUAL CHAMBER;  Surgeon: Piter Cooley MD;  Location: [facility] CATH LAB/EP;  Service: Cardiology;  Laterality: Left;    PHLEBOGRAPHY  5/25/2021    Procedure: Venogram;  Surgeon: Piter Cooley MD;  Location: [facility] EP LAB;  Service: Cardiology;;    REPLACEMENT OF PACEMAKER GENERATOR  5/25/2021    Procedure: REPLACEMENT, PACEMAKER GENERATOR;  Surgeon: Piter Cooley MD;  Location: [facility] EP LAB;  Service: Cardiology;;    WISDOM TOOTH EXTRACTION         Review of patient's allergies indicates:   Allergen Reactions    Lamictal  [lamotrigine]      Other reaction(s): HIVES    Cefzil [cefprozil] Rash    Demerol [meperidine] Rash     Other reaction(s): Rash    Pcn [penicillins] Rash     Other reaction(s): Rash       No current facility-administered medications on file prior to encounter.     Current Outpatient Medications on File Prior to Encounter   Medication Sig    betaxoloL (KERLONE) 10 mg Tab Take 1 tablet (10 mg total) by mouth once daily. Start with 1/4 pill a day for 2 weeks. Increase to 1/2 pill a day for the following 2 weeks. Increase to a full tablet (10 mg) a day thereafter.Progression schedule from 1/4 tab to a full tab to be modified in case of perceived side effects.Future further increases will be tailored to clinical response.    ibuprofen (ADVIL,MOTRIN) 800 MG tablet Take 1 tablet (800 mg total) by mouth 3 (three) times daily as needed for Pain.    norethindrone-ethinyl estradiol (JUNEL FE 1/20) 1 mg-20 mcg (21)/75 mg (7) per tablet Take 1 tablet by mouth once daily.     Family History       Problem Relation (Age of Onset)    Arthritis Paternal Grandmother    Cancer Maternal Grandmother    Diabetes Mother, Maternal  Grandfather, Maternal Grandmother, Paternal Grandmother, Paternal Grandfather, Father    Heart disease Maternal Grandfather    Irregular heart beat Sister    Stroke Maternal Grandfather          Tobacco Use    Smoking status: Never    Smokeless tobacco: Never   Substance and Sexual Activity    Alcohol use: Not Currently     Comment: occasionally     Drug use: Never    Sexual activity: Yes     Partners: Male     Birth control/protection: OCP     ROS12 point ROS negative except for HPI.   Objective:     Vital Signs (Most Recent):  Temp: 98.1 °F (36.7 °C) (07/15/24 0438)  Pulse: 72 (07/15/24 0438)  Resp: 16 (07/15/24 0438)  BP: (!) 98/51 (07/15/24 0438)  SpO2: 98 % (07/15/24 0438) Vital Signs (24h Range):  Temp:  [97.9 °F (36.6 °C)-98.9 °F (37.2 °C)] 98.1 °F (36.7 °C)  Pulse:  [65-99] 72  Resp:  [16-20] 16  SpO2:  [98 %-100 %] 98 %  BP: ()/(51-83) 98/51     Weight: 73.9 kg (163 lb)  Body mass index is 27.12 kg/m².    SpO2: 98 %         Intake/Output Summary (Last 24 hours) at 7/15/2024 0601  Last data filed at 7/15/2024 0551  Gross per 24 hour   Intake 920 ml   Output 950 ml   Net -30 ml       Lines/Drains/Airways       Drain  Duration             Female External Urinary Catheter w/ Suction 07/14/24 1051 <1 day              Peripheral Intravenous Line  Duration                  Peripheral IV - Single Lumen 07/14/24 0522 20 G Left Antecubital 1 day                     Physical Exam  Constitutional:       General: She is not in acute distress.  Neck:      Vascular: No hepatojugular reflux or JVD.   Cardiovascular:      Rate and Rhythm: Normal rate and regular rhythm.      Comments: Chest tenderness upon palpation  Pulmonary:      Effort: Pulmonary effort is normal.      Breath sounds: Normal breath sounds.   Abdominal:      General: Bowel sounds are normal.      Palpations: Abdomen is soft.   Skin:     General: Skin is warm.      Capillary Refill: Capillary refill takes less than 2 seconds.   Neurological:       Mental Status: She is alert and oriented to person, place, and time.      Sensory: Sensory deficit present.      Motor: Weakness present.          Significant Labs: Troponin   Recent Labs   Lab 07/14/24  0744 07/14/24  1428   TROPONINI <0.006 <0.006       Significant Imaging: EKG:

## 2024-07-15 NOTE — SUBJECTIVE & OBJECTIVE
Interval History:  AFVSS. NAEON.   Patient evaluated at bedside, mother present. Reports left arm and leg weakness/numbness are gradually improving. She was able to ambulate with PT/OT today. MRV done and negative. Neurology following for diagnosis and treatment management.     Review of Systems   Constitutional:  Negative for chills and fever.   Eyes:  Positive for visual disturbance.   Respiratory:  Negative for chest tightness and shortness of breath.    Cardiovascular:  Negative for chest pain and leg swelling.   Gastrointestinal:  Negative for abdominal pain, nausea and vomiting.   Genitourinary:  Negative for decreased urine volume and difficulty urinating.   Musculoskeletal:  Negative for back pain and neck pain.   Neurological:  Positive for weakness (left sided) and numbness (left sided). Negative for dizziness.     Objective:     Vital Signs (Most Recent):  Temp: 98.3 °F (36.8 °C) (07/15/24 1108)  Pulse: 71 (07/15/24 1245)  Resp: 18 (07/15/24 1108)  BP: 114/66 (07/15/24 1108)  SpO2: 97 % (07/15/24 1108) Vital Signs (24h Range):  Temp:  [97.9 °F (36.6 °C)-98.9 °F (37.2 °C)] 98.3 °F (36.8 °C)  Pulse:  [65-76] 71  Resp:  [16-20] 18  SpO2:  [97 %-100 %] 97 %  BP: ()/(51-76) 114/66     Weight: 73.9 kg (163 lb)  Body mass index is 27.12 kg/m².    Intake/Output Summary (Last 24 hours) at 7/15/2024 1451  Last data filed at 7/15/2024 0551  Gross per 24 hour   Intake 920 ml   Output 950 ml   Net -30 ml         Physical Exam  Vitals and nursing note reviewed.   Eyes:      General: No scleral icterus.        Right eye: No discharge.         Left eye: No discharge.   Cardiovascular:      Rate and Rhythm: Normal rate.   Pulmonary:      Effort: Pulmonary effort is normal. No respiratory distress.   Abdominal:      General: There is no distension.   Musculoskeletal:      Cervical back: Normal range of motion and neck supple.   Skin:     General: Skin is warm and dry.   Neurological:      Mental Status: She is alert  and oriented to person, place, and time.      Sensory: Sensory deficit present.      Motor: Weakness (improvin/5 strenght to left upper and lower extremities, 5/5 to all remaining) present.   Psychiatric:         Attention and Perception: Attention normal.         Mood and Affect: Affect is flat.       Significant Labs: All pertinent labs within the past 24 hours have been reviewed.    Significant Imaging: I have reviewed all pertinent imaging results/findings within the past 24 hours.

## 2024-07-15 NOTE — ASSESSMENT & PLAN NOTE
[facility] Note        Subjective     CC: Follow-up (3 week follow up -- Pain in both knees)      GRECIA Barnhart is a 73 y.o. female.  She had COVID and missed 2 weeks of therapy.  Therefore she has only had 2 weeks of physical therapy since I last saw her on August 16.  She fell in June she had both knees. She started oral steroids this week. They are helping. She goes to physical therapy tomorrow. She has a lupus-like condition with a connective tissue disease. She sees a rheumatologist. She had x-rays and rheumatology on the 16th. They are negative.     Overall, patient's symptoms are better.    ROS:    Constiutional:Pt denies fever, chills, nausea, or vomiting.  MSK:as above        Objective      Past Medical History  Past Medical History:   Diagnosis Date    Abnormal ECG but low risk assessment    Allergic rhinitis Many years ago    Anemia Prior to menopause    Arrhythmia     Arthritis     Asthma     Asthma, intrinsic around 2000    Atrial fibrillation     Received 4 to 5 notifications on [employer]it re: I may have Atrial fib    Cervical disc disease     Diastolic dysfunction 6/7/2023    Disease of thyroid gland     Diverticulitis of intestine with perforation     E-coli UTI 2019    treated with antibioiutcs and urine rechecked and clean per pt report    GERD (gastroesophageal reflux disease)     Hashimoto's disease     Headache     Heart murmur     fast heart beat; sometimes irregular    History of transfusion     several times, with surgeries post op and after c section, never a reaction    Hyperlipidemia sometimes marginally high    Hypertension     Hyperthyroidism     Thyroid nodule, hashimoto's thyroiditis    Hypothyroidism     Irritable bowel syndrome     Kidney stone     Low back pain     sometimes    Lupus     Neuromuscular disorder     Nerve damage lower back    Palpitations     POSSIBLE ATRIAL TACHYCARDIA    PONV (postoperative nausea and vomiting)     Raynaud's disease      - history noted  - follows with outpatient neurology    "Sinusitis very long time    Sjogren's disease     SVT (supraventricular tachycardia)     Thyroid nodule 2006    Tremor     sometimes hands shake    Visual impairment     Vitamin D deficiency 2000     Social History     Socioeconomic History    Marital status:    Tobacco Use    Smoking status: Never    Smokeless tobacco: Never   Vaping Use    Vaping Use: Never used   Substance and Sexual Activity    Alcohol use: Yes     Alcohol/week: 1.0 - 2.0 standard drink     Types: 1 - 2 Glasses of wine per week     Comment: social    Drug use: No    Sexual activity: Not Currently     Partners: Male     Birth control/protection: None          Physical Exam  /80   Ht 149.3 cm (58.78\")   Wt 59 kg (130 lb)   LMP  (LMP Unknown)   BMI 26.45 kg/m²     Body mass index is 26.45 kg/m².    Patient is well nourished and well developed.        Ortho Exam  Left knee abrasion is healing well.  Otherwise no change in knee exam    Imaging/Labs/EMG Reviewed:  Imaging Results (Last 24 Hours)       ** No results found for the last 24 hours. **              Assessment    Assessment:  1. Pain in both knees, unspecified chronicity    2. Mixed connective tissue disease        Plan:  Recommend over the counter anti-inflammatories for pain and/or swelling  She will continue physical therapy and if she fails to get better we can do cortisone injections.      Matthew Buckner MD  09/08/23  11:04 EDT      Dictated Utilizing Dragon Dictation.  "

## 2024-07-15 NOTE — HPI
Ms. Vanessa Sinclair is a 23 y/o F w/ PMH of SSS s/p pacemaker, vasovagal syncope, and epilepsy that came to the hospital complaining of left sided numbness/weakness and chest pain. Cardiology consulted on the case to evaluate chest pain.     Patient states she experienced sudden onset, substernal, pressure-like and sharp, non-radiating chest pain that was positional (improved sitting up and worse laying down) and pleuritic. Endorses chest tenderness but say pain is different than the once she gets from palpation. Associated w/ SOB w/ exertion and nausea but denies any vomiting, diaphoresis, fever, chills, leg swelling cough, tachypnea, palpations, recent immobilization, recent surgeries. States pain improved w/ tylenol. Does also endorse stroke like features of left sided numbness and weakness.     In the ED, EKG was negative for acute ischemic changes and troponin was negative x2. Stroke workup also negative.

## 2024-07-15 NOTE — PT/OT/SLP PROGRESS
Physical Therapy Evaluation    Patient Name:  Vanessa Sinclair   MRN:  [id number]  Admit Date: 7/14/2024  Admitting Diagnosis:  Left-sided weakness  Length of Stay: 0 days  Recent Surgery: * No surgery found *      Recommendations:     Discharge Recommendations:  No Therapy Indicated   Discharge Equipment Recommendations: none   Barriers to discharge: None    Assessment:     Vanessa Sinclair is a 24 y.o. female admitted with a medical diagnosis of Left-sided weakness. Pt found alert, cooperative, and motivated to participate in today's session. Pt states her PLOF was independent with all ADLs and recreational activities. Pt able to ambulate in hallway and successfully complete stair trial at this visit. Next visit to work on quality of stair ascent/descent and improve ambulation distance. Pt is a good candidate for PT and would benefit from skilled interventions to improve functional strength, endurance, and progress toward PLOF.    Problem List: impaired endurance, impaired functional mobility  Rehab Prognosis: Good; patient would benefit from acute skilled PT services to address these deficits and reach maximum level of function.      Plan:     During this hospitalization, patient to be seen 3 x/week to address the identified rehab impairments via gait training, therapeutic activities, therapeutic exercises, neuromuscular re-education and progress towards the established goals.    Plan of Care Expires:  08/14/24    Subjective   Communicated with RN prior to session.  Patient found HOB elevated upon PT entry to room, agreeable to evaluation. Vanessa Sinclair's mother present during session.    Chief Complaint: Chest Pain (Left side with left arm numbness/tingling with vision changes)    Patient/Family Comments/goals: to get better  Pain/Comfort:  Pain Rating 1: 0/10  Pain Rating Post-Intervention 1: 0/10    Living Environment:  Patient lives with significant other in an apartment with 3 flights of stairs to  "enter  Prior Level of Function:   Patient reports being independent with mobility & with ADLs. Patient uses DME as follows: grab bar. DME owned (not currently used): none.    Patient reports they will have assistance from mother and boyfriend upon discharge.    Objective:   Patient found with: telemetry, pulse ox (continuous)     General Precautions: Standard, Cardiac fall   Orthopedic Precautions:N/A   Braces: N/A   Oxygen Device: Room Air  Vitals: /66 (BP Location: Right arm, Patient Position: Lying)   Pulse 75   Temp 98.3 °F (36.8 °C) (Oral)   Resp 18   Ht 5' 5" (1.651 m)   Wt 73.9 kg (163 lb)   SpO2 97%   Breastfeeding No   BMI 27.12 kg/m²     Exams:  Cognition:   Alert and Cooperative  AxOx4  Command following: Follows multistep  commands  Fluency: clear/fluent    Sensation:    -       Intact  light/touch stanton LE and proprioception stanton LE  RLE ROM: WFL  RLE Strength: WFL  LLE ROM: WFL  LLE Strength: WFL    Outcome Measures:  AM-PAC 6 CLICK MOBILITY  Turning over in bed (including adjusting bedclothes, sheets and blankets)?: 3  Sitting down on and standing up from a chair with arms (e.g., wheelchair, bedside commode, etc.): 3  Moving from lying on back to sitting on the side of the bed?: 3  Moving to and from a bed to a chair (including a wheelchair)?: 3  Need to walk in hospital room?: 3  Climbing 3-5 steps with a railing?: 3  Basic Mobility Total Score: 18     Functional Mobility:  Additional staff present: Supervising PT  Bed Mobility:  Supine to Sit: stand by assistance; HOB elevated  Scooting anteriorly to EOB to have both feet planted on floor: stand by assistance    Sitting Balance at Edge of Bed:  Assistance Level Required: Supervision  Time: 8 mins  Postural deviations noted: no deviations noted    Transfers:   Sit <> Stand Transfer: stand by assistance with no assistive device       Gait:   Patient ambulated: 80' +80' (stair trial between ambulation trials)   Patient required: " supervision  Patient used: no assistive device  Gait Pattern observed: reciprocal gait  Gait Deviation(s): decreased step length and decreased clarice  Impairments due to: decreased endurance and impaired postural control  Comments:   Verbal cues to maintain normal arm swing  Verbal cues to take strong, purposeful steps    Stairs:  Pt ascended/descended 3 flight(s) with No Assistive Device with bilateral handrails with Contact Guard Assistance.   Flights 1 and 2: step to pattern; up with right, down with left  Flight 3: reciprocal pattern      Therapeutic Activities, Exercises, & Education:   Education:  Pt educated on role of PT and POC  Pt educated on importance of daily ambulation and OOB activity  Pt educated on importance of using L side of her body  Pt verbalized understanding      Patient left up in chair with all lines intact, call button in reach, and mom present.    GOALS:   Multidisciplinary Problems       Physical Therapy Goals          Problem: Physical Therapy    Goal Priority Disciplines Outcome Goal Variances Interventions   Physical Therapy Goal     PT, PT/OT Progressing     Description: Goals to be met by: 2024     Patient will increase functional independence with mobility by performin. Supine to sit with supervision  2. Sit to stand transfer with Supervision and no AD  3. Gait  x 400 feet with Supervision using No Assistive Device.   4. Ascend/descend 3 flights of stairs with bilateral Handrails Supervision using No Assistive Device.                          History:     Past Medical History:   Diagnosis Date    Asthma     Headache(784.0)     Migraine     Pre-syncope 2021    Seizures     Syncope and collapse        Past Surgical History:   Procedure Laterality Date    A-V CARDIAC PACEMAKER INSERTION Left 2019    Procedure: INSERTION, CARDIAC PACEMAKER, DUAL CHAMBER;  Surgeon: Piter Cooley MD;  Location: [facility] CATH LAB/EP;  Service: Cardiology;  Laterality: Left;     PHLEBOGRAPHY  5/25/2021    Procedure: Venogram;  Surgeon: Piter Cooley MD;  Location: [facility] EP LAB;  Service: Cardiology;;    REPLACEMENT OF PACEMAKER GENERATOR  5/25/2021    Procedure: REPLACEMENT, PACEMAKER GENERATOR;  Surgeon: Piter Cooley MD;  Location: [facility] EP LAB;  Service: Cardiology;;    WISDOM TOOTH EXTRACTION         Time Tracking:     PT Received On: 07/15/24  PT Start Time: 1027     PT Stop Time: 1050  PT Total Time (min): 23 min     Billable Minutes: Evaluation 8 and Gait Training 15

## 2024-07-15 NOTE — CONSULTS
[facility]  Cardiology  Consult Note    Patient Name: Vanessa Sinclair  MRN: [id number]  Admission Date: 7/14/2024  Hospital Length of Stay: 0 days  Code Status: Full Code   Attending Provider: Ana Mac MD   Consulting Provider: Rudi Ocampo MD  Primary Care Physician: Juan C Farmer MD  Principal Problem:Chest pain    Patient information was obtained from patient, past medical records, and ER records.     Inpatient consult to Cardiology  Consult performed by: Rudi Ocampo MD  Consult ordered by: Sabina Ashford MD        Subjective:     Chief Complaint:  chest pain     HPI:   Ms. Vanessa Sinclair is a 23 y/o F w/ PMH of SSS s/p pacemaker, vasovagal syncope, and epilepsy that came to the hospital complaining of left sided numbness/weakness and chest pain. Cardiology consulted on the case to evaluate chest pain.     Patient states she experienced sudden onset, substernal, pressure-like and sharp, non-radiating chest pain that was positional (improved sitting up and worse laying down) and pleuritic. Endorses chest tenderness but say pain is different than the once she gets from palpation. Associated w/ SOB w/ exertion and nausea but denies any vomiting, diaphoresis, fever, chills, leg swelling cough, tachypnea, palpations, recent immobilization, recent surgeries. States pain improved w/ tylenol. Does also endorse stroke like features of left sided numbness and weakness.     In the ED, EKG was negative for acute ischemic changes and troponin was negative x2. Stroke workup also negative.     Past Medical History:   Diagnosis Date    Asthma     Headache(784.0)     Migraine     Pre-syncope 5/25/2021    Seizures     Syncope and collapse        Past Surgical History:   Procedure Laterality Date    A-V CARDIAC PACEMAKER INSERTION Left 7/18/2019    Procedure: INSERTION, CARDIAC PACEMAKER, DUAL CHAMBER;  Surgeon: Piter Cooley MD;  Location: [facility] CATH LAB/EP;  Service:  Cardiology;  Laterality: Left;    PHLEBOGRAPHY  5/25/2021    Procedure: Venogram;  Surgeon: Piter Cooley MD;  Location: [facility] EP LAB;  Service: Cardiology;;    REPLACEMENT OF PACEMAKER GENERATOR  5/25/2021    Procedure: REPLACEMENT, PACEMAKER GENERATOR;  Surgeon: Piter Cooley MD;  Location: [facility] EP LAB;  Service: Cardiology;;    WISDOM TOOTH EXTRACTION         Review of patient's allergies indicates:   Allergen Reactions    Lamictal  [lamotrigine]      Other reaction(s): HIVES    Cefzil [cefprozil] Rash    Demerol [meperidine] Rash     Other reaction(s): Rash    Pcn [penicillins] Rash     Other reaction(s): Rash       No current facility-administered medications on file prior to encounter.     Current Outpatient Medications on File Prior to Encounter   Medication Sig    betaxoloL (KERLONE) 10 mg Tab Take 1 tablet (10 mg total) by mouth once daily. Start with 1/4 pill a day for 2 weeks. Increase to 1/2 pill a day for the following 2 weeks. Increase to a full tablet (10 mg) a day thereafter.Progression schedule from 1/4 tab to a full tab to be modified in case of perceived side effects.Future further increases will be tailored to clinical response.    ibuprofen (ADVIL,MOTRIN) 800 MG tablet Take 1 tablet (800 mg total) by mouth 3 (three) times daily as needed for Pain.    norethindrone-ethinyl estradiol (JUNEL FE 1/20) 1 mg-20 mcg (21)/75 mg (7) per tablet Take 1 tablet by mouth once daily.     Family History       Problem Relation (Age of Onset)    Arthritis Paternal Grandmother    Cancer Maternal Grandmother    Diabetes Mother, Maternal Grandfather, Maternal Grandmother, Paternal Grandmother, Paternal Grandfather, Father    Heart disease Maternal Grandfather    Irregular heart beat Sister    Stroke Maternal Grandfather          Tobacco Use    Smoking status: Never    Smokeless tobacco: Never   Substance and Sexual Activity    Alcohol use: Not Currently     Comment: occasionally     Drug use: Never    Sexual  activity: Yes     Partners: Male     Birth control/protection: OCP     ROS12 point ROS negative except for HPI.   Objective:     Vital Signs (Most Recent):  Temp: 98.1 °F (36.7 °C) (07/15/24 0438)  Pulse: 72 (07/15/24 0438)  Resp: 16 (07/15/24 0438)  BP: (!) 98/51 (07/15/24 0438)  SpO2: 98 % (07/15/24 0438) Vital Signs (24h Range):  Temp:  [97.9 °F (36.6 °C)-98.9 °F (37.2 °C)] 98.1 °F (36.7 °C)  Pulse:  [65-99] 72  Resp:  [16-20] 16  SpO2:  [98 %-100 %] 98 %  BP: ()/(51-83) 98/51     Weight: 73.9 kg (163 lb)  Body mass index is 27.12 kg/m².    SpO2: 98 %         Intake/Output Summary (Last 24 hours) at 7/15/2024 0601  Last data filed at 7/15/2024 0551  Gross per 24 hour   Intake 920 ml   Output 950 ml   Net -30 ml       Lines/Drains/Airways       Drain  Duration             Female External Urinary Catheter w/ Suction 07/14/24 1051 <1 day              Peripheral Intravenous Line  Duration                  Peripheral IV - Single Lumen 07/14/24 0522 20 G Left Antecubital 1 day                     Physical Exam  Constitutional:       General: She is not in acute distress.  Neck:      Vascular: No hepatojugular reflux or JVD.   Cardiovascular:      Rate and Rhythm: Normal rate and regular rhythm.      Comments: Chest tenderness upon palpation  Pulmonary:      Effort: Pulmonary effort is normal.      Breath sounds: Normal breath sounds.   Abdominal:      General: Bowel sounds are normal.      Palpations: Abdomen is soft.   Skin:     General: Skin is warm.      Capillary Refill: Capillary refill takes less than 2 seconds.   Neurological:      Mental Status: She is alert and oriented to person, place, and time.      Sensory: Sensory deficit present.      Motor: Weakness present.          Significant Labs: Troponin   Recent Labs   Lab 07/14/24  0744 07/14/24  1428   TROPONINI <0.006 <0.006       Significant Imaging: EKG:    Assessment and Plan:     * Chest pain  25 y/o F w/ no pertinent CAD Rfs came in endorsing chest  pain. Does not appear to be related to CAD or ACS. Troponins and EKG unremarkable for ischemia. DEVANTE 1 and Angelica 41.    - Will order CRP, ESR, and ECHO to r/o pericarditis.   - Recommend Ibuprofen 800mg tid PRN for chest pain.         VTE Risk Mitigation (From admission, onward)           Ordered     IP VTE LOW RISK PATIENT  Once         07/14/24 1027     Place sequential compression device  Until discontinued         07/14/24 1027                    Thank you for your consult. I will follow-up with patient. Please contact us if you have any additional questions.    Rudi Ocampo MD  Cardiology   [facility] - Observation 11H

## 2024-07-15 NOTE — PROGRESS NOTES
[employer] ProMRI Cardiology checklist for device programming pre MRI:    The patient's medical record was reviewed for the following:    A ProMRI® pacemaker or ICD system has been implanted pectorally    Leads have been implanted for at least six weeks    No additional active or abandoned cardiac implants like leads or wires, lead extenders or adapters  are present    Other active or passive implants are permitted if MR-conditional; metal implantable devices larger than 5 cm must be 4 cm or greater distance from the ProMRI® lead    Device threshold does not exceed 2.0 V at 0.4 ms pulse width    Device is functioning normally      The battery status is neither DOMINIK nor EOS    The patients ProMRI® system will be programmed by the industry represeentative to a mode suitable for MRI.    Pacing parameters should be programmed as such: (select one and delete those not applicable)    DOO: (for dual chamber and CRT devices) program base rate + 10 bpm above the patients intrinsic sinus rate or +10 bpm above programmed base rate if dual pacing is noted

## 2024-07-15 NOTE — ASSESSMENT & PLAN NOTE
25 y/o F w/ no pertinent CAD Rfs came in endorsing chest pain. Does not appear to be related to CAD or ACS. Troponins and EKG unremarkable for ischemia. DEVANTE 1 and Angelica 41.    - Will order CRP, ESR, and ECHO to r/o pericarditis.   - Recommend Ibuprofen 800mg tid PRN for chest pain.

## 2024-07-15 NOTE — PLAN OF CARE
Ms. Vanessa Sinclair is a 25 y/o F w/ PMH of SSS s/p pacemaker, vasovagal syncope, and epilepsy that came to the hospital complaining of left sided numbness/weakness and chest pain. Cardiology consulted on the case to evaluate chest pain.       A/P:     - Echo shows no acute findings and no signs of pleural effusion  - F/u CRP, ESR labs are normal  - Pt denies any chest pain, L arm numbness today and improvement of sx.   - R/O pericarditis as it seems to be non cardiac chest pain   - CP most likely due to costochondritis / pleuritis   - Continue with Ibuprofen 400-800mg or Tylenol PRN for chest pain   - Cardiology will sign out     Thank you, please feel free to reach out for any questions or concerns,  Axel Mata, DO

## 2024-07-15 NOTE — ASSESSMENT & PLAN NOTE
- troponin wnl  - EKG reassuring   - cardiology consulted in ED, appreciate recommendations   - Echo shows no acute findings and no signs of pleural effusion  - F/u CRP, ESR labs are normal  - Pt denies any chest pain, L arm numbness today and improvement of sx.   - R/O pericarditis as it seems to be non cardiac chest pain   - CP most likely due to costochondritis / pleuritis   - Continue with Ibuprofen 400-800mg or Tylenol PRN for chest pain

## 2024-07-15 NOTE — PROGRESS NOTES
Received a call Natasha RODRIGUEZ with Hospital Medicine in relation to this Inpatient needing to have a MRI and has a [employer] Pacemaker.  Please see information below as it relates to patient's Device being MRI compatible/conditional.  To meet protocol the Pacemaker/ICD must have been implanted no less than 6 weeks prior to scheduled date of Scan.  ICD/PPM and leads must be from same .  MRI informed ordering MD must input a Cardiac Device Check in clinic and hospital order, patient must have an xray within 6 months or less prior to MRI reprogramming.  Chest xray to be reviewed by Radiologist.    ADD-ON Inpatient MRI    MRI scheduled on (today@Noon). [employer] Rep (Lore) notified on (this am ~ 8:55 pm).  Confirmation received from Rep.

## 2024-07-15 NOTE — PROGRESS NOTES
[facility] [address] [place] [address] Medicine  Progress Note    Patient Name: Vanessa Sinclair  MRN: [id number]  Patient Class: OP- Observation   Admission Date: 7/14/2024  Length of Stay: 0 days  Attending Physician: Ana Mac MD  Primary Care Provider: Juan C Farmer MD    Subjective:     Principal Problem:Left-sided weakness    HPI:  Vanessa Sinclair is a 24 y.o. female with SSS s/p PPM and migraine being admitted to hospital medicine for management of acute onset left sided weakness and numbness. Patient reports waking from sleep with new onset left upper and lower extremity weakness and numbness. Reports she is able to move her left leg and arm some but has significantly diminished range of motion and decreased sensation that impairs her ability to ambulate. Endorses associated right eye > left eye blurriness. Notes she did have a headache before going to sleep last night but does not have one now. Overall symptoms are gradually improving since onset. Last normal yesterday, she was able to walk around the mall with her family without issue. No familiarity of symptoms, recent trauma or injury, fever, chills, nausea, vomiting, diarrhea, abdominal pain, dysuria, cough or congestion. Does not smoke or drink.         In ED: AFVSS. CBC and CMP grossly unremarkable. TSH and troponin wnl. Stroke code activated, CTA and MRI brain negative for acute infarction or hydrocephalus. Vasc neuro consulted, no indication for thrombotic agents. Given 1 g tylenol. Admitted to hospital medicine for further management.     Overview/Hospital Course:  Vanessa Sinclair was admitted to hospital medicine for management of acute left upper and lower extremity weakness. Stroke code activated, CTA and MRI brain negative for acute infarction or hydrocephalus. Vasc neuro consulted, no indication for thrombotic agents. Reported some chest pain in the ED, cards consulted. TTE and inflammatory markers obtained to rule out  pericarditis; imaging revealed normal EF and diastolic function and lab work wnl. Neurology consulted for ongoing neurological symptoms. MRV ordered to rule out venous thrombosis and was negative. Leading diagnosis is complex migraines. PT/OT consulted and working with patient to improve functional status.     Interval History:  AFVSS. NAEON.   Patient evaluated at bedside, mother present. Reports left arm and leg weakness/numbness are gradually improving. She was able to ambulate with PT/OT today. MRV done and negative. Neurology following for diagnosis and treatment management.     Review of Systems   Constitutional:  Negative for chills and fever.   Eyes:  Positive for visual disturbance.   Respiratory:  Negative for chest tightness and shortness of breath.    Cardiovascular:  Negative for chest pain and leg swelling.   Gastrointestinal:  Negative for abdominal pain, nausea and vomiting.   Genitourinary:  Negative for decreased urine volume and difficulty urinating.   Musculoskeletal:  Negative for back pain and neck pain.   Neurological:  Positive for weakness (left sided) and numbness (left sided). Negative for dizziness.     Objective:     Vital Signs (Most Recent):  Temp: 98.3 °F (36.8 °C) (07/15/24 1108)  Pulse: 71 (07/15/24 1245)  Resp: 18 (07/15/24 1108)  BP: 114/66 (07/15/24 1108)  SpO2: 97 % (07/15/24 1108) Vital Signs (24h Range):  Temp:  [97.9 °F (36.6 °C)-98.9 °F (37.2 °C)] 98.3 °F (36.8 °C)  Pulse:  [65-76] 71  Resp:  [16-20] 18  SpO2:  [97 %-100 %] 97 %  BP: ()/(51-76) 114/66     Weight: 73.9 kg (163 lb)  Body mass index is 27.12 kg/m².    Intake/Output Summary (Last 24 hours) at 7/15/2024 7870  Last data filed at 7/15/2024 0551  Gross per 24 hour   Intake 920 ml   Output 950 ml   Net -30 ml         Physical Exam  Vitals and nursing note reviewed.   Eyes:      General: No scleral icterus.        Right eye: No discharge.         Left eye: No discharge.   Cardiovascular:      Rate and Rhythm:  Normal rate.   Pulmonary:      Effort: Pulmonary effort is normal. No respiratory distress.   Abdominal:      General: There is no distension.   Musculoskeletal:      Cervical back: Normal range of motion and neck supple.   Skin:     General: Skin is warm and dry.   Neurological:      Mental Status: She is alert and oriented to person, place, and time.      Sensory: Sensory deficit present.      Motor: Weakness (improvin/5 strenght to left upper and lower extremities, 5/5 to all remaining) present.   Psychiatric:         Attention and Perception: Attention normal.         Mood and Affect: Affect is flat.       Significant Labs: All pertinent labs within the past 24 hours have been reviewed.    Significant Imaging: I have reviewed all pertinent imaging results/findings within the past 24 hours.    Assessment/Plan:      * Left-sided weakness  Left sided numbness   - CTA and MRI brain negative for ischemia   - CBC and CMP grossly unremarkable   - trop and TSH wnl  - PT/OT rec low intensity therapy   - vasc neuro consulted   No indication for thrombolytics and etiology of presentation less likely to be vascular in origin.   - neurology consulted, appreciate recommendations  - fall precautions  - q4h neuro checks     Chest pain  - troponin wnl  - EKG reassuring   - cardiology consulted in ED, appreciate recommendations   - Echo shows no acute findings and no signs of pleural effusion  - F/u CRP, ESR labs are normal  - Pt denies any chest pain, L arm numbness today and improvement of sx.   - R/O pericarditis as it seems to be non cardiac chest pain   - CP most likely due to costochondritis / pleuritis   - Continue with Ibuprofen 400-800mg or Tylenol PRN for chest pain     SSS (sick sinus syndrome)  Cardiac pace maker in situ   - trop wnl   - home betaxolol not on formulary, sub with MTP   - monitor on telemetry     Migraine headache  - history noted  - follows with outpatient neurology       VTE Risk Mitigation (From  admission, onward)           Ordered     IP VTE LOW RISK PATIENT  Once         07/14/24 1027     Place sequential compression device  Until discontinued         07/14/24 1027                    Discharge Planning   KETURAH: 7/15/2024     Code Status: Full Code   Is the patient medically ready for discharge?:     Reason for patient still in hospital (select all that apply): Patient trending condition, Treatment, and Consult recommendations  Discharge Plan A: Home, Other (out pt PT/OT)          Natasha Foss PA-C  Department of Hospital Medicine   [facility]

## 2024-07-16 VITALS
WEIGHT: 162.94 LBS | RESPIRATION RATE: 16 BRPM | SYSTOLIC BLOOD PRESSURE: 107 MMHG | BODY MASS INDEX: 27.15 KG/M2 | DIASTOLIC BLOOD PRESSURE: 64 MMHG | HEIGHT: 65 IN | HEART RATE: 76 BPM | OXYGEN SATURATION: 97 % | TEMPERATURE: 98 F

## 2024-07-16 PROBLEM — Q21.12 PFO (PATENT FORAMEN OVALE): Status: ACTIVE | Noted: 2024-07-16

## 2024-07-16 LAB — BSA FOR ECHO PROCEDURE: 1.84 M2

## 2024-07-16 PROCEDURE — 25000003 PHARM REV CODE 250

## 2024-07-16 PROCEDURE — G0378 HOSPITAL OBSERVATION PER HR: HCPCS

## 2024-07-16 RX ORDER — AMITRIPTYLINE HYDROCHLORIDE 10 MG/1
10 TABLET, FILM COATED ORAL DAILY
Qty: 30 TABLET | Refills: 0 | Status: SHIPPED | OUTPATIENT
Start: 2024-07-16 | End: 2024-08-15

## 2024-07-16 RX ADMIN — METOPROLOL SUCCINATE 50 MG: 50 TABLET, EXTENDED RELEASE ORAL at 09:07

## 2024-07-16 RX ADMIN — AMITRIPTYLINE HYDROCHLORIDE 10 MG: 10 TABLET, FILM COATED ORAL at 08:07

## 2024-07-16 NOTE — PLAN OF CARE
Problem: Fall Injury Risk  Goal: Absence of Fall and Fall-Related Injury  Outcome: Progressing     Problem: Pain Acute  Goal: Optimal Pain Control and Function  Outcome: Progressing     Problem: Fatigue  Goal: Improved Activity Tolerance  Outcome: Progressing

## 2024-07-16 NOTE — PT/OT/SLP PROGRESS
Occupational Therapy      Patient Name:  Vanessa Sinclair   MRN:  [id number]    Patient not seen today secondary to per RN, pt preparing for imminent discharge. Thank you for your consult.     7/16/2024

## 2024-07-16 NOTE — PLAN OF CARE
Patient Ready for discharge. Discharge papers given. Questions answered. Mother will transport daughter home.   Problem: Fall Injury Risk  Goal: Absence of Fall and Fall-Related Injury  Outcome: Met     Problem: Pain Acute  Goal: Optimal Pain Control and Function  Outcome: Met     Problem: Fatigue  Goal: Improved Activity Tolerance  Outcome: Met

## 2024-07-17 ENCOUNTER — TELEPHONE (OUTPATIENT)
Dept: NEUROLOGY | Facility: CLINIC | Age: 24
End: 2024-07-17
Payer: MEDICAID

## 2024-07-17 ENCOUNTER — PATIENT OUTREACH (OUTPATIENT)
Dept: ADMINISTRATIVE | Facility: CLINIC | Age: 24
End: 2024-07-17
Payer: MEDICAID

## 2024-07-17 RX ORDER — LANOLIN ALCOHOL/MO/W.PET/CERES
400 CREAM (GRAM) TOPICAL DAILY
COMMUNITY

## 2024-07-17 RX ORDER — BETAXOLOL 10 MG/1
10 TABLET, FILM COATED ORAL DAILY
Qty: 90 TABLET | Refills: 3 | Status: SHIPPED | OUTPATIENT
Start: 2024-07-17 | End: 2025-07-17

## 2024-07-17 RX ORDER — RIBOFLAVIN (VITAMIN B2) 100 MG
100 TABLET ORAL DAILY
COMMUNITY

## 2024-07-17 NOTE — PLAN OF CARE
07/17/24 0825   Final Note   Assessment Type Final Discharge Note   Anticipated Discharge Disposition Home   Hospital Resources/Appts/Education Provided Provided patient/caregiver with written discharge plan information   Post-Acute Status   Patient choice form signed by patient/caregiver List with quality metrics by geographic area provided   Discharge Delays None known at this time     [facility]  Discharge Final Note    Primary Care Provider: Juan C Farmer MD    Expected Discharge Date: 7/16/2024    Pt d/c home with family. No d/c needs reported by medical team at this time.      Patient cleared for discharge from case management standpoint.     Discharge Plan A and Plan B have been determined by review of patient's clinical status, future medical and therapeutic needs, and coverage/benefits for post-acute care in coordination with multidisciplinary team members.        Final Discharge Note (most recent)       Final Note - 07/17/24 0825          Final Note    Assessment Type Final Discharge Note (P)      Anticipated Discharge Disposition Home or Self Care (P)      Hospital Resources/Appts/Education Provided Provided patient/caregiver with written discharge plan information (P)         Post-Acute Status    Patient choice form signed by patient/caregiver List with quality metrics by geographic area provided (P)      Discharge Delays None known at this time (P)                      Important Message from Medicare             Contact Info       Juan C Farmer MD   Specialty: Internal Medicine   Relationship: PCP - General    [address]  [place] [address]   Phone: [phone]       Next Steps: Follow up    Odilon Brown MD   Specialty: Neurology    [address] [facility] [address]   Phone: [phone]       Next Steps: Follow up    [facility] [address] [address]   Specialty: Cardiology    [address]  [place] [address]   Phone: [phone]        Next Steps: Follow up            Future Appointments   Date Time Provider Department Center   8/1/2024  2:45 PM [facility] US2 [facility] MAJO AGUILERA scheduled post-discharge follow-up appointment and information added to AVS.     Thalia Gutierrez, LUISA  [facility]  Ext. [phone]

## 2024-07-17 NOTE — PROGRESS NOTES
C3 nurse spoke with Vanessa Sinclair  for a TCC post hospital discharge follow up call. The patient does not have a scheduled HOSFU appointment with Juan C Farmer MD  within 5-7 days post hospital discharge date 7/16/2024. C3 nurse was unable to schedule HOSFU appointment in [facility].    Message sent to PCP staff requesting they contact patient and schedule follow up appointment.

## 2024-07-18 ENCOUNTER — TELEPHONE (OUTPATIENT)
Dept: CARDIOLOGY | Facility: CLINIC | Age: 24
End: 2024-07-18
Payer: MEDICAID

## 2024-07-18 DIAGNOSIS — I49.5 SSS (SICK SINUS SYNDROME): Primary | ICD-10-CM

## 2024-07-19 ENCOUNTER — TELEPHONE (OUTPATIENT)
Dept: CARDIOLOGY | Facility: CLINIC | Age: 24
End: 2024-07-19
Payer: MEDICAID

## 2024-07-19 NOTE — TELEPHONE ENCOUNTER
Outcome  Denied on July 18  Denial: Regular: Not Medically Necessary  Drug  Betaxolol HCl 10MG tablets

## 2024-07-19 NOTE — TELEPHONE ENCOUNTER
Called pt; pt answered    Scheduled HOSFU appt with MICHAEL Chau ; Dr. Farmer's PA    Pt verbalized thanks

## 2024-07-19 NOTE — DISCHARGE SUMMARY
[facility] [address] [place] [address] Medicine  Discharge Summary      Patient Name: Vanessa Sinclair  MRN: [id number]  SHRADDHA: [id number]  Patient Class: OP- Observation  Admission Date: 7/14/2024  Hospital Length of Stay: 0 days  Discharge Date and Time: 7/16/2024  6:00 PM  Attending Physician: No att. providers found   Discharging Provider: Natasha Foss PA-C  Primary Care Provider: Juan C Farmer MD  [facility] Medicine Team: [facility] HOSP MED E Natasha Foss PA-C  Primary Care Team: [facility] HOSP MED E    HPI:   Vanessa Sinclair is a 24 y.o. female with SSS s/p PPM and migraine being admitted to hospital medicine for management of acute onset left sided weakness and numbness. Patient reports waking from sleep with new onset left upper and lower extremity weakness and numbness. Reports she is able to move her left leg and arm some but has significantly diminished range of motion and decreased sensation that impairs her ability to ambulate. Endorses associated right eye > left eye blurriness. Notes she did have a headache before going to sleep last night but does not have one now. Overall symptoms are gradually improving since onset. Last normal yesterday, she was able to walk around the mall with her family without issue. No familiarity of symptoms, recent trauma or injury, fever, chills, nausea, vomiting, diarrhea, abdominal pain, dysuria, cough or congestion. Does not smoke or drink.         In ED: AFVSS. CBC and CMP grossly unremarkable. TSH and troponin wnl. Stroke code activated, CTA and MRI brain negative for acute infarction or hydrocephalus. Vasc neuro consulted, no indication for thrombotic agents. Given 1 g tylenol. Admitted to hospital medicine for further management.     * No surgery found *      Hospital Course:   Vanessa Sinclair was admitted to hospital medicine for management of acute left upper and lower extremity weakness. Stroke code activated, CTA and MRI brain negative for acute  infarction or hydrocephalus. [facility] neuro consulted, no indication for thrombotic agents. Reported some chest pain in the ED, cards consulted. TTE and inflammatory markers obtained to rule out pericarditis; imaging revealed normal EF and diastolic function and lab work wnl. Neurology consulted for ongoing neurological symptoms. MRV ordered to rule out venous thrombosis and was negative. Suspect complex migraines. MRV negative for thrombosis, started on 10 mg PO elavil with improvement in symptoms. PT/OT consulted and working with patient to improve functional status. Gradually able to ambulate and complete ADLs. Cardiology ordered saline bubble echo to investigate for PFO, study positive. No plans for inpatient intervention but recommend outpatient follow up with interventional cardiology placed for possible PFO closure. Referrals to neurology and IC placed prior to DC. Prescription for 10 mg elavil placed on DC, advised to stop triptans and added this to her allergy list.    On day of discharge patient's vital signs were stable and patient appeared clinically ready for discharge. Hospital course, discharge plan and return precautions discussed - patient expressed understanding. All questions answered at that time.         Physical Exam  Vitals and nursing note reviewed.   Eyes:      General: No scleral icterus.        Right eye: No discharge.         Left eye: No discharge.   Cardiovascular:      Rate and Rhythm: Normal rate.   Pulmonary:      Effort: Pulmonary effort is normal. No respiratory distress.   Abdominal:      General: There is no distension.   Musculoskeletal:      Cervical back: Normal range of motion and neck supple.   Skin:     General: Skin is warm and dry.   Neurological:      Mental Status: She is alert and oriented to person, place, and time.      Sensory: Sensory deficit present.      Motor: Weakness (improvin/5 strenght to left upper and lower extremities, 5/5 to all remaining) present.    Psychiatric:         Attention and Perception: Attention normal.         Goals of Care Treatment Preferences:  Code Status: Full Code      Consults:   Consults (From admission, onward)          Status Ordering Provider     Inpatient consult to Neurology  Once        Provider:  (Not yet assigned)    Completed KUNAL ODOM     Inpatient consult to Cardiology  Once        Provider:  (Not yet assigned)    Completed SISSY KELLER     Inpatient consult to Vascular (Stroke) Neurology  Once        Provider:  (Not yet assigned)    Completed GT LAZARO JR            No new Assessment & Plan notes have been filed under this hospital service since the last note was generated.  Service: Hospital Medicine    Final Active Diagnoses:    Diagnosis Date Noted POA    PRINCIPAL PROBLEM:  Left-sided weakness [R53.1] 07/14/2024 Yes    Chest pain [R07.9] 07/15/2024 Yes    PFO (patent foramen ovale) [Q21.12] 07/16/2024 Not Applicable    Hemiplegic migraine without status migrainosus [G43.409] 07/15/2024 Yes    Left sided numbness [R20.0] 07/14/2024 Yes    SSS (sick sinus syndrome) [I49.5] 02/13/2020 Yes    Cardiac pacemaker in situ [Z95.0] 08/22/2019 Yes    Migraine headache [G43.909] 08/13/2012 Yes      Problems Resolved During this Admission:       Discharged Condition: good    Disposition: Home or Self Care    Follow Up:   Follow-up Information       Juan C Farmer MD Follow up.    Specialty: Internal Medicine  Contact information:  [address] [facility] [address]  [phone]               Odilon Brown MD Follow up.    Specialty: Neurology  [facility] information[facility]  [address] [facility] [address]  [phone]               [facility] Card[facility] 3rd Fl Follow up.    Specialty: Cardiology  Contact information:  [address] [facility] [address] [phone]  Additional information:  Cardiology Services Clinic - Main Building, 3rd Floor   Please park in [facility]  and take Clinic elevator                         Patient Instructions:      Ambulatory referral/consult to Neurology   Standing Status: Future   Referral Priority: Routine Referral Type: Consultation   Referral Reason: Specialty Services Required   Referred to Provider: RENETTA GARCIA Requested Specialty: Neurology   Number of Visits Requested: 1     Ambulatory referral/consult to Interventional Cardiology   Standing Status: Future   Referral Priority: Urgent Referral Type: Consultation   Referral Reason: Specialty Services Required   Requested Specialty: Interventional Cardiology   Number of Visits Requested: 1     Diet Adult Regular     Notify your health care provider if you experience any of the following:  temperature >100.4     Notify your health care provider if you experience any of the following:  persistent nausea and vomiting or diarrhea     Notify your health care provider if you experience any of the following:  severe uncontrolled pain     Notify your health care provider if you experience any of the following:  difficulty breathing or increased cough     Notify your health care provider if you experience any of the following:  worsening rash     Notify your health care provider if you experience any of the following:  severe persistent headache     Notify your health care provider if you experience any of the following:  persistent dizziness, light-headedness, or visual disturbances     Notify your health care provider if you experience any of the following:  increased confusion or weakness     Activity as tolerated       Significant Diagnostic Studies:   Lab Results   Component Value Date    WBC 9.04 07/14/2024    HGB 13.6 07/14/2024    HCT 43.0 07/14/2024    MCV 93 07/14/2024     07/14/2024       BMP  Lab Results   Component Value Date     07/14/2024    K 4.6 07/14/2024     07/14/2024    CO2 21 (L) 07/14/2024    BUN 15 07/14/2024    CREATININE 0.9 07/14/2024    CALCIUM 9.0  07/14/2024    ANIONGAP 8 07/14/2024    EGFRNORACEVR >60.0 07/14/2024     Imaging Results              MRI Brain Ischemic Inter Pro Incl MRA W/O Con (Final result)  Result time 07/14/24 08:26:51      Final result by Aaron Menendez DO (07/14/24 08:26:51)                   Impression:      MRI brain: Unremarkable MRI brain specifically without evidence for acute infarction or hydrocephalus.    MRA head: Unremarkable MRA head specifically without evidence for high-grade proximal stenosis or proximal occlusion.      Electronically signed by: Aaron Menendez DO  Date:    07/14/2024  Time:    08:26               Narrative:    EXAMINATION:  MRI BRAIN ISCHEMIC INTERVENTIONAL PROTOCOL INCL MRA W/O CONTRAST    CLINICAL HISTORY:  wake up stroke LSW;    TECHNIQUE:  Axial diffusion, axial FLAIR and axial gradient imaging of the whole brain without contrast.  In addition Separate acquisition axial source noncontrast 3D  time-of-flight MRA head with 3 dimensional MIPS reformatted images.    COMPARISON:  CTA 7/14/24    FINDINGS:  MRI brain: There is no restricted diffusion to suggest acute infarction.  Ventricles normal without hydrocephalus.  There is no midline shift or mass effect.  Few punctate regions of T2 FLAIR signal hyperintensity in the supratentorial white matter which are nonspecific and may represent mild chronic ischemic change which would be advanced for patient's age.  There is no abnormal parenchymal susceptibility to suggest parenchymal hemorrhage.    MRA head: Anterior circulation:  The distal cervical, petrous, cavernous and supraclinoid segments of the ICAs as well as the visualized anterior and middle cerebral arteries are patent without high-grade proximal stenosis or definite aneurysm.    Posterior circulation: The distal vertebral arteries, basilar artery and posterior cerebral arteries are patent without significant stenosis or aneurysm.  Proximal basilar artery fenestration                                        X-Ray Chest AP Portable (Final result)  Result time 07/14/24 07:59:04      Final result by Trevor Stacy MD (07/14/24 07:59:04)                   Impression:      No convincing evidence of acute cardiopulmonary disease.      Electronically signed by: Trevor Stacy  Date:    07/14/2024  Time:    07:59               Narrative:    EXAMINATION:  XR CHEST AP PORTABLE    CLINICAL HISTORY:  chest pain;    TECHNIQUE:  Single frontal view of the chest was performed.    COMPARISON:  Chest radiograph performed 05/25/2021, 19:37 hours.    FINDINGS:  Monitoring leads overlie the chest.  Left subclavian approach dual lead pacer, as before.    Similar cardiomediastinal contours, considered to be within normal limits.    Lungs essentially clear.  No definite pneumothorax or large volume pleural effusion.    No definite acute findings in the visualized abdomen.    Osseous and soft tissue structures appear without definite acute change.                                       CTA STROKE MULTI-PHASE (Final result)  Result time 07/14/24 06:23:01      Final result by Trevor Stacy MD (07/14/24 06:23:01)                   Impression:      No evidence of acute major vascular distribution infarct or hemorrhage.    CTA head and neck demonstrate no high-grade stenosis or dissection.    Electronically signed by resident: René Valencia  Date:    07/14/2024  Time:    05:44    Electronically signed by: Trevor Stacy  Date:    07/14/2024  Time:    06:23               Narrative:    EXAMINATION:  CTA STROKE MULTI-PHASE    CLINICAL HISTORY:  Stroke, follow up;    TECHNIQUE:  CT angiogram was performed from through the cervical and intracranial vasculature during the IV bolus administration of 75mL of Omnipaque 350.  Multiplanar MPR and MIP reformats were performed.    COMPARISON:  MRI 04/04/2017    FINDINGS:  The brain appears within normal limits. No parenchymal mass, hemorrhage, edema or major vascular distribution  infarct.      The ventricles are normal in size without evidence of hydrocephalus.    No extra-axial blood or fluid collections.    The cranium appears intact. Mastoid air cells and paranasal sinuses are essentially clear.      CTA:    The aortic arch maintains a normal branching pattern.    The common and internal carotid arteries are normal in course and caliber. No significant stenosis in either carotid bifurcation.    The vertebral origins are patent. The cervical vertebral arteries are normal in course and caliber. Vertebrobasilar system is within normal limits without focal abnormality.    The anterior, middle, and posterior cerebral arteries are within normal limits, without evidence of significant stenosis, focal occlusion, or intracranial aneurysm formation.    Chest soft tissue demonstrate left AICD.  Lungs apex are clear.  No pleural effusion.                                    Results for orders placed during the hospital encounter of 07/14/24    Echo Saline Bubble? Yes    Interpretation Summary    Limited exam for bubble study. Technically challenging study.    Left Atrium: Agitated saline study is positive. A small amount of bubbles are visualized in the left cardiac chambers ~7 cardiac cycles following right heart opacification. Bubbles appear to enter the left atrium via the interatrial septum. Suspect there is an inter-atrial level shunt despite the late timing of bubbles entering the left cardiac chambers.      Pending Diagnostic Studies:       None           Medications:  Reconciled Home Medications:      Medication List        START taking these medications      amitriptyline 10 MG tablet  Commonly known as: ELAVIL  Take 1 tablet (10 mg total) by mouth once daily.            CONTINUE taking these medications      ibuprofen 800 MG tablet  Commonly known as: ADVIL,MOTRIN  Take 1 tablet (800 mg total) by mouth 3 (three) times daily as needed for Pain.     norethindrone-ethinyl estradiol 1 mg-20 mcg  (21)/75 mg (7) per tablet  Commonly known as: JUNEL FE 1/20  Take 1 tablet by mouth once daily.              Indwelling Lines/Drains at time of discharge:   Lines/Drains/Airways       None                   Time spent on the discharge of patient: 36 minutes         Natasha Foss PA-C  Department of Hospital Medicine  [facility] 11H

## 2024-07-22 ENCOUNTER — TELEPHONE (OUTPATIENT)
Dept: CARDIOLOGY | Facility: CLINIC | Age: 24
End: 2024-07-22
Payer: MEDICAID

## 2024-07-22 NOTE — TELEPHONE ENCOUNTER
This is her insurance company denied appears to be [employer] of [employer]. I will look into tomorrow and let you know why they denied and appeal it. They state is is not medically necessary. Let me look into more and ill see what I can go with it thanks Favian Villafuerte MD  You14 hours ago (6:53 PM)       I don't get this - who has the gull to say that this is not medically indicated?      You routed conversation to Deshawn CHAMBERLAIN Staff; Favian Hess MD3 days ago     You3 days ago     PB  Outcome  Denied on July 18  Denial: Regular: Not Medically Necessary  Drug  Betaxolol HCl 10MG tablets

## 2024-08-05 ENCOUNTER — OFFICE VISIT (OUTPATIENT)
Dept: NEUROLOGY | Facility: CLINIC | Age: 24
End: 2024-08-05
Payer: MEDICAID

## 2024-08-05 VITALS
DIASTOLIC BLOOD PRESSURE: 83 MMHG | BODY MASS INDEX: 27.2 KG/M2 | SYSTOLIC BLOOD PRESSURE: 120 MMHG | HEART RATE: 77 BPM | WEIGHT: 163.5 LBS

## 2024-08-05 DIAGNOSIS — G43.019 MIGRAINE WITHOUT AURA, INTRACTABLE, WITHOUT STATUS MIGRAINOSUS: Primary | ICD-10-CM

## 2024-08-05 DIAGNOSIS — G43.409 HEMIPLEGIC MIGRAINE WITHOUT STATUS MIGRAINOSUS, NOT INTRACTABLE: ICD-10-CM

## 2024-08-05 PROCEDURE — G2211 COMPLEX E/M VISIT ADD ON: HCPCS | Mod: S$PBB,,, | Performed by: NURSE PRACTITIONER

## 2024-08-05 PROCEDURE — 3008F BODY MASS INDEX DOCD: CPT | Mod: CPTII,,, | Performed by: NURSE PRACTITIONER

## 2024-08-05 PROCEDURE — 99999 PR PBB SHADOW E&M-EST. PATIENT-LVL III: CPT | Mod: PBBFAC,,, | Performed by: NURSE PRACTITIONER

## 2024-08-05 PROCEDURE — 1160F RVW MEDS BY RX/DR IN RCRD: CPT | Mod: CPTII,,, | Performed by: NURSE PRACTITIONER

## 2024-08-05 PROCEDURE — 3074F SYST BP LT 130 MM HG: CPT | Mod: CPTII,,, | Performed by: NURSE PRACTITIONER

## 2024-08-05 PROCEDURE — 99215 OFFICE O/P EST HI 40 MIN: CPT | Mod: S$PBB,,, | Performed by: NURSE PRACTITIONER

## 2024-08-05 PROCEDURE — 1159F MED LIST DOCD IN RCRD: CPT | Mod: CPTII,,, | Performed by: NURSE PRACTITIONER

## 2024-08-05 PROCEDURE — 99213 OFFICE O/P EST LOW 20 MIN: CPT | Mod: PBBFAC | Performed by: NURSE PRACTITIONER

## 2024-08-05 PROCEDURE — 3079F DIAST BP 80-89 MM HG: CPT | Mod: CPTII,,, | Performed by: NURSE PRACTITIONER

## 2024-08-05 RX ORDER — UBROGEPANT 100 MG/1
100 TABLET ORAL
Qty: 16 TABLET | Refills: 2 | Status: SHIPPED | OUTPATIENT
Start: 2024-08-05

## 2024-08-05 RX ORDER — ONDANSETRON 4 MG/1
4 TABLET, ORALLY DISINTEGRATING ORAL EVERY 6 HOURS PRN
Qty: 20 TABLET | Refills: 0 | Status: SHIPPED | OUTPATIENT
Start: 2024-08-05

## 2024-08-05 RX ORDER — NORTRIPTYLINE HYDROCHLORIDE 10 MG/1
CAPSULE ORAL
Qty: 60 CAPSULE | Refills: 2 | Status: SHIPPED | OUTPATIENT
Start: 2024-08-05

## 2024-08-13 ENCOUNTER — PATIENT MESSAGE (OUTPATIENT)
Dept: ELECTROPHYSIOLOGY | Facility: CLINIC | Age: 24
End: 2024-08-13
Payer: MEDICAID

## 2024-08-15 ENCOUNTER — OFFICE VISIT (OUTPATIENT)
Dept: INTERNAL MEDICINE | Facility: CLINIC | Age: 24
End: 2024-08-15
Payer: MEDICAID

## 2024-08-15 VITALS
DIASTOLIC BLOOD PRESSURE: 62 MMHG | OXYGEN SATURATION: 99 % | HEART RATE: 75 BPM | SYSTOLIC BLOOD PRESSURE: 120 MMHG | WEIGHT: 162.94 LBS | BODY MASS INDEX: 27.15 KG/M2 | HEIGHT: 65 IN

## 2024-08-15 DIAGNOSIS — Q21.12 PFO (PATENT FORAMEN OVALE): Primary | ICD-10-CM

## 2024-08-15 PROCEDURE — 99215 OFFICE O/P EST HI 40 MIN: CPT | Mod: PBBFAC

## 2024-08-15 PROCEDURE — 99999 PR PBB SHADOW E&M-EST. PATIENT-LVL V: CPT | Mod: PBBFAC,,,

## 2024-08-15 NOTE — PROGRESS NOTES
Subjective     Chief Complaint: ER Follow-up     History of Present Illness:  Ms. Vanessa Sinclair is a 24 y.o. female w/hx of Sick Sinus Syndrome s/p PPM (2021) and migraines who presented to the clinic for ER follow-up     She was admitted to the hospital for observation of acute onset left-sided weakness and numbness. She also endorsed associated right eye> left eye blurriness. Stroke code was activated. CTA and MRI brain negative acute infarction or hydrocephalus. Cardiology was consulted for chest pain. TTE and inflammatory markers were obtained to r/o pericarditis, all was negative. Neurology consulted for ongoing neurological symptoms. MRV ordered which ruled out venous thrombosis. Suspected it was complex migraines. She was started on 10mg PO Elavil which improved symptoms. IC evaluated patient while hospitalized. They suggested outpatient follow-up for possible PFO closure. She was discharged home on 10mg Elavil and advised to stop taking Triptans.     Today, she reports some improvement in her left-sided weakness (LUE better than LLE). She still reports low energy, fatigue, hot intolerance, decrease appetite, and interruption in sleep. Denies palpitations, anxiousness, tremors. She reports the only change in her medication was the change in her migraine medication from triptan to TCA but reports that the TCA makes her less drowsy. Last TSH in July was WNL.     Review of Systems   Constitutional:  Negative for chills, malaise/fatigue and weight loss.   HENT:  Negative for congestion, ear pain and hearing loss.    Eyes:  Negative for blurred vision and redness.   Respiratory:  Negative for cough and shortness of breath.    Cardiovascular:  Negative for chest pain, palpitations and leg swelling.   Gastrointestinal:  Negative for abdominal pain, constipation, diarrhea, nausea and vomiting.   Genitourinary:  Negative for dysuria, flank pain and hematuria.   Musculoskeletal:  Negative for myalgias and neck  pain.   Neurological:  Positive for focal weakness. Negative for tremors, seizures, weakness and headaches.   Endo/Heme/Allergies:  Does not bruise/bleed easily.   Psychiatric/Behavioral:  Negative for depression and substance abuse. The patient is not nervous/anxious.        PAST HISTORY:     Past Medical History:   Diagnosis Date    Asthma     Headache(784.0)     Migraine     Pre-syncope 5/25/2021    Seizures     Syncope and collapse        Past Surgical History:   Procedure Laterality Date    A-V CARDIAC PACEMAKER INSERTION Left 7/18/2019    Procedure: INSERTION, CARDIAC PACEMAKER, DUAL CHAMBER;  Surgeon: Piter Cooley MD;  Location: [facility] CATH LAB/EP;  Service: Cardiology;  Laterality: Left;    PHLEBOGRAPHY  5/25/2021    Procedure: Venogram;  Surgeon: Piter Cooley MD;  Location: [facility] EP LAB;  Service: Cardiology;;    REPLACEMENT OF PACEMAKER GENERATOR  5/25/2021    Procedure: REPLACEMENT, PACEMAKER GENERATOR;  Surgeon: Piter Cooley MD;  Location: [facility] EP LAB;  Service: Cardiology;;    WISDOM TOOTH EXTRACTION         Family History   Problem Relation Name Age of Onset    Diabetes Mother      Stroke Maternal Grandfather Lul dudley     Heart disease Maternal Grandfather Lul dudley     Diabetes Maternal Grandfather Lul dudley     Irregular heart beat Sister Leigh     Diabetes Maternal Grandmother Jessica dudley     Cancer Maternal Grandmother Jessica dudley     Arthritis Paternal Grandmother      Diabetes Paternal Grandmother      Diabetes Paternal Grandfather      Diabetes Father Vivek reyes     Breast cancer Neg Hx      Ovarian cancer Neg Hx      Colon cancer Neg Hx         Social History     Socioeconomic History    Marital status: Single   Tobacco Use    Smoking status: Never    Smokeless tobacco: Never   Substance and Sexual Activity    Alcohol use: Not Currently     Comment: occasionally     Drug use: Never    Sexual activity: Yes     Partners: Male     Birth control/protection: OCP    Social History Narrative    Lives at home with mom, dad, 3 sisters, and 2 dogs.        Self-employed.         Rides bike three times weekly.      Social Determinants of Health     Financial Resource Strain: Low Risk  (7/15/2024)    Overall Financial Resource Strain (CARDIA)     Difficulty of Paying Living Expenses: Not very hard   Food Insecurity: No Food Insecurity (7/15/2024)    Hunger Vital Sign     Worried About Running Out of Food in the Last Year: Never true     Ran Out of Food in the Last Year: Never true   Transportation Needs: No Transportation Needs (7/15/2024)    TRANSPORTATION NEEDS     Transportation : No   Physical Activity: Insufficiently Active (3/18/2024)    Exercise Vital Sign     Days of Exercise per Week: 2 days     Minutes of Exercise per Session: 30 min   Stress: No Stress Concern Present (7/15/2024)    [employer] [place] [employer] - Occupational Stress Questionnaire     Feeling of Stress : Only a little   Housing Stability: Low Risk  (7/15/2024)    Housing Stability Vital Sign     Unable to Pay for Housing in the Last Year: No     Homeless in the Last Year: No       MEDICATIONS & ALLERGIES:     Current Outpatient Medications on File Prior to Visit   Medication Sig    betaxoloL (KERLONE) 10 mg Tab Take 1 tablet (10 mg total) by mouth once daily. Start with 1/4 pill a day for 2 weeks. Increase to 1/2 pill a day for the following 2 weeks. Increase to a full tablet (10 mg) a day thereafter.Progression schedule from 1/4 tab to a full tab to be modified in case of perceived side effects.Future further increases will be tailored to clinical response.    ibuprofen (ADVIL,MOTRIN) 800 MG tablet Take 1 tablet (800 mg total) by mouth 3 (three) times daily as needed for Pain.    magnesium oxide (MAG-OX) 400 mg (241.3 mg magnesium) tablet Take 400 mg by mouth once daily.    norethindrone-ethinyl estradiol (JUNEL FE 1/20) 1 mg-20 mcg (21)/75 mg (7) per tablet Take 1 tablet by mouth once daily.  "   nortriptyline (PAMELOR) 10 MG capsule Take 1 capsule nightly x 2 weeks then take 2 capsules by mouth nightly.    ondansetron (ZOFRAN-ODT) 4 MG TbDL Take 1 tablet (4 mg total) by mouth every 6 (six) hours as needed (nausea).    riboflavin, vitamin B2, (VITAMIN B-2) 100 mg Tab tablet Take 100 mg by mouth once daily. 2 tablets (200 mg total) once daily    ubrogepant (UBRELVY) 100 mg tablet Take 1 tablet (100 mg total) by mouth every 2 (two) hours as needed for Migraine. If symptoms persist or return, may repeat dose after 2 hours. Maximum: 200 mg per 24 hours     No current facility-administered medications on file prior to visit.       Review of patient's allergies indicates:   Allergen Reactions    Lamictal  [lamotrigine]      Other reaction(s): HIVES    [id number] antimigraine agents Other (See Comments)     Contraindicated in complex migraines, needs to avoid     Cefzil [cefprozil] Rash    Demerol [meperidine] Rash     Other reaction(s): Rash    Pcn [penicillins] Rash     Other reaction(s): Rash       OBJECTIVE:     Vital Signs:  Vitals:    08/15/24 1447   BP: 120/62   BP Location: Right arm   Patient Position: Sitting   BP Method: Large (Manual)   Pulse: 75   SpO2: 99%   Weight: 73.9 kg (162 lb 14.7 oz)   Height: 5' 5" (1.651 m)       Body mass index is 27.11 kg/m².     Physical Exam  Constitutional:       General: She is not in acute distress.     Appearance: Normal appearance. She is normal weight. She is not ill-appearing.   HENT:      Head: Normocephalic.      Nose: No congestion or rhinorrhea.      Mouth/Throat:      Mouth: Mucous membranes are moist.      Pharynx: Oropharynx is clear. No posterior oropharyngeal erythema.   Eyes:      General: No scleral icterus.        Right eye: No discharge.         Left eye: No discharge.      Extraocular Movements: Extraocular movements intact.   Cardiovascular:      Rate and Rhythm: Normal rate and regular rhythm.      Pulses: Normal pulses.      Heart sounds: " Normal heart sounds. No murmur heard.     No friction rub. No gallop.   Pulmonary:      Effort: Pulmonary effort is normal. No respiratory distress.      Breath sounds: Normal breath sounds.   Abdominal:      General: Abdomen is flat. Bowel sounds are normal. There is no distension.      Palpations: Abdomen is soft.      Tenderness: There is no abdominal tenderness.   Musculoskeletal:         General: No swelling or tenderness.      Cervical back: Normal range of motion. No rigidity or tenderness.      Right lower leg: No edema.      Left lower leg: No edema.   Skin:     General: Skin is warm.      Findings: No bruising or erythema.   Neurological:      Mental Status: She is alert and oriented to person, place, and time. Mental status is at baseline.      Motor: Weakness (LLE > RLE) present.   Psychiatric:         Mood and Affect: Mood normal.         Behavior: Behavior normal.       Laboratory  No results found for this or any previous visit (from the past 24 hour(s)).    Diagnostic Results:      Health Maintenance Due   Topic Date Due    Pneumococcal Vaccines (Age 0-64) (1 of 2 - PCV) 06/20/2004         ASSESSMENT & PLAN:   24yoF w/hx of SSS s/p PPM and migraines who presents to the clinic for ER follow-up. Still reporting increased weakness and fatigue. Vision loss has resolved. Workup has been negative so far. Her newly discovered PFO could be contributing to her increased fatigue/weakness, however, patient was unable to see [facility] d/t patient having Medicaid. It is unclear if [facility] does not take Medicaid given the patient sees Electrophysiology here. This could be secondary to the patient recently turning 24 and being off of her parent's insurance. Will place referral to [facility] and [facility] again to see if patient can be seen. Will follow-up with patient in approx. 1mo.     PFO (patent foramen ovale)  -     Ambulatory referral/consult to Cardiology; Future; Expected date:  08/22/2024  -     Cancel: Ambulatory referral/consult to Cardiology; Future; Expected date: 08/22/2024  -     Ambulatory referral/consult to Cardiology; Future; Expected date: 08/22/2024        See above for further detail.    RTC in 1mo     Discussed with Dr Morin  - staff attestation to follow      Thalia Singer M.D.   Internal Medicine PGY-2

## 2024-08-16 ENCOUNTER — PATIENT MESSAGE (OUTPATIENT)
Dept: CARDIOLOGY | Facility: CLINIC | Age: 24
End: 2024-08-16
Payer: MEDICAID

## 2024-08-20 ENCOUNTER — TELEPHONE (OUTPATIENT)
Dept: CARDIOLOGY | Facility: CLINIC | Age: 24
End: 2024-08-20
Payer: MEDICAID

## 2024-08-20 NOTE — TELEPHONE ENCOUNTER
Spoke with Abdiel with [employer] [phone] . PA approved . PA #[id number] from 8/20/24-8/20/25 for a 30 day supply.

## 2024-08-28 DIAGNOSIS — G43.019 MIGRAINE WITHOUT AURA, INTRACTABLE, WITHOUT STATUS MIGRAINOSUS: ICD-10-CM

## 2024-08-28 RX ORDER — ONDANSETRON 4 MG/1
TABLET, ORALLY DISINTEGRATING ORAL
Qty: 20 TABLET | Refills: 2 | Status: SHIPPED | OUTPATIENT
Start: 2024-08-28

## 2024-09-03 ENCOUNTER — CLINICAL SUPPORT (OUTPATIENT)
Dept: CARDIOLOGY | Facility: HOSPITAL | Age: 24
End: 2024-09-03
Attending: INTERNAL MEDICINE
Payer: MEDICAID

## 2024-09-03 ENCOUNTER — CLINICAL SUPPORT (OUTPATIENT)
Dept: CARDIOLOGY | Facility: HOSPITAL | Age: 24
End: 2024-09-03
Payer: MEDICAID

## 2024-09-03 DIAGNOSIS — Z95.0 PRESENCE OF CARDIAC PACEMAKER: ICD-10-CM

## 2024-09-03 DIAGNOSIS — R00.1 BRADYCARDIA, UNSPECIFIED: ICD-10-CM

## 2024-09-03 DIAGNOSIS — R55 SYNCOPE AND COLLAPSE: ICD-10-CM

## 2024-09-03 PROCEDURE — 93296 REM INTERROG EVL PM/IDS: CPT | Performed by: INTERNAL MEDICINE

## 2024-09-03 PROCEDURE — 93294 REM INTERROG EVL PM/LDLS PM: CPT | Mod: ,,, | Performed by: INTERNAL MEDICINE

## 2024-09-05 LAB
OHS CV AF BURDEN PERCENT: < 1
OHS CV DC REMOTE DEVICE TYPE: NORMAL
OHS CV RV PACING PERCENT: 0 %

## 2024-10-01 ENCOUNTER — IMMUNIZATION (OUTPATIENT)
Dept: INTERNAL MEDICINE | Facility: CLINIC | Age: 24
End: 2024-10-01
Payer: MEDICAID

## 2024-10-01 ENCOUNTER — OFFICE VISIT (OUTPATIENT)
Dept: INTERNAL MEDICINE | Facility: CLINIC | Age: 24
End: 2024-10-01
Payer: MEDICAID

## 2024-10-01 VITALS
HEIGHT: 65 IN | TEMPERATURE: 99 F | DIASTOLIC BLOOD PRESSURE: 68 MMHG | WEIGHT: 165.56 LBS | HEART RATE: 75 BPM | BODY MASS INDEX: 27.58 KG/M2 | SYSTOLIC BLOOD PRESSURE: 106 MMHG | OXYGEN SATURATION: 98 %

## 2024-10-01 DIAGNOSIS — J06.9 UPPER RESPIRATORY VIRUS: Primary | ICD-10-CM

## 2024-10-01 DIAGNOSIS — Z23 NEED FOR VACCINATION: Primary | ICD-10-CM

## 2024-10-01 DIAGNOSIS — J45.909 ASTHMA, UNSPECIFIED ASTHMA SEVERITY, UNSPECIFIED WHETHER COMPLICATED, UNSPECIFIED WHETHER PERSISTENT: ICD-10-CM

## 2024-10-01 DIAGNOSIS — Z00.00 HEALTH CARE MAINTENANCE: ICD-10-CM

## 2024-10-01 LAB
CTP QC/QA: YES
FLUAV AG NPH QL: NEGATIVE
FLUBV AG NPH QL: NEGATIVE
S PYO RRNA THROAT QL PROBE: NEGATIVE
SARS-COV-2 RDRP RESP QL NAA+PROBE: NEGATIVE

## 2024-10-01 PROCEDURE — 90656 IIV3 VACC NO PRSV 0.5 ML IM: CPT | Mod: PBBFAC

## 2024-10-01 PROCEDURE — 99999PBSHW INFLUENZA - TRIVALENT - PF (ADULT): Mod: PBBFAC,,,

## 2024-10-01 PROCEDURE — 99213 OFFICE O/P EST LOW 20 MIN: CPT | Mod: PBBFAC

## 2024-10-01 PROCEDURE — 99999 PR PBB SHADOW E&M-EST. PATIENT-LVL III: CPT | Mod: PBBFAC,,,

## 2024-10-01 RX ORDER — ALBUTEROL SULFATE 90 UG/1
2 INHALANT RESPIRATORY (INHALATION) EVERY 6 HOURS PRN
Qty: 18 G | Refills: 0 | Status: SHIPPED | OUTPATIENT
Start: 2024-10-01 | End: 2025-10-01

## 2024-10-01 NOTE — PROGRESS NOTES
Subjective     Chief Complaint: ER Follow-up     History of Present Illness:  Ms. Vanessa Sinclair is a 24 y.o. female w/hx of Sick Sinus Syndrome s/p PPM (2021) and migraines who presented to the clinic for 1mo follow-up.     Since our last visit, patient was able to get in with  Cardiology and LEWIS was performed to assess her PFO, which was negative for PFO per the patient.  Cardiology was requesting TTE images but unclear if they were obtained. She still reports fatigue and decreased energy. She states that she has been having increased asthma symptoms such as wheezing and coughing. She denies Dyspnea or chest tightness. Her symptoms mainly occur at night. No known triggers.     Upper Respiratory Symptoms: Patient complains of nasal congestion, non productive cough, sore throat, and body aches .  Onset of symptoms started today,  gradually worsening since that time. She is drinking moderate amounts of fluids.  Past history is significant for asthma. Patient is non-smoker.      Review of Systems   Constitutional:  Negative for chills, malaise/fatigue and weight loss.   HENT:  Negative for congestion, ear pain and hearing loss.    Eyes:  Negative for blurred vision and redness.   Respiratory:  Negative for cough and shortness of breath.    Cardiovascular:  Negative for chest pain, palpitations and leg swelling.   Gastrointestinal:  Negative for abdominal pain, constipation, diarrhea, nausea and vomiting.   Genitourinary:  Negative for dysuria, flank pain and hematuria.   Musculoskeletal:  Negative for myalgias and neck pain.   Neurological:  Positive for focal weakness. Negative for tremors, seizures, weakness and headaches.   Endo/Heme/Allergies:  Does not bruise/bleed easily.   Psychiatric/Behavioral:  Negative for depression and substance abuse. The patient is not nervous/anxious.        PAST HISTORY:     Past Medical History:   Diagnosis Date    Asthma     Headache(784.0)     Migraine     Pre-syncope  5/25/2021    Seizures     Syncope and collapse        Past Surgical History:   Procedure Laterality Date    A-V CARDIAC PACEMAKER INSERTION Left 7/18/2019    Procedure: INSERTION, CARDIAC PACEMAKER, DUAL CHAMBER;  Surgeon: Piter Cooley MD;  Location: [facility] CATH LAB/EP;  Service: Cardiology;  Laterality: Left;    PHLEBOGRAPHY  5/25/2021    Procedure: Venogram;  Surgeon: Piter Cooley MD;  Location: [facility] EP LAB;  Service: Cardiology;;    REPLACEMENT OF PACEMAKER GENERATOR  5/25/2021    Procedure: REPLACEMENT, PACEMAKER GENERATOR;  Surgeon: Piter Cooley MD;  Location: [facility] EP LAB;  Service: Cardiology;;    WISDOM TOOTH EXTRACTION         Family History   Problem Relation Name Age of Onset    Diabetes Mother      Stroke Maternal Grandfather Lul dudley     Heart disease Maternal Grandfather Lul dudley     Diabetes Maternal Grandfather Lul dudley     Irregular heart beat Sister Leigh     Diabetes Maternal Grandmother Jessica dudley     Cancer Maternal Grandmother Jessica dudley     Arthritis Paternal Grandmother      Diabetes Paternal Grandmother      Diabetes Paternal Grandfather      Diabetes Father Vivek reyes     Breast cancer Neg Hx      Ovarian cancer Neg Hx      Colon cancer Neg Hx         Social History     Socioeconomic History    Marital status: Single   Tobacco Use    Smoking status: Never    Smokeless tobacco: Never   Substance and Sexual Activity    Alcohol use: Not Currently     Comment: occasionally     Drug use: Never    Sexual activity: Yes     Partners: Male     Birth control/protection: OCP   Social History Narrative    Lives at home with mom, dad, 3 sisters, and 2 dogs.        Self-employed.         Rides bike three times weekly.      Social Drivers of Health     Financial Resource Strain: Low Risk  (7/15/2024)    Overall Financial Resource Strain (CARDIA)     Difficulty of Paying Living Expenses: Not very hard   Food Insecurity: No Food Insecurity (7/15/2024)    Hunger Vital Sign      Worried About Running Out of Food in the Last Year: Never true     Ran Out of Food in the Last Year: Never true   Transportation Needs: No Transportation Needs (7/15/2024)    TRANSPORTATION NEEDS     Transportation : No   Physical Activity: Insufficiently Active (3/18/2024)    Exercise Vital Sign     Days of Exercise per Week: 2 days     Minutes of Exercise per Session: 30 min   Stress: No Stress Concern Present (7/15/2024)    [employer] [place] [employer] - Occupational Stress Questionnaire     Feeling of Stress : Only a little   Housing Stability: Low Risk  (7/15/2024)    Housing Stability Vital Sign     Unable to Pay for Housing in the Last Year: No     Homeless in the Last Year: No       MEDICATIONS & ALLERGIES:     Current Outpatient Medications on File Prior to Visit   Medication Sig    betaxoloL (KERLONE) 10 mg Tab Take 1 tablet (10 mg total) by mouth once daily. Start with 1/4 pill a day for 2 weeks. Increase to 1/2 pill a day for the following 2 weeks. Increase to a full tablet (10 mg) a day thereafter.Progression schedule from 1/4 tab to a full tab to be modified in case of perceived side effects.Future further increases will be tailored to clinical response.    ibuprofen (ADVIL,MOTRIN) 800 MG tablet Take 1 tablet (800 mg total) by mouth 3 (three) times daily as needed for Pain.    magnesium oxide (MAG-OX) 400 mg (241.3 mg magnesium) tablet Take 400 mg by mouth once daily.    norethindrone-ethinyl estradiol (JUNEL FE 1/20) 1 mg-20 mcg (21)/75 mg (7) per tablet Take 1 tablet by mouth once daily.    nortriptyline (PAMELOR) 10 MG capsule Take 1 capsule nightly x 2 weeks then take 2 capsules by mouth nightly.    ondansetron (ZOFRAN-ODT) 4 MG TbDL DISSOLVE 1 TABLET(4 MG) ON THE TONGUE EVERY 6 HOURS AS NEEDED FOR NAUSEA    riboflavin, vitamin B2, (VITAMIN B-2) 100 mg Tab tablet Take 100 mg by mouth once daily. 2 tablets (200 mg total) once daily    ubrogepant (UBRELVY) 100 mg tablet Take 1 tablet  "(100 mg total) by mouth every 2 (two) hours as needed for Migraine. If symptoms persist or return, may repeat dose after 2 hours. Maximum: 200 mg per 24 hours     No current facility-administered medications on file prior to visit.       Review of patient's allergies indicates:   Allergen Reactions    Lamictal  [lamotrigine]      Other reaction(s): HIVES    [id number] antimigraine agents Other (See Comments)     Contraindicated in complex migraines, needs to avoid     Cefzil [cefprozil] Rash    Demerol [meperidine] Rash     Other reaction(s): Rash    Pcn [penicillins] Rash     Other reaction(s): Rash       OBJECTIVE:     Vital Signs:  Vitals:    10/01/24 1445   BP: 106/68   BP Location: Right arm   Patient Position: Sitting   Pulse: 75   Temp: 98.6 °F (37 °C)   TempSrc: Oral   SpO2: 98%   Weight: 75.1 kg (165 lb 9.1 oz)   Height: 5' 5" (1.651 m)         Body mass index is 27.55 kg/m².     Physical Exam  Constitutional:       General: She is not in acute distress.     Appearance: Normal appearance. She is normal weight. She is not ill-appearing.   HENT:      Head: Normocephalic.      Nose: No congestion or rhinorrhea.      Mouth/Throat:      Mouth: Mucous membranes are moist.      Pharynx: Oropharynx is clear. No posterior oropharyngeal erythema.   Eyes:      General: No scleral icterus.        Right eye: No discharge.         Left eye: No discharge.      Extraocular Movements: Extraocular movements intact.   Cardiovascular:      Rate and Rhythm: Normal rate and regular rhythm.      Pulses: Normal pulses.      Heart sounds: Normal heart sounds. No murmur heard.     No friction rub. No gallop.   Pulmonary:      Effort: Pulmonary effort is normal. No respiratory distress.      Breath sounds: Normal breath sounds.   Abdominal:      General: Abdomen is flat. Bowel sounds are normal. There is no distension.      Palpations: Abdomen is soft.      Tenderness: There is no abdominal tenderness.   Musculoskeletal:         " General: No swelling or tenderness.      Cervical back: Normal range of motion. No rigidity or tenderness.      Right lower leg: No edema.      Left lower leg: No edema.   Skin:     General: Skin is warm.      Findings: No bruising or erythema.   Neurological:      Mental Status: She is alert and oriented to person, place, and time. Mental status is at baseline.      Motor: Weakness (LLE > RLE) present.   Psychiatric:         Mood and Affect: Mood normal.         Behavior: Behavior normal.         Laboratory  Recent Results (from the past 24 hours)   POCT COVID-19 Rapid Screening    Collection Time: 10/01/24  3:12 PM   Result Value Ref Range    POC Rapid COVID Negative Negative     Acceptable Yes    POCT Influenza A/B Rapid Antigen    Collection Time: 10/01/24  3:13 PM   Result Value Ref Range    Rapid Influenza A Ag Negative Negative    Rapid Influenza B Ag Negative Negative     Acceptable Yes    POCT Rapid Strep A    Collection Time: 10/01/24  3:21 PM   Result Value Ref Range    Rapid Strep A Screen Negative Negative     Acceptable Yes        Diagnostic Results:      Health Maintenance Due   Topic Date Due    Pneumococcal Vaccines (Age 0-64) (1 of 2 - PCV) 06/20/2004    Influenza Vaccine (1) 09/01/2024         ASSESSMENT & PLAN:   24yoF w/hx of SSS s/p PPM and migraines who presents to the clinic for 1mo follow-up. LEWIS performed at [facility] was negative for PFO.  Cardiology currently trying to obtain images to officially r/o PFO. Instructed patient to go to [facility] to see if she can obtain a disc of the images. Hx of Asthma with recently increased symptoms. Currently does not have a ANNIE or ICS. Will start with Albuterol and see if an ICS needs to be added. URI symptoms, negative for Covid, Flu, and Strep. Instructed patient to treat her symptoms with OTC medications.     Upper respiratory virus  -     POCT COVID-19 Rapid Screening  -     POCT Influenza A/B  Rapid Antigen  -     POCT Rapid Strep A    Asthma, unspecified asthma severity, unspecified whether complicated, unspecified whether persistent  -     albuterol (VENTOLIN HFA) 90 mcg/actuation inhaler; Inhale 2 puffs into the lungs every 6 (six) hours as needed for Wheezing. Rescue  Dispense: 18 g; Refill: 0    Health care maintenance  -     influenza (Afluria, Fluzone) 45 mcg/0.5 mL IM vaccine (> or = 6 mo) (*contains preservatives) 0.5 mL  -     Discontinue: pneumococcal vaccine (PNEUMOVAX 23) injection 0.5 mL          See above for further detail.    RTC in 3mos     Discussed with Dr Alvarado  - staff attestation to follow      Thalia Singer M.D.   Internal Medicine PGY-2

## 2024-10-06 ENCOUNTER — PATIENT MESSAGE (OUTPATIENT)
Dept: INTERNAL MEDICINE | Facility: CLINIC | Age: 24
End: 2024-10-06
Payer: MEDICAID

## 2024-10-06 DIAGNOSIS — J06.9 UPPER RESPIRATORY VIRUS: Primary | ICD-10-CM

## 2024-10-07 RX ORDER — BENZONATATE 200 MG/1
200 CAPSULE ORAL 3 TIMES DAILY PRN
Qty: 30 CAPSULE | Refills: 0 | Status: SHIPPED | OUTPATIENT
Start: 2024-10-07 | End: 2024-10-17

## 2024-10-07 RX ORDER — GUAIFENESIN 100 MG/5ML
200 SOLUTION ORAL 3 TIMES DAILY PRN
Qty: 180 ML | Refills: 0 | Status: SHIPPED | OUTPATIENT
Start: 2024-10-07 | End: 2024-10-17

## 2024-12-02 ENCOUNTER — CLINICAL SUPPORT (OUTPATIENT)
Dept: CARDIOLOGY | Facility: HOSPITAL | Age: 24
End: 2024-12-02
Payer: MEDICAID

## 2024-12-02 DIAGNOSIS — Z95.0 PRESENCE OF CARDIAC PACEMAKER: ICD-10-CM

## 2024-12-02 DIAGNOSIS — R55 SYNCOPE AND COLLAPSE: ICD-10-CM

## 2024-12-02 DIAGNOSIS — R00.1 BRADYCARDIA, UNSPECIFIED: ICD-10-CM

## 2024-12-03 ENCOUNTER — CLINICAL SUPPORT (OUTPATIENT)
Dept: CARDIOLOGY | Facility: HOSPITAL | Age: 24
End: 2024-12-03
Attending: INTERNAL MEDICINE
Payer: MEDICAID

## 2024-12-03 DIAGNOSIS — R55 SYNCOPE AND COLLAPSE: ICD-10-CM

## 2024-12-03 DIAGNOSIS — R00.1 BRADYCARDIA, UNSPECIFIED: ICD-10-CM

## 2024-12-03 DIAGNOSIS — Z95.0 PRESENCE OF CARDIAC PACEMAKER: ICD-10-CM

## 2024-12-03 PROCEDURE — 93294 REM INTERROG EVL PM/LDLS PM: CPT | Mod: ,,, | Performed by: INTERNAL MEDICINE

## 2024-12-03 PROCEDURE — 93296 REM INTERROG EVL PM/IDS: CPT | Performed by: INTERNAL MEDICINE

## 2024-12-04 ENCOUNTER — TELEPHONE (OUTPATIENT)
Dept: ELECTROPHYSIOLOGY | Facility: CLINIC | Age: 24
End: 2024-12-04
Payer: MEDICAID

## 2024-12-04 NOTE — TELEPHONE ENCOUNTER
Patient is a former patient of Dr. Cooley. With his departure, patient will be scheduled with Dr. Everett to establish care. Patient agrees to transition to Dr. Everett and agrees with appointment date and time.

## 2024-12-12 LAB
OHS CV AF BURDEN PERCENT: < 1
OHS CV DC REMOTE DEVICE TYPE: NORMAL
OHS CV RV PACING PERCENT: 0 %

## 2025-02-05 ENCOUNTER — TELEPHONE (OUTPATIENT)
Dept: OBSTETRICS AND GYNECOLOGY | Facility: CLINIC | Age: 25
End: 2025-02-05
Payer: MEDICAID

## 2025-02-10 ENCOUNTER — TELEPHONE (OUTPATIENT)
Dept: INTERNAL MEDICINE | Facility: CLINIC | Age: 25
End: 2025-02-10
Payer: MEDICAID

## 2025-02-12 ENCOUNTER — LAB VISIT (OUTPATIENT)
Dept: LAB | Facility: HOSPITAL | Age: 25
End: 2025-02-12
Payer: MEDICAID

## 2025-02-12 ENCOUNTER — OFFICE VISIT (OUTPATIENT)
Dept: INTERNAL MEDICINE | Facility: CLINIC | Age: 25
End: 2025-02-12
Payer: MEDICAID

## 2025-02-12 VITALS
DIASTOLIC BLOOD PRESSURE: 68 MMHG | OXYGEN SATURATION: 97 % | WEIGHT: 171.94 LBS | BODY MASS INDEX: 28.65 KG/M2 | HEIGHT: 65 IN | HEART RATE: 73 BPM | RESPIRATION RATE: 16 BRPM | SYSTOLIC BLOOD PRESSURE: 109 MMHG

## 2025-02-12 DIAGNOSIS — I49.5 SSS (SICK SINUS SYNDROME): ICD-10-CM

## 2025-02-12 DIAGNOSIS — Z33.1 PREGNANCY AS INCIDENTAL FINDING: ICD-10-CM

## 2025-02-12 DIAGNOSIS — J45.909 ASTHMA, UNSPECIFIED ASTHMA SEVERITY, UNSPECIFIED WHETHER COMPLICATED, UNSPECIFIED WHETHER PERSISTENT: Primary | ICD-10-CM

## 2025-02-12 LAB — HCG INTACT+B SERPL-ACNC: 9985 MIU/ML

## 2025-02-12 PROCEDURE — 3078F DIAST BP <80 MM HG: CPT | Mod: CPTII,,,

## 2025-02-12 PROCEDURE — 3074F SYST BP LT 130 MM HG: CPT | Mod: CPTII,,,

## 2025-02-12 PROCEDURE — 36415 COLL VENOUS BLD VENIPUNCTURE: CPT

## 2025-02-12 PROCEDURE — 99213 OFFICE O/P EST LOW 20 MIN: CPT | Mod: PBBFAC

## 2025-02-12 PROCEDURE — 99213 OFFICE O/P EST LOW 20 MIN: CPT | Mod: GE,S$PBB,,

## 2025-02-12 PROCEDURE — 84702 CHORIONIC GONADOTROPIN TEST: CPT

## 2025-02-12 PROCEDURE — 3008F BODY MASS INDEX DOCD: CPT | Mod: CPTII,,,

## 2025-02-12 PROCEDURE — 99999 PR PBB SHADOW E&M-EST. PATIENT-LVL III: CPT | Mod: PBBFAC,,,

## 2025-02-12 RX ORDER — BUDESONIDE AND FORMOTEROL FUMARATE DIHYDRATE 80; 4.5 UG/1; UG/1
2 AEROSOL RESPIRATORY (INHALATION) 2 TIMES DAILY
Qty: 6.9 G | Refills: 3 | Status: SHIPPED | OUTPATIENT
Start: 2025-02-12 | End: 2025-06-12

## 2025-02-12 RX ORDER — ALBUTEROL SULFATE 90 UG/1
2 INHALANT RESPIRATORY (INHALATION) EVERY 6 HOURS PRN
Qty: 18 G | Refills: 3 | Status: SHIPPED | OUTPATIENT
Start: 2025-02-12 | End: 2025-06-12

## 2025-02-12 NOTE — PATIENT INSTRUCTIONS
Start taking 1/2 pill of Betaxolol every day for a week   then 1/2 pill of Betaxolol every other day  then 1/2 pill 3 times a week   Then 1/2 pill of Betaxolol 2 times a week  And then 1/2 pill once a week.

## 2025-02-12 NOTE — PROGRESS NOTES
Subjective     Chief Complaint: 3mo Follow-up    History of Present Illness:  Ms. Vanessa Sinclair is a 24 y.o. female w/hx of Sick Sinus Syndrome s/p PPM (2021) and migraines who presented to the clinic for follow-up of Asthma symptoms.     She reports that her symptoms have improved since starting Albuterol; however, she finds herself using it at least twice a day every day. She denies SOB, cough, chest tightness. No interference with normal activities. No recent urgent care visit or hospitalizations for asthma exacerbation.     She also reports having 3 positive pregnancy test at home. Has not had a period since early December.     Review of Systems   Constitutional:  Negative for chills, malaise/fatigue and weight loss.   HENT:  Negative for congestion, ear pain and hearing loss.    Eyes:  Negative for blurred vision and redness.   Respiratory:  Negative for cough and shortness of breath.    Cardiovascular:  Negative for chest pain, palpitations and leg swelling.   Gastrointestinal:  Negative for abdominal pain, constipation, diarrhea, nausea and vomiting.   Genitourinary:  Negative for dysuria, flank pain and hematuria.   Musculoskeletal:  Negative for myalgias and neck pain.   Neurological:  Positive for focal weakness. Negative for tremors, seizures, weakness and headaches.   Endo/Heme/Allergies:  Does not bruise/bleed easily.   Psychiatric/Behavioral:  Negative for depression and substance abuse. The patient is not nervous/anxious.        PAST HISTORY:     Past Medical History:   Diagnosis Date    Asthma     Headache(784.0)     Migraine     Pre-syncope 5/25/2021    Seizures     Syncope and collapse        Past Surgical History:   Procedure Laterality Date    A-V CARDIAC PACEMAKER INSERTION Left 7/18/2019    Procedure: INSERTION, CARDIAC PACEMAKER, DUAL CHAMBER;  Surgeon: Piter Cooley MD;  Location: [facility] CATH LAB/EP;  Service: Cardiology;  Laterality: Left;    PHLEBOGRAPHY  5/25/2021    Procedure:  Venogram;  Surgeon: Piter Cooley MD;  Location: [facility] EP LAB;  Service: Cardiology;;    REPLACEMENT OF PACEMAKER GENERATOR  5/25/2021    Procedure: REPLACEMENT, PACEMAKER GENERATOR;  Surgeon: Piter Cooley MD;  Location: [facility] EP LAB;  Service: Cardiology;;    WISDOM TOOTH EXTRACTION         Family History   Problem Relation Name Age of Onset    Diabetes Mother      Stroke Maternal Grandfather Lul dudley     Heart disease Maternal Grandfather Lul dudley     Diabetes Maternal Grandfather Lul dudley     Irregular heart beat Sister Leigh     Diabetes Maternal Grandmother Jessica dudley     Cancer Maternal Grandmother Jessica dudley     Arthritis Paternal Grandmother      Diabetes Paternal Grandmother      Diabetes Paternal Grandfather      Diabetes Father Vivek reyes     Breast cancer Neg Hx      Ovarian cancer Neg Hx      Colon cancer Neg Hx         Social History     Socioeconomic History    Marital status: Single   Tobacco Use    Smoking status: Never    Smokeless tobacco: Never   Substance and Sexual Activity    Alcohol use: Not Currently     Comment: occasionally     Drug use: Never    Sexual activity: Yes     Partners: Male     Birth control/protection: OCP   Social History Narrative    Lives at home with mom, dad, 3 sisters, and 2 dogs.        Self-employed.         Rides bike three times weekly.      Social Drivers of Health     Financial Resource Strain: Low Risk  (7/15/2024)    Overall Financial Resource Strain (CARDIA)     Difficulty of Paying Living Expenses: Not very hard   Food Insecurity: No Food Insecurity (7/15/2024)    Hunger Vital Sign     Worried About Running Out of Food in the Last Year: Never true     Ran Out of Food in the Last Year: Never true   Transportation Needs: No Transportation Needs (7/15/2024)    TRANSPORTATION NEEDS     Transportation : No   Physical Activity: Insufficiently Active (3/18/2024)    Exercise Vital Sign     Days of Exercise per Week: 2 days     Minutes of  Exercise per Session: 30 min   Stress: No Stress Concern Present (7/15/2024)    [employer] [place] [employer] - Occupational Stress Questionnaire     Feeling of Stress : Only a little   Housing Stability: Unknown (7/15/2024)    Housing Stability Vital Sign     Unable to Pay for Housing in the Last Year: No     Homeless in the Last Year: No       MEDICATIONS & ALLERGIES:     Current Outpatient Medications on File Prior to Visit   Medication Sig    betaxoloL (KERLONE) 10 mg Tab Take 1 tablet (10 mg total) by mouth once daily. Start with 1/4 pill a day for 2 weeks. Increase to 1/2 pill a day for the following 2 weeks. Increase to a full tablet (10 mg) a day thereafter.Progression schedule from 1/4 tab to a full tab to be modified in case of perceived side effects.Future further increases will be tailored to clinical response.    ibuprofen (ADVIL,MOTRIN) 800 MG tablet Take 1 tablet (800 mg total) by mouth 3 (three) times daily as needed for Pain.    magnesium oxide (MAG-OX) 400 mg (241.3 mg magnesium) tablet Take 400 mg by mouth once daily.    norethindrone-ethinyl estradiol (JUNEL FE 1/20) 1 mg-20 mcg (21)/75 mg (7) per tablet Take 1 tablet by mouth once daily.    nortriptyline (PAMELOR) 10 MG capsule Take 1 capsule nightly x 2 weeks then take 2 capsules by mouth nightly.    ondansetron (ZOFRAN-ODT) 4 MG TbDL DISSOLVE 1 TABLET(4 MG) ON THE TONGUE EVERY 6 HOURS AS NEEDED FOR NAUSEA    riboflavin, vitamin B2, (VITAMIN B-2) 100 mg Tab tablet Take 100 mg by mouth once daily. 2 tablets (200 mg total) once daily    ubrogepant (UBRELVY) 100 mg tablet Take 1 tablet (100 mg total) by mouth every 2 (two) hours as needed for Migraine. If symptoms persist or return, may repeat dose after 2 hours. Maximum: 200 mg per 24 hours    [DISCONTINUED] albuterol (VENTOLIN HFA) 90 mcg/actuation inhaler Inhale 2 puffs into the lungs every 6 (six) hours as needed for Wheezing. Rescue     No current facility-administered medications  "on file prior to visit.       Review of patient's allergies indicates:   Allergen Reactions    Lamictal  [lamotrigine]      Other reaction(s): HIVES    [id number] antimigraine agents Other (See Comments)     Contraindicated in complex migraines, needs to avoid     Cefzil [cefprozil] Rash    Demerol [meperidine] Rash     Other reaction(s): Rash    Pcn [penicillins] Rash     Other reaction(s): Rash       OBJECTIVE:     Vital Signs:  Vitals:    02/12/25 1519   BP: 109/68   BP Location: Right arm   Patient Position: Sitting   Pulse: 73   Resp: 16   SpO2: 97%   Weight: 78 kg (171 lb 15.3 oz)   Height: 5' 5" (1.651 m)           Body mass index is 28.62 kg/m².     Physical Exam  Constitutional:       General: She is not in acute distress.     Appearance: Normal appearance. She is normal weight. She is not ill-appearing.   HENT:      Head: Normocephalic.      Nose: No congestion or rhinorrhea.      Mouth/Throat:      Mouth: Mucous membranes are moist.      Pharynx: Oropharynx is clear. No posterior oropharyngeal erythema.   Eyes:      General: No scleral icterus.        Right eye: No discharge.         Left eye: No discharge.      Extraocular Movements: Extraocular movements intact.   Cardiovascular:      Rate and Rhythm: Normal rate and regular rhythm.      Pulses: Normal pulses.      Heart sounds: Normal heart sounds. No murmur heard.     No friction rub. No gallop.   Pulmonary:      Effort: Pulmonary effort is normal. No respiratory distress.      Breath sounds: Normal breath sounds.   Abdominal:      General: Abdomen is flat. Bowel sounds are normal. There is no distension.      Palpations: Abdomen is soft.      Tenderness: There is no abdominal tenderness.   Musculoskeletal:         General: No swelling or tenderness.      Cervical back: Normal range of motion. No rigidity or tenderness.      Right lower leg: No edema.      Left lower leg: No edema.   Skin:     General: Skin is warm.      Findings: No bruising or " erythema.   Neurological:      Mental Status: She is alert and oriented to person, place, and time. Mental status is at baseline.      Motor: Weakness (LLE > RLE) present.   Psychiatric:         Mood and Affect: Mood normal.         Behavior: Behavior normal.         Laboratory  No results found for this or any previous visit (from the past 24 hours).      Diagnostic Results:      Health Maintenance Due   Topic Date Due    Pneumococcal Vaccines (Age 0-49) (1 of 2 - PCV) 06/20/2017    Pap Smear  02/08/2025         ASSESSMENT & PLAN:   24yoF w/hx of SSS s/p PPM and migraines who presents to the clinic for 3mo follow-up. Doing OK on albuterol but would benefit from a ICS-LABA plus a ANNIE PRN. Will send in a referral for Symbicort and Albuterol. Given that the patient is pregnant, Betaxolol is now contraindicated. Provided patient on how to taper off Betaxolol and instructed patient to follow-up with Cardiology. Due to patient having Medicaid, have made appt for patient to be seen at [facility] for SSS management.     Asthma, unspecified asthma severity, unspecified whether complicated, unspecified whether persistent  -     budesonide-formoterol 80-4.5 mcg (SYMBICORT) 80-4.5 mcg/actuation HFAA; Inhale 2 puffs into the lungs 2 (two) times a day. Controller  Dispense: 6.9 g; Refill: 3  -     albuterol (VENTOLIN HFA) 90 mcg/actuation inhaler; Inhale 2 puffs into the lungs every 6 (six) hours as needed for Wheezing. Rescue  Dispense: 18 g; Refill: 3    SSS (sick sinus syndrome)    Pregnancy as incidental finding  -     HCG, Quantitative; Future; Expected date: 02/12/2025          See above for further detail.    RTC in 3mos     Discussed with Dr Farmer  - staff attestation to follow      Thalia Singer M.D.   Internal Medicine PGY-2

## 2025-02-13 ENCOUNTER — TELEPHONE (OUTPATIENT)
Dept: OBSTETRICS AND GYNECOLOGY | Facility: CLINIC | Age: 25
End: 2025-02-13
Payer: MEDICAID

## 2025-02-13 ENCOUNTER — PATIENT MESSAGE (OUTPATIENT)
Dept: OBSTETRICS AND GYNECOLOGY | Facility: CLINIC | Age: 25
End: 2025-02-13
Payer: MEDICAID

## 2025-02-13 ENCOUNTER — CLINICAL SUPPORT (OUTPATIENT)
Dept: OBSTETRICS AND GYNECOLOGY | Facility: CLINIC | Age: 25
End: 2025-02-13
Payer: MEDICAID

## 2025-02-13 DIAGNOSIS — Z34.90 PREGNANCY, UNSPECIFIED GESTATIONAL AGE: Primary | ICD-10-CM

## 2025-02-13 PROCEDURE — 99212 OFFICE O/P EST SF 10 MIN: CPT | Mod: PBBFAC,TH

## 2025-02-13 PROCEDURE — 99999 PR PBB SHADOW E&M-EST. PATIENT-LVL II: CPT | Mod: PBBFAC,,,

## 2025-02-13 NOTE — PROGRESS NOTES
Spoke with patient for approximately 30 minutes during virtual visit. Updated chart to reflect up to date patient demographics. Allergies, medications, pharmacy, medical, surgical and family hx updated. Substance and sexual activity, marital status, gender identity and OB/Gyn hx updated. Patient was guided through expectations of care during pregnancy. Pregnancy confirmation, dating u/s and first OB appts scheduled. New pregnancy education provided and questions answered. Encouraged to send message or call office with any questions/concerns. Verbalized understanding.    Discussed with pt:  LMP: 12/14  Encouraged pt to begin PNV daily  C/O: denies n/v   C/O:denies cramping/spotting  Encouraged and discussed healthy diet: nothing raw, meat well done, heat deli meat and hot dogs prior to eat  Encouraged adequate fluids: 8-12 cups of water/healthy liquids each day limit caffeine to <200 mg/day

## 2025-02-21 ENCOUNTER — OFFICE VISIT (OUTPATIENT)
Dept: OBSTETRICS AND GYNECOLOGY | Facility: CLINIC | Age: 25
End: 2025-02-21
Payer: MEDICAID

## 2025-02-21 ENCOUNTER — HOSPITAL ENCOUNTER (OUTPATIENT)
Dept: PERINATAL CARE | Facility: OTHER | Age: 25
Discharge: HOME | End: 2025-02-21
Payer: MEDICAID

## 2025-02-21 VITALS
SYSTOLIC BLOOD PRESSURE: 118 MMHG | DIASTOLIC BLOOD PRESSURE: 66 MMHG | BODY MASS INDEX: 29.06 KG/M2 | WEIGHT: 174.63 LBS

## 2025-02-21 DIAGNOSIS — Z34.90 PREGNANCY, UNSPECIFIED GESTATIONAL AGE: ICD-10-CM

## 2025-02-21 DIAGNOSIS — O21.9 NAUSEA AND VOMITING IN PREGNANCY: ICD-10-CM

## 2025-02-21 DIAGNOSIS — N91.2 AMENORRHEA: Primary | ICD-10-CM

## 2025-02-21 DIAGNOSIS — Z32.01 POSITIVE PREGNANCY TEST: ICD-10-CM

## 2025-02-21 DIAGNOSIS — I49.5 SSS (SICK SINUS SYNDROME): ICD-10-CM

## 2025-02-21 DIAGNOSIS — Q21.12 PFO (PATENT FORAMEN OVALE): Primary | ICD-10-CM

## 2025-02-21 DIAGNOSIS — Z95.0 CARDIAC PACEMAKER IN SITU: ICD-10-CM

## 2025-02-21 DIAGNOSIS — Z11.3 SCREEN FOR STD (SEXUALLY TRANSMITTED DISEASE): ICD-10-CM

## 2025-02-21 DIAGNOSIS — Z12.4 CERVICAL CANCER SCREENING: ICD-10-CM

## 2025-02-21 DIAGNOSIS — Z95.0 PACEMAKER: ICD-10-CM

## 2025-02-21 PROCEDURE — 87591 N.GONORRHOEAE DNA AMP PROB: CPT

## 2025-02-21 PROCEDURE — 99213 OFFICE O/P EST LOW 20 MIN: CPT | Mod: PBBFAC,25,TH

## 2025-02-21 PROCEDURE — 76801 OB US < 14 WKS SINGLE FETUS: CPT | Mod: 26,,, | Performed by: OBSTETRICS & GYNECOLOGY

## 2025-02-21 PROCEDURE — 87086 URINE CULTURE/COLONY COUNT: CPT

## 2025-02-21 PROCEDURE — 99999 PR PBB SHADOW E&M-EST. PATIENT-LVL III: CPT | Mod: PBBFAC,,,

## 2025-02-21 PROCEDURE — 76801 OB US < 14 WKS SINGLE FETUS: CPT

## 2025-02-21 RX ORDER — ONDANSETRON 4 MG/1
4 TABLET, ORALLY DISINTEGRATING ORAL EVERY 6 HOURS PRN
Qty: 30 TABLET | Refills: 1 | Status: SHIPPED | OUTPATIENT
Start: 2025-02-21 | End: 2025-03-23

## 2025-02-21 NOTE — PROGRESS NOTES
CC: Positive Pregnancy Test    HISTORY OF PRESENT ILLNESS:    April Lorrie Sinclair is a 24 y.o. female, ,  Presents today for a routine exam complaining of amenorrhea and positive home urine pregnancy test.  LMP 24. She is not currently on any contraception. Reports nausea (has baseline nausea due to heart condition). Reports breast tenderness. Denies pelvic pain. Some cramping at night. Denies vaginal bleeding. This is her first pregnancy. Has significant cardiac hx. Sick sinus syndrome with pacemaker. Hx of migraines. Possible PFO- unclear. States her cardiologist recently retired. Does not have one at this time. Trying to establish care with [facility].     LMP: 24   EGA: 6w5d (per ultrasound)  EDC: 10/12/25 (per ultrasound)    ROS:  GENERAL: No weight changes. No swelling. No fatigue. No fever.  CARDIOVASCULAR: No chest pain. No shortness of breath. No leg cramps.   NEUROLOGICAL: No headaches. No vision changes.  BREASTS: No pain. No lumps. No discharge.  ABDOMEN: No pain. No diarrhea. No constipation.  REPRODUCTIVE: No abnormal bleeding.   VULVA: No pain. No lesions. No itching.  VAGINA: No relaxation. No itching. No odor. No discharge. No lesions.  URINARY: No incontinence. No nocturia. No frequency. No dysuria.    MEDICATIONS AND ALLERGIES:  Reviewed - currently taking inhalers, PNV, mag oxide, zofran as needed     COMPREHENSIVE GYN HISTORY:  PAP History: Denies abnormal Paps. Last pap 22- NILM   Infection History: Denies STDs. Denies PID.  Benign History: Denies uterine fibroids. Denies ovarian cysts. Denies endometriosis. Denies other conditions.  Cancer History: Denies cervical cancer. Denies uterine cancer or hyperplasia. Denies ovarian cancer. Denies vulvar cancer or pre-cancer. Denies vaginal cancer or pre-cancer. Denies breast cancer. Denies colon cancer.  Sexual Activity History: Reports currently being sexually active  Menstrual History: None.  Contraception: None    /66    Wt 79.2 kg (174 lb 9.7 oz)   LMP 2024   BMI 29.06 kg/m²     PE: Female chaperone present   AFFECT: Calm, alert and oriented X 3. Interactive during exam  GENERAL: Appears well-nourished, well-developed, in no acute distress.  HEAD: Normocephalic, atraumatic  SKIN: Normal for race, warm, & dry. No lesions or rashes.  LUNGS: Easy and unlabored  ABDOMEN: Soft and nontender without masses or organomegally.  VULVA: No lesions, masses or tenderness.  VAGINA: Moist and well rugated without lesions or discharge.  CERVIX: Moist and pink without lesions, discharge or tenderness.      UTERUS: non tender and without masses.  ADNEXA: No masses or tenderness.  ESTIMATE OF PELVIC CAPACITY: Adequate  EXTREMITIES: No cyanosis, clubbing or edema. No calf tenderness.    PROCEDURES:  UPT Positive  Genprobe  Pap updated     ASSESSMENT/PLAN:  Amenorrhea  Positive urine pregnancy test (KETURAH: 10/12/25, EGA: 6w5d based on ultrasound)    -  Routine prenatal care    Nausea and vomiting in pregnancy    -  Education regarding lifestyle and dietary modifications    -  Advised use of B6/Unisom. Pt will notify us if no relief/worsening symptoms, will consider Zofran if needed.    1st TRIMESTER COUNSELING: Discussed all, booklet provided:  Common complaints of pregnancy  HIV and other routine prenatal tests including  genetic screening  Risk factors identified by prenatal history  Oriented to practice - discussed anticipated course of prenatal care & indications for Ultrasound  Childbirth classes/Hospital facilities   Nutrition and weight gain counseling  Toxoplasmosis precautions (Cats/Raw Meat)  Sexual activity and exercise  Environmental/Work hazards  Travel  Tobacco (Ask, Advise, Assess, Assist, and Arrange), as well as alcohol and drug use  Use of any medications (Including supplements, Vitamins, Herbs, or OTC Drugs)  Domestic violence  Seat belt use    TERATOLOGY COUNSELING:   Discussed indications and options for aneuploidy  screening - pamphlets given    -  Pt desires MT21, will get around 10 wks     PLAN:  - Dating ultrasound today   - Urine culture sent  - GC/CT collected  - 1T labs ordered   - Pap updated   - Zofran sent per pt request   - MFM referral   - Will message cards for sooner appointment     FOLLOW-UP in 4 weeks with Dr. Сергей Barker, NP    OB/GYN

## 2025-02-21 NOTE — PATIENT INSTRUCTIONS
1) Eat small frequent meals through the day versus three large meals  2) Try ginger ale or sprite to help settle the stomach   3) Eat crackers or dry toast before getting out of bed in the morning   4) Stay hydrated by drinking plenty of water-do not immediately eat or drink something after vomiting. Give your stomach a rest for 20-30 minutes. Slowly reintroduce ice chips, small sips water, crackers, etc.    5) Try OTC Unisom (use the tablets that have doxylamine not diphenhydramine in them, can use generic brands like Equate) and vitamin B6  (25 mg, can find on [employer]) together at night before bed. This can help with the nausea in the morning and is safe to use during pregnancy. You can also take the vitamin B6 alone, every 8 hours to help with the nausea.     If you are unable to keep anything down and constantly vomiting for more that 24 hours, let the office know so that dehydration can be avoided. We would recommend being seen in the emergency department if this is the case.      Call [phone] to see about coverage and any out of pocket costs regarding the Anoseuczs89 (MT21) testing. This is done any day after 10 weeks and is blood work only. It checks for any chromosomal abnormalities like Down Syndrome. You can also find out the sex of the baby if you choose to know. Once you find out coverage and decide to proceed, send either myself or your doctor a message and we can see what date you can do it. It is done at our second floor lab at [facility].      Call clinic [phone] or L & D after hours at [phone]

## 2025-02-22 LAB — BACTERIA UR CULT: NO GROWTH

## 2025-02-24 ENCOUNTER — RESULTS FOLLOW-UP (OUTPATIENT)
Dept: OBSTETRICS AND GYNECOLOGY | Facility: CLINIC | Age: 25
End: 2025-02-24

## 2025-02-24 PROBLEM — J45.40 MODERATE PERSISTENT ASTHMA WITHOUT COMPLICATION: Status: ACTIVE | Noted: 2025-02-24

## 2025-02-25 ENCOUNTER — PROCEDURE VISIT (OUTPATIENT)
Dept: MATERNAL FETAL MEDICINE | Facility: CLINIC | Age: 25
End: 2025-02-25
Payer: MEDICAID

## 2025-02-25 ENCOUNTER — OFFICE VISIT (OUTPATIENT)
Dept: MATERNAL FETAL MEDICINE | Facility: CLINIC | Age: 25
End: 2025-02-25
Payer: MEDICAID

## 2025-02-25 ENCOUNTER — RESULTS FOLLOW-UP (OUTPATIENT)
Dept: OBSTETRICS AND GYNECOLOGY | Facility: CLINIC | Age: 25
End: 2025-02-25

## 2025-02-25 VITALS
HEIGHT: 65 IN | DIASTOLIC BLOOD PRESSURE: 73 MMHG | WEIGHT: 172.63 LBS | BODY MASS INDEX: 28.76 KG/M2 | SYSTOLIC BLOOD PRESSURE: 106 MMHG

## 2025-02-25 DIAGNOSIS — Z32.01 POSITIVE PREGNANCY TEST: ICD-10-CM

## 2025-02-25 DIAGNOSIS — Z95.0 CARDIAC PACEMAKER IN SITU: ICD-10-CM

## 2025-02-25 DIAGNOSIS — I49.5 SSS (SICK SINUS SYNDROME): ICD-10-CM

## 2025-02-25 DIAGNOSIS — Z36.89 ENCOUNTER FOR FETAL ANATOMIC SURVEY: ICD-10-CM

## 2025-02-25 DIAGNOSIS — J45.30 MILD PERSISTENT ASTHMA WITHOUT COMPLICATION: Chronic | ICD-10-CM

## 2025-02-25 DIAGNOSIS — Z95.0 PACEMAKER: ICD-10-CM

## 2025-02-25 DIAGNOSIS — G43.809 OTHER MIGRAINE WITHOUT STATUS MIGRAINOSUS, NOT INTRACTABLE: ICD-10-CM

## 2025-02-25 DIAGNOSIS — Q21.12 PFO (PATENT FORAMEN OVALE): Primary | ICD-10-CM

## 2025-02-25 DIAGNOSIS — Q21.12 PFO (PATENT FORAMEN OVALE): ICD-10-CM

## 2025-02-25 PROCEDURE — 99999 PR PBB SHADOW E&M-EST. PATIENT-LVL III: CPT | Mod: PBBFAC,,, | Performed by: OBSTETRICS & GYNECOLOGY

## 2025-02-25 PROCEDURE — 76815 OB US LIMITED FETUS(S): CPT | Mod: PBBFAC | Performed by: OBSTETRICS & GYNECOLOGY

## 2025-02-25 PROCEDURE — 99213 OFFICE O/P EST LOW 20 MIN: CPT | Mod: PBBFAC,TH | Performed by: OBSTETRICS & GYNECOLOGY

## 2025-02-25 NOTE — PROGRESS NOTES
MATERNAL-FETAL MEDICINE   CONSULT NOTE    Provider requesting consultation: Dr. Rushing    SUBJECTIVE:     Ms. Vanessa Sinclair is a 24 y.o.  female with IUP at 7w2d who is seen in consultation by MFM for evaluation and management of:  Problem   Mild Persistent Asthma Without Complication   Pfo (Patent Foramen Ovale)   Sss (Sick Sinus Syndrome)   Migraine Headache     She feels well today without complaints. Denies vaginal bleeding, leakage of fluid, contractions, or DFM.         Medication List with Changes/Refills   Current Medications    ALBUTEROL (VENTOLIN HFA) 90 MCG/ACTUATION INHALER    Inhale 2 puffs into the lungs every 6 (six) hours as needed for Wheezing. Rescue    BETAXOLOL (KERLONE) 10 MG TAB    Take 1 tablet (10 mg total) by mouth once daily. Start with 1/4 pill a day for 2 weeks. Increase to 1/2 pill a day for the following 2 weeks. Increase to a full tablet (10 mg) a day thereafter.Progression schedule from 1/4 tab to a full tab to be modified in case of perceived side effects.Future further increases will be tailored to clinical response.    BUDESONIDE-FORMOTEROL 80-4.5 MCG (SYMBICORT) 80-4.5 MCG/ACTUATION HFAA    Inhale 2 puffs into the lungs 2 (two) times a day. Controller    MAGNESIUM OXIDE (MAG-OX) 400 MG (241.3 MG MAGNESIUM) TABLET    Take 400 mg by mouth once daily.    NORTRIPTYLINE (PAMELOR) 10 MG CAPSULE    Take 1 capsule nightly x 2 weeks then take 2 capsules by mouth nightly.    ONDANSETRON (ZOFRAN-ODT) 4 MG TBDL    Take 1 tablet (4 mg total) by mouth every 6 (six) hours as needed (nausea).    PNV NO.95/FERROUS FUM/FOLIC AC (PRENATAL ORAL)    Take by mouth.    RIBOFLAVIN, VITAMIN B2, (VITAMIN B-2) 100 MG TAB TABLET    Take 100 mg by mouth once daily. 2 tablets (200 mg total) once daily   Discontinued Medications    IBUPROFEN (ADVIL,MOTRIN) 800 MG TABLET    Take 1 tablet (800 mg total) by mouth 3 (three) times daily as needed for Pain.    UBROGEPANT (UBRELVY) 100 MG TABLET    Take 1 tablet  (100 mg total) by mouth every 2 (two) hours as needed for Migraine. If symptoms persist or return, may repeat dose after 2 hours. Maximum: 200 mg per 24 hours       Review of patient's allergies indicates:   Allergen Reactions    Lamictal  [lamotrigine]      Other reaction(s): HIVES    [id number] antimigraine agents Other (See Comments)     Contraindicated in complex migraines, needs to avoid     Cefzil [cefprozil] Rash    Demerol [meperidine] Rash     Other reaction(s): Rash    Pcn [penicillins] Rash     Other reaction(s): Rash       PMH:  Past Medical History:   Diagnosis Date    Asthma     Headache(784.0)     Migraine     Pre-syncope 2021    Seizures     Syncope and collapse        PObHx:  OB History    Para Term  AB Living   1 0 0 0 0 0   SAB IAB Ectopic Multiple Live Births   0 0 0 0 0      # Outcome Date GA Lbr Chin/2nd Weight Sex Type Anes PTL Lv   1 Current               Obstetric Comments      No abnormal pap   No STDs       PSH:  Past Surgical History:   Procedure Laterality Date    A-V CARDIAC PACEMAKER INSERTION Left 2019    Procedure: INSERTION, CARDIAC PACEMAKER, DUAL CHAMBER;  Surgeon: Piter Cooley MD;  Location: [facility] CATH LAB/EP;  Service: Cardiology;  Laterality: Left;    PHLEBOGRAPHY  2021    Procedure: Venogram;  Surgeon: Piter Cooley MD;  Location: [facility] EP LAB;  Service: Cardiology;;    REPLACEMENT OF PACEMAKER GENERATOR  2021    Procedure: REPLACEMENT, PACEMAKER GENERATOR;  Surgeon: Piter Cooley MD;  Location: [facility] EP LAB;  Service: Cardiology;;    WISDOM TOOTH EXTRACTION         Family history:family history includes Arthritis in her paternal grandmother; Cancer in her maternal grandmother; Diabetes in her father, maternal grandfather, maternal grandmother, mother, paternal grandfather, and paternal grandmother; Heart disease in her maternal grandfather; Irregular heart beat in her sister; Stroke in her maternal grandfather.    Social  "history: reports that she has never smoked. She has never used smokeless tobacco. She reports that she does not currently use alcohol. She reports that she does not use drugs.    Objective:   /73 (BP Location: Left arm, Patient Position: Sitting)   Ht 5' 5" (1.651 m)   Wt 78.3 kg (172 lb 9.9 oz)   LMP 2024   BMI 28.73 kg/m²     Ultrasound performed. See viewpoint for full ultrasound report.  1. A lindsey IUP is present. Cardiac activity is observed.  2. Size is consistent with established dating.   3. The maternal adnexae are normal in appearance.     ASSESSMENT/PLAN:     24 y.o.  female with IUP at 7w2d     PFO (patent foramen ovale)  She was recently admitted to [facility] (-) for left-sided weakness, neurological workup at that time including CTA and MRI of the brain were negative, complex migraine was suspected. She had an echocardiogram with possible PFO. She follows with Dr. Mohsen at [facility] [place] and underwent a LEWIS on 2024. The records from that LEWIS are not available but reportedly were negative for a PFO. Dr. Mohsen was considering closure previously but now with the negative LEWIS would like to review those images first. Ms. Sinclair is having a hard time getting appointments due to insurance issues.     Recommendations:  Would defer closure of her PFO until post partum unless clinically indicated    SSS (sick sinus syndrome)  Previously diagnosed with "seizures". However, with further evaluation this was determined to be more likely seizure-type activity after syncopal episodes. She underwent TTT that showed a significant cardio-inhibitory component to her syncope, with asystole lasting for 50 seconds. She ultimately had a PPM placed in , with an additional lead placed in  and has had no syncopal episodes since then. She previously followed with Dr. Cooley from electrophysiology, but has since switched to Dr. Everett. Her next appointment is on 2025. Her last " EKG was on 7/14/2024. Last device check was remotely in 12/2024. She was previously taking betoxalol but this was discontinued a couple weeks ago in the setting of pregnancy.    Recommendations:  Continue close follow up with Cardiology. If possible, patient should be seen earlier than her currently scheduled appointment on 4/7.  Anesthesia consult in the third trimester.  Betaxolol is safe in pregnancy and can be restarted at the discretion of her electrophysiologist.    Migraine headache  She has a long standing history of migraines. She last saw neurology in 8/2024. At that time her symptoms appeared to be consistent with migraine with/without aura and hemiplegic migraine. She was started on nortriptyline 10 mg qhs for prevention and ubrelvy 100 mg for acute migraines. The nortriptyline 10mg qHS brought her migraines from daily to 4-5x week. She never started the ubrelvy because it was not covered by her insurance. She discontinued the nortriptyline after her neurologist instructed her to stop. Her next appointment with Neurology is on 3/12/2025.    In pregnancy, migraine headaches often improve, with about 1/3 of patients experiencing worsening symptoms. The mainstays of therapy include supportive care and acetaminophen use. NSAIDS should be avoided due to potential premature closure of the ductus arteriosus. Second line therapy includes the use of Metoclopramide and Benadryl. Opioid use is discouraged as they have a habit-forming quality and can worsen the clinical situation with the formation of rebound headaches. Fioricet is no longer considered a good alternative due to the absence of supplemental analgesia and the associated risks of medication overuse headache and addiction.    Pharmacologic interventions to prevent primary headaches during pregnancy may be considered in those experiencing frequent or disruptive headaches and after careful discussion with the patient. First line prophylaxis in the  "prevention of primary headaches are calcium channel blockers and antihistamines. The use of beta-blockers and oral magnesium remain reasonable options as well. Though there has been no direct causal effect delineated, TCA (such as nortriptyline) in general are associated with multiple congenital defects (OR 1.31) such as microcephaly, skeletal abnormalities, "cotton- like" hair with pronounced shedding, cleft palate, micrognathia, ambiguous genitalia, and dermal ridges. Its use may also be associated with  convulsion (OR 7.82),  respiratory distress (OR 2.11). We reviewed that the overall risk of these complications is low, and in the setting of frequent migraine headaches, the use of nortriptyline in pregnancy should be determined with shared decision making. Ultimately after our discussion, she opted to restarted the nortriptyline after organogenesis is completed at 10w. We reviewed that there is little data for ubrely's safety in pregnancy. Based on data from animal reproduction studies, in utero exposure to ubrogepant may cause fetal harm. We recommended against ubrelvy in pregnancy.    With regards to breastfeeding. Existing data indicate that very little nortriptyline is ingested by a breastfeeding infant and that maternal use of during breastfeeding is probably safe. However, the prolonged half-life of nortriptyline in newborns suggests that some infants may be at risk of accumulation. If the patient elects to breastfeed during nortriptyline therapy, the infant should be monitored for adverse effects.    Recommendations:  Avoid NSAID use  Encourage hydration  Discontinue ubrelvy  Restart Nortriptyline after 10wga for migraine prophylaxis  Continue consultation and follow-up with Neurology  Consider for treatment:  Acetaminophen 1000 mg every 6-8 hours PO as needed for recurrent headache  Can pair with use of caffeine, though not more than 200 mg daily, metoclopramide 10 mg PO (Rx sent), Mag " sulfate 400mg PO and benadryl 12.5mg.  Persistent headaches despite that regimen should be evaluated by primary team   her on signs and symptoms of preeclampsia, as her headaches may confound diagnosis  Breastfeeding: probably safe, monitor infant for adverse effects      Mild persistent asthma without complication  Ms. Sinclair has mild persistent asthma. She currently is currently well controlled on symbicort and albuterol.      Asthma is the most common condition affecting the lungs during pregnancy, occurring in between 4-8 % of pregnant women. During pregnancy, asthma worsens in about one-third of women, improves in one-third, and remains stable in one-third. The risk of poorly controlled asthma is much greater than the risk of taking medications to control symptoms. Asthma exacerbations are frequently seen in the second trimester, and close monitoring is indicated. Patients should avoid exposure to specific allergens and triggers. Moderate persistent and severe persistent asthma are associated with  delivery,  delivery, preeclampsia, fetal growth restriction, and maternal morbidity.     Recommendations:  Continue current medications: Symbicort and Albuterol  Avoid triggers  Administer flu vaccination, when available; consider pneumovax if other comorbidities  Recommend COVID vaccination  Avoid prostaglandins such as E2 and F2-alpha (carboprost/Hemabate)      FOLLOW UP:   F/u in 5 weeks for US/MFM visit      Italia Aguilera  Maternal-Fetal Medicine    Electronically Signed by Italia Aguilera 2025

## 2025-02-25 NOTE — ASSESSMENT & PLAN NOTE
"Previously diagnosed with "seizures". However, with further evaluation this was determined to be more likely seizure-type activity after syncopal episodes. She underwent TTT that showed a significant cardio-inhibitory component to her syncope, with asystole lasting for 50 seconds. She ultimately had a PPM placed in 2019, with an additional lead placed in 2021 and has had no syncopal episodes since then. She previously followed with Dr. Cooley from electrophysiology, but has since switched to Dr. Everett. Her next appointment is on 4/7/2025. Her last EKG was on 7/14/2024. Last device check was remotely in 12/2024. She was previously taking betoxalol but this was discontinued a couple weeks ago in the setting of pregnancy.    Recommendations:  Continue close follow up with Cardiology. If possible, patient should be seen earlier than her currently scheduled appointment on 4/7.  Anesthesia consult in the third trimester.  Betaxolol is safe in pregnancy and can be restarted at the discretion of her electrophysiologist.  "

## 2025-02-25 NOTE — ASSESSMENT & PLAN NOTE
She was recently admitted to [facility] (7/14-7/16) for left-sided weakness, neurological workup at that time including CTA and MRI of the brain were negative, complex migraine was suspected. She had an echocardiogram with possible PFO. She follows with Dr. Mohsen at [facility] [place] and underwent a LEWIS on 9/24/2024. The records from that LEWIS are not available but reportedly were negative for a PFO. Dr. Mohsen was considering closure previously but now with the negative LEWIS would like to review those images first. Ms. Sinclair is having a hard time getting appointments due to insurance issues.     Recommendations:  Would defer closure of her PFO until post partum unless clinically indicated

## 2025-02-25 NOTE — ASSESSMENT & PLAN NOTE
"She has a long standing history of migraines. She last saw neurology in 8/2024. At that time her symptoms appeared to be consistent with migraine with/without aura and hemiplegic migraine. She was started on nortriptyline 10 mg qhs for prevention and ubrelvy 100 mg for acute migraines. The nortriptyline 10mg qHS brought her migraines from daily to 4-5x week. She never started the ubrelvy because it was not covered by her insurance. She discontinued the nortriptyline after her neurologist instructed her to stop. Her next appointment with Neurology is on 3/12/2025.    In pregnancy, migraine headaches often improve, with about 1/3 of patients experiencing worsening symptoms. The mainstays of therapy include supportive care and acetaminophen use. NSAIDS should be avoided due to potential premature closure of the ductus arteriosus. Second line therapy includes the use of Metoclopramide and Benadryl. Opioid use is discouraged as they have a habit-forming quality and can worsen the clinical situation with the formation of rebound headaches. Fioricet is no longer considered a good alternative due to the absence of supplemental analgesia and the associated risks of medication overuse headache and addiction.    Pharmacologic interventions to prevent primary headaches during pregnancy may be considered in those experiencing frequent or disruptive headaches and after careful discussion with the patient. First line prophylaxis in the prevention of primary headaches are calcium channel blockers and antihistamines. The use of beta-blockers and oral magnesium remain reasonable options as well. Though there has been no direct causal effect delineated, TCA (such as nortriptyline) in general are associated with multiple congenital defects (OR 1.31) such as microcephaly, skeletal abnormalities, "cotton- like" hair with pronounced shedding, cleft palate, micrognathia, ambiguous genitalia, and dermal ridges. Its use may also be " associated with  convulsion (OR 7.82),  respiratory distress (OR 2.11). We reviewed that the overall risk of these complications is low, and in the setting of frequent migraine headaches, the use of nortriptyline in pregnancy should be determined with shared decision making. Ultimately after our discussion, she opted to restarted the nortriptyline after organogenesis is completed at 10w. We reviewed that there is little data for ubrely's safety in pregnancy. Based on data from animal reproduction studies, in utero exposure to ubrogepant may cause fetal harm. We recommended against ubrelvy in pregnancy.    With regards to breastfeeding. Existing data indicate that very little nortriptyline is ingested by a breastfeeding infant and that maternal use of during breastfeeding is probably safe. However, the prolonged half-life of nortriptyline in newborns suggests that some infants may be at risk of accumulation. If the patient elects to breastfeed during nortriptyline therapy, the infant should be monitored for adverse effects.    Recommendations:  Avoid NSAID use  Encourage hydration  Discontinue ubrelvy  Restart Nortriptyline after 10wga for migraine prophylaxis  Continue consultation and follow-up with Neurology  Consider for treatment:  Acetaminophen 1000 mg every 6-8 hours PO as needed for recurrent headache  Can pair with use of caffeine, though not more than 200 mg daily, metoclopramide 10 mg PO (Rx sent), Mag sulfate 400mg PO and benadryl 12.5mg.  Persistent headaches despite that regimen should be evaluated by primary team   her on signs and symptoms of preeclampsia, as her headaches may confound diagnosis  Breastfeeding: probably safe, monitor infant for adverse effects     95

## 2025-02-25 NOTE — ASSESSMENT & PLAN NOTE
Ms. Sinclair has mild persistent asthma. She currently is currently well controlled on symbicort and albuterol.      Asthma is the most common condition affecting the lungs during pregnancy, occurring in between 4-8 % of pregnant women. During pregnancy, asthma worsens in about one-third of women, improves in one-third, and remains stable in one-third. The risk of poorly controlled asthma is much greater than the risk of taking medications to control symptoms. Asthma exacerbations are frequently seen in the second trimester, and close monitoring is indicated. Patients should avoid exposure to specific allergens and triggers. Moderate persistent and severe persistent asthma are associated with  delivery,  delivery, preeclampsia, fetal growth restriction, and maternal morbidity.     Recommendations:  Continue current medications: Symbicort and Albuterol  Avoid triggers  Administer flu vaccination, when available; consider pneumovax if other comorbidities  Recommend COVID vaccination  Avoid prostaglandins such as E2 and F2-alpha (carboprost/Hemabate)

## 2025-02-26 ENCOUNTER — RESULTS FOLLOW-UP (OUTPATIENT)
Dept: OBSTETRICS AND GYNECOLOGY | Facility: CLINIC | Age: 25
End: 2025-02-26

## 2025-02-26 ENCOUNTER — PATIENT MESSAGE (OUTPATIENT)
Dept: ELECTROPHYSIOLOGY | Facility: CLINIC | Age: 25
End: 2025-02-26
Payer: MEDICAID

## 2025-02-26 ENCOUNTER — TELEPHONE (OUTPATIENT)
Dept: ELECTROPHYSIOLOGY | Facility: CLINIC | Age: 25
End: 2025-02-26
Payer: MEDICAID

## 2025-02-26 NOTE — TELEPHONE ENCOUNTER
----- Message from Med Assistant Wilcox sent at 2/21/2025  1:53 PM CST -----    ----- Message -----  From: Tati Barker NP  Sent: 2/21/2025  11:40 AM CST  To: [facility] Staff    Good morning!I saw this patient today for a pregnancy confirmation visit. She is ~ 6wks pregnant. She has significant cardiac hx, was trying to see if she can get in sooner for a visit. ThanksTati Barker

## 2025-03-03 ENCOUNTER — TELEPHONE (OUTPATIENT)
Dept: ELECTROPHYSIOLOGY | Facility: CLINIC | Age: 25
End: 2025-03-03
Payer: MEDICAID

## 2025-03-03 NOTE — TELEPHONE ENCOUNTER
Spoke with patient, offered her sooner appointment with NP, pt declined and would like to keep OV with Dr. Everett on 4/7

## 2025-03-04 ENCOUNTER — CLINICAL SUPPORT (OUTPATIENT)
Dept: CARDIOLOGY | Facility: HOSPITAL | Age: 25
End: 2025-03-04
Payer: MEDICAID

## 2025-03-04 DIAGNOSIS — R55 SYNCOPE AND COLLAPSE: ICD-10-CM

## 2025-03-04 DIAGNOSIS — Z95.0 PRESENCE OF CARDIAC PACEMAKER: ICD-10-CM

## 2025-03-04 DIAGNOSIS — R00.1 BRADYCARDIA, UNSPECIFIED: ICD-10-CM

## 2025-03-04 PROCEDURE — 93294 REM INTERROG EVL PM/LDLS PM: CPT | Mod: ,,, | Performed by: INTERNAL MEDICINE

## 2025-03-05 ENCOUNTER — CLINICAL SUPPORT (OUTPATIENT)
Dept: CARDIOLOGY | Facility: HOSPITAL | Age: 25
End: 2025-03-05
Attending: INTERNAL MEDICINE
Payer: MEDICAID

## 2025-03-05 DIAGNOSIS — Z95.0 PRESENCE OF CARDIAC PACEMAKER: ICD-10-CM

## 2025-03-05 DIAGNOSIS — R00.1 BRADYCARDIA, UNSPECIFIED: ICD-10-CM

## 2025-03-05 DIAGNOSIS — R55 SYNCOPE AND COLLAPSE: ICD-10-CM

## 2025-03-05 PROCEDURE — 93296 REM INTERROG EVL PM/IDS: CPT | Performed by: INTERNAL MEDICINE

## 2025-03-11 LAB
OHS CV AF BURDEN PERCENT: < 1
OHS CV DC REMOTE DEVICE TYPE: NORMAL
OHS CV RV PACING PERCENT: 0 %

## 2025-03-26 ENCOUNTER — INITIAL PRENATAL (OUTPATIENT)
Dept: OBSTETRICS AND GYNECOLOGY | Facility: CLINIC | Age: 25
End: 2025-03-26
Payer: MEDICAID

## 2025-03-26 VITALS
BODY MASS INDEX: 29.09 KG/M2 | SYSTOLIC BLOOD PRESSURE: 100 MMHG | WEIGHT: 174.81 LBS | DIASTOLIC BLOOD PRESSURE: 62 MMHG

## 2025-03-26 DIAGNOSIS — J45.30 MILD PERSISTENT ASTHMA WITHOUT COMPLICATION: Chronic | ICD-10-CM

## 2025-03-26 DIAGNOSIS — G43.009 MIGRAINE WITHOUT AURA AND WITHOUT STATUS MIGRAINOSUS, NOT INTRACTABLE: ICD-10-CM

## 2025-03-26 DIAGNOSIS — O09.91 SUPERVISION OF HIGH RISK PREGNANCY IN FIRST TRIMESTER: Primary | ICD-10-CM

## 2025-03-26 DIAGNOSIS — Z95.0 CARDIAC PACEMAKER IN SITU: ICD-10-CM

## 2025-03-26 DIAGNOSIS — J45.909 ASTHMA AFFECTING PREGNANCY IN FIRST TRIMESTER: ICD-10-CM

## 2025-03-26 DIAGNOSIS — Z13.79 ENCOUNTER FOR GENETIC SCREENING FOR DOWN SYNDROME: ICD-10-CM

## 2025-03-26 DIAGNOSIS — Z13.71 SCREENING FOR GENETIC DISEASE CARRIER STATUS: ICD-10-CM

## 2025-03-26 DIAGNOSIS — O99.511 ASTHMA AFFECTING PREGNANCY IN FIRST TRIMESTER: ICD-10-CM

## 2025-03-26 DIAGNOSIS — I49.5 SSS (SICK SINUS SYNDROME): ICD-10-CM

## 2025-03-26 PROBLEM — R20.0 LEFT SIDED NUMBNESS: Status: RESOLVED | Noted: 2024-07-14 | Resolved: 2025-03-26

## 2025-03-26 PROBLEM — R53.1 LEFT-SIDED WEAKNESS: Status: RESOLVED | Noted: 2024-07-14 | Resolved: 2025-03-26

## 2025-03-26 PROBLEM — R07.82 INTERCOSTAL PAIN: Status: RESOLVED | Noted: 2021-03-27 | Resolved: 2025-03-26

## 2025-03-26 PROCEDURE — 99213 OFFICE O/P EST LOW 20 MIN: CPT | Mod: PBBFAC,TH,PN | Performed by: OBSTETRICS & GYNECOLOGY

## 2025-03-26 PROCEDURE — 99999 PR PBB SHADOW E&M-EST. PATIENT-LVL III: CPT | Mod: PBBFAC,,, | Performed by: OBSTETRICS & GYNECOLOGY

## 2025-03-26 RX ORDER — ONDANSETRON 4 MG/1
TABLET, ORALLY DISINTEGRATING ORAL
COMMUNITY
Start: 2025-03-05

## 2025-03-26 RX ORDER — NORTRIPTYLINE HYDROCHLORIDE 10 MG/1
1 CAPSULE ORAL 2 TIMES DAILY
COMMUNITY
Start: 2024-08-05

## 2025-03-26 RX ORDER — BETAXOLOL 10 MG/1
1 TABLET, FILM COATED ORAL DAILY
COMMUNITY

## 2025-03-26 NOTE — PROGRESS NOTES
25 yo  at 11w3d presents for initial OB visit.  PMH c/b SSS, mild persistent asthma, migraine headaches, and possible PFO.  Currently following with MFM, cardiology, neurology, and primary care.     [id number] ordered.  Discussed risks and benefits of carrier screening.  Patient and her partner desire. Ordered,  Referral placed for pulmonology.   Advised to keep MFM appointment and all specialty appointments.     RTC 4 weeks or as needed.        Karin Rushing MD  [facility]  2025

## 2025-03-27 ENCOUNTER — DOCUMENTATION ONLY (OUTPATIENT)
Dept: LAB | Facility: HOSPITAL | Age: 25
End: 2025-03-27
Payer: MEDICAID

## 2025-03-31 ENCOUNTER — OFFICE VISIT (OUTPATIENT)
Dept: MATERNAL FETAL MEDICINE | Facility: CLINIC | Age: 25
End: 2025-03-31
Payer: MEDICAID

## 2025-03-31 ENCOUNTER — PROCEDURE VISIT (OUTPATIENT)
Dept: MATERNAL FETAL MEDICINE | Facility: CLINIC | Age: 25
End: 2025-03-31
Payer: MEDICAID

## 2025-03-31 VITALS
WEIGHT: 174.38 LBS | BODY MASS INDEX: 29.02 KG/M2 | DIASTOLIC BLOOD PRESSURE: 60 MMHG | SYSTOLIC BLOOD PRESSURE: 102 MMHG

## 2025-03-31 DIAGNOSIS — Z95.0 CARDIAC PACEMAKER IN SITU: ICD-10-CM

## 2025-03-31 DIAGNOSIS — Z36.89 ENCOUNTER FOR FETAL ANATOMIC SURVEY: Primary | ICD-10-CM

## 2025-03-31 DIAGNOSIS — Q21.12 PFO (PATENT FORAMEN OVALE): ICD-10-CM

## 2025-03-31 DIAGNOSIS — J45.30 MILD PERSISTENT ASTHMA WITHOUT COMPLICATION: Primary | Chronic | ICD-10-CM

## 2025-03-31 DIAGNOSIS — Z36.89 ENCOUNTER FOR FETAL ANATOMIC SURVEY: ICD-10-CM

## 2025-03-31 DIAGNOSIS — I49.5 SICK SINUS SYNDROME: ICD-10-CM

## 2025-03-31 DIAGNOSIS — G43.809 OTHER MIGRAINE WITHOUT STATUS MIGRAINOSUS, NOT INTRACTABLE: ICD-10-CM

## 2025-03-31 PROCEDURE — 3074F SYST BP LT 130 MM HG: CPT | Mod: CPTII,,, | Performed by: STUDENT IN AN ORGANIZED HEALTH CARE EDUCATION/TRAINING PROGRAM

## 2025-03-31 PROCEDURE — 1159F MED LIST DOCD IN RCRD: CPT | Mod: CPTII,,, | Performed by: STUDENT IN AN ORGANIZED HEALTH CARE EDUCATION/TRAINING PROGRAM

## 2025-03-31 PROCEDURE — 3008F BODY MASS INDEX DOCD: CPT | Mod: CPTII,,, | Performed by: STUDENT IN AN ORGANIZED HEALTH CARE EDUCATION/TRAINING PROGRAM

## 2025-03-31 PROCEDURE — 99214 OFFICE O/P EST MOD 30 MIN: CPT | Mod: S$PBB,TH,, | Performed by: STUDENT IN AN ORGANIZED HEALTH CARE EDUCATION/TRAINING PROGRAM

## 2025-03-31 PROCEDURE — 76817 TRANSVAGINAL US OBSTETRIC: CPT | Mod: 26,S$PBB,, | Performed by: STUDENT IN AN ORGANIZED HEALTH CARE EDUCATION/TRAINING PROGRAM

## 2025-03-31 PROCEDURE — 3078F DIAST BP <80 MM HG: CPT | Mod: CPTII,,, | Performed by: STUDENT IN AN ORGANIZED HEALTH CARE EDUCATION/TRAINING PROGRAM

## 2025-03-31 PROCEDURE — 99213 OFFICE O/P EST LOW 20 MIN: CPT | Mod: PBBFAC | Performed by: STUDENT IN AN ORGANIZED HEALTH CARE EDUCATION/TRAINING PROGRAM

## 2025-03-31 PROCEDURE — 76813 OB US NUCHAL MEAS 1 GEST: CPT | Mod: PBBFAC | Performed by: STUDENT IN AN ORGANIZED HEALTH CARE EDUCATION/TRAINING PROGRAM

## 2025-03-31 PROCEDURE — 99999 PR PBB SHADOW E&M-EST. PATIENT-LVL III: CPT | Mod: PBBFAC,,, | Performed by: STUDENT IN AN ORGANIZED HEALTH CARE EDUCATION/TRAINING PROGRAM

## 2025-03-31 NOTE — ASSESSMENT & PLAN NOTE
Mild persistent asthma, previously well controlled on symbicort and albuterol. Now noticing some increased need for albuterol rescue inhaler  Previously counseled, please reference prior consultations for full breadth of recommendations.    Recommendations:  Continue Symbicort and Albuterol. Encouraged her to keep Pulmonology appt 4/14, would appreciate recs on any adjustments to optimize given increasing need for rescue inhaler.  Avoid triggers  Administer flu vaccination, when available; consider pneumovax if other comorbidities  Recommend COVID vaccination  Avoid prostaglandins such as E2 and F2-alpha (carboprost/Hemabate)  Assessment of symptoms and rescue inhaler frequency of use at each visit

## 2025-03-31 NOTE — ASSESSMENT & PLAN NOTE
Previously counseled, please reference prior consultations for full breadth of history of condition and recommendations.  Formerly managed on Betaxolol.    Recommendations:  Continue close follow up with Cardiology and Electrophysiology. Scheduled for 4/7 (unable to move to sooner).  Anesthesia consult in the third trimester - to be facilitated by primary OB  Betaxolol is safe in pregnancy and can be restarted at the discretion of her electrophysiologist.

## 2025-03-31 NOTE — ASSESSMENT & PLAN NOTE
Previously counseled, please reference prior consultations for full breadth of history of this condition and recommendations.  Has not restarted any medications. Noticing a reprieve from headaches at this time.     Recommendations:  Avoid NSAID use  Encourage hydration  Encouraged a symptom diary  May restart Nortriptyline now that she's past 10 w for migraine prophylaxis - she does not desire to restart at this time.   Continue consultation and follow-up with Neurology - not scheduled until 6/2, will attempt to assist with an earlier visit  Consider for treatment:  Acetaminophen 1000 mg every 6-8 hours PO as needed for recurrent headache  Can pair with use of caffeine, though not more than 200 mg daily, metoclopramide 10 mg PO (Rx sent), Mag sulfate 400mg PO and benadryl 12.5mg.  Persistent headaches despite that regimen should be evaluated by primary team   her on signs and symptoms of preeclampsia, as her headaches may confound diagnosis  Breastfeeding: probably safe, monitor infant for adverse effects

## 2025-03-31 NOTE — PROGRESS NOTES
Maternal Fetal Medicine follow up consult    SUBJECTIVE:     April Lorrie Sinclair is a 24 y.o.  female with IUP at 12w1d who is seen in follow up consultation by MFM.  Pregnancy complications include:   Problem   Mild Persistent Asthma Without Complication   Pfo (Patent Foramen Ovale)   Sick Sinus Syndrome   Cardiac Pacemaker in Situ   Migraine Headache       Previous notes reviewed.   No changes to medical, surgical, family, social, or obstetric history.    Interval history since last MFM visit: Doing well, though feeling lower in energy in this first trimester. Noticing she's using her rescue inhaler a bit more. Having some low back pain. Has noticed improvement in headaches.     Medications reviewed.    Care team members:  Karin Rushing MD - Primary OB     OBJECTIVE:   /60 (BP Location: Right arm, Patient Position: Sitting)   Wt 79.1 kg (174 lb 6.1 oz)   LMP 2024   BMI 29.02 kg/m²     Physical Exam  WNWD female appearing stated age, in NAD  No conversational dyspnea  Gravid abdomen    Ultrasound performed. See viewpoint for full ultrasound report.  A lindsey IUP with cardiac activity is identified.  Fetal size is consistent with established dating.  The nuchal translucency is measured and appears normal.  Limited early anatomy appears unremarkable.     ASSESSMENT/PLAN:     24 y.o.  female with IUP at 12w1d    Mild persistent asthma without complication  Mild persistent asthma, previously well controlled on symbicort and albuterol. Now noticing some increased need for albuterol rescue inhaler  Previously counseled, please reference prior consultations for full breadth of recommendations.    Recommendations:  Continue Symbicort and Albuterol. Encouraged her to keep Pulmonology appt , would appreciate recs on any adjustments to optimize given increasing need for rescue inhaler.  Avoid triggers  Administer flu vaccination, when available; consider pneumovax if other  comorbidities  Recommend COVID vaccination  Avoid prostaglandins such as E2 and F2-alpha (carboprost/Hemabate)  Assessment of symptoms and rescue inhaler frequency of use at each visit    Migraine headache  Previously counseled, please reference prior consultations for full breadth of history of this condition and recommendations.  Has not restarted any medications. Noticing a reprieve from headaches at this time.     Recommendations:  Avoid NSAID use  Encourage hydration  Encouraged a symptom diary  May restart Nortriptyline now that she's past 10 w for migraine prophylaxis - she does not desire to restart at this time.   Continue consultation and follow-up with Neurology - not scheduled until , will attempt to assist with an earlier visit  Consider for treatment:  Acetaminophen 1000 mg every 6-8 hours PO as needed for recurrent headache  Can pair with use of caffeine, though not more than 200 mg daily, metoclopramide 10 mg PO (Rx sent), Mag sulfate 400mg PO and benadryl 12.5mg.  Persistent headaches despite that regimen should be evaluated by primary team   her on signs and symptoms of preeclampsia, as her headaches may confound diagnosis  Breastfeeding: probably safe, monitor infant for adverse effects      Sick sinus syndrome  Previously counseled, please reference prior consultations for full breadth of history of condition and recommendations.  Formerly managed on Betaxolol.    Recommendations:  Continue close follow up with Cardiology and Electrophysiology. Scheduled for  (unable to move to sooner).  Anesthesia consult in the third trimester - to be facilitated by primary OB  Betaxolol is safe in pregnancy and can be restarted at the discretion of her electrophysiologist.    Cardiac pacemaker in situ  Appointment with electrophysiology as scheduled.  Would appreciate any specific recs for management of pacemaker should she require delivery via  and use of electrocautery for  hemostasis.    PFO (patent foramen ovale)  Previously counseled, please reference prior consultations for full breadth of recommendations.      Recommendations:  Would defer closure of her PFO until post partum unless clinically indicated  In the event she requires a  delivery, minimize manipulation of tissue and try to avoid exteriorizing the uterus    F/u in 7 weeks for [facility]  F/u in 7 weeks for US    35 minutes of total time spent on the encounter, which includes face to face time and non-face to face time preparing to see the patient (eg, review of tests), obtaining and/or reviewing separately obtained history, documenting clinical information in the electronic or other health record, independently interpreting results (not separately reported) and communicating results to the patient/family/caregiver, or care coordination (not separately reported).    Aminata Peraza MD  Maternal Fetal Medicine

## 2025-03-31 NOTE — ASSESSMENT & PLAN NOTE
Previously counseled, please reference prior consultations for full breadth of recommendations.      Recommendations:  Would defer closure of her PFO until post partum unless clinically indicated  In the event she requires a  delivery, minimize manipulation of tissue and try to avoid exteriorizing the uterus

## 2025-03-31 NOTE — ASSESSMENT & PLAN NOTE
Appointment with electrophysiology as scheduled.  Would appreciate any specific recs for management of pacemaker should she require delivery via  and use of electrocautery for hemostasis.

## 2025-04-04 NOTE — PROGRESS NOTES
"Subjective     HPI    I had the pleasure of seeing Vanessa Sinclair in follow-up for her history of SSS and PPM in situ. Former pt of Dr. Cooley (last seen in 4/2024), seeing me for the first time today.    She is a 24F with a history of migraines, asthma, and recurrent syncope with a marked cardioinhibitory response (asystole >50 seconds) during HUT. Pt underwent pacemaker implantation (VVI) in 7/2019 with upgrade to dual chamber pacemaker implantation in 5/2021.    No recurrent syncope since device implant.    I reviewed today's device check which shows stable device/lead function, RAp 69%, RVp 0%, battery 5 yr 11 mo.    Echo with contrast performed in 7/2024 after unilateral numbness, which showed the following: "a small amount of bubbles are visualized in the left cardiac chambers ~7 cardiac cycles following right heart opacification. Bubbles appear to enter the left atrium via the interatrial septum. Suspect there is an inter-atrial level shunt despite the late timing of bubbles entering the left cardiac chambers."    Pt is 13 weeks pregnant with first child.    My interpretation of today's ECG is NSR 76 bpm.    Review of Systems   Constitutional: Negative for decreased appetite, malaise/fatigue, weight gain and weight loss.   HENT:  Negative for sore throat.    Eyes:  Negative for blurred vision.   Cardiovascular:  Negative for chest pain, dyspnea on exertion, irregular heartbeat, leg swelling, near-syncope, orthopnea, palpitations, paroxysmal nocturnal dyspnea and syncope.   Respiratory:  Negative for shortness of breath.    Skin:  Negative for rash.   Musculoskeletal:  Negative for arthritis.   Gastrointestinal:  Negative for abdominal pain.   Neurological:  Negative for focal weakness.   Psychiatric/Behavioral:  Negative for altered mental status.           Objective     Physical Exam  Vitals and nursing note reviewed.   Constitutional:       General: She is not in acute distress.     Appearance: She is " well-developed.   HENT:      Head: Normocephalic and atraumatic.   Eyes:      General: No scleral icterus.     Conjunctiva/sclera: Conjunctivae normal.      Pupils: Pupils are equal, round, and reactive to light.   Neck:      Thyroid: No thyromegaly.      Vascular: No JVD.   Cardiovascular:      Rate and Rhythm: Normal rate and regular rhythm.      Pulses: Intact distal pulses.      Heart sounds: Normal heart sounds. No murmur heard.     No friction rub. No gallop.   Pulmonary:      Effort: Pulmonary effort is normal. No respiratory distress.      Breath sounds: Normal breath sounds.   Abdominal:      General: Bowel sounds are normal. There is no distension.      Palpations: Abdomen is soft.   Musculoskeletal:      Cervical back: Normal range of motion and neck supple.   Skin:     General: Skin is warm and dry.   Neurological:      Mental Status: She is alert and oriented to person, place, and time.   Psychiatric:         Behavior: Behavior normal.            Assessment and Plan     1. Cardiac pacemaker in situ    2. Syncope, vasovagal    3. Sick sinus syndrome    4. Palpitations    5. PFO (patent foramen ovale)        Plan:     In summary, Vanessa Sinclair is a 24F with a history of vasovagal syncope with marked cardioinhibitory response, status-post dual chamber pacemaker implantation.    The device is functioning appropriately. Plan is regular device checks, follow-up 1 yr.    At pts request will refer to cardiology to evaluate for whether intracardiac shunt is in fact clinically relevant.    Thank you for allowing me to participate in the care of this patient. Please do not hesitate to call me with any questions or concerns.

## 2025-04-07 ENCOUNTER — OFFICE VISIT (OUTPATIENT)
Dept: ELECTROPHYSIOLOGY | Facility: CLINIC | Age: 25
End: 2025-04-07
Payer: MEDICAID

## 2025-04-07 ENCOUNTER — CLINICAL SUPPORT (OUTPATIENT)
Dept: CARDIOLOGY | Facility: HOSPITAL | Age: 25
End: 2025-04-07
Attending: INTERNAL MEDICINE
Payer: MEDICAID

## 2025-04-07 ENCOUNTER — HOSPITAL ENCOUNTER (OUTPATIENT)
Dept: CARDIOLOGY | Facility: CLINIC | Age: 25
Discharge: HOME | End: 2025-04-07
Payer: MEDICAID

## 2025-04-07 VITALS
HEIGHT: 65 IN | WEIGHT: 176.81 LBS | HEART RATE: 76 BPM | BODY MASS INDEX: 29.46 KG/M2 | DIASTOLIC BLOOD PRESSURE: 67 MMHG | SYSTOLIC BLOOD PRESSURE: 105 MMHG

## 2025-04-07 DIAGNOSIS — Q21.12 PFO (PATENT FORAMEN OVALE): ICD-10-CM

## 2025-04-07 DIAGNOSIS — I49.5 SSS (SICK SINUS SYNDROME): ICD-10-CM

## 2025-04-07 DIAGNOSIS — R55 SYNCOPE, VASOVAGAL: ICD-10-CM

## 2025-04-07 DIAGNOSIS — I49.5 SICK SINUS SYNDROME: ICD-10-CM

## 2025-04-07 DIAGNOSIS — Q21.10 ASD (ATRIAL SEPTAL DEFECT): ICD-10-CM

## 2025-04-07 DIAGNOSIS — R00.2 PALPITATIONS: ICD-10-CM

## 2025-04-07 DIAGNOSIS — Z95.0 CARDIAC PACEMAKER IN SITU: Primary | ICD-10-CM

## 2025-04-07 LAB
OHS QRS DURATION: 88 MS
OHS QTC CALCULATION: 409 MS

## 2025-04-07 PROCEDURE — 93280 PM DEVICE PROGR EVAL DUAL: CPT

## 2025-04-07 PROCEDURE — 99999 PR PBB SHADOW E&M-EST. PATIENT-LVL III: CPT | Mod: PBBFAC,,, | Performed by: INTERNAL MEDICINE

## 2025-04-07 PROCEDURE — 93010 ELECTROCARDIOGRAM REPORT: CPT | Mod: S$PBB,,, | Performed by: STUDENT IN AN ORGANIZED HEALTH CARE EDUCATION/TRAINING PROGRAM

## 2025-04-07 PROCEDURE — 99213 OFFICE O/P EST LOW 20 MIN: CPT | Mod: PBBFAC,25 | Performed by: INTERNAL MEDICINE

## 2025-04-07 PROCEDURE — 1160F RVW MEDS BY RX/DR IN RCRD: CPT | Mod: CPTII,,, | Performed by: INTERNAL MEDICINE

## 2025-04-07 PROCEDURE — 3074F SYST BP LT 130 MM HG: CPT | Mod: CPTII,,, | Performed by: INTERNAL MEDICINE

## 2025-04-07 PROCEDURE — 99215 OFFICE O/P EST HI 40 MIN: CPT | Mod: S$PBB,,, | Performed by: INTERNAL MEDICINE

## 2025-04-07 PROCEDURE — 93005 ELECTROCARDIOGRAM TRACING: CPT | Mod: PBBFAC | Performed by: STUDENT IN AN ORGANIZED HEALTH CARE EDUCATION/TRAINING PROGRAM

## 2025-04-07 PROCEDURE — 1159F MED LIST DOCD IN RCRD: CPT | Mod: CPTII,,, | Performed by: INTERNAL MEDICINE

## 2025-04-07 PROCEDURE — 3078F DIAST BP <80 MM HG: CPT | Mod: CPTII,,, | Performed by: INTERNAL MEDICINE

## 2025-04-07 PROCEDURE — 93280 PM DEVICE PROGR EVAL DUAL: CPT | Mod: 26,,, | Performed by: INTERNAL MEDICINE

## 2025-04-07 PROCEDURE — 3008F BODY MASS INDEX DOCD: CPT | Mod: CPTII,,, | Performed by: INTERNAL MEDICINE

## 2025-04-11 LAB
OHS CV DC REMOTE DEVICE TYPE: NORMAL
OHS CV RV PACING PERCENT: 0 %

## 2025-04-16 ENCOUNTER — OFFICE VISIT (OUTPATIENT)
Dept: CARDIOLOGY | Facility: CLINIC | Age: 25
End: 2025-04-16
Payer: MEDICAID

## 2025-04-16 VITALS
BODY MASS INDEX: 29.72 KG/M2 | SYSTOLIC BLOOD PRESSURE: 100 MMHG | HEIGHT: 65 IN | DIASTOLIC BLOOD PRESSURE: 65 MMHG | OXYGEN SATURATION: 98 % | RESPIRATION RATE: 18 BRPM | WEIGHT: 178.38 LBS | HEART RATE: 78 BPM

## 2025-04-16 DIAGNOSIS — J45.30 MILD PERSISTENT ASTHMA WITHOUT COMPLICATION: Primary | Chronic | ICD-10-CM

## 2025-04-16 DIAGNOSIS — R55 SYNCOPE, VASOVAGAL: ICD-10-CM

## 2025-04-16 DIAGNOSIS — G43.409 HEMIPLEGIC MIGRAINE WITHOUT STATUS MIGRAINOSUS, NOT INTRACTABLE: ICD-10-CM

## 2025-04-16 DIAGNOSIS — R00.2 PALPITATIONS: ICD-10-CM

## 2025-04-16 DIAGNOSIS — Z95.0 CARDIAC PACEMAKER IN SITU: ICD-10-CM

## 2025-04-16 DIAGNOSIS — Q21.12 PFO (PATENT FORAMEN OVALE): ICD-10-CM

## 2025-04-16 PROCEDURE — 3008F BODY MASS INDEX DOCD: CPT | Mod: CPTII,,, | Performed by: INTERNAL MEDICINE

## 2025-04-16 PROCEDURE — 3078F DIAST BP <80 MM HG: CPT | Mod: CPTII,,, | Performed by: INTERNAL MEDICINE

## 2025-04-16 PROCEDURE — 3074F SYST BP LT 130 MM HG: CPT | Mod: CPTII,,, | Performed by: INTERNAL MEDICINE

## 2025-04-16 PROCEDURE — 99213 OFFICE O/P EST LOW 20 MIN: CPT | Mod: PBBFAC | Performed by: INTERNAL MEDICINE

## 2025-04-16 PROCEDURE — 99999 PR PBB SHADOW E&M-EST. PATIENT-LVL III: CPT | Mod: PBBFAC,,, | Performed by: INTERNAL MEDICINE

## 2025-04-16 PROCEDURE — 99215 OFFICE O/P EST HI 40 MIN: CPT | Mod: S$PBB,,, | Performed by: INTERNAL MEDICINE

## 2025-04-16 PROCEDURE — 1159F MED LIST DOCD IN RCRD: CPT | Mod: CPTII,,, | Performed by: INTERNAL MEDICINE

## 2025-04-16 NOTE — ASSESSMENT & PLAN NOTE
Her left arm weakness and numbness was 2024.  It was confirmed as not related to stroke.  Condition improved without recurrence.  No residual deficit documented.

## 2025-04-16 NOTE — PROGRESS NOTES
[facility] Cardiology     Subjective:    Patient ID:  April Lorrie Sinclair is a 24 y.o. female who presents for evaluation of Pregnancy, Palpitations, Pacemaker in Situ, and PFO/ASD    Review of patient's allergies indicates:   Allergen Reactions    Lamictal  [lamotrigine]      Other reaction(s): HIVES    [id number] antimigraine agents Other (See Comments)     Contraindicated in complex migraines, needs to avoid     Cefzil [cefprozil] Rash    Demerol [meperidine] Rash     Other reaction(s): Rash    Pcn [penicillins] Rash     Other reaction(s): Rash     She is here for reassessment of presence of patent foramen ovale with shunting.  Previously, she developed left-sided arm weakness, possible stroke.  Ultimately it was diagnosed as a complex migraine, she has a migraine disorder.    Her transthoracic echo supported positive bubble study.  A 2nd echo was done, LEWIS performed at [facility] which was negative for presence of patent foramen ovale.  No shunting was detected.  Because of her pregnancy there is a request for a repeat bubble study and assessment of shunting.  Her ejection fraction was normal.    She has a history of pacemaker implantation 2019 for vasovagal syncope.  She had prolonged asystolic pauses and recurrent syncope leading to pacemaker implantation.  She paces 69% from the atria.  Her pacemaker is set at 75 beats per minute.  She used to follow with Dr. Cooley.    Her current pacemaker shows 100% atrial pacing at 76 beats per minute.  The tracing is otherwise remarkable for isolated T-wave inversion in V3.  She has narrow complex QRS.    She is on her 1st pregnancy.  Delivery date is October.  Thus far pregnancy going well.  She is on beta sotalol 10 mg daily.  She has a history of palpitations.  Since pacemaker implantation she has never had syncope.         Review of Systems   Constitutional: Positive for weight gain. Negative for  chills, decreased appetite, diaphoresis, fever, malaise/fatigue and night sweats.   HENT:  Negative for congestion, ear discharge, ear pain, hearing loss, hoarse voice, nosebleeds, odynophagia, sore throat, stridor and tinnitus.    Eyes:  Negative for blurred vision, discharge, double vision, pain, photophobia, redness, vision loss in left eye, vision loss in right eye, visual disturbance and visual halos.   Cardiovascular:  Positive for palpitations. Negative for chest pain, claudication, cyanosis, dyspnea on exertion, irregular heartbeat, leg swelling, near-syncope, orthopnea, paroxysmal nocturnal dyspnea and syncope.   Respiratory:  Negative for cough, hemoptysis, shortness of breath, sleep disturbances due to breathing, snoring, sputum production and wheezing.    Endocrine: Negative for cold intolerance, heat intolerance, polydipsia, polyphagia and polyuria.   Hematologic/Lymphatic: Negative for adenopathy and bleeding problem. Does not bruise/bleed easily.   Skin:  Negative for color change, dry skin, flushing, itching, nail changes, poor wound healing, rash, skin cancer, suspicious lesions and unusual hair distribution.   Musculoskeletal:  Negative for arthritis, back pain, falls, gout, joint pain, joint swelling, muscle cramps, muscle weakness, myalgias, neck pain and stiffness.   Gastrointestinal:  Negative for bloating, abdominal pain, anorexia, change in bowel habit, bowel incontinence, constipation, diarrhea, dysphagia, excessive appetite, flatus, heartburn, hematemesis, hematochezia, hemorrhoids, jaundice, melena, nausea and vomiting.   Genitourinary:  Negative for bladder incontinence, decreased libido, dysuria, flank pain, frequency, genital sores, hematuria, hesitancy, incomplete emptying, nocturia and urgency.   Neurological:  Negative for aphonia, brief paralysis, difficulty with concentration, disturbances in coordination, excessive daytime sleepiness, dizziness, focal weakness, headaches,  "light-headedness, loss of balance, numbness, paresthesias, seizures, sensory change, tremors, vertigo and weakness.   Psychiatric/Behavioral:  Negative for altered mental status, depression, hallucinations, memory loss, substance abuse, suicidal ideas and thoughts of violence. The patient does not have insomnia and is not nervous/anxious.    Allergic/Immunologic: Negative for hives and persistent infections.        Objective:       Vitals:    04/16/25 1436   BP: 100/65   Patient Position: Sitting   Pulse: 78   Resp: 18   SpO2: 98%   Weight: 80.9 kg (178 lb 5.6 oz)   Height: 5' 5" (1.651 m)    Physical Exam  Constitutional:       General: She is not in acute distress.     Appearance: She is well-developed. She is not diaphoretic.   HENT:      Head: Normocephalic and atraumatic.      Nose: Nose normal.   Eyes:      General: No scleral icterus.        Right eye: No discharge.      Conjunctiva/sclera: Conjunctivae normal.      Pupils: Pupils are equal, round, and reactive to light.   Neck:      Thyroid: No thyromegaly.      Vascular: No JVD.      Trachea: No tracheal deviation.   Cardiovascular:      Rate and Rhythm: Normal rate and regular rhythm.      Pulses:           Carotid pulses are 2+ on the right side and 2+ on the left side.       Radial pulses are 2+ on the right side and 2+ on the left side.        Dorsalis pedis pulses are 2+ on the right side and 2+ on the left side.        Posterior tibial pulses are 2+ on the right side and 2+ on the left side.      Heart sounds: Normal heart sounds. No murmur heard.     No friction rub. No gallop.   Pulmonary:      Effort: Pulmonary effort is normal. No respiratory distress.      Breath sounds: Normal breath sounds. No stridor. No wheezing or rales.   Chest:      Chest wall: No tenderness.   Abdominal:      General: Bowel sounds are normal. There is no distension.      Palpations: Abdomen is soft. There is no mass.      Tenderness: There is no abdominal tenderness. " There is no guarding or rebound.   Musculoskeletal:         General: No tenderness. Normal range of motion.      Cervical back: Normal range of motion and neck supple.   Lymphadenopathy:      Cervical: No cervical adenopathy.   Skin:     General: Skin is warm and dry.      Coloration: Skin is not pale.      Findings: No erythema or rash.   Neurological:      Mental Status: She is alert and oriented to person, place, and time.      Cranial Nerves: No cranial nerve deficit.      Coordination: Coordination normal.   Psychiatric:         Behavior: Behavior normal.         Thought Content: Thought content normal.         Judgment: Judgment normal.           Assessment:       1. Mild persistent asthma without complication    2. Cardiac pacemaker in situ    3. PFO (patent foramen ovale)    4. Palpitations    5. Syncope, vasovagal    6. Hemiplegic migraine without status migrainosus, not intractable      Results for orders placed or performed during the hospital encounter of 04/07/25   Rhythm strip    Collection Time: 04/07/25 12:58 PM   Result Value Ref Range    QRS Duration 88 ms    [facility] QTC Calculation 409 ms       Current Medications[1]     Lab Results   Component Value Date    WBC 6.98 02/21/2025    RBC 4.60 02/21/2025    HGB 13.2 02/21/2025    HCT 40.8 02/21/2025    MCV 89 02/21/2025    MCH 28.7 02/21/2025    MCHC 32.4 02/21/2025    RDW 13.2 02/21/2025     02/21/2025    MPV 11.6 02/21/2025    GRAN 4.4 02/21/2025    GRAN 63.2 02/21/2025    LYMPH 1.9 02/21/2025    LYMPH 26.9 02/21/2025    MONO 0.6 02/21/2025    MONO 8.6 02/21/2025    EOS 0.1 02/21/2025    BASO 0.02 02/21/2025    EOSINOPHIL 0.7 02/21/2025    BASOPHIL 0.3 02/21/2025    MG 1.9 03/27/2021        CMP  Lab Results   Component Value Date     09/23/2024    K 4.3 09/23/2024     07/14/2024    CO2 26 09/23/2024     07/14/2024    BUN 11.0 09/23/2024    CREATININE 0.72 09/23/2024    CALCIUM 8.5 09/23/2024    PROT 7.3 07/14/2024    ALBUMIN  3.8 07/14/2024    BILITOT 0.1 07/14/2024    ALKPHOS 46 (L) 07/14/2024    AST 22 07/14/2024    ALT 12 07/14/2024    ANIONGAP 7 (L) 09/23/2024    ESTGFRAFRICA >60.0 05/21/2021    EGFRNONAA >60.0 05/21/2021        Lab Results   Component Value Date    LABURIN No growth 02/21/2025            Results for orders placed or performed during the hospital encounter of 07/14/24   EKG 12-lead    Collection Time: 07/14/24  8:31 AM   Result Value Ref Range    QRS Duration 152 ms    [facility] QTC Calculation 484 ms    Narrative    Test Reason :     Vent. Rate : 091 BPM     Atrial Rate : 091 BPM     P-R Int : 124 ms          QRS Dur : 152 ms      QT Int : 394 ms       P-R-T Axes : 112 069 -81 degrees     QTc Int : 484 ms    AV dual-paced rhythm  Abnormal ECG  When compared with ECG of 14-JUL-2024 05:37,  Electronic ventricular pacemaker has replaced Electronic atrial pacemaker  Confirmed by CATA PARKER MD ([id number]) on 7/14/2024 9:32:52 AM    Referred By: [email or web address]   SELF           Confirmed By:CATA PARKER MD        Cardiac device check - [facility] 04/07/2025 [id number] Final   Cardiac device check - Remote 03/11/2025 [id number] Final   Cardiac device check - Remote 12/12/2024 [id number] Final             Plan:       Problem List Items Addressed This Visit          Neuro    Hemiplegic migraine without status migrainosus    Her left arm weakness and numbness was 2024.  It was confirmed as not related to stroke.  Condition improved without recurrence.  No residual deficit documented.            Pulmonary    Mild persistent asthma without complication - Primary (Chronic)    Condition stable.            Cardiac/Vascular    Syncope, vasovagal    Leading to pacemaker implantation 2019.         Cardiac pacemaker in situ    Placed for episodes of syncope triggered by high vagal tone.  She paces significantly from the atria.  She has intact pacemaker function.  The device has more than 5 years of battery life.         Palpitations     Beta-blockers to continue.  No ectopy noted on exam.         PFO (patent foramen ovale)    Repeat transthoracic echo ordered today with bubble study.         Relevant Orders    Echo    Echo Saline Bubble? Yes          Four-month follow-up recommended.    My review of her echoes and workup for PFO is that by LEWIS no PFO was found.  Unfortunately we do not have a copy of the report or exam.    Echocardiogram ordered for completeness sake.  If there is a PFO it must be rather small since it took 7 cardiac cycles for bubbles to cross.  I reviewed the echoes I am not convinced there are any bubbles.    Pregnancy due date October 12, 2025             Angel Maloney MD  04/16/2025   3:39 PM               [1]   Current Outpatient Medications:     albuterol (VENTOLIN HFA) 90 mcg/actuation inhaler, Inhale 2 puffs into the lungs every 6 (six) hours as needed for Wheezing. Rescue, Disp: 18 g, Rfl: 3    betaxoloL (KERLONE) 10 mg Tab, Take 1 tablet (10 mg total) by mouth once daily. Start with 1/4 pill a day for 2 weeks. Increase to 1/2 pill a day for the following 2 weeks. Increase to a full tablet (10 mg) a day thereafter.Progression schedule from 1/4 tab to a full tab to be modified in case of perceived side effects.Future further increases will be tailored to clinical response., Disp: 90 tablet, Rfl: 3    betaxoloL (KERLONE) 10 mg Tab, Take 1 tablet by mouth once daily., Disp: , Rfl:     budesonide-formoterol 80-4.5 mcg (SYMBICORT) 80-4.5 mcg/actuation HFAA, Inhale 2 puffs into the lungs 2 (two) times a day. Controller, Disp: 6.9 g, Rfl: 3    magnesium oxide (MAG-OX) 400 mg (241.3 mg magnesium) tablet, Take 400 mg by mouth once daily., Disp: , Rfl:     nortriptyline (PAMELOR) 10 MG capsule, Take 1 capsule nightly x 2 weeks then take 2 capsules by mouth nightly., Disp: 60 capsule, Rfl: 2    ondansetron (ZOFRAN-ODT) 4 MG TbDL, Take by mouth., Disp: , Rfl:     PNV no.95/ferrous fum/folic ac (PRENATAL ORAL), Take by mouth.,  Disp: , Rfl:     riboflavin, vitamin B2, (VITAMIN B-2) 100 mg Tab tablet, Take 100 mg by mouth once daily. 2 tablets (200 mg total) once daily, Disp: , Rfl:     nortriptyline (PAMELOR) 10 MG capsule, Take 1 capsule by mouth 2 (two) times daily. (Patient not taking: Reported on 4/16/2025), Disp: , Rfl:

## 2025-04-16 NOTE — ASSESSMENT & PLAN NOTE
Placed for episodes of syncope triggered by high vagal tone.  She paces significantly from the atria.  She has intact pacemaker function.  The device has more than 5 years of battery life.

## 2025-04-24 ENCOUNTER — ROUTINE PRENATAL (OUTPATIENT)
Dept: OBSTETRICS AND GYNECOLOGY | Facility: CLINIC | Age: 25
End: 2025-04-24
Payer: MEDICAID

## 2025-04-24 VITALS — SYSTOLIC BLOOD PRESSURE: 98 MMHG | BODY MASS INDEX: 29.94 KG/M2 | WEIGHT: 179.88 LBS | DIASTOLIC BLOOD PRESSURE: 74 MMHG

## 2025-04-24 DIAGNOSIS — R82.998 URINE LEUKOCYTES INCREASED: Primary | ICD-10-CM

## 2025-04-24 DIAGNOSIS — R82.90 ABNORMAL URINALYSIS: ICD-10-CM

## 2025-04-24 PROCEDURE — 99213 OFFICE O/P EST LOW 20 MIN: CPT | Mod: PBBFAC | Performed by: OBSTETRICS & GYNECOLOGY

## 2025-04-24 PROCEDURE — 99999 PR PBB SHADOW E&M-EST. PATIENT-LVL III: CPT | Mod: PBBFAC,,, | Performed by: OBSTETRICS & GYNECOLOGY

## 2025-04-24 PROCEDURE — 87086 URINE CULTURE/COLONY COUNT: CPT | Performed by: OBSTETRICS & GYNECOLOGY

## 2025-04-24 NOTE — PROGRESS NOTES
Doing well.  Notes 1 day history of intermittent, sharp  right sided groin pain with standing. Denies fever, chills, nausea, emesis. Denies contractions, leakage of fluid, vaginal bleeding.    Abd soft, NT  Pain consistent with round ligament pain, reassurance provided, discussed maternity support belt. UA 2+ LE, will send for Ucx.   All questions answered.  RTC in 4 or sooner if needed.        Karin Rushing MD  [facility]  04/24/2025

## 2025-04-25 LAB — BACTERIA UR CULT: NORMAL

## 2025-04-27 ENCOUNTER — PATIENT MESSAGE (OUTPATIENT)
Dept: OTHER | Facility: OTHER | Age: 25
End: 2025-04-27
Payer: MEDICAID

## 2025-04-30 ENCOUNTER — PATIENT MESSAGE (OUTPATIENT)
Dept: OBSTETRICS AND GYNECOLOGY | Facility: CLINIC | Age: 25
End: 2025-04-30
Payer: MEDICAID

## 2025-05-04 ENCOUNTER — PATIENT MESSAGE (OUTPATIENT)
Dept: OTHER | Facility: OTHER | Age: 25
End: 2025-05-04
Payer: MEDICAID

## 2025-05-05 ENCOUNTER — PATIENT MESSAGE (OUTPATIENT)
Dept: OBSTETRICS AND GYNECOLOGY | Facility: CLINIC | Age: 25
End: 2025-05-05
Payer: MEDICAID

## 2025-05-05 ENCOUNTER — HOSPITAL ENCOUNTER (OUTPATIENT)
Dept: CARDIOLOGY | Facility: HOSPITAL | Age: 25
Discharge: HOME | End: 2025-05-05
Attending: INTERNAL MEDICINE
Payer: MEDICAID

## 2025-05-05 VITALS
BODY MASS INDEX: 29.82 KG/M2 | HEART RATE: 72 BPM | HEIGHT: 65 IN | SYSTOLIC BLOOD PRESSURE: 98 MMHG | WEIGHT: 179 LBS | DIASTOLIC BLOOD PRESSURE: 74 MMHG

## 2025-05-05 DIAGNOSIS — Q21.12 PFO (PATENT FORAMEN OVALE): ICD-10-CM

## 2025-05-05 LAB
ASCENDING AORTA: 2.8 CM
AV AREA BY CONTINUOUS VTI: 2.2 CM2
AV INDEX (PROSTH): 0.8
AV LVOT MEAN GRADIENT: 3 MMHG
AV LVOT PEAK GRADIENT: 6 MMHG
AV MEAN GRADIENT: 5 MMHG
AV PEAK GRADIENT: 8 MMHG
AV VALVE AREA BY VELOCITY RATIO: 2.6 CM²
AV VALVE AREA: 2.3 CM2
AV VELOCITY RATIO: 0.93
BSA FOR ECHO PROCEDURE: 1.93 M2
CV ECHO LV RWT: 0.34 CM
DOP CALC AO PEAK VEL: 1.4 M/S
DOP CALC AO VTI: 29.6 CM
DOP CALC LVOT AREA: 2.8 CM2
DOP CALC LVOT DIAMETER: 1.9 CM
DOP CALC LVOT PEAK VEL: 1.3 M/S
DOP CALC LVOT STROKE VOLUME: 67.4 CM3
DOP CALCLVOT PEAK VEL VTI: 23.8 CM
E WAVE DECELERATION TIME: 188 MS
E/A RATIO: 1.57
E/E' RATIO: 4 M/S
ECHO EF ESTIMATED: 56 %
ECHO LV POSTERIOR WALL: 0.8 CM (ref 0.6–1.1)
FRACTIONAL SHORTENING: 29.8 % (ref 28–44)
INTERVENTRICULAR SEPTUM: 0.6 CM (ref 0.6–1.1)
IVC DIAMETER: 1.33 CM
IVRT: 86 MS
LA MAJOR: 5 CM
LA MINOR: 5.1 CM
LA WIDTH: 3.9 CM
LEFT ATRIUM SIZE: 2.7 CM
LEFT ATRIUM VOLUME INDEX MOD: 31 ML/M2
LEFT ATRIUM VOLUME INDEX: 24 ML/M2
LEFT ATRIUM VOLUME MOD: 59 ML
LEFT ATRIUM VOLUME: 45 CM3
LEFT INTERNAL DIMENSION IN SYSTOLE: 3.3 CM (ref 2.1–4)
LEFT VENTRICLE DIASTOLIC VOLUME INDEX: 53.97 ML/M2
LEFT VENTRICLE DIASTOLIC VOLUME: 102 ML
LEFT VENTRICLE MASS INDEX: 54.5 G/M2
LEFT VENTRICLE SYSTOLIC VOLUME INDEX: 23.8 ML/M2
LEFT VENTRICLE SYSTOLIC VOLUME: 45 ML
LEFT VENTRICULAR INTERNAL DIMENSION IN DIASTOLE: 4.7 CM (ref 3.5–6)
LEFT VENTRICULAR MASS: 103.1 G
LV LATERAL E/E' RATIO: 4.1
LV SEPTAL E/E' RATIO: 4.7
MV A" WAVE DURATION": 154.14 MS
MV PEAK A VEL: 0.42 M/S
MV PEAK E VEL: 0.66 M/S
OHS CV RV/LV RATIO: 0.66 CM
PISA TR MAX VEL: 2 M/S
PULM VEIN A" WAVE DURATION": 154.14 MS
PULM VEIN S/D RATIO: 0.52
PULMONIC VEIN PEAK A VELOCITY: 0.2 M/S
PV PEAK D VEL: 0.58 M/S
PV PEAK S VEL: 0.3 M/S
RA MAJOR: 4.64 CM
RA PRESSURE ESTIMATED: 3 MMHG
RA WIDTH: 3.24 CM
RIGHT ATRIAL AREA: 13.7 CM2
RIGHT VENTRICLE DIASTOLIC BASEL DIMENSION: 3.1 CM
RV TB RVSP: 5 MMHG
RV TISSUE DOPPLER FREE WALL SYSTOLIC VELOCITY 1 (APICAL 4 CHAMBER VIEW): 12.97 CM/S
SINUS: 3.31 CM
STJ: 2.6 CM
TDI LATERAL: 0.16 M/S
TDI SEPTAL: 0.14 M/S
TDI: 0.15 M/S
TRICUSPID ANNULAR PLANE SYSTOLIC EXCURSION: 2.1 CM
TV PEAK GRADIENT: 17 MMHG
TV REST PULMONARY ARTERY PRESSURE: 19 MMHG
Z-SCORE OF LEFT VENTRICULAR DIMENSION IN END DIASTOLE: -1.18
Z-SCORE OF LEFT VENTRICULAR DIMENSION IN END SYSTOLE: 0.09

## 2025-05-05 PROCEDURE — 93306 TTE W/DOPPLER COMPLETE: CPT | Mod: 26,,, | Performed by: INTERNAL MEDICINE

## 2025-05-05 PROCEDURE — 93306 TTE W/DOPPLER COMPLETE: CPT

## 2025-05-12 ENCOUNTER — PATIENT MESSAGE (OUTPATIENT)
Dept: CARDIOLOGY | Facility: CLINIC | Age: 25
End: 2025-05-12
Payer: MEDICAID

## 2025-05-19 ENCOUNTER — PATIENT MESSAGE (OUTPATIENT)
Dept: MATERNAL FETAL MEDICINE | Facility: CLINIC | Age: 25
End: 2025-05-19

## 2025-05-19 ENCOUNTER — OFFICE VISIT (OUTPATIENT)
Dept: MATERNAL FETAL MEDICINE | Facility: CLINIC | Age: 25
End: 2025-05-19
Attending: OBSTETRICS & GYNECOLOGY
Payer: MEDICAID

## 2025-05-19 ENCOUNTER — PROCEDURE VISIT (OUTPATIENT)
Dept: MATERNAL FETAL MEDICINE | Facility: CLINIC | Age: 25
End: 2025-05-19
Payer: MEDICAID

## 2025-05-19 VITALS
HEIGHT: 65 IN | WEIGHT: 182.13 LBS | BODY MASS INDEX: 30.34 KG/M2 | DIASTOLIC BLOOD PRESSURE: 70 MMHG | SYSTOLIC BLOOD PRESSURE: 100 MMHG

## 2025-05-19 DIAGNOSIS — Z34.90 PREGNANCY, UNSPECIFIED GESTATIONAL AGE: ICD-10-CM

## 2025-05-19 DIAGNOSIS — Q21.12 PFO (PATENT FORAMEN OVALE): ICD-10-CM

## 2025-05-19 DIAGNOSIS — Z36.89 ENCOUNTER FOR FETAL ANATOMIC SURVEY: ICD-10-CM

## 2025-05-19 DIAGNOSIS — Z36.89 ENCOUNTER FOR ULTRASOUND TO ASSESS FETAL GROWTH: Primary | ICD-10-CM

## 2025-05-19 DIAGNOSIS — I49.5 SICK SINUS SYNDROME: ICD-10-CM

## 2025-05-19 PROCEDURE — 99214 OFFICE O/P EST MOD 30 MIN: CPT | Mod: S$PBB,TH,, | Performed by: OBSTETRICS & GYNECOLOGY

## 2025-05-19 PROCEDURE — 99213 OFFICE O/P EST LOW 20 MIN: CPT | Mod: PBBFAC,TH | Performed by: OBSTETRICS & GYNECOLOGY

## 2025-05-19 PROCEDURE — 3078F DIAST BP <80 MM HG: CPT | Mod: CPTII,,, | Performed by: OBSTETRICS & GYNECOLOGY

## 2025-05-19 PROCEDURE — 1159F MED LIST DOCD IN RCRD: CPT | Mod: CPTII,,, | Performed by: OBSTETRICS & GYNECOLOGY

## 2025-05-19 PROCEDURE — 99999 PR PBB SHADOW E&M-EST. PATIENT-LVL III: CPT | Mod: PBBFAC,,, | Performed by: OBSTETRICS & GYNECOLOGY

## 2025-05-19 PROCEDURE — 76811 OB US DETAILED SNGL FETUS: CPT | Mod: 26,S$PBB,, | Performed by: OBSTETRICS & GYNECOLOGY

## 2025-05-19 PROCEDURE — 3008F BODY MASS INDEX DOCD: CPT | Mod: CPTII,,, | Performed by: OBSTETRICS & GYNECOLOGY

## 2025-05-19 PROCEDURE — 3074F SYST BP LT 130 MM HG: CPT | Mod: CPTII,,, | Performed by: OBSTETRICS & GYNECOLOGY

## 2025-05-19 PROCEDURE — 1160F RVW MEDS BY RX/DR IN RCRD: CPT | Mod: CPTII,,, | Performed by: OBSTETRICS & GYNECOLOGY

## 2025-05-19 PROCEDURE — 76811 OB US DETAILED SNGL FETUS: CPT | Mod: PBBFAC | Performed by: OBSTETRICS & GYNECOLOGY

## 2025-05-19 NOTE — PROGRESS NOTES
"Maternal Fetal Medicine follow up consult    SUBJECTIVE:     April Lorrie Sinclair is a 24 y.o.  female with IUP at 19w1d who is seen in follow up consultation by MFM.  Pregnancy complications include:   Mild Persistent Asthma Without Complication   Pfo (Patent Foramen Ovale)   Sick Sinus Syndrome   Cardiac Pacemaker in Situ   Migraine Headache    Previous notes reviewed.   No changes to medical, surgical, family, social, or obstetric history.    Interval history since last M visit: Moving to [place] nd requests assist in finding care.  No new problems.    Medications reviewed.    Care team members:  - Primary OB- Dr. Rushing/new OB in [place] to be determined.        OBJECTIVE:   /70 (BP Location: Left arm, Patient Position: Sitting)   Ht 5' 5" (1.651 m)   Wt 82.6 kg (182 lb 1.6 oz)   LMP 2024   BMI 30.30 kg/m²     Ultrasound performed. See viewpoint for full ultrasound report.  A detailed fetal anatomic ultrasound examination was performed today due to the following high risk indication: maternal congenital cardiac disease.   No fetal structural abnormalities are identified today, and fetal size is appropriate for EGA.   Cervical length by TA scanning is normal.   Placental location is posterior without evidence of previa.     Significant labs/imaging:  Echocardiogram    Left Ventricle: The left ventricle is normal in size. Normal wall thickness. There is normal systolic function with a visually estimated ejection fraction of 60 - 65%. There is normal diastolic function.    Right Ventricle: The right ventricle is normal in size. Wall thickness is normal. Systolic function is normal.    Tricuspid Valve: There is mild regurgitation.    Pulmonary Artery: The estimated pulmonary artery systolic pressure is 19 mmHg.    IVC/SVC: Normal venous pressure at 3 mmHg.       ASSESSMENT/PLAN:     24 y.o.  female with IUP at 19w1d    Discussed that no PFO was seen on her most recent " echocardiogram.  Reviewed prior notes and agree with following recommendations made to her.      Recommendations:  Avoid NSAID use  Encourage hydration  Encouraged a symptom diary  May restart Nortriptyline now that she's past 10 w for migraine prophylaxis - she does not desire to restart at this time.   Continue consultation and follow-up with Neurology - not scheduled until 6/2, will attempt to assist with an earlier visit  Consider for treatment:  Acetaminophen 1000 mg every 6-8 hours PO as needed for recurrent headache  Can pair with use of caffeine, though not more than 200 mg daily, metoclopramide 10 mg PO (Rx sent), Mag sulfate 400mg PO and benadryl 12.5mg.  Persistent headaches despite that regimen should be evaluated by primary team   her on signs and symptoms of preeclampsia, as her headaches may confound diagnosis  Breastfeeding: probably safe, monitor infant for adverse effects        Sick sinus syndrome  Previously counseled, please reference prior consultations for full breadth of history of condition and recommendations.  Formerly managed on Betaxolol.     Recommendations:  Continue close follow up with Cardiology and Electrophysiology. Scheduled for 4/7 (unable to move to sooner).  Anesthesia consult in the third trimester - to be facilitated by primary OB  Betaxolol is safe in pregnancy and can be restarted at the discretion of her electrophysiologist.    The patient was given an opportunity to ask questions about management and the diease process.  She expressed an understanding of and agreement to the above impression and plan. All questions were answered to her satisfaction.  She was given contact information to the [facility] clinic to address further concerns.

## 2025-05-21 ENCOUNTER — RESULTS FOLLOW-UP (OUTPATIENT)
Dept: OBSTETRICS AND GYNECOLOGY | Facility: CLINIC | Age: 25
End: 2025-05-21

## 2025-05-22 ENCOUNTER — PATIENT MESSAGE (OUTPATIENT)
Dept: OBSTETRICS AND GYNECOLOGY | Facility: CLINIC | Age: 25
End: 2025-05-22
Payer: MEDICAID

## 2025-06-03 ENCOUNTER — CLINICAL SUPPORT (OUTPATIENT)
Dept: CARDIOLOGY | Facility: HOSPITAL | Age: 25
End: 2025-06-03
Payer: MEDICAID

## 2025-06-03 ENCOUNTER — CLINICAL SUPPORT (OUTPATIENT)
Dept: CARDIOLOGY | Facility: HOSPITAL | Age: 25
End: 2025-06-03
Attending: INTERNAL MEDICINE
Payer: MEDICAID

## 2025-06-03 DIAGNOSIS — R00.1 BRADYCARDIA, UNSPECIFIED: ICD-10-CM

## 2025-06-03 DIAGNOSIS — R55 SYNCOPE AND COLLAPSE: ICD-10-CM

## 2025-06-03 DIAGNOSIS — Z95.0 PRESENCE OF CARDIAC PACEMAKER: ICD-10-CM

## 2025-06-03 PROCEDURE — 93294 REM INTERROG EVL PM/LDLS PM: CPT | Mod: ,,, | Performed by: INTERNAL MEDICINE

## 2025-06-03 PROCEDURE — 93296 REM INTERROG EVL PM/IDS: CPT | Performed by: INTERNAL MEDICINE

## 2025-06-10 LAB
OHS CV AF BURDEN PERCENT: < 1
OHS CV DC REMOTE DEVICE TYPE: NORMAL
OHS CV RV PACING PERCENT: 0 %

## 2025-06-12 ENCOUNTER — TELEPHONE (OUTPATIENT)
Dept: INTERNAL MEDICINE | Facility: CLINIC | Age: 25
End: 2025-06-12
Payer: MEDICAID

## 2025-06-12 NOTE — TELEPHONE ENCOUNTER
Attempted to call pt to reschedule   Unable to leave voice message   Left message to call back  Will also send portal message

## 2025-06-19 ENCOUNTER — ROUTINE PRENATAL (OUTPATIENT)
Dept: OBSTETRICS AND GYNECOLOGY | Facility: CLINIC | Age: 25
End: 2025-06-19
Payer: MEDICAID

## 2025-06-19 VITALS
SYSTOLIC BLOOD PRESSURE: 108 MMHG | DIASTOLIC BLOOD PRESSURE: 74 MMHG | BODY MASS INDEX: 31.73 KG/M2 | WEIGHT: 190.69 LBS

## 2025-06-19 DIAGNOSIS — O26.899 PREGNANCY RELATED NAUSEA, ANTEPARTUM: ICD-10-CM

## 2025-06-19 DIAGNOSIS — J45.909 ASTHMA, UNSPECIFIED ASTHMA SEVERITY, UNSPECIFIED WHETHER COMPLICATED, UNSPECIFIED WHETHER PERSISTENT: ICD-10-CM

## 2025-06-19 DIAGNOSIS — I49.5 SSS (SICK SINUS SYNDROME): Primary | ICD-10-CM

## 2025-06-19 DIAGNOSIS — O09.92 SUPERVISION OF HIGH RISK PREGNANCY IN SECOND TRIMESTER: ICD-10-CM

## 2025-06-19 DIAGNOSIS — R11.0 PREGNANCY RELATED NAUSEA, ANTEPARTUM: ICD-10-CM

## 2025-06-19 PROCEDURE — 99212 OFFICE O/P EST SF 10 MIN: CPT | Mod: PBBFAC,TH | Performed by: OBSTETRICS & GYNECOLOGY

## 2025-06-19 PROCEDURE — 99213 OFFICE O/P EST LOW 20 MIN: CPT | Mod: TH,S$PBB,, | Performed by: OBSTETRICS & GYNECOLOGY

## 2025-06-19 PROCEDURE — 99999 PR PBB SHADOW E&M-EST. PATIENT-LVL II: CPT | Mod: PBBFAC,,, | Performed by: OBSTETRICS & GYNECOLOGY

## 2025-06-19 RX ORDER — BUDESONIDE AND FORMOTEROL FUMARATE DIHYDRATE 160; 4.5 UG/1; UG/1
2 AEROSOL RESPIRATORY (INHALATION) EVERY 12 HOURS
Qty: 10.2 G | Refills: 1 | Status: SHIPPED | OUTPATIENT
Start: 2025-06-19 | End: 2025-06-20

## 2025-06-19 RX ORDER — FAMOTIDINE 40 MG/1
40 TABLET, FILM COATED ORAL NIGHTLY
Qty: 90 TABLET | Refills: 1 | Status: SHIPPED | OUTPATIENT
Start: 2025-06-19 | End: 2026-06-19

## 2025-06-19 RX ORDER — BETAXOLOL 10 MG/1
10 TABLET, FILM COATED ORAL DAILY
Qty: 90 TABLET | Refills: 3 | Status: SHIPPED | OUTPATIENT
Start: 2025-06-19

## 2025-06-19 RX ORDER — ALBUTEROL SULFATE 90 UG/1
2 INHALANT RESPIRATORY (INHALATION) EVERY 6 HOURS PRN
Qty: 18 G | Refills: 3 | Status: SHIPPED | OUTPATIENT
Start: 2025-06-19 | End: 2025-10-17

## 2025-06-19 NOTE — PROGRESS NOTES
Doing well.  Reports fetal movement.  Denies contractions, leakage of fluid, vaginal bleeding.  Notes recent nausea that is relieved with Zofran.  Also notes soreness after feeling pacemaker trigger. Denies CP. Notes increased SOB but is not different from baseline. No recent migraines. Asthma moderately well controlled. Using Albuterol twice daily.   Nausea: Check CMP, start Pepcid  Asthma: Increase Symbicort actuation, refilled albuterol prn, keep follow up with PCP in 2 weeks.   2T labs ordered.   Has been unable to establish with cardiologist in [place]. Is almost out of Betaxolol. Will refill until she can get established with Cardiology.  All questions answered.  RTC in 4 weeks or sooner if needed.        Karin Rushing MD  [facility]  06/19/2025

## 2025-06-20 RX ORDER — BUDESONIDE AND FORMOTEROL FUMARATE DIHYDRATE 160; 4.5 UG/1; UG/1
2 AEROSOL RESPIRATORY (INHALATION) EVERY 12 HOURS
Qty: 30.6 G | Refills: 1 | Status: SHIPPED | OUTPATIENT
Start: 2025-06-20

## 2025-06-20 NOTE — TELEPHONE ENCOUNTER
Refill Routing Note   Medication(s) are not appropriate for processing by [facility] for the following reason(s):        Outside of protocol  New or recently adjusted medication-Pharmacy would like 90-day order    [facility] action(s):  Route        Medication Therapy Plan: Pharmacy request converting to 90-day supply      Appointments  past 12m or future 3m with PCP    Date Provider   Last Visit   6/19/2025 Karin Rushing MD   Next Visit   8/14/2025 Karin Rushing MD   ED visits in past 90 days: 0        Note composed:6:38 AM 06/20/2025

## 2025-06-22 ENCOUNTER — PATIENT MESSAGE (OUTPATIENT)
Dept: OTHER | Facility: OTHER | Age: 25
End: 2025-06-22
Payer: MEDICAID

## 2025-06-24 ENCOUNTER — PATIENT MESSAGE (OUTPATIENT)
Dept: MATERNAL FETAL MEDICINE | Facility: CLINIC | Age: 25
End: 2025-06-24
Payer: MEDICAID

## 2025-06-25 ENCOUNTER — LAB VISIT (OUTPATIENT)
Dept: LAB | Facility: HOSPITAL | Age: 25
End: 2025-06-25
Attending: OBSTETRICS & GYNECOLOGY
Payer: MEDICAID

## 2025-06-25 DIAGNOSIS — R11.0 PREGNANCY RELATED NAUSEA, ANTEPARTUM: ICD-10-CM

## 2025-06-25 DIAGNOSIS — O26.899 PREGNANCY RELATED NAUSEA, ANTEPARTUM: ICD-10-CM

## 2025-06-25 DIAGNOSIS — O09.92 SUPERVISION OF HIGH RISK PREGNANCY IN SECOND TRIMESTER: ICD-10-CM

## 2025-06-25 LAB
ALBUMIN SERPL-MCNC: 2.8 G/DL (ref 3.5–5)
ALBUMIN/GLOB SERPL: 0.8 RATIO (ref 1.1–2)
ALP SERPL-CCNC: 44 UNIT/L (ref 40–150)
ALT SERPL-CCNC: 16 UNIT/L (ref 0–55)
ANION GAP SERPL CALC-SCNC: 5 MEQ/L
AST SERPL-CCNC: 13 UNIT/L (ref 11–45)
BASOPHILS # BLD AUTO: 0.02 X10(3)/MCL
BASOPHILS NFR BLD AUTO: 0.2 %
BILIRUB SERPL-MCNC: 0.2 MG/DL
BUN SERPL-MCNC: 12.3 MG/DL (ref 7–18.7)
CALCIUM SERPL-MCNC: 9 MG/DL (ref 8.4–10.2)
CHLORIDE SERPL-SCNC: 108 MMOL/L (ref 98–107)
CO2 SERPL-SCNC: 25 MMOL/L (ref 22–29)
CREAT SERPL-MCNC: 0.63 MG/DL (ref 0.55–1.02)
CREAT/UREA NIT SERPL: 20
EOSINOPHIL # BLD AUTO: 0.14 X10(3)/MCL (ref 0–0.9)
EOSINOPHIL NFR BLD AUTO: 1.5 %
ERYTHROCYTE [DISTWIDTH] IN BLOOD BY AUTOMATED COUNT: 14.7 % (ref 11.5–17)
GFR SERPLBLD CREATININE-BSD FMLA CKD-EPI: >60 ML/MIN/1.73/M2
GLOBULIN SER-MCNC: 3.7 GM/DL (ref 2.4–3.5)
GLUCOSE 1H P 100 G GLC PO SERPL-MCNC: 90 MG/DL (ref 100–180)
GLUCOSE SERPL-MCNC: 90 MG/DL (ref 74–100)
HCT VFR BLD AUTO: 35.2 % (ref 37–47)
HGB BLD-MCNC: 11.4 G/DL (ref 12–16)
IMM GRANULOCYTES # BLD AUTO: 0.09 X10(3)/MCL (ref 0–0.04)
IMM GRANULOCYTES NFR BLD AUTO: 1 %
LYMPHOCYTES # BLD AUTO: 1.6 X10(3)/MCL (ref 0.6–4.6)
LYMPHOCYTES NFR BLD AUTO: 17.3 %
MCH RBC QN AUTO: 29.7 PG (ref 27–31)
MCHC RBC AUTO-ENTMCNC: 32.4 G/DL (ref 33–36)
MCV RBC AUTO: 91.7 FL (ref 80–94)
MONOCYTES # BLD AUTO: 0.7 X10(3)/MCL (ref 0.1–1.3)
MONOCYTES NFR BLD AUTO: 7.6 %
NEUTROPHILS # BLD AUTO: 6.71 X10(3)/MCL (ref 2.1–9.2)
NEUTROPHILS NFR BLD AUTO: 72.4 %
NRBC BLD AUTO-RTO: 0 %
PLATELET # BLD AUTO: 203 X10(3)/MCL (ref 130–400)
PMV BLD AUTO: 11.6 FL (ref 7.4–10.4)
POTASSIUM SERPL-SCNC: 4 MMOL/L (ref 3.5–5.1)
PROT SERPL-MCNC: 6.5 GM/DL (ref 6.4–8.3)
RBC # BLD AUTO: 3.84 X10(6)/MCL (ref 4.2–5.4)
SODIUM SERPL-SCNC: 138 MMOL/L (ref 136–145)
WBC # BLD AUTO: 9.26 X10(3)/MCL (ref 4.5–11.5)

## 2025-06-25 PROCEDURE — 82950 GLUCOSE TEST: CPT

## 2025-06-25 PROCEDURE — 80053 COMPREHEN METABOLIC PANEL: CPT

## 2025-06-25 PROCEDURE — 85025 COMPLETE CBC W/AUTO DIFF WBC: CPT

## 2025-06-25 PROCEDURE — 36415 COLL VENOUS BLD VENIPUNCTURE: CPT

## 2025-06-26 ENCOUNTER — RESULTS FOLLOW-UP (OUTPATIENT)
Dept: OBSTETRICS AND GYNECOLOGY | Facility: CLINIC | Age: 25
End: 2025-06-26

## 2025-07-01 ENCOUNTER — OFFICE VISIT (OUTPATIENT)
Dept: FAMILY MEDICINE | Facility: CLINIC | Age: 25
End: 2025-07-01
Payer: MEDICAID

## 2025-07-01 VITALS
BODY MASS INDEX: 32.65 KG/M2 | WEIGHT: 196 LBS | OXYGEN SATURATION: 97 % | HEIGHT: 65 IN | HEART RATE: 61 BPM | SYSTOLIC BLOOD PRESSURE: 97 MMHG | TEMPERATURE: 98 F | DIASTOLIC BLOOD PRESSURE: 63 MMHG | RESPIRATION RATE: 16 BRPM

## 2025-07-01 DIAGNOSIS — Q21.12 PFO (PATENT FORAMEN OVALE): ICD-10-CM

## 2025-07-01 DIAGNOSIS — Z95.0 CARDIAC PACEMAKER IN SITU: ICD-10-CM

## 2025-07-01 DIAGNOSIS — J45.30 MILD PERSISTENT ASTHMA WITHOUT COMPLICATION: Chronic | ICD-10-CM

## 2025-07-01 DIAGNOSIS — Z3A.25 25 WEEKS GESTATION OF PREGNANCY: Primary | ICD-10-CM

## 2025-07-01 LAB
BILIRUB SERPL-MCNC: NORMAL MG/DL
BLOOD URINE, POC: NORMAL
CLARITY, POC UA: NORMAL
COLOR, POC UA: YELLOW
GLUCOSE UR QL STRIP: NORMAL
KETONES UR QL STRIP: NORMAL
LEUKOCYTE ESTERASE URINE, POC: NORMAL
NITRITE, POC UA: NORMAL
PH, POC UA: 8
PROTEIN, POC: NORMAL
SPECIFIC GRAVITY, POC UA: 1.02
UROBILINOGEN, POC UA: 0.2

## 2025-07-01 PROCEDURE — 81002 URINALYSIS NONAUTO W/O SCOPE: CPT | Mod: PBBFAC

## 2025-07-01 PROCEDURE — 99215 OFFICE O/P EST HI 40 MIN: CPT | Mod: PBBFAC

## 2025-07-01 RX ORDER — SWAB
1 SWAB, NON-MEDICATED MISCELLANEOUS DAILY
Qty: 30 TABLET | Refills: 11 | Status: SHIPPED | OUTPATIENT
Start: 2025-07-01

## 2025-07-01 NOTE — PROGRESS NOTES
[facility] OB OFFICE VISIT NOTE     Primary PCP: none    Chief Complaint     April Lorrie Sinclair is a 25 y.o. female   25w2d 10/12/2025, by Ultrasound presenting to [facility] for routine HROB visit.       HPI:   Patient comes to [facility] high-risk OB clinic, along with the father of her baby, to establish care after moving from [place] to [place], [place].  Patient states she is feeling fetal movement and denies nausea, vomiting, constipation, cramping, hematuria, vicenta vaginal bleeding, or leakage of fluid.  Patient was being followed by Juan C Barreto with Maternal-Fetal Medicine and [place] secondary to patient's extensive cardiac history including patent Gardner ovale and sick sinus syndrome status post pacemaker placement.  Patient is requesting a cardiologist who takes her insurance that is closer to [place].   Patient denies dizziness, syncope, acute vision changes; she does report increase use of her albuterol inhaler in addition to her scheduled Symbicort inhaler over the last month.  Has scheduled appointment with new pulmonologist, Dr. Montes, in [place], [place] on 2025.  Of note, patient's previous OB/GYN physician, Dr. Karin Rushing MD, was previously located in [place].    Relevant PMH:   Seizures- May 2017- stopped all anti-epileptic seizure medication in  by Neurologist- Dr. Odilon Brown in [place]  Asthma- using Symbicort inhaler scheduled and albuterol inhaler p.r.n.- Pulmonologist in [place]  Sick Sinus Syndrome/PFO- Pacemaker placed 2019- cardiologist and electrophysiologist currently located in [place]   Obstetrics History     OB History          1    Para   0    Term   0       0    AB   0    Living   0         SAB   0    IAB   0    Ectopic   0    Multiple   0    Live Births   0           Obstetric Comments     No abnormal pap  No STDs                Gynecology History   - LMP: 2025  - Age at menarche: 10 years  -  Menstrual hx: regular, 5 pads/day, 10 days per period  - History of birth control: OCP  - History of STDs and/or Abnormal PAPs: denies abnormal pap smear  - History of prior : none    Medical History    Past Medical History:  Mild persistent asthma, anxiety, sick sinus syndrome status post pacemaker placement, PFO  Surgical History: pacemaker placement; wisdom teeth  Family History: maternal brother-stroke; maternal grandfather- heart attack;  Father and mother- DM2  Social History: denies any previous smoking history, occasional drinking before current pregnancy; denies any previous drug use history   Medications:  PMV, Pepcid, Symbicort inhaler, albuterol inhaler, betaxolol, vitamin B2    Review of Systems   Review of Systems   Constitutional:  Negative for activity change and unexpected weight change.   HENT:  Negative for hearing loss, rhinorrhea and trouble swallowing.    Eyes:  Negative for discharge and visual disturbance.   Respiratory:  Negative for chest tightness and wheezing.    Cardiovascular:  Negative for chest pain and palpitations.   Gastrointestinal:  Negative for blood in stool, constipation, diarrhea and vomiting.   Endocrine: Negative for polydipsia and polyuria.   Genitourinary:  Negative for difficulty urinating, dysuria, hematuria and menstrual problem.   Musculoskeletal:  Negative for arthralgias, joint swelling and neck pain.   Neurological:  Negative for weakness and headaches.   Psychiatric/Behavioral:  Negative for confusion and dysphoric mood.      Positive nausea and increased shortness of breath.  Negative vomiting, constipation, fevers, acute vision changes, seizure-like activity, or syncope.    Antepartum specific   - Fetal movements: yes  - Vaginal bleeding: no  - Vaginal discharge: no  - Loss of fluid: no  - Contractions: no  - Headaches: no  - Vision changes: no  - Edema: yes; feet B/L no pain     Vital Signs     Vitals:    25 1121   BP: 97/63   BP Location: Left  "arm   Patient Position: Sitting   Pulse: 61   Resp: 16   Temp: 98.2 °F (36.8 °C)   TempSrc: Oral   SpO2: 97%   Weight: 88.9 kg (196 lb)   Height: 5' 5" (1.651 m)       Physical Exam   General: in no acute distress; pregnant young female   RESP: clear to auscultation bilaterally, non labored  CV: regular rate and rhythm, no murmurs, no edema  ABD: non-tender, BS+; tense, gravid abdomen  FHTs: 140s bpm via Doppler US  Fundal height: 27 cm    Current Medications:      Current Outpatient Medications   Medication Instructions    albuterol (VENTOLIN HFA) 90 mcg/actuation inhaler 2 puffs, Inhalation, Every 6 hours PRN, Rescue    betaxoloL (KERLONE) 10 mg, Oral, Daily    budesonide-formoterol 160-4.5 mcg (SYMBICORT) 160-4.5 mcg/actuation HFAA 2 puffs, Inhalation, Every 12 hours    famotidine (PEPCID) 40 mg, Oral, Nightly    magnesium oxide (MAG-OX) 400 mg, Daily    nortriptyline (PAMELOR) 10 MG capsule Take 1 capsule nightly x 2 weeks then take 2 capsules by mouth nightly.    nortriptyline (PAMELOR) 10 MG capsule 1 capsule, 2 times daily    ondansetron (ZOFRAN-ODT) 4 MG TbDL Take by mouth.    prenatal vit-iron fum-folic ac (PRENATAL TABLET) 28 mg iron- 800 mcg Tab 1 tablet, Oral, Daily    riboflavin (vitamin B2) (VITAMIN B-2) 100 mg, Daily       Labs   Urine dipstick:   Lab Results   Component Value Date    COLORU Yellow 07/01/2025    SPECGRAV 1.020 07/01/2025    PHUR 8.0 07/01/2025    WBCUR trace 07/01/2025    NITRITE neg 07/01/2025    PROTEINPOC neg 07/01/2025    GLUCOSEUR neg 07/01/2025    KETONESU neg 07/01/2025    UROBILINOGEN 0.2 07/01/2025    BILIRUBINPOC neg 07/01/2025    RBCUR neg 07/01/2025       Initial OB Labs: (02/21/2025 )  - Blood Type and Rh: A+  - Antibody Screen: Negative  - CBC H/H: 13.2/40.8  - BUN/Cr: (from 9/23/24 lab) 11/0.72  - HIV: NR  - RPR: NR  - GC: negative  - CT: negative  - HBsAg:  NR  - HCVAb: NR  - Rubella: Immune  - Varicella: Immune  - UA & Culture: urine culture with no growth  - Sickle " Cell Screen:  not performed with initial labs  - PAP: NIL  - Influenza vaccine date: N/A   - Previous  (N/A if not applicable): N/A  - BTL desired (N/A if not applicable): N/A      15-20 Weeks Lab:   - Quad Screen: not performed; out of window    28 Week Labs: (Date)  - 1H GTT:   - Rhogam (N/A if not applicable):   - Date of Tdap:   - CBC H/H:   - RPR:   -  Consent Form Signed (N/A if not applicable):   - BTL consent:   - [facility] [place]:     37 Week Labs: (Date)  - CBC H/H:   - RPR:   - GBS Culture:    - HIV:   - Cervical GC:   - Cervical Exam:     38 Weeks:  -Cervical Exam:     39 Weeks:  -Cervical Exam:  -NST Exam:    FM [facility] Patient: no  Previous : no    Scheduled delivery: induction or  (can NOT be scheduled any earlier than 39w0d):    Imaging    Initial US (2025):  A lindsey IUP is identified. Embryonic cardiac activity is observed. Dating is assigned based on the CRL from today's study. Gestational age is 6w 5d, with an KETURAH of 10/12/2025. The maternal adnexae are normal in appearance.     Anatomy Scan (25):  A detailed fetal anatomic ultrasound examination was performed today due to the following high risk indication: maternal congenital cardiac disease.   No fetal structural abnormalities are identified today, and fetal size is appropriate for EGA.   Cervical length by TA scanning is normal.   Placental location is posterior without evidence of previa.   Recommend follow up growth scan at 32 weeks gestational age.    Assessment     1. 25 weeks gestation of pregnancy    2. Cardiac pacemaker in situ    3. PFO (patent foramen ovale)    4. Mild persistent asthma without complication        Plan     - patient in case discussed with Dr. Venegas at time of clinic visit.  Advised patient to continue on all current medications as previously prescribed by Maternal-Fetal Medicine and previous obstetrics physician.   - referral for cardiology and pulmonology in  [place] area placed today per patient request; keep all scheduled pulmonology and cardiology appointments until established with new physicians within the area.  - patient is to follow with Dr. Mary Hernandez with MFM secondary to her extensive heart history; patient has repeat ultrasound and MFM appointment on 07/10/2025.  Patient is aware and agreeable to appointment.  - OB Protocol discussed  - continue on daily PNVs  - Urine dip reviewed as above  - Routine OB labs reviewed as above  - Mother plans to breast feed  - Postpartum contraception discussion: pending  - ED precautions discussed: vaginal bleeding or leaking fluid, belly cramping or pain, SOB/chest pain, swelling of the face/lower extremities, vision changes. If don't feel the baby move in over an hour. Severe headache that are not resolved with medication       Return to HROB clinic in 3 weeks for 28 week labs/OB visit.       Yvonne Spence DO  [facility], [facility]

## 2025-07-02 NOTE — PROGRESS NOTES
I have personally reviewed the review of systems (ROS) and past, family and social histories (PFSH) documented above by the resident.  I have reviewed the care furnished by the resident during the encounter, including a review of the patient's medical history, the resident's findings on physical examination, diagnosis, and the treatment plan.  I participated in the management of the patient and was immediately available throughout the encounter.   I was physically present during all key portions of the service(s) provided with the resident.  Services were furnished in a primary care center located in the outpatient department of a [facility].

## 2025-07-03 ENCOUNTER — OFFICE VISIT (OUTPATIENT)
Facility: CLINIC | Age: 25
End: 2025-07-03
Payer: MEDICAID

## 2025-07-03 ENCOUNTER — TELEPHONE (OUTPATIENT)
Dept: FAMILY MEDICINE | Facility: CLINIC | Age: 25
End: 2025-07-03
Payer: MEDICAID

## 2025-07-03 VITALS
SYSTOLIC BLOOD PRESSURE: 110 MMHG | RESPIRATION RATE: 16 BRPM | HEIGHT: 65 IN | BODY MASS INDEX: 32.76 KG/M2 | DIASTOLIC BLOOD PRESSURE: 58 MMHG | WEIGHT: 196.63 LBS | OXYGEN SATURATION: 99 % | HEART RATE: 76 BPM

## 2025-07-03 DIAGNOSIS — J45.909 ASTHMA AFFECTING PREGNANCY IN FIRST TRIMESTER: ICD-10-CM

## 2025-07-03 DIAGNOSIS — O99.511 ASTHMA AFFECTING PREGNANCY IN FIRST TRIMESTER: ICD-10-CM

## 2025-07-03 DIAGNOSIS — J45.909 ASTHMA, UNSPECIFIED ASTHMA SEVERITY, UNSPECIFIED WHETHER COMPLICATED, UNSPECIFIED WHETHER PERSISTENT: Primary | ICD-10-CM

## 2025-07-03 DIAGNOSIS — O09.91 SUPERVISION OF HIGH RISK PREGNANCY IN FIRST TRIMESTER: ICD-10-CM

## 2025-07-03 PROCEDURE — 99999 PR PBB SHADOW E&M-EST. PATIENT-LVL IV: CPT | Mod: PBBFAC,,, | Performed by: INTERNAL MEDICINE

## 2025-07-03 PROCEDURE — 99214 OFFICE O/P EST MOD 30 MIN: CPT | Mod: PBBFAC | Performed by: INTERNAL MEDICINE

## 2025-07-03 RX ORDER — IPRATROPIUM BROMIDE AND ALBUTEROL SULFATE 2.5; .5 MG/3ML; MG/3ML
3 SOLUTION RESPIRATORY (INHALATION) EVERY 6 HOURS PRN
Qty: 300 ML | Refills: 10 | Status: SHIPPED | OUTPATIENT
Start: 2025-07-03 | End: 2026-07-03

## 2025-07-03 NOTE — TELEPHONE ENCOUNTER
Discussed medication, Betaxolol 10 mg daily tablet, with MFM who suggests holding medication if patient has ever been trailed off medication and tolerated that trial period without significant adverse events/side effects. Attempted to call patient x2  to relay this information to the patient; phone rang and went to voicemail. Voicemail left with clinic number.     Yvonne Spence,   7/3/25 @ 14:20   - Continue magnesium oxide 400 mg PO TID. - Continue magnesium oxide 400 mg PO TID. - Continue magnesium oxide 400 mg PO TID. - Continue magnesium oxide 400 mg PO TID. - Continue magnesium oxide 400 mg PO TID.

## 2025-07-03 NOTE — PROGRESS NOTES
[facility]    Today's Date: 07/03/2025    subjective    History of Present Illness    CHIEF COMPLAINT:  - Ms. Sinclair, who is pregnant, presents with worsening asthma symptoms including frequent asthma attacks.    HPI:  - Ms. Sinclair reports asthma attacks approximately twice daily with symptoms including wheezing, difficulty catching her breath, and chest tightness. Her asthma began around age 7 and was initially quite severe. While it had been manageable for some time, symptoms have worsened since becoming pregnant.  - She is currently using a Symbicort inhaler, which was recently increased in dosage about 12 days ago. Although the increased Symbicort dose has made a noticeable difference, with less severe wheezing during asthma attacks, she still requires her rescue inhaler (albuterol) approximately twice daily.  - Her attacks are particularly concerning when they occur during sleep, causing her to wake up struggling to breathe. She keeps her rescue inhaler nearby for these episodes. Recently, while out of town, she had an attack without her rescue inhaler and used her Symbicort instead, which provided some relief.  - She expresses anxiety about her asthma attacks, particularly due to their frequency and intensity. She is especially worried about managing her condition during labor and delivery    MEDICATIONS:  - Symbicort, increased dose about 12 days ago (on the 19th of the previous month), for asthma  - Albuterol (rescue inhaler), as needed, for asthma attacks, used twice daily    MEDICAL HISTORY:  - Asthma: Onset at age 7, worsened during pregnancy      ROS:  ROS findings as noted in HPI.          No results found for this or any previous visit.       objective     Vitals:    07/03/25 1437   BP: (!) 110/58   Pulse: 76   Resp: 16     Physical Exam    General: No acute distress. Well-developed. Well-nourished.  Eyes: EOMI. Sclerae anicteric.  HENT: Normocephalic. Atraumatic. Nares patent. Moist oral  mucosa.  Cardiovascular: Regular rate. Regular rhythm. No murmurs. No rubs. No gallops. Normal S1, S2.  Respiratory: Normal respiratory effort. Clear to auscultation bilaterally. No rales. No rhonchi. No wheezing.  Musculoskeletal: No  obvious deformity.  Extremities: No lower extremity edema.  Neurological: Alert & oriented x3. No slurred speech. Normal gait.  Psychiatric: Normal mood. Normal affect. Good insight. Good judgment.  Skin: Warm. Dry. No rash.       assessment & plan     Assessment & Plan    ASTHMA AFFECTING PREGNANCY IN FIRST TRIMESTER:   Explained difference between immediate-acting rescue medications (albuterol) and long-acting controller medications (Symbicort), discussing gradual onset of Symbicort's effects, typically noticeable after 3-5 weeks of consistent use.   Ms. Sinclair to use nebulizer at home when experiencing asthma symptoms and keep rescue inhaler in purse for use when away from home.   Ms. Sinclair to monitor frequency of rescue medication use, noting if it exceeds 4 times per day.   Continued Symbicort at increased dose.   Started albuterol nebulizer solution for use with home nebulizer machine as needed and continued albuterol rescue inhaler as needed, emphasizing use only when necessary and not on a scheduled basis.   Ordered nebulizer machine through [employer] (medical supply company) and liquid albuterol medication for nebulizer use.   Ordered peak flow device through [employer], as well.   Contact the office if needing to use rescue medication more than 4 times per day.   Message if not feeling right or if asthma symptoms worsen.   Contact the office by Tuesday if not hearing from the medical supply company about the nebulizer machine.         Summary/Orders  1. Supervision of high risk pregnancy in first trimester  - Ambulatory referral/consult to Pulmonology    2. Asthma affecting pregnancy in first trimester  - Ambulatory referral/consult to Pulmonology  - albuterol-ipratropium (DUO-NEB) 2.5  mg-0.5 mg/3 mL nebulizer solution; Take 3 mLs by nebulization every 6 (six) hours as needed for Wheezing. Rescue  Dispense: 300 mL; Refill: 10  - NEBULIZER FOR HOME USE  - HANDHELD PEFR METER FOR HOME USE         Nico Montes MD  Pulmonary & Critical Care Medicine  [facility]  O -   F [phone]    This note was generated with the assistance of ambient listening technology. Verbal consent was obtained by the patient and accompanying visitor(s) for the recording of patient appointment to facilitate this note. I attest to having reviewed and edited the generated note for accuracy, though some syntax or spelling errors may persist. Please contact the author of this note for any clarification.

## 2025-07-06 ENCOUNTER — PATIENT MESSAGE (OUTPATIENT)
Dept: OTHER | Facility: OTHER | Age: 25
End: 2025-07-06
Payer: MEDICAID

## 2025-07-10 ENCOUNTER — OFFICE VISIT (OUTPATIENT)
Dept: MATERNAL FETAL MEDICINE | Facility: CLINIC | Age: 25
End: 2025-07-10
Payer: MEDICAID

## 2025-07-10 ENCOUNTER — PROCEDURE VISIT (OUTPATIENT)
Dept: MATERNAL FETAL MEDICINE | Facility: CLINIC | Age: 25
End: 2025-07-10
Payer: MEDICAID

## 2025-07-10 VITALS
SYSTOLIC BLOOD PRESSURE: 112 MMHG | WEIGHT: 200.19 LBS | HEART RATE: 76 BPM | DIASTOLIC BLOOD PRESSURE: 74 MMHG | HEIGHT: 65 IN | BODY MASS INDEX: 33.35 KG/M2

## 2025-07-10 DIAGNOSIS — J45.909 ASTHMA, UNSPECIFIED ASTHMA SEVERITY, UNSPECIFIED WHETHER COMPLICATED, UNSPECIFIED WHETHER PERSISTENT: ICD-10-CM

## 2025-07-10 DIAGNOSIS — O22.42 HEMORRHOIDS DURING PREGNANCY IN SECOND TRIMESTER: ICD-10-CM

## 2025-07-10 DIAGNOSIS — F41.9 ANXIETY DURING PREGNANCY: ICD-10-CM

## 2025-07-10 DIAGNOSIS — O99.412 CARDIAC DISEASE DURING PREGNANCY IN SECOND TRIMESTER: Primary | ICD-10-CM

## 2025-07-10 DIAGNOSIS — O99.340 ANXIETY DURING PREGNANCY: ICD-10-CM

## 2025-07-10 DIAGNOSIS — J45.909 ASTHMA DURING PREGNANCY: ICD-10-CM

## 2025-07-10 DIAGNOSIS — O99.519 ASTHMA DURING PREGNANCY: ICD-10-CM

## 2025-07-10 RX ORDER — VIT C/E/ZN/COPPR/LUTEIN/ZEAXAN 250MG-90MG
1 CAPSULE ORAL
COMMUNITY
Start: 2025-07-01

## 2025-07-10 RX ORDER — ACETAMINOPHEN 500 MG
500 TABLET ORAL EVERY 6 HOURS PRN
COMMUNITY

## 2025-07-10 RX ORDER — BENZOCAINE AND LEVOMENTHOL 200; 5 MG/G; MG/G
SPRAY TOPICAL 4 TIMES DAILY PRN
Qty: 56 G | Refills: 1 | Status: SHIPPED | OUTPATIENT
Start: 2025-07-10

## 2025-07-10 NOTE — ASSESSMENT & PLAN NOTE
Asthma is the most common condition affecting the lungs during pregnancy, occurring in between 4-8 % of pregnant women. During pregnancy, asthma worsens in about one-third of women, improves in one-third, and remains stable in one-third. The risk of poorly controlled asthma is much greater than the risk of taking medications to control symptoms. Asthma exacerbations are frequently seen in the second trimester, and close monitoring is indicated. Patients should avoid exposure to specific allergens and triggers. Moderate persistent and severe persistent asthma are associated with  delivery,  delivery, preeclampsia, fetal growth restriction, and maternal morbidity.     Recommendations:  Continue current medications: Symbicort BID, Duo-Neb PRN, Albuterol INH PRN  Home peak flow monitoring (if moderate to severe persistent)  Avoid triggers  Growth ultrasounds every 4-6 weeks, starting at 26-28 weeks for women with moderate persistent or severe persistent asthma  Antepartum testing at 32 for women with moderate persistent or severe persistent asthma  Administer flu vaccination, when available; consider pneumovax if other comorbidities  Recommend COVID vaccination  Avoid prostaglandins such as E2 and F2-alpha (carboprost/Hemabate)

## 2025-07-10 NOTE — ASSESSMENT & PLAN NOTE
She reports a history of anxiety. She is not taking medication daily. She reports stable symptoms without medication.  There are increased risks for peripartum and postpartum mood disorders. Precautions provided.     It is important to optimize mental health conditions during pregnancy in an effort to reduce the risk of postpartum mood disorders. There are options for management including psychotherapy, counseling, cognitive behavioral therapy, exercise/yoga, journaling, and psychiatry services.

## 2025-07-10 NOTE — ASSESSMENT & PLAN NOTE
"Questionable hx PFO  She was admitted to [facility] (7/2024) for left-sided weakness, neurological workup at that time including CTA and MRI of the brain were negative, complex migraine was suspected. She had an echocardiogram with possible PFO. She follows with Dr. Mohsen at [facility] and underwent a LEWIS on 9/24/2024. The records from that LEWIS are not available but reportedly were negative for a PFO. Dr. Mohsen was considering closure previously but now with the negative LEWIS would like to review those images first. Ms. Sinclair is having a hard time getting appointments due to insurance issues. Discussed that no PFO was seen on her most recent echocardiogram. Reviewed prior notes and agree with following recommendations made to her.      Sinus sick syndrome  Previously diagnosed with "seizures". However, with further evaluation this was determined to be more likely seizure-type activity after syncopal episodes. She underwent TTT that showed a significant cardio-inhibitory component to her syncope, with asystole lasting for 50 seconds. She ultimately had a PPM placed in 2019, with an additional lead placed in 2021 and has had no syncopal episodes since then. She previously followed with Dr. Cooley from electrophysiology, but has since switched to Dr. Everett. Her next appointment is on 4/7/2025. Her last EKG was on 7/14/2024. Last device check was remotely in 12/2024. She was previously taking betoxalol but this was discontinued in the beginning of her pregnancy. She is now taking Betaxolol again.    We will continue to make a plan as she sees her new cardiologist.  We will consider telemetry in labor as well as interrogation of her pacemaker when she goes in for delivery.  We discussed early epidural in labor to limit increased cardiac demand.     Recommendations:  Continue close follow up with Cardiology.   Anesthesia consult in the third trimester.  Betaxolol is safe in pregnancy and can be continued       "

## 2025-07-10 NOTE — ASSESSMENT & PLAN NOTE
She has complaints of severe hemorrhoids. Denies rectal bleeding. Reports significant associated discomfort, unable to lie down on her back for ultrasound and reports difficulty sitting upright.   States she has been Rx'ed a stool softener that she takes daily. Reports daily BM.   Has tried several OTC meds without relief.   Rx sent

## 2025-07-10 NOTE — PROGRESS NOTES
"Maternal Fetal Medicine follow up consult    SUBJECTIVE:     April Lorrie Sinclair is a 25 y.o.  female with IUP at 26w4d who is seen in follow up consultation by MFM.  Pregnancy complications include:   Problem   - Cardiac disease during pregnancy in second trimester   - Hemorrhoids during pregnancy in second trimester   - Anxiety during pregnancy   - Asthma during pregnancy     April presents for routine follow up appointment.  She has a complaint of severe, worsening hemorrhoids. She's used several OTC agents and is taking daily stool softeners without relief.   Has not yet gotten established with cardiology here in [place] - awaiting appointment.  She recently had a pulmonology consult - DuoNe Rx'ed. Awaiting nebulizer machine.  Denies LOF, VB, contractions. Positive fetal movement.    Previous notes reviewed.   No changes to medical, surgical, family, social, or obstetric history.  Interval history since last MFM visit: see above  Medications reviewed.    Care team members:  [facility] - Primary OB     OBJECTIVE:   /74 (BP Location: Right arm, Patient Position: Sitting)   Pulse 76   Ht 5' 5" (1.651 m)   Wt 90.8 kg (200 lb 2.8 oz)   LMP 2024   BMI 33.31 kg/m²     Ultrasound performed. See viewpoint for full ultrasound report.    A viable lindsey pregnancy is visualized in cephalic presentation. Estimated fetal weight is at the 45th percentile with an abdominal circumference at the 41st percentile. No fetal abnormalities are noted and previous anatomic survey was normal. Amniotic fluid volume is normal. Placenta is posterior.    ASSESSMENT/PLAN:     25 y.o.  female with IUP at 26w4d    - Cardiac disease during pregnancy in second trimester  Questionable hx PFO  She was admitted to [facility] (2024) for left-sided weakness, neurological workup at that time including CTA and MRI of the brain were negative, complex migraine was suspected. She had an echocardiogram with possible " "PFO. [place] [facility] [place] on [place]. The records from that LEWIS are not available but reportedly were negative for a PFO. Dr. Mohsen was considering closure previously but now with the negative LEWIS would like to review those images first. Ms. Sinclair is having a hard time getting appointments due to insurance issues. Discussed that no PFO was seen on her most recent echocardiogram. Reviewed prior notes and agree with following recommendations made to her.      Sinus sick syndrome  Previously diagnosed with "seizures". However, with further evaluation this was determined to be more likely seizure-type activity after syncopal episodes. She underwent TTT that showed a significant cardio-inhibitory component to her syncope, with asystole lasting for 50 seconds. She ultimately had a PPM placed in 2019, with an additional lead placed in 2021 and has had no syncopal episodes since then. She previously followed with Dr. Cooley from electrophysiology, but has since switched to Dr. Everett. Her next appointment is on 4/7/2025. Her last EKG was on 7/14/2024. Last device check was remotely in 12/2024. She was previously taking betoxalol but this was discontinued in the beginning of her pregnancy. She is now taking Betaxolol again.    We will continue to make a plan as she sees her new cardiologist.  We will consider telemetry in labor as well as interrogation of her pacemaker when she goes in for delivery.  We discussed early epidural in labor to limit increased cardiac demand.     Recommendations:  Continue close follow up with Cardiology.   Anesthesia consult in the third trimester.  Betaxolol is safe in pregnancy and can be continued         - Asthma during pregnancy  Asthma is the most common condition affecting the lungs during pregnancy, occurring in between 4-8 % of pregnant women. During pregnancy, asthma worsens in about one-third of women, improves in one-third, and remains stable in " one-third. The risk of poorly controlled asthma is much greater than the risk of taking medications to control symptoms. Asthma exacerbations are frequently seen in the second trimester, and close monitoring is indicated. Patients should avoid exposure to specific allergens and triggers. Moderate persistent and severe persistent asthma are associated with  delivery,  delivery, preeclampsia, fetal growth restriction, and maternal morbidity.     Recommendations:  Continue current medications: Symbicort BID, Duo-Neb PRN, Albuterol INH PRN  Home peak flow monitoring (if moderate to severe persistent)  Avoid triggers  Growth ultrasounds every 4-6 weeks, starting at 26-28 weeks for women with moderate persistent or severe persistent asthma  Antepartum testing at 32 for women with moderate persistent or severe persistent asthma  Administer flu vaccination, when available; consider pneumovax if other comorbidities  Recommend COVID vaccination  Avoid prostaglandins such as E2 and F2-alpha (carboprost/Hemabate)      - Hemorrhoids during pregnancy in second trimester  She has complaints of severe hemorrhoids. Denies rectal bleeding. Reports significant associated discomfort, unable to lie down on her back for ultrasound and reports difficulty sitting upright.   States she has been Rx'ed a stool softener that she takes daily. Reports daily BM.   Has tried several OTC meds without relief.   Rx sent    - Anxiety during pregnancy  She reports a history of anxiety. She is not taking medication daily. She reports stable symptoms without medication.  There are increased risks for peripartum and postpartum mood disorders. Precautions provided.     It is important to optimize mental health conditions during pregnancy in an effort to reduce the risk of postpartum mood disorders. There are options for management including psychotherapy, counseling, cognitive behavioral therapy, exercise/yoga, journaling, and psychiatry  services.        F/u in 4 weeks for MFM visit /growth ultrasound    Mary Hernandez MD  Maternal Fetal Medicine

## 2025-07-15 ENCOUNTER — TELEPHONE (OUTPATIENT)
Facility: CLINIC | Age: 25
End: 2025-07-15
Payer: MEDICAID

## 2025-07-15 NOTE — TELEPHONE ENCOUNTER
----- Message from Sherry sent at 7/15/2025 11:27 AM CDT -----  Contact: pt  April Lorrie Sinclair  MRN: [id number]  : 2000  PCP: Thalia Singer  Home Phone      Not on file.  Work Phone      Not on file.  Mobile          [phone]      MESSAGE: Pt. states the doctor was putting in orders for a nebulizer machine. Pt. States she has not heard anything and she did check with her pharmacy [facility] and they did not have any orders for one. Please advise.    [phone]

## 2025-07-20 ENCOUNTER — PATIENT MESSAGE (OUTPATIENT)
Dept: OTHER | Facility: OTHER | Age: 25
End: 2025-07-20
Payer: MEDICAID

## 2025-07-22 ENCOUNTER — OFFICE VISIT (OUTPATIENT)
Dept: FAMILY MEDICINE | Facility: CLINIC | Age: 25
End: 2025-07-22
Payer: MEDICAID

## 2025-07-22 VITALS
DIASTOLIC BLOOD PRESSURE: 70 MMHG | BODY MASS INDEX: 34.22 KG/M2 | HEIGHT: 65 IN | TEMPERATURE: 98 F | SYSTOLIC BLOOD PRESSURE: 103 MMHG | RESPIRATION RATE: 18 BRPM | OXYGEN SATURATION: 96 % | WEIGHT: 205.38 LBS | HEART RATE: 74 BPM

## 2025-07-22 DIAGNOSIS — Z3A.28 28 WEEKS GESTATION OF PREGNANCY: Primary | ICD-10-CM

## 2025-07-22 LAB
BASOPHILS # BLD AUTO: 0.02 X10(3)/MCL
BASOPHILS NFR BLD AUTO: 0.2 %
BILIRUB SERPL-MCNC: NORMAL MG/DL
BLOOD URINE, POC: NORMAL
CLARITY, POC UA: CLEAR
COLOR, POC UA: NORMAL
EOSINOPHIL # BLD AUTO: 0.06 X10(3)/MCL (ref 0–0.9)
EOSINOPHIL NFR BLD AUTO: 0.6 %
ERYTHROCYTE [DISTWIDTH] IN BLOOD BY AUTOMATED COUNT: 14.3 % (ref 11.5–17)
GLUCOSE 1H P 100 G GLC PO SERPL-MCNC: 91 MG/DL (ref 100–180)
GLUCOSE UR QL STRIP: NORMAL
HCT VFR BLD AUTO: 37.5 % (ref 37–47)
HGB BLD-MCNC: 11.7 G/DL (ref 12–16)
IMM GRANULOCYTES # BLD AUTO: 0.09 X10(3)/MCL (ref 0–0.04)
IMM GRANULOCYTES NFR BLD AUTO: 1 %
KETONES UR QL STRIP: NORMAL
LEUKOCYTE ESTERASE URINE, POC: NORMAL
LYMPHOCYTES # BLD AUTO: 1.64 X10(3)/MCL (ref 0.6–4.6)
LYMPHOCYTES NFR BLD AUTO: 17.7 %
MCH RBC QN AUTO: 28.9 PG (ref 27–31)
MCHC RBC AUTO-ENTMCNC: 31.2 G/DL (ref 33–36)
MCV RBC AUTO: 92.6 FL (ref 80–94)
MONOCYTES # BLD AUTO: 0.47 X10(3)/MCL (ref 0.1–1.3)
MONOCYTES NFR BLD AUTO: 5.1 %
NEUTROPHILS # BLD AUTO: 7.01 X10(3)/MCL (ref 2.1–9.2)
NEUTROPHILS NFR BLD AUTO: 75.4 %
NITRITE, POC UA: NORMAL
NRBC BLD AUTO-RTO: 0 %
PH, POC UA: 7
PLATELET # BLD AUTO: 237 X10(3)/MCL (ref 130–400)
PMV BLD AUTO: 11.6 FL (ref 7.4–10.4)
PROTEIN, POC: NORMAL
RBC # BLD AUTO: 4.05 X10(6)/MCL (ref 4.2–5.4)
SPECIFIC GRAVITY, POC UA: 1.02
T PALLIDUM AB SER QL: NONREACTIVE
UROBILINOGEN, POC UA: 0.2
WBC # BLD AUTO: 9.29 X10(3)/MCL (ref 4.5–11.5)

## 2025-07-22 PROCEDURE — 86780 TREPONEMA PALLIDUM: CPT

## 2025-07-22 PROCEDURE — 90715 TDAP VACCINE 7 YRS/> IM: CPT | Mod: PBBFAC

## 2025-07-22 PROCEDURE — 81002 URINALYSIS NONAUTO W/O SCOPE: CPT | Mod: PBBFAC

## 2025-07-22 PROCEDURE — 99213 OFFICE O/P EST LOW 20 MIN: CPT | Mod: PBBFAC

## 2025-07-22 PROCEDURE — 85025 COMPLETE CBC W/AUTO DIFF WBC: CPT

## 2025-07-22 PROCEDURE — 82950 GLUCOSE TEST: CPT

## 2025-07-22 PROCEDURE — 90471 IMMUNIZATION ADMIN: CPT | Mod: PBBFAC

## 2025-07-22 RX ADMIN — CLOSTRIDIUM TETANI TOXOID ANTIGEN (FORMALDEHYDE INACTIVATED), CORYNEBACTERIUM DIPHTHERIAE TOXOID ANTIGEN (FORMALDEHYDE INACTIVATED), BORDETELLA PERTUSSIS TOXOID ANTIGEN (GLUTARALDEHYDE INACTIVATED), BORDETELLA PERTUSSIS FILAMENTOUS HEMAGGLUTININ ANTIGEN (FORMALDEHYDE INACTIVATED), BORDETELLA PERTUSSIS PERTACTIN ANTIGEN, AND BORDETELLA PERTUSSIS FIMBRIAE 2/3 ANTIGEN 0.5 ML: 5; 2; 2.5; 5; 3; 5 INJECTION, SUSPENSION INTRAMUSCULAR at 11:07

## 2025-07-22 NOTE — PROGRESS NOTES
OB Office Visit Note    Name: Vanessa Sinclair  MRN: [id number]  Date: 2025    Subjective:      Chief Complaint: Routine Prenatal Visit (28w2d, Patient states feeling dizzy and nausea for 4 days. )      Vanessa Sinclair is a 25 y.o.  at 28w2d with KETURAH 10/12/2025, by Ultrasound    Current issues:  Feeling dizzy over the weekend with no falls. Able to sit down and rest, denied any change in her vision. Reports drinking multiple bottles of water throughout the day. Still feeling dizzy today but improved compared to how it was over the weekend.    Relevant PMH:   Seizures- May 2017- stopped all anti-epileptic seizure medication in 2018 by Neurologist- Dr. Odilon Brown in [facility] note 7/10/25 believes may have been seizure type activity after syncopal episode related to her sick sinus syndrome after reviewing chart  Asthma- using Symbicort inhaler scheduled and albuterol inhaler p.r.n.- Pulmonologist in [place]  Sick Sinus Syndrome/PFO- Pacemaker placed 2019- cardiologist and electrophysiologist currently located in [place]     Meds:   Prior to Admission medications    Medication Sig Start Date End Date Taking? Authorizing Provider   acetaminophen (TYLENOL) 500 MG tablet Take 500 mg by mouth every 6 (six) hours as needed for Pain.    Provider, Historical   albuterol (VENTOLIN HFA) 90 mcg/actuation inhaler Inhale 2 puffs into the lungs every 6 (six) hours as needed for Wheezing. Rescue 6/19/25 10/17/25  Karin Rushing MD   albuterol-ipratropium (DUO-NEB) 2.5 mg-0.5 mg/3 mL nebulizer solution Take 3 mLs by nebulization every 6 (six) hours as needed for Wheezing. Rescue 7/3/25 7/3/26  Sudheer Montes MD   benzocaine-lanolin (DERMOPLAST, WITH MENTHOL,) 20-0.5 % Aero Apply topically 4 (four) times daily as needed (discomfort). 7/10/25   Sherry Dalton NP   betaxoloL (KERLONE) 10 mg Tab Take 1 tablet (10 mg total) by mouth once daily. 25   Karin Rushing MD    budesonide-formoterol 160-4.5 mcg (SYMBICORT) 160-4.5 mcg/actuation HFAA INHALE 2 PUFFS INTO THE LUNGS EVERY 12 HOURS 25   Karin Rushing MD   famotidine (PEPCID) 40 MG tablet Take 1 tablet (40 mg total) by mouth every evening. 25  Karin Rushing MD   hydrocortisone-pramoxine (PROCTOFOAM-HS) rectal foam Place 1 applicator rectally 2 (two) times daily. 7/10/25   Sherry Dalton NP   magnesium oxide (MAG-OX) 400 mg (241.3 mg magnesium) tablet Take 400 mg by mouth once daily.    Provider, Historical   PRENATAL 28 mg iron- 800 mcg Tab Take 1 tablet by mouth. 25   Provider, Historical   riboflavin, vitamin B2, (VITAMIN B-2) 100 mg Tab tablet Take 100 mg by mouth once daily. 2 tablets (200 mg total) once daily    Provider, Historical     Allergies:   Review of patient's allergies indicates:   Allergen Reactions    Lamictal  [lamotrigine]      Other reaction(s): HIVES    [id number] antimigraine agents Other (See Comments)     Contraindicated in complex migraines, needs to avoid     Cefzil [cefprozil] Rash    Demerol [meperidine] Rash     Other reaction(s): Rash    Pcn [penicillins] Rash     Other reaction(s): Rash       Gestational History:   OB History    Para Term  AB Living   1 0 0 0 0 0   SAB IAB Ectopic Multiple Live Births   0 0 0 0 0      # Outcome Date GA Lbr Chin/2nd Weight Sex Type Anes PTL Lv   1 Current               Obstetric Comments      No abnormal pap   No STDs       Gynecology History   - LMP: 2025  - Age at menarche: 10 years  - Menstrual hx: regular, 5 pads/day, 10 days per period  - History of birth control: OCP  - History of STDs and/or Abnormal PAPs: denies abnormal pap smear  - History of prior : none     Medical History    Past Medical History:  Mild persistent asthma, anxiety, sick sinus syndrome status post pacemaker placement, PFO  Surgical History: pacemaker placement; wisdom teeth  Family History: maternal  "brother-stroke; maternal grandfather- heart attack;  Father and mother- DM2  Social History: denies any previous smoking history, occasional drinking before current pregnancy; denies any previous drug use history   Medications:  PMV, Pepcid, Symbicort inhaler, albuterol inhaler, betaxolol, vitamin B2      Antepartum specific ROS  - Fetal movements: Yes  - Vaginal bleeding: No  - Vaginal discharge: Yes - regular physiologic discharge  - Loss of fluid: No  - Contractions: No  - Headaches: Yes - headaches twice a day that resolves on their own or with tylenol  - Vision changes: No  - Edema: Yes - Will have swelling at end of day andhas not noticed any worsening    Review of Systems  Constitutional: no fever, no chills  CV: no chest pain  RESP: no SOB  : no dysuria, no hematuria  GI: no constipation, no diarrhea, no nausea, no vomiting  Psych: no depression, no anxiety; No SI/HI    Objective:      Vitals:    07/22/25 1039   BP: 103/70   BP Location: Left arm   Patient Position: Sitting   Pulse: 74   Resp: 18   Temp: 98 °F (36.7 °C)   TempSrc: Oral   SpO2: 96%   Weight: 93.2 kg (205 lb 6.4 oz)   Height: 5' 5" (1.651 m)       Lab Results   Component Value Date    COLORU Light Yellow 07/22/2025    SPECGRAV 1.020 07/22/2025    PHUR 7.0 07/22/2025    WBCUR small 07/22/2025    NITRITE neg 07/22/2025    PROTEINPOC neg 07/22/2025    GLUCOSEUR neg 07/22/2025    KETONESU neg 07/22/2025    UROBILINOGEN 0.2 07/22/2025    BILIRUBINPOC neg 07/22/2025    RBCUR neg 07/22/2025       General:   RESP: clear to auscultation bilaterally, non labored  CV: regular rate and rhythm, no murmurs, no edema  ABD: gravid, nontender, BS+ soft, nontender, nondistended, no abnormal masses, no epigastric pain and FHT present  FHTs: 130s bpm; (location)  Fundal height: 29 cm    Initial OB Labs: (02/21/2025 )  - Blood Type and Rh: A+  - Antibody Screen: Negative  - CBC H/H: 13.2/40.8  - BUN/Cr: (from 9/23/24 lab) 11/0.72  - HIV: NR  - RPR: NR  - GC: " negative  - CT: negative  - HBsAg:  NR  - HCVAb: NR  - Rubella: Immune  - Varicella: Immune  - UA & Culture: urine culture with no growth  - Sickle Cell Screen:  not performed with initial labs  - PAP: NIL  - Influenza vaccine date: N/A   - Previous  (N/A if not applicable): N/A  - BTL desired (N/A if not applicable): N/A       15-20 Weeks Lab:   - Quad Screen: not performed; out of window    28 Week Labs: 25  - 1H GTT: pending  - Rhogam (N/A if not applicable): n/a  - Date of Tdap: 25  - CBC H/H: pending  - RPR: pending  -  Consent Form Signed (N/A if not applicable): N/A  - BTL consent: N/A  - C for pediatric care: No    37 Week Labs: (Date)  - CBC H/H:   - RPR:   - GBS Culture:    - HIV:   - Cervical GC:   - Cervical Exam:      38 Weeks:  -Cervical Exam:      39 Weeks:  -Cervical Exam:  -NST Exam:     FM Continuity Patient: no  Previous : no     Scheduled delivery: induction or  (can NOT be scheduled any earlier than 39w0d):    Imaging    Initial US (2025):  A lindsey IUP is identified. Embryonic cardiac activity is observed. Dating is assigned based on the CRL from today's study. Gestational age is 6w 5d, with an KETURAH of 10/12/2025. The maternal adnexae are normal in appearance.      Anatomy Scan (25):  A detailed fetal anatomic ultrasound examination was performed today due to the following high risk indication: maternal congenital cardiac disease.   No fetal structural abnormalities are identified today, and fetal size is appropriate for EGA.   Cervical length by TA scanning is normal.   Placental location is posterior without evidence of previa.   Recommend follow up growth scan at 32 weeks gestational age.    Repeat scan 7/10/25  Impression   =========   A viable lindsey pregnancy is visualized in cephalic presentation. Estimated fetal weight is at the 45th percentile with an abdominal circumference at the 41st percentile.   No fetal abnormalities are  noted and previous anatomic survey was normal. Amniotic fluid volume is normal. Placenta is posterior.     Recommendation   ==============   We recommend evaluation in 4 weeks.     Urine dip:  Lab Results   Component Value Date    COLORU Light Yellow 07/22/2025    SPECGRAV 1.020 07/22/2025    PHUR 7.0 07/22/2025    WBCUR small 07/22/2025    NITRITE neg 07/22/2025    PROTEINPOC neg 07/22/2025    GLUCOSEUR neg 07/22/2025    KETONESU neg 07/22/2025    UROBILINOGEN 0.2 07/22/2025    BILIRUBINPOC neg 07/22/2025    RBCUR neg 07/22/2025     Assessment/Plan:     April was seen today for routine prenatal visit.    Diagnoses and all orders for this visit:    28 weeks gestation of pregnancy  -     Glucose Tolerance, 1 Hour; Future  -     CBC Auto Differential; Future  -     SYPHILIS ANTIBODY (WITH REFLEX RPR); Future  -     POCT urine dipstick without microscope  -     Tdap vaccine injection 0.5 mL         28 weeks gestation of Pregnancy  -     POCT urine dipstick without microscope  -     OB Protocol   -     PNVs  -     Urine dip reviewed as above  -     Routine (initial) labs: as mentioned above/pending  -     Mother plans to breast and/or bottlefeed  -     Postpartum contraception discussion: PENDING, likely will resume previous OCP  -     ED precautions discussed in depth: vaginal bleeding or leaking fluid, belly cramping or pain, SOB/chest pain, swelling of the face/lower extremities, vision changes. If don't feel the baby move in over an hour. Severe headache that are not resolved with medication     Sick Sinus Syndrome  - referral for cardiology and pulmonology in [facility] placed prior visit per patient request; keep all scheduled pulmonology and cardiology appointments until established with new physicians within the area.   - patient is to follow with Dr. Mary Hernandez with MFM secondary to her extensive heart history; patient had repeat ultrasound and MFM appointment on 07/10/2025.  Patient is aware and  agreeable to appointment.   - Anesthesia consult in third trimester  - Ok to continue Betaxolol per MFM    Asthma  - Following with Pulmonology here, waiting on asthma pump  - continue Symbicort BID, Duo-Neb PRN, Albuterol INH PRN   - per MFM, Growth ultrasounds every 4-6 weeks, starting at 26-28 weeks  for women with moderate persistent or severe persistent asthma  - Per MFM, Antepartum testing at 32 for women with moderate persistent or severe persistent asthma    Return to clinic in Follow up in about 4 weeks (around 8/19/2025) for HROB.    Vivek Gil MD  [employer] [facility]

## 2025-07-23 NOTE — PROGRESS NOTES
I have personally reviewed the review of systems (ROS) and past, family and social histories (PFSH) documented above by the resident.  I have reviewed the care furnished by the resident during the encounter, including a review of the patient's medical history, the resident's findings on physical examination, diagnosis, and the treatment plan.  I participated in the management of the patient and was immediately available throughout the encounter.   I was physically present during all key portions of the service(s) provided with the resident.  Services were furnished in a primary care center located in the outpatient department of a [facility].

## 2025-07-24 ENCOUNTER — OFFICE VISIT (OUTPATIENT)
Dept: CARDIOLOGY | Facility: CLINIC | Age: 25
End: 2025-07-24
Payer: MEDICAID

## 2025-07-24 VITALS
TEMPERATURE: 98 F | HEART RATE: 76 BPM | BODY MASS INDEX: 33.99 KG/M2 | WEIGHT: 204 LBS | RESPIRATION RATE: 18 BRPM | OXYGEN SATURATION: 97 % | DIASTOLIC BLOOD PRESSURE: 80 MMHG | SYSTOLIC BLOOD PRESSURE: 112 MMHG | HEIGHT: 65 IN

## 2025-07-24 DIAGNOSIS — Z95.0 CARDIAC PACEMAKER IN SITU: ICD-10-CM

## 2025-07-24 DIAGNOSIS — R42 POSTURAL DIZZINESS WITH PRESYNCOPE: ICD-10-CM

## 2025-07-24 DIAGNOSIS — I49.5 SICK SINUS SYNDROME: Primary | ICD-10-CM

## 2025-07-24 DIAGNOSIS — Z3A.25 25 WEEKS GESTATION OF PREGNANCY: ICD-10-CM

## 2025-07-24 DIAGNOSIS — R55 POSTURAL DIZZINESS WITH PRESYNCOPE: ICD-10-CM

## 2025-07-24 PROCEDURE — 99215 OFFICE O/P EST HI 40 MIN: CPT | Mod: PBBFAC | Performed by: INTERNAL MEDICINE

## 2025-07-24 NOTE — PROGRESS NOTES
[facility]  Outpatient Cardiology Clinic    Chief Complaint: Establish care                                 History of Present Illness           April Lorrie Sinclair 25 y.o. female with PMHx of syncopal episodes, SSS s/p pacemaker in 2019, hemorrhoids, anxiety, asthma. She previously followed with Dr. Cooley from electrophysiology, but has since switched to Dr. Everett ([facility] in [place]). Last device check was remotely in 12/2024.  She is taking Betaxolol. She recently moved to [place] and now presents to establish care.    She reports that she has not had any episodes of passing out since placement of the PPM.She is currently 28 weeks pregnant. She reports that for the past few days she has been fatigued and feels lightheaded to the point she stayed in the bed 1 day ago because she felt like she was going to pass out. She reports that she feels similarly to how she felt prior to PPM placement. She reports SOB on exertion and chest pressure- substernal . Last night was her last occurrence. Chest squeezing lasted for 2 minutes then it subsided on its own.  There was concern for PFO but last TTE with bubble study done in 2025 was negative for PFO with normal EF (60-65%) . She had her device interrogated in feb 2025 and was told that it looked okay.  Reports that she has drinks 6-7 bottles of water each day.                                                                                                                                                                                                                                                                                                                                                                                                                                                                               CARDIAC TESTING:  Results for orders placed during the hospital encounter of 05/05/25    Echo Saline Bubble? Yes    Interpretation Summary    Left Ventricle: The  left ventricle is normal in size. Normal wall thickness. There is normal systolic function with a visually estimated ejection fraction of 60 - 65%. There is normal diastolic function.    Right Ventricle: The right ventricle is normal in size. Wall thickness is normal. Systolic function is normal.    Tricuspid Valve: There is mild regurgitation.    Pulmonary Artery: The estimated pulmonary artery systolic pressure is 19 mmHg.    IVC/SVC: Normal venous pressure at 3 mmHg.    Results for orders placed during the hospital encounter of 11/22/21    Exercise Stress - EKG    Interpretation Summary    The EKG portion of this study is negative for ischemia. Sensitivity is reduced secondary to the target heart rate not being achieved.    The patient reported 4/10 chest pain at baseline that increased to 7/10 during the stress test.    The blood pressure response to stress was normal.    There were no arrhythmias during stress.    The exercise capacity was moderately impaired (7 METs).     Results for orders placed during the hospital encounter of 05/25/21    Cardiac catheterization    Conclusion  · Successful device upgrade.  · Successful implantation of PPM Dual.  · Scar excision (5 cm x 1 cm x 0.5 cm).    I certify that I was present for the critical steps of the procedure including the diagnostic, surgical and/or interventional portions.    Procedure Log documented by Documenter: Shadi Carter and verified by Piter Cooley MD.    Date: 5/25/2021  Time: 3:46 PM         Problem List[1]  Past Surgical History:   Procedure Laterality Date    A-V CARDIAC PACEMAKER INSERTION Left 07/18/2019    Procedure: INSERTION, CARDIAC PACEMAKER, DUAL CHAMBER;  Surgeon: Piter Cooley MD;  Location: [facility] CATH LAB/EP;  Service: Cardiology;  Laterality: Left;    PHLEBOGRAPHY  05/25/2021    Procedure: Venogram;  Surgeon: Piter Cooley MD;  Location: [facility] EP LAB;  Service: Cardiology;;    REPLACEMENT OF PACEMAKER GENERATOR  05/25/2021     "Procedure: REPLACEMENT, PACEMAKER GENERATOR;  Surgeon: Piter Cooley MD;  Location: [facility] EP LAB;  Service: Cardiology;;    WISDOM TOOTH EXTRACTION       Social History[2]     Family History   Problem Relation Name Age of Onset    Diabetes Mother Johanna reyes     Miscarriages / Stillbirths Mother Johanna reyes     Stroke Maternal Grandfather Lul dudley     Heart disease Maternal Grandfather Lul dudley     Diabetes Maternal Grandfather Lul dudley     Irregular heart beat Sister Leigh     Diabetes Maternal Grandmother Jessica dudley     Cancer Maternal Grandmother Jessica dudley     Arthritis Paternal Grandmother N/A     Diabetes Paternal Grandmother N/A     Diabetes Paternal Grandfather tamika     Diabetes Father Vivek reyes     Breast cancer Neg Hx      Ovarian cancer Neg Hx      Colon cancer Neg Hx       Review of patient's allergies indicates:   Allergen Reactions    Lamictal  [lamotrigine]      Other reaction(s): HIVES    [id number] antimigraine agents Other (See Comments)     Contraindicated in complex migraines, needs to avoid     Cefzil [cefprozil] Rash    Demerol [meperidine] Rash     Other reaction(s): Rash    Pcn [penicillins] Rash     Other reaction(s): Rash         ROS:                                                                                                                                                                             Negative except as stated in the history of present illness. See HPI for details.    PHYSICAL EXAM:  Visit Vitals  /80 (BP Location: Right arm, Patient Position: Sitting)   Pulse 76   Temp 98.1 °F (36.7 °C)   Resp 18   Ht 5' 4.96" (1.65 m)   Wt 92.5 kg (204 lb)   LMP 05/29/2024   SpO2 97%   BMI 33.99 kg/m²       OE:   General - young female in no cardio-pulmonary distress  CVS: RRR, no murmur, 1+pedal edema bilateral  RS:chest clear, no wheeze  ABD: gravid   CNS: awake and alert. No focal deficits    Current Outpatient Medications   Medication " Instructions    acetaminophen (TYLENOL) 500 mg, Every 6 hours PRN    albuterol (VENTOLIN HFA) 90 mcg/actuation inhaler 2 puffs, Inhalation, Every 6 hours PRN, Rescue    albuterol-ipratropium (DUO-NEB) 2.5 mg-0.5 mg/3 mL nebulizer solution 3 mLs, Nebulization, Every 6 hours PRN, Rescue    benzocaine-lanolin (DERMOPLAST, WITH MENTHOL,) 20-0.5 % Aero Topical (Top), 4 times daily PRN    betaxoloL (KERLONE) 10 mg, Oral, Daily    budesonide-formoterol 160-4.5 mcg (SYMBICORT) 160-4.5 mcg/actuation HFAA 2 puffs, Inhalation, Every 12 hours    famotidine (PEPCID) 40 mg, Oral, Nightly    hydrocortisone-pramoxine (PROCTOFOAM-HS) rectal foam 1 applicator, Rectal, 2 times daily    magnesium oxide (MAG-OX) 400 mg, Daily    PRENATAL 28 mg iron- 800 mcg Tab 1 tablet    riboflavin (vitamin B2) (VITAMIN B-2) 100 mg, Daily          All medications, laboratory studies, cardiac diagnostic imaging independently reviewed.     Lab Results   Component Value Date    CHOL 185 07/14/2024    CHOL 200 (H) 07/01/2023    HDL 42 07/14/2024    HDL 44 07/01/2023    TRIG 130 07/14/2024    TRIG 130 07/01/2023    TROPONINI <0.006 07/14/2024    TROPONINI <0.006 07/14/2024    TROPONINI <0.006 03/26/2021    CREATININE 0.63 06/25/2025    CREATININE 0.72 09/23/2024    MG 1.9 03/27/2021    K 4.0 06/25/2025    K 4.3 09/23/2024          ASSESSMENT/PLAN:    Recurrent vaso-vagal syncope s/p dual chamber RA/RV PPM in 2019  - Reviewed device interrogation last month which showed that she is very dependent on her RA lead >60%. Will need cardiac MRI at some point but not urgently to rule out other eitologies for conduction abnormalities. No risk factors for lyme disease.   -Will rx compression stockings to aid in hypotensive component of pre-syncopal episodes. She was counselled on measure to abort hypotension such as fist making, crossing legs when standing etc and maintain adequate hydration  -she is protected from a cardiology standpoint for delivery given presence  of pacemaker        RTC in 6 months        Signed:  Parveen Bain MD  Cardiology PGY4'         [1]   Patient Active Problem List  Diagnosis    Migraine headache    Syncope, vasovagal    Vitamin D deficiency    Anxiety    Cardiac pacemaker in situ    Sick sinus syndrome    Neck pain    Paresthesia and pain of right extremity    Palpitations    Pacemaker syndrome    Impaired flexibility of upper extremity    Bilateral neck pain    Impaired range of motion of cervical spine    Circadian rhythm sleep disorder, jet lag type    Chest pain    Hemiplegic migraine without status migrainosus    PFO (patent foramen ovale)    Pregnancy    Mild persistent asthma without complication    - Asthma during pregnancy    - Cardiac disease during pregnancy in second trimester    - Hemorrhoids during pregnancy in second trimester    - Anxiety during pregnancy   [2]   Social History  Socioeconomic History    Marital status: Single   Tobacco Use    Smoking status: Never     Passive exposure: Never    Smokeless tobacco: Never   Substance and Sexual Activity    Alcohol use: Not Currently     Comment: occasionally     Drug use: Never    Sexual activity: Yes     Partners: Male   Social History Narrative    Lives at home with mom, dad, 3 sisters, and 2 dogs.        Self-employed.         Rides bike three times weekly.      Social Drivers of Health     Financial Resource Strain: High Risk (4/7/2025)    Overall Financial Resource Strain (CARDIA)     Difficulty of Paying Living Expenses: Hard   Food Insecurity: Food Insecurity Present (4/7/2025)    Hunger Vital Sign     Worried About Running Out of Food in the Last Year: Often true     Ran Out of Food in the Last Year: Often true   Transportation Needs: No Transportation Needs (4/7/2025)    PRAPARE - Transportation     Lack of Transportation (Medical): No     Lack of Transportation (Non-Medical): No   Physical Activity: Unknown (4/7/2025)    Exercise Vital Sign     Days of Exercise per Week: 0  days   Stress: No Stress Concern Present (4/7/2025)    [employer] - Occupational Stress Questionnaire     Feeling of Stress : Only a little   Housing Stability: High Risk (4/7/2025)    Housing Stability Vital Sign     Unable to Pay for Housing in the Last Year: Yes     Number of Times Moved in the Last Year: 1     Homeless in the Last Year: No

## 2025-07-30 RX ORDER — VIT C/E/ZN/COPPR/LUTEIN/ZEAXAN 250MG-90MG
1 CAPSULE ORAL DAILY
Qty: 30 TABLET | Refills: 1 | Status: CANCELLED | OUTPATIENT
Start: 2025-07-30

## 2025-07-31 ENCOUNTER — HOSPITAL ENCOUNTER (OUTPATIENT)
Facility: HOSPITAL | Age: 25
Discharge: HOME | End: 2025-07-31
Attending: OBSTETRICS & GYNECOLOGY | Admitting: OBSTETRICS & GYNECOLOGY
Payer: MEDICAID

## 2025-07-31 ENCOUNTER — OFFICE VISIT (OUTPATIENT)
Dept: FAMILY MEDICINE | Facility: CLINIC | Age: 25
End: 2025-07-31
Payer: MEDICAID

## 2025-07-31 VITALS
TEMPERATURE: 98 F | HEART RATE: 75 BPM | RESPIRATION RATE: 18 BRPM | DIASTOLIC BLOOD PRESSURE: 71 MMHG | SYSTOLIC BLOOD PRESSURE: 108 MMHG | HEIGHT: 64 IN | BODY MASS INDEX: 35 KG/M2 | WEIGHT: 205 LBS | OXYGEN SATURATION: 98 %

## 2025-07-31 VITALS
SYSTOLIC BLOOD PRESSURE: 98 MMHG | HEART RATE: 75 BPM | RESPIRATION RATE: 18 BRPM | TEMPERATURE: 98 F | DIASTOLIC BLOOD PRESSURE: 65 MMHG

## 2025-07-31 DIAGNOSIS — N89.8 VAGINAL DISCHARGE DURING PREGNANCY IN THIRD TRIMESTER: ICD-10-CM

## 2025-07-31 DIAGNOSIS — O26.893 DYSURIA IN PREGNANCY IN THIRD TRIMESTER: Primary | ICD-10-CM

## 2025-07-31 DIAGNOSIS — O26.893 VAGINAL DISCHARGE DURING PREGNANCY IN THIRD TRIMESTER: ICD-10-CM

## 2025-07-31 DIAGNOSIS — R30.0 DYSURIA IN PREGNANCY IN THIRD TRIMESTER: Primary | ICD-10-CM

## 2025-07-31 DIAGNOSIS — O36.8131 DECREASED FETAL MOVEMENTS IN THIRD TRIMESTER, FETUS 1 OF MULTIPLE GESTATION: ICD-10-CM

## 2025-07-31 LAB
BACTERIA #/AREA URNS AUTO: ABNORMAL /HPF
BILIRUB UR QL STRIP.AUTO: NEGATIVE
C TRACH DNA SPEC QL NAA+PROBE: NOT DETECTED
CLARITY UR: ABNORMAL
CLUE CELLS VAG QL WET PREP: ABNORMAL
COLOR UR AUTO: ABNORMAL
GLUCOSE UR QL STRIP: NORMAL
HGB UR QL STRIP: NEGATIVE
HYALINE CASTS #/AREA URNS LPF: ABNORMAL /LPF
KETONES UR QL STRIP: NEGATIVE
LEUKOCYTE ESTERASE UR QL STRIP: 500
MUCOUS THREADS URNS QL MICRO: ABNORMAL /LPF
N GONORRHOEA DNA SPEC QL NAA+PROBE: NOT DETECTED
NITRITE UR QL STRIP: NEGATIVE
PH UR STRIP: 7 [PH]
PROT UR QL STRIP: NEGATIVE
RBC #/AREA URNS AUTO: ABNORMAL /HPF
SP GR UR STRIP.AUTO: 1.02 (ref 1–1.03)
SPECIMEN SOURCE: NORMAL
SQUAMOUS #/AREA URNS LPF: ABNORMAL /HPF
T VAGINALIS VAG QL WET PREP: ABNORMAL
UROBILINOGEN UR STRIP-ACNC: NORMAL
WBC #/AREA URNS AUTO: ABNORMAL /HPF
WBC #/AREA VAG WET PREP: ABNORMAL
YEAST SPEC QL WET PREP: ABNORMAL

## 2025-07-31 PROCEDURE — 87491 CHLMYD TRACH DNA AMP PROBE: CPT

## 2025-07-31 PROCEDURE — 87210 SMEAR WET MOUNT SALINE/INK: CPT

## 2025-07-31 PROCEDURE — 87086 URINE CULTURE/COLONY COUNT: CPT

## 2025-07-31 PROCEDURE — 81003 URINALYSIS AUTO W/O SCOPE: CPT

## 2025-07-31 PROCEDURE — 59025 FETAL NON-STRESS TEST: CPT

## 2025-07-31 PROCEDURE — 99214 OFFICE O/P EST MOD 30 MIN: CPT | Mod: PBBFAC,25

## 2025-07-31 RX ORDER — VIT C/E/ZN/COPPR/LUTEIN/ZEAXAN 250MG-90MG
1 CAPSULE ORAL DAILY
Qty: 90 TABLET | Refills: 1 | Status: SHIPPED | OUTPATIENT
Start: 2025-07-31

## 2025-07-31 RX ORDER — MICONAZOLE NITRATE 2 %
1 CREAM WITH APPLICATOR VAGINAL NIGHTLY
Qty: 45 G | Refills: 0 | Status: SHIPPED | OUTPATIENT
Start: 2025-07-31 | End: 2025-08-07

## 2025-07-31 NOTE — PROGRESS NOTES
[facility] OFFICE VISIT NOTE  April Lorrie Sinclair  [id number]  2025    Chief Complaint   Patient presents with    Dysuria     Onset Monday, and c/o abdominal pressure       April Lorrie Sinclair is a 25 y.o. female  presenting to [facility] for dysuria.    HPI    Dysuria  Vaginal discharge  24 yo  at 29w4d with KETURAH 10/12/25 by US. Followed by [facility] clinic for prenatal care. Presenting due to concern for UTI. Urinary frequency, dysuria and lower abdominal pressure x 4 days. Also complains of increased vaginal discharge. No contractions or LOF. Nausea (which is chronic throughout current pregnancy) but denies vomiting. Decreased fetal movement for last two days.      PMH:  Seizures- May 2017- stopped all anti-epileptic seizure medication in  by Neurologist- Dr. Odilon Brown in [place], [facility] note 7/10/25 believes may have been seizure type activity after syncopal episode related to her sick sinus syndrome after reviewing chart  Asthma- using Symbicort inhaler scheduled and albuterol inhaler p.r.n.- Pulmonologist in [place]  Sick Sinus Syndrome/PFO- Pacemaker placed 2019- cardiologist and electrophysiologist currently located in [place]     Current Medications:   Current Outpatient Medications   Medication Instructions    acetaminophen (TYLENOL) 500 mg, Every 6 hours PRN    albuterol (VENTOLIN HFA) 90 mcg/actuation inhaler 2 puffs, Inhalation, Every 6 hours PRN, Rescue    albuterol-ipratropium (DUO-NEB) 2.5 mg-0.5 mg/3 mL nebulizer solution 3 mLs, Nebulization, Every 6 hours PRN, Rescue    benzocaine-lanolin (DERMOPLAST, WITH MENTHOL,) 20-0.5 % Aero Topical (Top), 4 times daily PRN    betaxoloL (KERLONE) 10 mg, Oral, Daily    budesonide-formoterol 160-4.5 mcg (SYMBICORT) 160-4.5 mcg/actuation HFAA 2 puffs, Inhalation, Every 12 hours    famotidine (PEPCID) 40 mg, Oral, Nightly    hydrocortisone-pramoxine (PROCTOFOAM-HS) rectal foam 1 applicator, Rectal, 2 times daily    magnesium oxide (MAG-OX)  "400 mg, Daily    miconazole (MICOTIN) 2 % vaginal cream 1 applicator, Vaginal, Nightly    PRENATAL 28 mg iron- 800 mcg Tab 1 tablet, Oral, Daily    riboflavin (vitamin B2) (VITAMIN B-2) 100 mg, Daily       Review of Systems  As per HPI    Blood pressure 108/71, pulse 75, temperature 98 °F (36.7 °C), temperature source Oral, resp. rate 18, height 5' 4" (1.626 m), weight 93 kg (205 lb), last menstrual period 05/29/2024, SpO2 98%.   Physical Exam  Vitals and nursing note reviewed. Exam conducted with a chaperone present.   Constitutional:       General: She is not in acute distress.  HENT:      Head: Normocephalic and atraumatic.      Mouth/Throat:      Mouth: Mucous membranes are moist.   Eyes:      General: No scleral icterus.  Cardiovascular:      Rate and Rhythm: Normal rate and regular rhythm.      Heart sounds: No murmur heard.     No gallop.   Pulmonary:      Effort: Pulmonary effort is normal. No respiratory distress.      Breath sounds: Normal breath sounds. No wheezing, rhonchi or rales.   Abdominal:      General: Bowel sounds are normal.      Palpations: Abdomen is soft.      Tenderness: There is no abdominal tenderness. There is no guarding or rebound.      Comments: Gravid. Fundal height 29 cm. + FHT by dopper; 140 bpm.    Genitourinary:     Comments: Normal external genitalia without rashes or lesions.  Cervix nondilated and without lesions.  No cervical motion tenderness.  Moderate amount of thick white discharge.  No vaginal bleeding noted.  Musculoskeletal:      Cervical back: Neck supple.      Right lower leg: No edema.      Left lower leg: No edema.   Skin:     General: Skin is warm.   Neurological:      Mental Status: She is alert and oriented to person, place, and time.             Assessment/Plan:    Decreased fetal movements in third trimester, fetus 1 of multiple gestation  Vaginal discharge during pregnancy in third trimester  Dysuria in pregnancy in third trimester  -wet prep with moderate " yeast. Vaginal miconazole sent to pharmacy. Patient aware.  -pending urine culture and GC/CT  -PNV refilled  -+ FHT by doppler. However, patient reporting decreased fetal movement. Discussed with OB staff. Recommended report to [facility] for BPP for further evaluation given history. Patient verbalized understanding.  -strict ER/RTC precautions provided      Orders Placed This Encounter    Urine Culture High Risk    Wet Prep, Genital    Chlamydia/GC, PCR    Urinalysis    miconazole (MICOTIN) 2 % vaginal cream    PRENATAL 28 mg iron- 800 mcg Tab       Return to clinic in 1 week in [facility] for routine prenatal visit, or sooner if needed.     Louise Sanches MD  [facility], PGY-III

## 2025-08-02 LAB — BACTERIA UR CULT: NO GROWTH

## 2025-08-03 ENCOUNTER — PATIENT MESSAGE (OUTPATIENT)
Dept: OTHER | Facility: OTHER | Age: 25
End: 2025-08-03
Payer: MEDICAID

## 2025-08-07 ENCOUNTER — OFFICE VISIT (OUTPATIENT)
Dept: FAMILY MEDICINE | Facility: CLINIC | Age: 25
End: 2025-08-07
Payer: MEDICAID

## 2025-08-07 VITALS
TEMPERATURE: 98 F | DIASTOLIC BLOOD PRESSURE: 78 MMHG | WEIGHT: 210.19 LBS | BODY MASS INDEX: 35.88 KG/M2 | SYSTOLIC BLOOD PRESSURE: 116 MMHG | RESPIRATION RATE: 20 BRPM | HEIGHT: 64 IN | HEART RATE: 76 BPM | OXYGEN SATURATION: 98 %

## 2025-08-07 DIAGNOSIS — O99.519 ASTHMA DURING PREGNANCY: ICD-10-CM

## 2025-08-07 DIAGNOSIS — O99.412 CARDIAC DISEASE DURING PREGNANCY IN SECOND TRIMESTER: ICD-10-CM

## 2025-08-07 DIAGNOSIS — J45.909 ASTHMA DURING PREGNANCY: ICD-10-CM

## 2025-08-07 DIAGNOSIS — Z3A.30 30 WEEKS GESTATION OF PREGNANCY: Primary | ICD-10-CM

## 2025-08-07 LAB
BILIRUB SERPL-MCNC: NORMAL MG/DL
BLOOD URINE, POC: NORMAL
CLARITY, POC UA: CLEAR
COLOR, POC UA: YELLOW
GLUCOSE UR QL STRIP: NORMAL
KETONES UR QL STRIP: NORMAL
LEUKOCYTE ESTERASE URINE, POC: NORMAL
NITRITE, POC UA: NORMAL
PH, POC UA: 7.5
PROTEIN, POC: NORMAL
SPECIFIC GRAVITY, POC UA: 1.01
UROBILINOGEN, POC UA: 0.2

## 2025-08-07 PROCEDURE — 99214 OFFICE O/P EST MOD 30 MIN: CPT | Mod: PBBFAC

## 2025-08-07 RX ORDER — ONDANSETRON 4 MG/1
4 TABLET, ORALLY DISINTEGRATING ORAL EVERY 6 HOURS PRN
Qty: 30 TABLET | Refills: 2 | Status: SHIPPED | OUTPATIENT
Start: 2025-08-07

## 2025-08-07 NOTE — PROGRESS NOTES
[place] OFFICE VISIT NOTE     Primary PCP:     Chief Complaint     April Lorrie Sinclair is a 25 y.o. female   30w4d 10/12/2025, by Ultrasound presenting to [place] for routine prenatal visit.    HPI: Patient reports doing well with no acute concerns today. Reports good fetal movement. Denies vaginal bleeding, discharge, LOF, ctx.    Relevant PMH:    - Seizures- May 2017- stopped all anti-epileptic seizure medication in 2018 by Neurologist- Dr. Odilon Brown in [facility] note 7/10/25 believes may have been seizure type activity after syncopal episode related to her sick sinus syndrome after reviewing chart  - Asthma- using Symbicort inhaler scheduled and albuterol inhaler p.r.n.- Pulmonologist in [place]  - Sick Sinus Syndrome/PFO- Pacemaker placed 2019- follows cardiologist and electrophysiologist     Obstetrics History     OB History          1    Para   0    Term   0       0    AB   0    Living   0         SAB   0    IAB   0    Ectopic   0    Multiple   0    Live Births   0           Obstetric Comments     No abnormal pap  No STDs                Gynecology History   - LMP: 2025  - Age at menarche: 10 years  - Menstrual hx: regular, 5 pads/day, 10 days per period  - History of birth control: OCP  - History of STDs and/or Abnormal PAPs: denies abnormal pap smear  - History of prior : none    Medical History    Past Medical History:  Mild persistent asthma, anxiety, sick sinus syndrome status post pacemaker placement, PFO  Surgical History: pacemaker placement; wisdom teeth  Family History: maternal brother-stroke; maternal grandfather- heart attack;  Father and mother- DM2  Social History: denies any previous smoking history, occasional drinking before current pregnancy; denies any previous drug use history   Medications:  PMV, Pepcid, Symbicort inhaler, albuterol inhaler, betaxolol, vitamin B2    Review of Systems     Antepartum specific   - Fetal  "movements: yes   - Vaginal bleeding: no  - Vaginal discharge: no  - Loss of fluid: no  - Contractions: irregular ctx  - Headaches: no  - Vision changes: no  - Edema: yes, no worsening    Vital Signs     Vitals:    08/07/25 1135   BP: 116/78   BP Location: Right arm   Patient Position: Sitting   Pulse: 76   Resp: 20   Temp: 97.7 °F (36.5 °C)   TempSrc: Oral   SpO2: 98%   Weight: 95.3 kg (210 lb 3.2 oz)   Height: 5' 4" (1.626 m)       Physical Exam   General: in no acute distress  RESP: clear to auscultation bilaterally, non labored  CV: regular rate and rhythm, no murmurs, no edema  ABD: non-tender, BS+  FHTs: 152 bpm via Doppler  Fundal height: 31 cm    Current Medications:      Current Outpatient Medications   Medication Instructions    acetaminophen (TYLENOL) 500 mg, Every 6 hours PRN    albuterol (VENTOLIN HFA) 90 mcg/actuation inhaler 2 puffs, Inhalation, Every 6 hours PRN, Rescue    albuterol-ipratropium (DUO-NEB) 2.5 mg-0.5 mg/3 mL nebulizer solution 3 mLs, Nebulization, Every 6 hours PRN, Rescue    benzocaine-lanolin (DERMOPLAST, WITH MENTHOL,) 20-0.5 % Aero Topical (Top), 4 times daily PRN    betaxoloL (KERLONE) 10 mg, Oral, Daily    budesonide-formoterol 160-4.5 mcg (SYMBICORT) 160-4.5 mcg/actuation HFAA 2 puffs, Inhalation, Every 12 hours    famotidine (PEPCID) 40 mg, Oral, Nightly    hydrocortisone-pramoxine (PROCTOFOAM-HS) rectal foam 1 applicator, Rectal, 2 times daily    magnesium oxide (MAG-OX) 400 mg, Daily    miconazole (MICOTIN) 2 % vaginal cream 1 applicator, Vaginal, Nightly    ondansetron (ZOFRAN-ODT) 4 mg, Oral, Every 6 hours PRN    PRENATAL 28 mg iron- 800 mcg Tab 1 tablet, Oral, Daily    riboflavin (vitamin B2) (VITAMIN B-2) 100 mg, Daily       Labs   Urine dipstick:   Lab Results   Component Value Date    COLORU Yellow 08/07/2025    SPECGRAV 1.015 08/07/2025    PHUR 7.5 08/07/2025    WBCUR small 08/07/2025    NITRITE neg 08/07/2025    PROTEINPOC neg 08/07/2025    GLUCOSEUR neg 08/07/2025    " KETONESU neg 2025    UROBILINOGEN 0.2 2025    BILIRUBINPOC neg 2025    RBCUR neg 2025       Initial OB Labs: (Date 2025)  - Blood Type and Rh: A+  - Antibody Screen: Negative  - CBC H/H: 13.2/40.8  - BUN/Cr: (from 24 lab) 11/0.72  - HIV: NR  - RPR: NR  - GC: negative  - CT: negative  - HBsAg:  NR  - HCVAb: NR  - Rubella: Immune  - Varicella: Immune  - UA & Culture: no growth  - Sickle Cell Screen:  not performed with initial labs  - PAP: NIL  - Influenza vaccine date: N/A   - Previous  (N/A if not applicable): N/A  - BTL desired (N/A if not applicable): N/A     15-20 Weeks Lab:   - Quad Screen: not performed; out of window    28 Week Labs: (Date 2025)  - 1H GTT: 91  - Rhogam (N/A if not applicable): N/A  - Date of Tdap: 2025  - CBC H/H: 11.7/37.5  - RPR: NR  -  Consent Form Signed (N/A if not applicable): NO  - BTL consent: no  - C for pediatric care:     37 Week Labs: (Date)  - CBC H/H:   - RPR:   - GBS Culture:    - HIV:   - Cervical GC:   - Cervical Exam:     38 Weeks:  -Cervical Exam:     39 Weeks:  -Cervical Exam:  -NST Exam:    FM Continuity Patient: no  Previous : no    Scheduled delivery: induction or  (can NOT be scheduled any earlier than 39w0d):    Imaging    Initial US: 2025):  A lindsey IUP is identified. Embryonic cardiac activity is observed. Dating is assigned based on the CRL from today's study. Gestational age is 6w 5d, with an KETURAH of 10/12/2025. The maternal adnexae are normal in appearance.      Anatomy Scan (25):  A detailed fetal anatomic ultrasound examination was performed today due to the following high risk indication: maternal congenital cardiac disease.   No fetal structural abnormalities are identified today, and fetal size is appropriate for EGA.   Cervical length by TA scanning is normal.   Placental location is posterior without evidence of previa.   Recommend follow up growth scan at 32 weeks  gestational age.     Repeat scan 7/10/25  A viable lindsey pregnancy is visualized in cephalic presentation. Estimated fetal weight is at the 45th percentile with an abdominal circumference at the 41st percentile.   No fetal abnormalities are noted and previous anatomic survey was normal. Amniotic fluid volume is normal. Placenta is posterior.        Assessment     1. 30 weeks gestation of pregnancy    2. - Cardiac disease during pregnancy in second trimester    3. - Asthma during pregnancy        Plan     30 weeks gestation of pregnancy  - OB Protocol discussed  - PNVs  - Urine dip reviewed as above  - Routine labs: none indicated today  - Mother plans to breast feed  - Postpartum contraception discussion: Undecided, considering pills  - ED precautions discussed: vaginal bleeding or leaking fluid, belly cramping or pain, SOB/chest pain, swelling of the face/lower extremities, vision changes. If don't feel the baby move in over an hour. Severe headache that are not resolved with medication       Sick Sinus Syndrome  - Now established with Cardiology in [place]. Keep current scheduled appts.   - Keep appts with MFM  - Per MFM recs - Anesthesia consult in third trimester, Ok to continue Betaxolol.  - Patient will discuss with M regarding their recommendations on whether to deliver here or in [place]. She will let us know at next appt.       Asthma  - Following with Pulmonology here  - Continue Symbicort BID, Duo-Neb PRN, Albuterol PRN   - Per MFM, Growth ultrasounds every 4-6 weeks, starting at 26-28 weeks  for women with moderate persistent or severe persistent asthma  - Per MFM, Antepartum testing at 32 for women with moderate persistent or severe persistent asthma      Return to clinic in 2 weeks.      Jamila Payne MD  [facility] Family Medicine Resident, HO-3

## 2025-08-11 ENCOUNTER — OFFICE VISIT (OUTPATIENT)
Dept: MATERNAL FETAL MEDICINE | Facility: CLINIC | Age: 25
End: 2025-08-11
Payer: MEDICAID

## 2025-08-11 ENCOUNTER — PROCEDURE VISIT (OUTPATIENT)
Dept: MATERNAL FETAL MEDICINE | Facility: CLINIC | Age: 25
End: 2025-08-11
Payer: MEDICAID

## 2025-08-11 VITALS
HEART RATE: 90 BPM | BODY MASS INDEX: 36.15 KG/M2 | DIASTOLIC BLOOD PRESSURE: 72 MMHG | SYSTOLIC BLOOD PRESSURE: 108 MMHG | WEIGHT: 211.75 LBS | HEIGHT: 64 IN

## 2025-08-11 DIAGNOSIS — O99.340 ANXIETY DURING PREGNANCY: ICD-10-CM

## 2025-08-11 DIAGNOSIS — Z95.0 CARDIAC PACEMAKER IN SITU: Primary | ICD-10-CM

## 2025-08-11 DIAGNOSIS — J45.909 ASTHMA DURING PREGNANCY: ICD-10-CM

## 2025-08-11 DIAGNOSIS — O99.412 CARDIAC DISEASE DURING PREGNANCY IN SECOND TRIMESTER: Primary | ICD-10-CM

## 2025-08-11 DIAGNOSIS — F41.9 ANXIETY DURING PREGNANCY: ICD-10-CM

## 2025-08-11 DIAGNOSIS — Z95.0 CARDIAC PACEMAKER IN SITU: ICD-10-CM

## 2025-08-11 DIAGNOSIS — O99.519 ASTHMA DURING PREGNANCY: ICD-10-CM

## 2025-08-11 DIAGNOSIS — R06.02 SHORTNESS OF BREATH: Primary | ICD-10-CM

## 2025-08-11 DIAGNOSIS — J45.30 MILD PERSISTENT ASTHMA, UNCOMPLICATED: ICD-10-CM

## 2025-08-11 PROBLEM — O22.42 HEMORRHOIDS DURING PREGNANCY IN SECOND TRIMESTER: Status: RESOLVED | Noted: 2025-07-10 | Resolved: 2025-08-11

## 2025-08-11 PROCEDURE — 1160F RVW MEDS BY RX/DR IN RCRD: CPT | Mod: CPTII,S$GLB,, | Performed by: OBSTETRICS & GYNECOLOGY

## 2025-08-11 PROCEDURE — 76816 OB US FOLLOW-UP PER FETUS: CPT | Mod: S$GLB,,, | Performed by: OBSTETRICS & GYNECOLOGY

## 2025-08-11 PROCEDURE — 3074F SYST BP LT 130 MM HG: CPT | Mod: CPTII,S$GLB,, | Performed by: OBSTETRICS & GYNECOLOGY

## 2025-08-11 PROCEDURE — 3008F BODY MASS INDEX DOCD: CPT | Mod: CPTII,S$GLB,, | Performed by: OBSTETRICS & GYNECOLOGY

## 2025-08-11 PROCEDURE — 3078F DIAST BP <80 MM HG: CPT | Mod: CPTII,S$GLB,, | Performed by: OBSTETRICS & GYNECOLOGY

## 2025-08-11 PROCEDURE — 1159F MED LIST DOCD IN RCRD: CPT | Mod: CPTII,S$GLB,, | Performed by: OBSTETRICS & GYNECOLOGY

## 2025-08-11 PROCEDURE — 99214 OFFICE O/P EST MOD 30 MIN: CPT | Mod: TH,25,S$GLB, | Performed by: OBSTETRICS & GYNECOLOGY

## 2025-08-12 ENCOUNTER — HOSPITAL ENCOUNTER (OUTPATIENT)
Dept: RADIOLOGY | Facility: HOSPITAL | Age: 25
Discharge: HOME | End: 2025-08-12
Attending: OBSTETRICS & GYNECOLOGY
Payer: MEDICAID

## 2025-08-12 ENCOUNTER — PATIENT MESSAGE (OUTPATIENT)
Facility: CLINIC | Age: 25
End: 2025-08-12
Payer: MEDICAID

## 2025-08-12 DIAGNOSIS — O99.519 ASTHMA DURING PREGNANCY: Primary | ICD-10-CM

## 2025-08-12 DIAGNOSIS — R06.02 SHORTNESS OF BREATH: ICD-10-CM

## 2025-08-12 DIAGNOSIS — J45.909 ASTHMA DURING PREGNANCY: ICD-10-CM

## 2025-08-12 DIAGNOSIS — O99.519 ASTHMA DURING PREGNANCY: ICD-10-CM

## 2025-08-12 DIAGNOSIS — J45.909 ASTHMA DURING PREGNANCY: Primary | ICD-10-CM

## 2025-08-12 DIAGNOSIS — Z95.0 CARDIAC PACEMAKER IN SITU: ICD-10-CM

## 2025-08-12 DIAGNOSIS — O99.413 CARDIAC DISEASE IN PREGNANCY IN THIRD TRIMESTER: ICD-10-CM

## 2025-08-12 DIAGNOSIS — J45.30 MILD PERSISTENT ASTHMA, UNCOMPLICATED: ICD-10-CM

## 2025-08-12 PROCEDURE — 71046 X-RAY EXAM CHEST 2 VIEWS: CPT | Mod: TC

## 2025-08-15 ENCOUNTER — OFFICE VISIT (OUTPATIENT)
Facility: CLINIC | Age: 25
End: 2025-08-15
Payer: MEDICAID

## 2025-08-15 ENCOUNTER — PATIENT MESSAGE (OUTPATIENT)
Dept: OBSTETRICS AND GYNECOLOGY | Facility: CLINIC | Age: 25
End: 2025-08-15
Payer: MEDICAID

## 2025-08-15 VITALS
DIASTOLIC BLOOD PRESSURE: 74 MMHG | HEART RATE: 75 BPM | SYSTOLIC BLOOD PRESSURE: 107 MMHG | OXYGEN SATURATION: 100 % | WEIGHT: 209.31 LBS | HEIGHT: 64 IN | RESPIRATION RATE: 18 BRPM | BODY MASS INDEX: 35.73 KG/M2

## 2025-08-15 DIAGNOSIS — O99.511 ASTHMA AFFECTING PREGNANCY IN FIRST TRIMESTER: Primary | ICD-10-CM

## 2025-08-15 DIAGNOSIS — J45.909 ASTHMA AFFECTING PREGNANCY IN FIRST TRIMESTER: Primary | ICD-10-CM

## 2025-08-15 PROCEDURE — 1159F MED LIST DOCD IN RCRD: CPT | Mod: CPTII,,, | Performed by: INTERNAL MEDICINE

## 2025-08-15 PROCEDURE — 99999 PR PBB SHADOW E&M-EST. PATIENT-LVL III: CPT | Mod: PBBFAC,,, | Performed by: INTERNAL MEDICINE

## 2025-08-15 PROCEDURE — 3008F BODY MASS INDEX DOCD: CPT | Mod: CPTII,,, | Performed by: INTERNAL MEDICINE

## 2025-08-15 PROCEDURE — 3078F DIAST BP <80 MM HG: CPT | Mod: CPTII,,, | Performed by: INTERNAL MEDICINE

## 2025-08-15 PROCEDURE — 99213 OFFICE O/P EST LOW 20 MIN: CPT | Mod: PBBFAC | Performed by: INTERNAL MEDICINE

## 2025-08-15 PROCEDURE — 1160F RVW MEDS BY RX/DR IN RCRD: CPT | Mod: CPTII,,, | Performed by: INTERNAL MEDICINE

## 2025-08-15 PROCEDURE — 3074F SYST BP LT 130 MM HG: CPT | Mod: CPTII,,, | Performed by: INTERNAL MEDICINE

## 2025-08-15 PROCEDURE — 99214 OFFICE O/P EST MOD 30 MIN: CPT | Mod: S$PBB,,, | Performed by: INTERNAL MEDICINE

## 2025-08-16 ENCOUNTER — HOSPITAL ENCOUNTER (EMERGENCY)
Facility: HOSPITAL | Age: 25
Discharge: HOME | End: 2025-08-16
Payer: MEDICAID

## 2025-08-16 VITALS
HEIGHT: 65 IN | TEMPERATURE: 98 F | DIASTOLIC BLOOD PRESSURE: 68 MMHG | SYSTOLIC BLOOD PRESSURE: 107 MMHG | RESPIRATION RATE: 18 BRPM | HEART RATE: 73 BPM | BODY MASS INDEX: 35.32 KG/M2 | WEIGHT: 212 LBS

## 2025-08-16 PROBLEM — R10.84 GENERALIZED ABDOMINAL WALL PAIN: Status: ACTIVE | Noted: 2025-08-16

## 2025-08-16 PROCEDURE — 99284 EMERGENCY DEPT VISIT MOD MDM: CPT

## 2025-08-17 ENCOUNTER — PATIENT MESSAGE (OUTPATIENT)
Dept: OTHER | Facility: OTHER | Age: 25
End: 2025-08-17
Payer: MEDICAID

## 2025-08-18 ENCOUNTER — TELEPHONE (OUTPATIENT)
Facility: CLINIC | Age: 25
End: 2025-08-18
Payer: MEDICAID

## 2025-08-18 RX ORDER — MONTELUKAST SODIUM 10 MG/1
10 TABLET ORAL NIGHTLY
Qty: 30 TABLET | Refills: 4 | Status: SHIPPED | OUTPATIENT
Start: 2025-08-18 | End: 2025-09-17

## 2025-08-19 ENCOUNTER — TELEPHONE (OUTPATIENT)
Dept: FAMILY MEDICINE | Facility: CLINIC | Age: 25
End: 2025-08-19

## 2025-08-19 ENCOUNTER — OFFICE VISIT (OUTPATIENT)
Dept: FAMILY MEDICINE | Facility: CLINIC | Age: 25
End: 2025-08-19
Payer: MEDICAID

## 2025-08-19 VITALS
DIASTOLIC BLOOD PRESSURE: 71 MMHG | HEIGHT: 65 IN | WEIGHT: 211.5 LBS | RESPIRATION RATE: 18 BRPM | TEMPERATURE: 98 F | BODY MASS INDEX: 35.24 KG/M2 | SYSTOLIC BLOOD PRESSURE: 103 MMHG | HEART RATE: 76 BPM | OXYGEN SATURATION: 100 %

## 2025-08-19 DIAGNOSIS — Z3A.32 32 WEEKS GESTATION OF PREGNANCY: Primary | ICD-10-CM

## 2025-08-19 DIAGNOSIS — B37.31 YEAST VAGINITIS: Primary | ICD-10-CM

## 2025-08-19 DIAGNOSIS — O99.412 CARDIAC DISEASE DURING PREGNANCY IN SECOND TRIMESTER: ICD-10-CM

## 2025-08-19 DIAGNOSIS — O99.519 ASTHMA DURING PREGNANCY: ICD-10-CM

## 2025-08-19 DIAGNOSIS — N89.8 VAGINAL DISCHARGE: ICD-10-CM

## 2025-08-19 DIAGNOSIS — J45.909 ASTHMA DURING PREGNANCY: ICD-10-CM

## 2025-08-19 LAB
BACTERIA #/AREA URNS AUTO: ABNORMAL /HPF
BILIRUB SERPL-MCNC: NEGATIVE MG/DL
BILIRUB UR QL STRIP.AUTO: NEGATIVE
BLOOD URINE, POC: NORMAL
C TRACH DNA SPEC QL NAA+PROBE: NOT DETECTED
CLARITY UR: ABNORMAL
CLARITY, POC UA: NORMAL
CLUE CELLS VAG QL WET PREP: ABNORMAL
COLOR UR AUTO: YELLOW
COLOR, POC UA: NORMAL
GLUCOSE UR QL STRIP: NEGATIVE
GLUCOSE UR QL STRIP: NORMAL
HGB UR QL STRIP: ABNORMAL
HYALINE CASTS #/AREA URNS LPF: ABNORMAL /LPF
KETONES UR QL STRIP: NEGATIVE
KETONES UR QL STRIP: NEGATIVE
LEUKOCYTE ESTERASE UR QL STRIP: 500
LEUKOCYTE ESTERASE URINE, POC: NORMAL
N GONORRHOEA DNA SPEC QL NAA+PROBE: NOT DETECTED
NITRITE UR QL STRIP: NEGATIVE
NITRITE, POC UA: NEGATIVE
PH UR STRIP: 7 [PH]
PH, POC UA: 7
PROT UR QL STRIP: ABNORMAL
PROTEIN, POC: NORMAL
RBC #/AREA URNS AUTO: ABNORMAL /HPF
SP GR UR STRIP.AUTO: 1.02 (ref 1–1.03)
SPECIFIC GRAVITY, POC UA: 1.02
SPECIMEN SOURCE: NORMAL
SQUAMOUS #/AREA URNS LPF: ABNORMAL /HPF
T VAGINALIS VAG QL WET PREP: ABNORMAL
UROBILINOGEN UR STRIP-ACNC: NORMAL
UROBILINOGEN, POC UA: 0.2
WBC #/AREA URNS AUTO: ABNORMAL /HPF
WBC #/AREA VAG WET PREP: ABNORMAL
YEAST SPEC QL WET PREP: ABNORMAL

## 2025-08-19 PROCEDURE — 81001 URINALYSIS AUTO W/SCOPE: CPT

## 2025-08-19 PROCEDURE — 87210 SMEAR WET MOUNT SALINE/INK: CPT

## 2025-08-19 PROCEDURE — 99214 OFFICE O/P EST MOD 30 MIN: CPT | Mod: PBBFAC

## 2025-08-19 PROCEDURE — 87086 URINE CULTURE/COLONY COUNT: CPT

## 2025-08-19 PROCEDURE — 87591 N.GONORRHOEAE DNA AMP PROB: CPT

## 2025-08-19 RX ORDER — FLUCONAZOLE 150 MG/1
150 TABLET ORAL DAILY
Qty: 1 TABLET | Refills: 0 | Status: SHIPPED | OUTPATIENT
Start: 2025-08-19 | End: 2025-08-20

## 2025-08-21 ENCOUNTER — TELEPHONE (OUTPATIENT)
Dept: ADMINISTRATIVE | Facility: OTHER | Age: 25
End: 2025-08-21
Payer: MEDICAID

## 2025-08-21 LAB — BACTERIA UR CULT: NO GROWTH

## 2025-08-25 PROBLEM — E66.01 SEVERE OBESITY (BMI 35.0-39.9) WITH COMORBIDITY: Status: ACTIVE | Noted: 2025-08-25

## 2025-09-02 ENCOUNTER — OFFICE VISIT (OUTPATIENT)
Dept: FAMILY MEDICINE | Facility: CLINIC | Age: 25
End: 2025-09-02
Payer: MEDICAID

## 2025-09-02 ENCOUNTER — CLINICAL SUPPORT (OUTPATIENT)
Dept: CARDIOLOGY | Facility: HOSPITAL | Age: 25
End: 2025-09-02
Payer: MEDICAID

## 2025-09-02 ENCOUNTER — CLINICAL SUPPORT (OUTPATIENT)
Dept: CARDIOLOGY | Facility: HOSPITAL | Age: 25
End: 2025-09-02
Attending: INTERNAL MEDICINE
Payer: MEDICAID

## 2025-09-02 VITALS
SYSTOLIC BLOOD PRESSURE: 108 MMHG | BODY MASS INDEX: 36.26 KG/M2 | DIASTOLIC BLOOD PRESSURE: 73 MMHG | WEIGHT: 217.63 LBS | HEIGHT: 65 IN | RESPIRATION RATE: 18 BRPM | TEMPERATURE: 98 F | HEART RATE: 75 BPM | OXYGEN SATURATION: 100 %

## 2025-09-02 DIAGNOSIS — Z95.0 PRESENCE OF CARDIAC PACEMAKER: ICD-10-CM

## 2025-09-02 DIAGNOSIS — Z3A.34 34 WEEKS GESTATION OF PREGNANCY: Primary | ICD-10-CM

## 2025-09-02 DIAGNOSIS — F41.9 ANXIETY DURING PREGNANCY: ICD-10-CM

## 2025-09-02 DIAGNOSIS — Q21.12 PFO (PATENT FORAMEN OVALE): ICD-10-CM

## 2025-09-02 DIAGNOSIS — M53.82 IMPAIRED RANGE OF MOTION OF CERVICAL SPINE: ICD-10-CM

## 2025-09-02 DIAGNOSIS — I49.5 SICK SINUS SYNDROME: ICD-10-CM

## 2025-09-02 DIAGNOSIS — R00.1 BRADYCARDIA, UNSPECIFIED: ICD-10-CM

## 2025-09-02 DIAGNOSIS — E66.01 SEVERE OBESITY (BMI 35.0-39.9) WITH COMORBIDITY: ICD-10-CM

## 2025-09-02 DIAGNOSIS — R55 SYNCOPE AND COLLAPSE: ICD-10-CM

## 2025-09-02 DIAGNOSIS — G43.809 OTHER MIGRAINE WITHOUT STATUS MIGRAINOSUS, NOT INTRACTABLE: ICD-10-CM

## 2025-09-02 DIAGNOSIS — O99.340 ANXIETY DURING PREGNANCY: ICD-10-CM

## 2025-09-02 DIAGNOSIS — Z95.0 CARDIAC PACEMAKER IN SITU: ICD-10-CM

## 2025-09-02 LAB
BILIRUB SERPL-MCNC: NORMAL MG/DL
BLOOD URINE, POC: NORMAL
CLARITY, POC UA: NORMAL
COLOR, POC UA: YELLOW
GLUCOSE UR QL STRIP: NORMAL
KETONES UR QL STRIP: NORMAL
LEUKOCYTE ESTERASE URINE, POC: NORMAL
NITRITE, POC UA: NORMAL
PH, POC UA: 7
PROTEIN, POC: NORMAL
SPECIFIC GRAVITY, POC UA: 1.02
UROBILINOGEN, POC UA: 0.2

## 2025-09-02 PROCEDURE — 99214 OFFICE O/P EST MOD 30 MIN: CPT | Mod: PBBFAC

## 2025-09-02 PROCEDURE — 81002 URINALYSIS NONAUTO W/O SCOPE: CPT | Mod: PBBFAC

## 2025-09-02 PROCEDURE — 93294 REM INTERROG EVL PM/LDLS PM: CPT | Mod: ,,, | Performed by: INTERNAL MEDICINE

## 2025-09-02 PROCEDURE — 93296 REM INTERROG EVL PM/IDS: CPT | Performed by: INTERNAL MEDICINE

## 2025-09-05 LAB
OHS CV AF BURDEN PERCENT: < 1
OHS CV DC REMOTE DEVICE TYPE: NORMAL
OHS CV RV PACING PERCENT: 0 %

## (undated) DEVICE — PACK PACER PERMANENT

## (undated) DEVICE — SHEATH SAFESHEATH II ULTRA 6FR

## (undated) DEVICE — PAD DEFIB CADENCE ADULT R2

## (undated) DEVICE — DRAPE INCISE IOBAN 2 23X17IN

## (undated) DEVICE — ADHESIVE DERMABOND ADVANCED

## (undated) DEVICE — CAUTERY BOVIE PENCIL

## (undated) DEVICE — SUT 2-0 VICRYL / FS-1

## (undated) DEVICE — SUT 3/0 27IN COATED VICRYL

## (undated) DEVICE — ELECTRODE REM PLYHSV RETURN 9

## (undated) DEVICE — DRESSING AQUACEL AG SILVER 4X4

## (undated) DEVICE — PACK PACEMAKER

## (undated) DEVICE — SUT 2/0 30IN SILK BLK BRAI

## (undated) DEVICE — BLADE PLASMA WIDE SPATULA TIP

## (undated) DEVICE — GAUZE SPONGE XRAY 4X4

## (undated) DEVICE — SCALPEL SZ 15 RETRACTABLE

## (undated) DEVICE — SUT 4.0 VICRYL